# Patient Record
Sex: MALE | Race: WHITE | NOT HISPANIC OR LATINO | Employment: OTHER | URBAN - METROPOLITAN AREA
[De-identification: names, ages, dates, MRNs, and addresses within clinical notes are randomized per-mention and may not be internally consistent; named-entity substitution may affect disease eponyms.]

---

## 2017-08-08 ENCOUNTER — TELEPHONE (OUTPATIENT)
Dept: FAMILY MEDICINE | Facility: CLINIC | Age: 82
End: 2017-08-08

## 2017-08-08 DIAGNOSIS — M17.11 OSTEOARTHRITIS OF RIGHT KNEE, UNSPECIFIED OSTEOARTHRITIS TYPE: ICD-10-CM

## 2017-08-08 NOTE — TELEPHONE ENCOUNTER
Reason for Call:  Medication or medication refill:    Do you use a [facility]?  Name of the pharmacy and phone number for the current request:  [facility] [facility] [address]    Name of the medication requested: diclofenac (VOLTAREN) 1 % GEL           Other request: INCREASE to 3% is being requested   Pharmacist says this is available    Can we leave a detailed message on this number? YES    Phone number patient can be reached at: Home number on file [phone] (home)    Best Time: anytime    Call taken on 8/8/2017 at 10:18 AM by ADAM THOMAS
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
## 2017-08-10 ENCOUNTER — TRANSFERRED RECORDS (OUTPATIENT)
Dept: HEALTH INFORMATION MANAGEMENT | Facility: CLINIC | Age: 82
End: 2017-08-10

## 2017-08-10 ENCOUNTER — TELEPHONE (OUTPATIENT)
Dept: FAMILY MEDICINE | Facility: CLINIC | Age: 82
End: 2017-08-10

## 2017-08-10 DIAGNOSIS — M17.11 OSTEOARTHRITIS OF RIGHT KNEE, UNSPECIFIED OSTEOARTHRITIS TYPE: ICD-10-CM

## 2017-08-10 NOTE — TELEPHONE ENCOUNTER
Reason for Call:  Medication or medication refill:    Do you use a [facility]?  Name of the pharmacy and phone number for the current request:  [facility]    Name of the medication requested: Diclofenac gel.    Other request: Pt was using the 1% gel but this was recently changed to the 3% gel.  When he went to  rx  it was $365.  He can't afford that and would like a new rx sent to his pharmacy for the 1%.     Can we leave a detailed message on this number? YES    Phone number patient can be reached at: Cell number on file:    Telephone Information:   Mobile [phone]       Best Time: any    Call taken on 8/10/2017 at 11:59 AM by SARAH ROSSI

## 2017-08-18 ENCOUNTER — TELEPHONE (OUTPATIENT)
Dept: FAMILY MEDICINE | Facility: CLINIC | Age: 82
End: 2017-08-18
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
## 2017-08-22 ENCOUNTER — OFFICE VISIT (OUTPATIENT)
Dept: FAMILY MEDICINE | Facility: CLINIC | Age: 82
End: 2017-08-22
Payer: COMMERCIAL

## 2017-08-22 VITALS
SYSTOLIC BLOOD PRESSURE: 110 MMHG | HEART RATE: 68 BPM | BODY MASS INDEX: 32.35 KG/M2 | HEIGHT: 70 IN | DIASTOLIC BLOOD PRESSURE: 66 MMHG | WEIGHT: 226 LBS | RESPIRATION RATE: 16 BRPM

## 2017-08-22 DIAGNOSIS — B02.29 POST HERPETIC NEURALGIA: Primary | ICD-10-CM

## 2017-08-22 PROCEDURE — 99214 OFFICE O/P EST MOD 30 MIN: CPT | Performed by: FAMILY MEDICINE

## 2017-08-22 NOTE — MR AVS SNAPSHOT
After Visit Summary   8/22/2017    Canelo Cruz    MRN: [id number]           Patient Information     Date Of Birth          7/30/1931        Visit Information        Provider Department      8/22/2017 7:45 AM Mert English MD [facility]        Today's Diagnoses     Post herpetic neuralgia    -  1       Follow-ups after your visit        Additional Services     NEUROLOGY ADULT REFERRAL       Your provider has referred you for the following:   Consult at [facility]: [facility] [place] [phone]   [email or web address]    Please be aware that coverage of these services is subject to the terms and limitations of your health insurance plan.  Call member services at your health plan with any benefit or coverage questions.      Please bring the following with you to your appointment:    (1) Any X-Rays, CTs or MRIs which have been performed.  Contact the facility where they were done to arrange for  prior to your scheduled appointment.    (2) List of current medications  (3) This referral request   (4) Any documents/labs given to you for this referral                  Who to contact     If you have questions or need follow up information about today's clinic visit or your schedule please contact [facility] directly at [phone].  Normal or non-critical lab and imaging results will be communicated to you by MyChart, letter or phone within 4 business days after the clinic has received the results. If you do not hear from us within 7 days, please contact the clinic through MyChart or phone. If you have a critical or abnormal lab result, we will notify you by phone as soon as possible.  Submit refill requests through cottonTracks or call your pharmacy and they will forward the refill request to us. Please allow 3 business days for your refill to be completed.          Additional Information  "About Your Visit        The [employer] Information     [employer] lets you send messages to your doctor, view your test results, renew your prescriptions, schedule appointments and more. To sign up, go to [email or web address] . Click on \"Log in\" on the left side of the screen, which will take you to the Welcome page. Then click on \"Sign up Now\" on the right side of the page.     You will be asked to enter the access code listed below, as well as some personal information. Please follow the directions to create your username and password.     Your access code is: [email or web address]  Expires: 2017  8:21 AM     Your access code will  in 90 days. If you need help or a new code, please call your [place] clinic or [phone].        Care EveryWhere ID     This is your Care EveryWhere ID. This could be used by other organizations to access your [place] medical records  [id number]        Your Vitals Were     Pulse Respirations Height BMI (Body Mass Index)          68 16 5' 10\" (1.778 m) 32.43 kg/m2         Blood Pressure from Last 3 Encounters:   17 110/66   16 136/72   10/24/16 138/76    Weight from Last 3 Encounters:   17 226 lb (102.5 kg)   16 229 lb (103.9 kg)   10/24/16 229 lb (103.9 kg)              We Performed the Following     NEUROLOGY ADULT REFERRAL        Primary Care Provider Office Phone # Fax #    Yuki Jaime PA-C [phone]       [address]  [facility] [address]        Equal Access to Services     [facility] Hospital[facility]IS ANTONIO : Hadii renetta flannery Soharish, waaxda luqadaha, qaybta kaalmada calderon, ania arevalo . So [facility] [phone].    ATENCIÓN: Si habla español, tiene a bermudez disposición servicios gratuitos de asistencia lingüística. Llame al [phone].    We comply with applicable federal civil rights laws and [place] laws. We do not discriminate on the basis of race, color, national origin, age, disability sex, " sexual orientation or gender identity.            Thank you!     Thank you for choosing [facility]  for your care. Our goal is always to provide you with excellent care. Hearing back from our patients is one way we can continue to improve our services. Please take a few minutes to complete the written survey that you may receive in the mail after your visit with us. Thank you!             Your Updated Medication List - Protect others around you: Learn how to safely use, store and throw away your medicines at www.disposemymeds.org.          This list is accurate as of: 8/22/17  8:21 AM.  Always use your most recent med list.                   Brand Name Dispense Instructions for use Diagnosis    diclofenac 1 % Gel topical gel    VOLTAREN    100 g    Apply 4 grams to knees or 2 grams to hands four times daily using enclosed dosing card.    Osteoarthritis of right knee, unspecified osteoarthritis type       HYDROcodone-acetaminophen 5-325 MG per tablet    NORCO    60 tablet    Take 1 tablet by mouth every 8 hours as needed for moderate to severe pain    Post herpetic neuralgia       lidocaine 5 % Patch    LIDODERM    30 patch    Apply up to 3 patches to painful area at once for up to 12 h within a 24 h period.  Remove after 12 hours.    Post herpetic neuralgia       sennosides 8.6 MG tablet    SENOKOT    60 tablet    Take 1 tablet by mouth daily as needed for constipation    Constipation, unspecified constipation type       triamcinolone 0.1 % cream    KENALOG    80 g    Apply to affected area 1-3 times daily as needed    Dermatitis

## 2017-08-22 NOTE — NURSING NOTE
"Chief Complaint   Patient presents with     Referral     to a neurologist- pain from shingles       Initial /66  Pulse 68  Resp 16  Ht 5' 10\" (1.778 m)  Wt 226 lb (102.5 kg)  BMI 32.43 kg/m2 Estimated body mass index is 32.43 kg/(m^2) as calculated from the following:    Height as of this encounter: 5' 10\" (1.778 m).    Weight as of this encounter: 226 lb (102.5 kg).  Medication Reconciliation: complete     Renetta Toscano CMA      "

## 2017-08-22 NOTE — PROGRESS NOTES
"  SUBJECTIVE:   Canelo Cruz is a 86 year old male who presents to clinic today for the following health issues:      Nerve Pain      Duration: 2 years    Description (location/character/radiation): pain since having shingles    Intensity:  moderate    Accompanying signs and symptoms: would like a referral to neurology    History (similar episodes/previous evaluation): None    Precipitating or alleviating factors: None    Therapies tried and outcome: lidocaine patches, hydrocodone- effective but stopped taking             Problem list and histories reviewed & adjusted, as indicated.  Additional history: as documented    Patient Active Problem List   Diagnosis     History of colonic polyps     Constipation, unspecified constipation type     Post herpetic neuralgia     Decreased libido     ACP (advance care planning)     Erectile dysfunction, unspecified erectile dysfunction type     Osteoarthritis of right knee, unspecified osteoarthritis type     Primary insomnia     Other fatigue     Skin lesion     Past Surgical History:   Procedure Laterality Date     ARTHROSCOPY KNEE Right     ~0827-1767     ORTHOPEDIC SURGERY      right achilles rupture repair with 14 month MRSA infection       Social History   Substance Use Topics     Smoking status: Never Smoker     Smokeless tobacco: Never Used     Alcohol use Yes     No family history on file.          Reviewed and updated as needed this visit by clinical staffTobacco  Allergies  Meds       Reviewed and updated as needed this visit by Provider         ROS:  Constitutional, HEENT, cardiovascular, pulmonary, gi and gu systems are negative, except as otherwise noted.  NEURO: NEGATIVE for weakness, dizziness or paresthesias and POSITIVE for radicular pain due to previous shingles 2 years ago.      OBJECTIVE:                                                    /66  Pulse 68  Resp 16  Ht 5' 10\" (1.778 m)  Wt 226 lb (102.5 kg)  BMI 32.43 kg/m2  Body mass index is " 32.43 kg/(m^2).  GENERAL APPEARANCE: healthy, alert and no distress  SKIN: no suspicious lesions or rashes         ASSESSMENT/PLAN:                                                        ICD-10-CM    1. Post herpetic neuralgia B02.29 NEUROLOGY ADULT REFERRAL       Patient Instructions   I spent 30 minutes with the patient discussing treatment for postherpetic neuralgia.  He has already been seen by [facility] for a consult and they in essence told him there is nothing else they could do with his desires to stay off many of the oral medications.  His lidocaine patches are working well and he will continue with those.  I did refer him to [facility] of neurology for a second opinion on other treatments.  We basically had the discussion that this is possibly a permanent problem for him.  He was interested in going to [facility], however, we settled on [facility] neurology.      Mert English MD  [facility]

## 2017-08-22 NOTE — PATIENT INSTRUCTIONS
I spent 30 minutes with the patient discussing treatment for postherpetic neuralgia.  He has already been seen by [facility] for a consult and they in essence told him there is nothing else they could do with his desires to stay off many of the oral medications.  His lidocaine patches are working well and he will continue with those.  I did refer him to [facility] of neurology for a second opinion on other treatments.  We basically had the discussion that this is possibly a permanent problem for him.  He was interested in going to [facility], however, we settled on [facility] neurology.

## 2017-09-06 DIAGNOSIS — Z00.00 ROUTINE MEDICAL EXAM: ICD-10-CM

## 2017-09-06 DIAGNOSIS — Z12.5 SCREENING FOR PROSTATE CANCER: ICD-10-CM

## 2017-09-06 DIAGNOSIS — E78.1 HYPERTRIGLYCERIDEMIA: ICD-10-CM

## 2017-09-06 DIAGNOSIS — R73.9 ELEVATED BLOOD SUGAR: Primary | ICD-10-CM

## 2017-09-09 DIAGNOSIS — R73.9 ELEVATED BLOOD SUGAR: ICD-10-CM

## 2017-09-09 DIAGNOSIS — Z12.5 SCREENING FOR PROSTATE CANCER: ICD-10-CM

## 2017-09-09 DIAGNOSIS — E78.1 HYPERTRIGLYCERIDEMIA: ICD-10-CM

## 2017-09-09 DIAGNOSIS — Z00.00 ROUTINE MEDICAL EXAM: ICD-10-CM

## 2017-09-09 LAB
ALBUMIN UR-MCNC: NEGATIVE MG/DL
APPEARANCE UR: CLEAR
BASOPHILS # BLD AUTO: 0 10E9/L (ref 0–0.2)
BASOPHILS NFR BLD AUTO: 0.5 %
BILIRUB UR QL STRIP: NEGATIVE
COLOR UR AUTO: YELLOW
DIFFERENTIAL METHOD BLD: NORMAL
EOSINOPHIL # BLD AUTO: 0.2 10E9/L (ref 0–0.7)
EOSINOPHIL NFR BLD AUTO: 2.1 %
ERYTHROCYTE [DISTWIDTH] IN BLOOD BY AUTOMATED COUNT: 14 % (ref 10–15)
GLUCOSE SERPL-MCNC: 99 MG/DL (ref 70–99)
GLUCOSE UR STRIP-MCNC: NEGATIVE MG/DL
HCT VFR BLD AUTO: 47.8 % (ref 40–53)
HGB BLD-MCNC: 16.6 G/DL (ref 13.3–17.7)
HGB UR QL STRIP: NEGATIVE
KETONES UR STRIP-MCNC: NEGATIVE MG/DL
LEUKOCYTE ESTERASE UR QL STRIP: NEGATIVE
LYMPHOCYTES # BLD AUTO: 1.9 10E9/L (ref 0.8–5.3)
LYMPHOCYTES NFR BLD AUTO: 23 %
MCH RBC QN AUTO: 31.5 PG (ref 26.5–33)
MCHC RBC AUTO-ENTMCNC: 34.7 G/DL (ref 31.5–36.5)
MCV RBC AUTO: 91 FL (ref 78–100)
MONOCYTES # BLD AUTO: 0.8 10E9/L (ref 0–1.3)
MONOCYTES NFR BLD AUTO: 9.7 %
NEUTROPHILS # BLD AUTO: 5.2 10E9/L (ref 1.6–8.3)
NEUTROPHILS NFR BLD AUTO: 64.7 %
NITRATE UR QL: NEGATIVE
NON-SQ EPI CELLS #/AREA URNS LPF: NORMAL /LPF
PH UR STRIP: 6 PH (ref 5–7)
PLATELET # BLD AUTO: 198 10E9/L (ref 150–450)
PSA SERPL-ACNC: 3.94 UG/L (ref 0–4)
RBC # BLD AUTO: 5.27 10E12/L (ref 4.4–5.9)
RBC #/AREA URNS AUTO: NORMAL /HPF
SOURCE: NORMAL
SP GR UR STRIP: 1.01 (ref 1–1.03)
TRIGL SERPL-MCNC: 229 MG/DL
UROBILINOGEN UR STRIP-ACNC: 0.2 EU/DL (ref 0.2–1)
WBC # BLD AUTO: 8.1 10E9/L (ref 4–11)
WBC #/AREA URNS AUTO: NORMAL /HPF

## 2017-09-09 PROCEDURE — 82947 ASSAY GLUCOSE BLOOD QUANT: CPT | Performed by: FAMILY MEDICINE

## 2017-09-09 PROCEDURE — 85025 COMPLETE CBC W/AUTO DIFF WBC: CPT | Performed by: FAMILY MEDICINE

## 2017-09-09 PROCEDURE — G0103 PSA SCREENING: HCPCS | Performed by: FAMILY MEDICINE

## 2017-09-09 PROCEDURE — 36415 COLL VENOUS BLD VENIPUNCTURE: CPT | Performed by: FAMILY MEDICINE

## 2017-09-09 PROCEDURE — 81001 URINALYSIS AUTO W/SCOPE: CPT | Performed by: FAMILY MEDICINE

## 2017-09-09 PROCEDURE — 84478 ASSAY OF TRIGLYCERIDES: CPT | Performed by: FAMILY MEDICINE

## 2017-09-11 ENCOUNTER — ALLIED HEALTH/NURSE VISIT (OUTPATIENT)
Dept: NURSING | Facility: CLINIC | Age: 82
End: 2017-09-11
Payer: COMMERCIAL

## 2017-09-11 DIAGNOSIS — Z23 NEED FOR PROPHYLACTIC VACCINATION AND INOCULATION AGAINST INFLUENZA: Primary | ICD-10-CM

## 2017-09-11 PROCEDURE — 90662 IIV NO PRSV INCREASED AG IM: CPT

## 2017-09-11 PROCEDURE — G0008 ADMIN INFLUENZA VIRUS VAC: HCPCS

## 2017-09-11 PROCEDURE — 99207 ZZC NO CHARGE NURSE ONLY: CPT

## 2017-09-11 NOTE — MR AVS SNAPSHOT
"              After Visit Summary   2017    Canelo Cruz    MRN: [id number]           Patient Information     Date Of Birth          1931        Visit Information        Provider Department      2017 11:00 AM BX NURSE [facility]        Today's Diagnoses     Need for prophylactic vaccination and inoculation against influenza    -  1       Follow-ups after your visit        Who to contact     If you have questions or need follow up information about today's clinic visit or your schedule please contact [facility] directly at [phone].  Normal or non-critical lab and imaging results will be communicated to you by Hair Scyncehart, letter or phone within 4 business days after the clinic has received the results. If you do not hear from us within 7 days, please contact the clinic through [employer] or phone. If you have a critical or abnormal lab result, we will notify you by phone as soon as possible.  Submit refill requests through [employer] or call your pharmacy and they will forward the refill request to us. Please allow 3 business days for your refill to be completed.          Additional Information About Your Visit        MyChart Information     [employer] lets you send messages to your doctor, view your test results, renew your prescriptions, schedule appointments and more. To sign up, go to [email or web address] . Click on \"Log in\" on the left side of the screen, which will take you to the Welcome page. Then click on \"Sign up Now\" on the right side of the page.     You will be asked to enter the access code listed below, as well as some personal information. Please follow the directions to create your username and password.     Your access code is: [email or web address]  Expires: 2017  8:21 AM     Your access code will  in 90 days. If you need help or a new code, please call your [facility] or [phone].        Care EveryWhere ID     This " is your Care EveryWhere ID. This could be used by other organizations to access your [place] medical records  [id number]         Blood Pressure from Last 3 Encounters:   08/22/17 110/66   12/13/16 136/72   10/24/16 138/76    Weight from Last 3 Encounters:   08/22/17 226 lb (102.5 kg)   12/13/16 229 lb (103.9 kg)   10/24/16 229 lb (103.9 kg)              We Performed the Following     ADMIN INFLUENZA (For MEDICARE Patients ONLY) []     FLU VACCINE, INCREASED ANTIGEN, PRESV FREE, AGE 65+ [46851]        Primary Care Provider Office Phone # Fax #    Yuki Jaime PA-C [phone]       [address] [facility] [address] [address]        Equal Access to Services     HANH BEAN : Hadii aad ku hadasho Soomaali, waaxda luqadaha, qaybta kaalmada adeegyada, ania mckeonin hayamy arevalo . So [facility] [phone].    ATENCIÓN: Si habla español, tiene a bermudez disposición servicios gratuitos de asistencia lingüística. Dashawn al [phone].    We comply with applicable federal civil rights laws and [place] laws. We do not discriminate on the basis of race, color, national origin, age, disability sex, sexual orientation or gender identity.            Thank you!     Thank you for choosing [facility]  for your care. Our goal is always to provide you with excellent care. Hearing back from our patients is one way we can continue to improve our services. Please take a few minutes to complete the written survey that you may receive in the mail after your visit with us. Thank you!             Your Updated Medication List - Protect others around you: Learn how to safely use, store and throw away your medicines at www.disposemymeds.org.          This list is accurate as of: 9/11/17 11:11 AM.  Always use your most recent med list.                   Brand Name Dispense Instructions for use Diagnosis    diclofenac 1 % Gel topical gel    VOLTAREN    100 g    Apply  4 grams to knees or 2 grams to hands four times daily using enclosed dosing card.    Osteoarthritis of right knee, unspecified osteoarthritis type       HYDROcodone-acetaminophen 5-325 MG per tablet    NORCO    60 tablet    Take 1 tablet by mouth every 8 hours as needed for moderate to severe pain    Post herpetic neuralgia       lidocaine 5 % Patch    LIDODERM    30 patch    Apply up to 3 patches to painful area at once for up to 12 h within a 24 h period.  Remove after 12 hours.    Post herpetic neuralgia       sennosides 8.6 MG tablet    SENOKOT    60 tablet    Take 1 tablet by mouth daily as needed for constipation    Constipation, unspecified constipation type       triamcinolone 0.1 % cream    KENALOG    80 g    Apply to affected area 1-3 times daily as needed    Dermatitis

## 2017-09-11 NOTE — PROGRESS NOTES
Injectable Influenza Immunization Documentation    1.  Are you sick today? (Fever of 100.5 or higher on the day of the clinic)   No    2.  Have you ever had Guillain-Bend Syndrome within 6 weeks of an influenza vaccionation?  No    3. Do you have a life-threatening allergy to eggs?  No    4. Do you have a life-threatening allergy to a component of the vaccine? May include antibiotics, gelatin or latex.  No     5. Have you ever had a reaction to a dose of flu vaccine that needed immediate medical attention?  No     Form completed by Jose Luis PEREZ

## 2017-09-14 ENCOUNTER — TELEPHONE (OUTPATIENT)
Dept: FAMILY MEDICINE | Facility: CLINIC | Age: 82
End: 2017-09-14

## 2017-09-14 NOTE — TELEPHONE ENCOUNTER
Reason for Call:  Request for results:    Name of test or procedure: 9/9    Date of test of procedure: bx    Location of the test or procedure: wants copy of all lab tests done and results-mail to him please    OK to leave the result message on voice mail or with a family member? YES    Phone number Patient can be reached at:  Home number on file [phone] (home)    Additional comments: mail to pt    Call taken on 9/14/2017 at 10:49 AM by DOMITILA TOVAR

## 2017-09-14 NOTE — LETTER
September 14, 2017      Canelo Lopesrell  [address]  [place] [address]        Dear ,    We are writing to inform you of your test results.    Results for orders placed or performed in visit on 09/09/17   CBC with platelets differential   Result Value Ref Range    WBC 8.1 4.0 - 11.0 10e9/L    RBC Count 5.27 4.4 - 5.9 10e12/L    Hemoglobin 16.6 13.3 - 17.7 g/dL    Hematocrit 47.8 40.0 - 53.0 %    MCV 91 78 - 100 fl    MCH 31.5 26.5 - 33.0 pg    MCHC 34.7 31.5 - 36.5 g/dL    RDW 14.0 10.0 - 15.0 %    Platelet Count 198 150 - 450 10e9/L    Diff Method Automated Method     % Neutrophils 64.7 %    % Lymphocytes 23.0 %    % Monocytes 9.7 %    % Eosinophils 2.1 %    % Basophils 0.5 %    Absolute Neutrophil 5.2 1.6 - 8.3 10e9/L    Absolute Lymphocytes 1.9 0.8 - 5.3 10e9/L    Absolute Monocytes 0.8 0.0 - 1.3 10e9/L    Absolute Eosinophils 0.2 0.0 - 0.7 10e9/L    Absolute Basophils 0.0 0.0 - 0.2 10e9/L   UA with Microscopic   Result Value Ref Range    Color Urine Yellow     Appearance Urine Clear     Glucose Urine Negative NEG^Negative mg/dL    Bilirubin Urine Negative NEG^Negative    Ketones Urine Negative NEG^Negative mg/dL    Specific Gravity Urine 1.015 1.003 - 1.035    pH Urine 6.0 5.0 - 7.0 pH    Protein Albumin Urine Negative NEG^Negative mg/dL    Urobilinogen Urine 0.2 0.2 - 1.0 EU/dL    Nitrite Urine Negative NEG^Negative    Blood Urine Negative NEG^Negative    Leukocyte Esterase Urine Negative NEG^Negative    Source Midstream Urine     WBC Urine O - 2 OTO2^O - 2 /HPF    RBC Urine O - 2 OTO2^O - 2 /HPF    Squamous Epithelial /LPF Urine Few FEW^Few /LPF   Prostate spec antigen screen   Result Value Ref Range    PSA 3.94 0 - 4 ug/L   Triglycerides   Result Value Ref Range    Triglycerides 229 (H) <150 mg/dL   Glucose   Result Value Ref Range    Glucose 99 70 - 99 mg/dL             If you have any questions or concerns, please call the clinic at the number listed above.        Sincerely,        Mert English MD

## 2017-09-19 ENCOUNTER — OFFICE VISIT (OUTPATIENT)
Dept: FAMILY MEDICINE | Facility: CLINIC | Age: 82
End: 2017-09-19
Payer: COMMERCIAL

## 2017-09-19 VITALS
SYSTOLIC BLOOD PRESSURE: 128 MMHG | TEMPERATURE: 98 F | DIASTOLIC BLOOD PRESSURE: 78 MMHG | HEART RATE: 71 BPM | BODY MASS INDEX: 32.86 KG/M2 | RESPIRATION RATE: 16 BRPM | WEIGHT: 229 LBS

## 2017-09-19 DIAGNOSIS — E78.1 HYPERTRIGLYCERIDEMIA: Primary | ICD-10-CM

## 2017-09-19 DIAGNOSIS — B02.29 POST HERPETIC NEURALGIA: ICD-10-CM

## 2017-09-19 PROCEDURE — 99213 OFFICE O/P EST LOW 20 MIN: CPT | Performed by: FAMILY MEDICINE

## 2017-09-19 NOTE — PROGRESS NOTES
SUBJECTIVE:   Canelo Cruz is a 86 year old male who presents to clinic today for the following health issues:      Lab Results      Duration: from 9/9/17    Description (location/character/radiation): elevated triglycerides    Intensity:  moderate    Accompanying signs and symptoms: na    History (similar episodes/previous evaluation): None    Precipitating or alleviating factors: None    Therapies tried and outcome: None         Hyperlipidemia Follow-Up      Rate your low fat/cholesterol diet?: good    Taking statin?  No    Other lipid medications/supplements?:  none          Problem list and histories reviewed & adjusted, as indicated.  Additional history: as documented    Patient Active Problem List   Diagnosis     History of colonic polyps     Constipation, unspecified constipation type     Post herpetic neuralgia     Decreased libido     ACP (advance care planning)     Erectile dysfunction, unspecified erectile dysfunction type     Osteoarthritis of right knee, unspecified osteoarthritis type     Primary insomnia     Other fatigue     Skin lesion     Elevated blood sugar     Hypertriglyceridemia     Past Surgical History:   Procedure Laterality Date     ARTHROSCOPY KNEE Right     ~[id number]     ORTHOPEDIC SURGERY      right achilles rupture repair with 14 month MRSA infection       Social History   Substance Use Topics     Smoking status: Never Smoker     Smokeless tobacco: Never Used     Alcohol use Yes     History reviewed. No pertinent family history.          Reviewed and updated as needed this visit by clinical staffTobacco  Allergies  Meds  Med Hx  Surg Hx  Fam Hx  Soc Hx      Reviewed and updated as needed this visit by Provider         ROS:  CONSTITUTIONAL:NEGATIVE for fever, chills, change in weight  ENDOCRINE: NEGATIVE for temperature intolerance, skin/hair changes, polydipsia, polyphagia and polyuria  NEURO: post herpetic neuralgia  OBJECTIVE:                                                     /78  Pulse 71  Temp 98  F (36.7  C) (Tympanic)  Resp 16  Wt 229 lb (103.9 kg)  BMI 32.86 kg/m2  Body mass index is 32.86 kg/(m^2).  GENERAL APPEARANCE: healthy, alert and no distress    Diagnostic test results:  Results for orders placed or performed in visit on 09/09/17   CBC with platelets differential   Result Value Ref Range    WBC 8.1 4.0 - 11.0 10e9/L    RBC Count 5.27 4.4 - 5.9 10e12/L    Hemoglobin 16.6 13.3 - 17.7 g/dL    Hematocrit 47.8 40.0 - 53.0 %    MCV 91 78 - 100 fl    MCH 31.5 26.5 - 33.0 pg    MCHC 34.7 31.5 - 36.5 g/dL    RDW 14.0 10.0 - 15.0 %    Platelet Count 198 150 - 450 10e9/L    Diff Method Automated Method     % Neutrophils 64.7 %    % Lymphocytes 23.0 %    % Monocytes 9.7 %    % Eosinophils 2.1 %    % Basophils 0.5 %    Absolute Neutrophil 5.2 1.6 - 8.3 10e9/L    Absolute Lymphocytes 1.9 0.8 - 5.3 10e9/L    Absolute Monocytes 0.8 0.0 - 1.3 10e9/L    Absolute Eosinophils 0.2 0.0 - 0.7 10e9/L    Absolute Basophils 0.0 0.0 - 0.2 10e9/L   UA with Microscopic   Result Value Ref Range    Color Urine Yellow     Appearance Urine Clear     Glucose Urine Negative NEG^Negative mg/dL    Bilirubin Urine Negative NEG^Negative    Ketones Urine Negative NEG^Negative mg/dL    Specific Gravity Urine 1.015 1.003 - 1.035    pH Urine 6.0 5.0 - 7.0 pH    Protein Albumin Urine Negative NEG^Negative mg/dL    Urobilinogen Urine 0.2 0.2 - 1.0 EU/dL    Nitrite Urine Negative NEG^Negative    Blood Urine Negative NEG^Negative    Leukocyte Esterase Urine Negative NEG^Negative    Source Midstream Urine     WBC Urine O - 2 OTO2^O - 2 /HPF    RBC Urine O - 2 OTO2^O - 2 /HPF    Squamous Epithelial /LPF Urine Few FEW^Few /LPF   Prostate spec antigen screen   Result Value Ref Range    PSA 3.94 0 - 4 ug/L   Triglycerides   Result Value Ref Range    Triglycerides 229 (H) <150 mg/dL   Glucose   Result Value Ref Range    Glucose 99 70 - 99 mg/dL          ASSESSMENT/PLAN:                                                       ICD-10-CM    1. Hypertriglyceridemia E78.1 Lipid panel reflex to direct LDL   2. Post herpetic neuralgia B02.29        Patient Instructions   The patient will repeat his lipid panel in December.  We will check his PSA test annually.  He will continue with Lidoderm patches for his postherpetic neuralgia.  He may add 2 extra strength Tylenol up to 4 times daily also for his postherpetic neuralgia.      Mert English MD  [facility]

## 2017-09-19 NOTE — PATIENT INSTRUCTIONS
The patient will repeat his lipid panel in December.  We will check his PSA test annually.  He will continue with Lidoderm patches for his postherpetic neuralgia.  He may add 2 extra strength Tylenol up to 4 times daily also for his postherpetic neuralgia.

## 2017-09-19 NOTE — MR AVS SNAPSHOT
After Visit Summary   9/19/2017    Canelo Cruz    MRN: [id number]           Patient Information     Date Of Birth          7/30/1931        Visit Information        Provider Department      9/19/2017 11:45 AM Mert English MD [facility]        Today's Diagnoses     Hypertriglyceridemia    -  1    Post herpetic neuralgia          Care Instructions    The patient will repeat his lipid panel in December.  We will check his PSA test annually.  He will continue with Lidoderm patches for his postherpetic neuralgia.  He may add 2 extra strength Tylenol up to 4 times daily also for his postherpetic neuralgia.          Follow-ups after your visit        Follow-up notes from your care team     Return in about 3 months (around 12/19/2017) for Lab Work.      Future tests that were ordered for you today     Open Future Orders        Priority Expected Expires Ordered    Lipid panel reflex to direct LDL Routine  1/17/2018 9/19/2017            Who to contact     If you have questions or need follow up information about today's clinic visit or your schedule please contact [facility] directly at [phone].  Normal or non-critical lab and imaging results will be communicated to you by Auter[employer], letter or phone within 4 business days after the clinic has received the results. If you do not hear from us within 7 days, please contact the clinic through Auter[employer] or phone. If you have a critical or abnormal lab result, we will notify you by phone as soon as possible.  Submit refill requests through [employer] or call your pharmacy and they will forward the refill request to us. Please allow 3 business days for your refill to be completed.          Additional Information About Your Visit        Auterrahart Information     Park[employer]u lets you send messages to your doctor, view your test results, renew your prescriptions, schedule appointments and more. To sign up,  "go to [email or web address] . Click on \"Log in\" on the left side of the screen, which will take you to the Welcome page. Then click on \"Sign up Now\" on the right side of the page.     You will be asked to enter the access code listed below, as well as some personal information. Please follow the directions to create your username and password.     Your access code is: [email or web address]  Expires: 2017  8:21 AM     Your access code will  in 90 days. If you need help or a new code, please call your [facility] clinic or [phone].        Care EveryWhere ID     This is your Care EveryWhere ID. This could be used by other organizations to access your [facility] medical records  [id number]        Your Vitals Were     Pulse Temperature Respirations BMI (Body Mass Index)          71 98  F (36.7  C) (Tympanic) 16 32.86 kg/m2         Blood Pressure from Last 3 Encounters:   17 128/78   17 110/66   16 136/72    Weight from Last 3 Encounters:   17 229 lb (103.9 kg)   17 226 lb (102.5 kg)   16 229 lb (103.9 kg)               Primary Care Provider Office Phone # Fax #    Yuki Jaime PA-C [phone] [address]  [facility] [address]        Equal Access to Services     HANH BEAN AH: Hadii aad ku hadasho Soomaali, waaxda luqadaha, qaybta kaalmada adeegyada, ania rossi hayamy arevalo . So [facility] [phone].    ATENCIÓN: Si habla español, tiene a bermudez disposición servicios gratuitos de asistencia lingüística. Llame al [phone].    We comply with applicable federal civil rights laws and [place] laws. We do not discriminate on the basis of race, color, national origin, age, disability sex, sexual orientation or gender identity.            Thank you!     Thank you for choosing [facility]  for your care. Our goal is always to provide you with excellent care. Hearing back from our patients is " one way we can continue to improve our services. Please take a few minutes to complete the written survey that you may receive in the mail after your visit with us. Thank you!             Your Updated Medication List - Protect others around you: Learn how to safely use, store and throw away your medicines at www.disposemymeds.org.          This list is accurate as of: 9/19/17  2:05 PM.  Always use your most recent med list.                   Brand Name Dispense Instructions for use Diagnosis    diclofenac 1 % Gel topical gel    VOLTAREN    100 g    Apply 4 grams to knees or 2 grams to hands four times daily using enclosed dosing card.    Osteoarthritis of right knee, unspecified osteoarthritis type       HYDROcodone-acetaminophen 5-325 MG per tablet    NORCO    60 tablet    Take 1 tablet by mouth every 8 hours as needed for moderate to severe pain    Post herpetic neuralgia       lidocaine 5 % Patch    LIDODERM    30 patch    Apply up to 3 patches to painful area at once for up to 12 h within a 24 h period.  Remove after 12 hours.    Post herpetic neuralgia       sennosides 8.6 MG tablet    SENOKOT    60 tablet    Take 1 tablet by mouth daily as needed for constipation    Constipation, unspecified constipation type       triamcinolone 0.1 % cream    KENALOG    80 g    Apply to affected area 1-3 times daily as needed    Dermatitis

## 2017-09-19 NOTE — NURSING NOTE
"Chief Complaint   Patient presents with     Lipids     results       Initial /78  Pulse 71  Temp 98  F (36.7  C) (Tympanic)  Resp 16  Wt 229 lb (103.9 kg)  BMI 32.86 kg/m2 Estimated body mass index is 32.86 kg/(m^2) as calculated from the following:    Height as of 8/22/17: 5' 10\" (1.778 m).    Weight as of this encounter: 229 lb (103.9 kg).  Medication Reconciliation: complete     Renetta Toscano CMA      "

## 2017-09-21 DIAGNOSIS — B02.29 POST HERPETIC NEURALGIA: ICD-10-CM

## 2017-09-21 RX ORDER — LIDOCAINE 50 MG/G
PATCH TOPICAL
Qty: 30 PATCH | Refills: 4 | Status: SHIPPED | OUTPATIENT
Start: 2017-09-21 | End: 2018-05-31

## 2017-09-21 NOTE — TELEPHONE ENCOUNTER
Lidocaine (LIDODERM) 5 % PATCH     Last Written Prescription Date: 12/13/16  Last Fill Quantity: 30,  # refills: 5   Last Office Visit with [facility], [facility] or [facility] prescribing provider: 9/19/17    Prescription approved per [facility] Refill Protocol.

## 2017-10-11 ENCOUNTER — OFFICE VISIT (OUTPATIENT)
Dept: FAMILY MEDICINE | Facility: CLINIC | Age: 82
End: 2017-10-11
Payer: COMMERCIAL

## 2017-10-11 VITALS
SYSTOLIC BLOOD PRESSURE: 142 MMHG | HEIGHT: 70 IN | WEIGHT: 225 LBS | BODY MASS INDEX: 32.21 KG/M2 | OXYGEN SATURATION: 95 % | HEART RATE: 68 BPM | DIASTOLIC BLOOD PRESSURE: 84 MMHG | RESPIRATION RATE: 16 BRPM | TEMPERATURE: 98.8 F

## 2017-10-11 DIAGNOSIS — M25.562 PATELLOFEMORAL ARTHRALGIA OF LEFT KNEE: ICD-10-CM

## 2017-10-11 DIAGNOSIS — L98.9 SKIN LESION: Primary | ICD-10-CM

## 2017-10-11 PROCEDURE — 99214 OFFICE O/P EST MOD 30 MIN: CPT | Performed by: PHYSICIAN ASSISTANT

## 2017-10-11 NOTE — PATIENT INSTRUCTIONS
Patellofemoral Pain Syndrome (Runner's Knee)             What is patellofemoral pain syndrome?   Patellofemoral pain syndrome is pain behind the kneecap. It may also be called patellofemoral disorder, patellar malalignment, runner's knee, and chondromalacia.   How does it occur?   Patellofemoral pain syndrome can occur from overuse of the knee in sports and activities such as running, walking, jumping, or bicycling.   The kneecap (patella) is attached to the large group of muscles in the thigh called the quadriceps. It is also attached to the shin bone by the patellar tendon. The kneecap fits into grooves in the end of the thigh bone (femur) called the femoral condyle. With repeated bending and straightening of the knee, you can irritate the inside surface of the kneecap and cause pain.   Patellofemoral pain syndrome also may result from the way your hips, legs, knees, or feet are aligned. For example, if you have wide hips or underdeveloped thigh muscles, or if you are knock-kneed You may also have this problem if your foot flattens too much when you walk or run (a condition called over-pronation).   What are the symptoms?   The main symptom is pain behind the kneecap. You may have pain when you walk, run, or sit for a long time. The pain is usually worse when you walk downhill or down stairs. Your knee may swell at times. You may feel or hear snapping, popping, or grinding in the knee.   How is it diagnosed?   Your healthcare provider will review your symptoms and examine your knee. You will have knee X-rays. You may have an MRI to check for damage to the surface of the patella or femur or another injury.   How is it treated?   Treatment includes the following:   Put an ice pack, gel pack, or package of frozen vegetables, wrapped in a cloth on the area every 3 to 4 hours, for up to 20 minutes at a time.   Raise the knee on a pillow when you sit or lie down.   Take an anti-inflammatory medicine such  as ibuprofen, or other medicine as directed by your provider. Nonsteroidal anti-inflammatory medicines (NSAIDs) may cause stomach bleeding and other problems. These risks increase with age. Read the label and take as directed. Unless recommended by your healthcare provider, do not take for more than 10 days.   Follow your provider's instructions for doing exercises to help you recover. Your healthcare provider will show you exercises to help decrease the pain behind your kneecap.   If you over-pronate, your healthcare provider may recommend shoe inserts, called orthotics. You can buy orthotics at a pharmacy or athletic shoe store or they can be custom-made.   Use an infrapatellar strap, a strap placed below the kneecap over the patellar tendon.   Wear a neoprene knee sleeve, which will give support to your knee and patella.   While you recover from your injury, you will need to change your sport or activity to one that does not make your condition worse. For example, you may need to bicycle or swim instead of run.   In cases of severe patellofemoral pain syndrome, surgery may be recommended.   How long will the effects last?   Patellofemoral pain often lasts a long time and can come back after symptoms were better for a while. Treatment requires proper rehabilitation exercises that are done regularly.   When can I return to my normal activities?   Everyone recovers from an injury at a different rate. Return to your activities depends on how soon your knee recovers, not by how many days or weeks it has been since your injury has occurred. In general, the longer you have symptoms before you start treatment, the longer it will take to get better. The goal is to return you to your normal activities as soon as is safely possible. If you return too soon you may worsen your injury.   You may safely return to your normal activities when, starting from the top of the list and progressing to the end, each of the following is  true:   Your injured knee can be fully straightened and bent without pain.   Your knee and leg have regained normal strength compared to the uninjured knee and leg.   You are able to walk, bend, and squat without pain.   How can I prevent runner's knee?   Runner's knee can best be prevented by strengthening your thigh muscles, particularly the inside part of this muscle group. It is also important to wear shoes that fit well and that have good arch supports.     Published by [employer].  This content is reviewed periodically and is subject to change as new health information becomes available. The information is intended to inform and educate and is not a replacement for medical evaluation, advice, diagnosis or treatment by a healthcare professional.   Written by Dre Henderson MD, for [employer]   ? 2010 [employer] and/or its affiliates. All Rights Reserved.   Copyright   [employer] Reference Systems 2011  Adult Health Advisor    Patellofemoral Pain Syndrome (Runner's Knee) Rehabilitation Exercises              You can do the hamstring stretch right away. When the pain in your knee has decreased, you can do the quadriceps stretch and start strengthening the thigh muscles using the rest of the exercises.   Standing hamstring stretch: Put the heel of the leg on your injured side on a stool about 15 inches high. Keep your leg straight. Lean forward, bending at the hips, until you feel a mild stretch in the back of your thigh. Make sure you don't roll your shoulders or bend at the waist when doing this or you will stretch your lower back instead of your leg. Hold the stretch for 15 to 30 seconds. Repeat 3 times.   Quadriceps stretch: Stand an arm's length away from the wall with your injured leg farthest from the wall. Facing straight ahead, brace yourself by keeping one hand against the wall. With your other hand, grasp the ankle of your injured leg and pull your heel toward your buttocks. Don't arch or twist your  back. Keep your knees together. Hold this stretch for 15 to 30 seconds.   Side-lying leg lift: Lie on your uninjured side. Tighten the front thigh muscles on your injured leg and lift that leg 8 to 10 inches away from the other leg. Keep the leg straight and lower it slowly. Do 3 sets of 10.   Quad sets: Sit on the floor with your injured leg straight and your other leg bent. Press the back of the knee of your injured leg against the floor by tightening the muscles on the top of your thigh. Hold this position 10 seconds. Relax. Do 3 sets of 10.   Straight leg raise: Lie on your back with your legs straight out in front of you. Bend the knee on your uninjured side and place the foot flat on the floor. Tighten the thigh muscle on your injured side and lift your leg about 8 inches off the floor. Keep your leg straight and your thigh muscle tight. Slowly lower your leg back down to the floor. Do 3 sets of 10.   Step-up: Stand with the foot of your injured leg on a support 3 to 5 inches high (like a small step or block of wood). Keep your other foot flat on the floor. Shift your weight onto the injured leg on the support. Straighten your injured leg as the other leg comes off the floor. Return to the starting position by bending your injured leg and slowly lowering your uninjured leg back to the floor. Do 3 sets of 10.   Wall squat with a ball: Stand with your back, shoulders, and head against a wall. Look straight ahead. Keep your shoulders relaxed and your feet 3 feet from the wall and shoulder's width apart. Place a soccer or basketball-sized ball behind your back. Keeping your back against the wall, slowly squat down to a 45-degree angle. Your thighs will not yet be parallel to the floor. Hold this position for 10 seconds and then slowly slide back up the wall. Repeat 10 times. Build up to 3 sets of 10.   Knee stabilization: Wrap a piece of elastic tubing around the ankle of the uninjured leg. Tie a knot in the other  end of the tubing and close it in a door.   0. Stand facing the door on the leg without tubing and bend your knee slightly, keeping your thigh muscles tight. While maintaining this position, move the leg with the tubing straight back behind you. Do 3 sets of 10.   0. Turn 90 degrees so the leg without tubing is closest to the door. Move the leg with tubing away from your body. Do 3 sets of 10.   0. Turn 90 degrees again so your back is to the door. Move the leg with tubing straight out in front of you. Do 3 sets of 10.   0. Turn your body 90 degrees again so the leg with tubing is closest to the door. Move the leg with tubing across your body. Do 3 sets of 10.   Hold onto a chair if you need help balancing. This exercise can be made even more challenging by standing on a pillow while you move the leg with tubing.   Resisted terminal knee extension: Make a loop from a piece of elastic tubing by tying a knot in both ends. Close both knots in a door. Step into the loop so the tubing is around the back of your injured leg. Lift the other foot off the ground. Hold onto a chair for balance, if needed. Bend the knee on the leg with tubing about 45 degrees. Slowly straighten your leg, keeping your thigh muscle tight as you do this. Do this 10 times. Do 3 sets. An easier way to do this is to stand on both legs for better support while you do the exercise.   Standing calf stretch: Stand facing a wall with your hands on the wall at about eye level. Keep your injured leg back with your heel on the floor. Keep the other leg forward with the knee bent. Turn your back foot slightly inward (as if you were pigeon-toed). Slowly lean into the wall until you feel a stretch in the back of your calf. Hold the stretch for 15 to 30 seconds. Return to the starting position. Repeat 3 times. Do this exercise several times each day.   Clam exercise: Lie on your uninjured side with your hips and knees bent and feet together. Slowly raise your  top leg toward the ceiling while keeping your heels touching each other. Hold for 2 seconds and lower slowly. Do 3 sets of 10 repetitions.   Iliotibial band stretch: Side-bending: Cross one leg in front of the other leg and lean in the opposite direction from the front leg. Reach the arm on the side of the back leg over your head while you do this. Hold this position for 15 to 30 seconds. Return to the starting position. Repeat 3 times and then switch legs and repeat the exercise.   Published by [facility].  This content is reviewed periodically and is subject to change as new health information becomes available. The information is intended to inform and educate and is not a replacement for medical evaluation, advice, diagnosis or treatment by a healthcare professional.   Written by Alla Bowser MS, PT, and Dorene Rebolledo PT, [facility], [facility], for [facility].   ? 2010 [facility] and/or its affiliates. All Rights Reserved.   Copyright   Clinical Reference Systems 2011  Adult Health Advisor

## 2017-10-11 NOTE — MR AVS SNAPSHOT
After Visit Summary   10/11/2017    Canelo Cruz    MRN: [id number]           Patient Information     Date Of Birth          7/30/1931        Visit Information        Provider Department      10/11/2017 3:30 PM Yuki Jaime PA-C [facility]        Today's Diagnoses     Skin lesion    -  1      Care Instructions                    Patellofemoral Pain Syndrome (Runner's Knee)             What is patellofemoral pain syndrome?   Patellofemoral pain syndrome is pain behind the kneecap. It may also be called patellofemoral disorder, patellar malalignment, runner's knee, and chondromalacia.   How does it occur?   Patellofemoral pain syndrome can occur from overuse of the knee in sports and activities such as running, walking, jumping, or bicycling.   The kneecap (patella) is attached to the large group of muscles in the thigh called the quadriceps. It is also attached to the shin bone by the patellar tendon. The kneecap fits into grooves in the end of the thigh bone (femur) called the femoral condyle. With repeated bending and straightening of the knee, you can irritate the inside surface of the kneecap and cause pain.   Patellofemoral pain syndrome also may result from the way your hips, legs, knees, or feet are aligned. For example, if you have wide hips or underdeveloped thigh muscles, or if you are knock-kneed You may also have this problem if your foot flattens too much when you walk or run (a condition called over-pronation).   What are the symptoms?   The main symptom is pain behind the kneecap. You may have pain when you walk, run, or sit for a long time. The pain is usually worse when you walk downhill or down stairs. Your knee may swell at times. You may feel or hear snapping, popping, or grinding in the knee.   How is it diagnosed?   Your healthcare provider will review your symptoms and examine your knee. You will have knee X-rays. You may have an  MRI to check for damage to the surface of the patella or femur or another injury.   How is it treated?   Treatment includes the following:   Put an ice pack, gel pack, or package of frozen vegetables, wrapped in a cloth on the area every 3 to 4 hours, for up to 20 minutes at a time.   Raise the knee on a pillow when you sit or lie down.   Take an anti-inflammatory medicine such as ibuprofen, or other medicine as directed by your provider. Nonsteroidal anti-inflammatory medicines (NSAIDs) may cause stomach bleeding and other problems. These risks increase with age. Read the label and take as directed. Unless recommended by your healthcare provider, do not take for more than 10 days.   Follow your provider's instructions for doing exercises to help you recover. Your healthcare provider will show you exercises to help decrease the pain behind your kneecap.   If you over-pronate, your healthcare provider may recommend shoe inserts, called orthotics. You can buy orthotics at a pharmacy or athletic shoe store or they can be custom-made.   Use an infrapatellar strap, a strap placed below the kneecap over the patellar tendon.   Wear a neoprene knee sleeve, which will give support to your knee and patella.   While you recover from your injury, you will need to change your sport or activity to one that does not make your condition worse. For example, you may need to bicycle or swim instead of run.   In cases of severe patellofemoral pain syndrome, surgery may be recommended.   How long will the effects last?   Patellofemoral pain often lasts a long time and can come back after symptoms were better for a while. Treatment requires proper rehabilitation exercises that are done regularly.   When can I return to my normal activities?   Everyone recovers from an injury at a different rate. Return to your activities depends on how soon your knee recovers, not by how many days or weeks it has been since your injury has occurred. In  general, the longer you have symptoms before you start treatment, the longer it will take to get better. The goal is to return you to your normal activities as soon as is safely possible. If you return too soon you may worsen your injury.   You may safely return to your normal activities when, starting from the top of the list and progressing to the end, each of the following is true:   Your injured knee can be fully straightened and bent without pain.   Your knee and leg have regained normal strength compared to the uninjured knee and leg.   You are able to walk, bend, and squat without pain.   How can I prevent runner's knee?   Runner's knee can best be prevented by strengthening your thigh muscles, particularly the inside part of this muscle group. It is also important to wear shoes that fit well and that have good arch supports.     Published by [employer].  This content is reviewed periodically and is subject to change as new health information becomes available. The information is intended to inform and educate and is not a replacement for medical evaluation, advice, diagnosis or treatment by a healthcare professional.   Written by Dre Henderson MD, for [employer]   ? 2010 [employer] and/or its affiliates. All Rights Reserved.   Copyright   [employer] 2011  Adult Health Advisor    Patellofemoral Pain Syndrome (Runner's Knee) Rehabilitation Exercises              You can do the hamstring stretch right away. When the pain in your knee has decreased, you can do the quadriceps stretch and start strengthening the thigh muscles using the rest of the exercises.   Standing hamstring stretch: Put the heel of the leg on your injured side on a stool about 15 inches high. Keep your leg straight. Lean forward, bending at the hips, until you feel a mild stretch in the back of your thigh. Make sure you don't roll your shoulders or bend at the waist when doing this or you will stretch your lower back  instead of your leg. Hold the stretch for 15 to 30 seconds. Repeat 3 times.   Quadriceps stretch: Stand an arm's length away from the wall with your injured leg farthest from the wall. Facing straight ahead, brace yourself by keeping one hand against the wall. With your other hand, grasp the ankle of your injured leg and pull your heel toward your buttocks. Don't arch or twist your back. Keep your knees together. Hold this stretch for 15 to 30 seconds.   Side-lying leg lift: Lie on your uninjured side. Tighten the front thigh muscles on your injured leg and lift that leg 8 to 10 inches away from the other leg. Keep the leg straight and lower it slowly. Do 3 sets of 10.   Quad sets: Sit on the floor with your injured leg straight and your other leg bent. Press the back of the knee of your injured leg against the floor by tightening the muscles on the top of your thigh. Hold this position 10 seconds. Relax. Do 3 sets of 10.   Straight leg raise: Lie on your back with your legs straight out in front of you. Bend the knee on your uninjured side and place the foot flat on the floor. Tighten the thigh muscle on your injured side and lift your leg about 8 inches off the floor. Keep your leg straight and your thigh muscle tight. Slowly lower your leg back down to the floor. Do 3 sets of 10.   Step-up: Stand with the foot of your injured leg on a support 3 to 5 inches high (like a small step or block of wood). Keep your other foot flat on the floor. Shift your weight onto the injured leg on the support. Straighten your injured leg as the other leg comes off the floor. Return to the starting position by bending your injured leg and slowly lowering your uninjured leg back to the floor. Do 3 sets of 10.   Wall squat with a ball: Stand with your back, shoulders, and head against a wall. Look straight ahead. Keep your shoulders relaxed and your feet 3 feet from the wall and shoulder's width apart. Place a soccer or  basketball-sized ball behind your back. Keeping your back against the wall, slowly squat down to a 45-degree angle. Your thighs will not yet be parallel to the floor. Hold this position for 10 seconds and then slowly slide back up the wall. Repeat 10 times. Build up to 3 sets of 10.   Knee stabilization: Wrap a piece of elastic tubing around the ankle of the uninjured leg. Tie a knot in the other end of the tubing and close it in a door.   0. Stand facing the door on the leg without tubing and bend your knee slightly, keeping your thigh muscles tight. While maintaining this position, move the leg with the tubing straight back behind you. Do 3 sets of 10.   0. Turn 90 degrees so the leg without tubing is closest to the door. Move the leg with tubing away from your body. Do 3 sets of 10.   0. Turn 90 degrees again so your back is to the door. Move the leg with tubing straight out in front of you. Do 3 sets of 10.   0. Turn your body 90 degrees again so the leg with tubing is closest to the door. Move the leg with tubing across your body. Do 3 sets of 10.   Hold onto a chair if you need help balancing. This exercise can be made even more challenging by standing on a pillow while you move the leg with tubing.   Resisted terminal knee extension: Make a loop from a piece of elastic tubing by tying a knot in both ends. Close both knots in a door. Step into the loop so the tubing is around the back of your injured leg. Lift the other foot off the ground. Hold onto a chair for balance, if needed. Bend the knee on the leg with tubing about 45 degrees. Slowly straighten your leg, keeping your thigh muscle tight as you do this. Do this 10 times. Do 3 sets. An easier way to do this is to stand on both legs for better support while you do the exercise.   Standing calf stretch: Stand facing a wall with your hands on the wall at about eye level. Keep your injured leg back with your heel on the floor. Keep the other leg forward with  the knee bent. Turn your back foot slightly inward (as if you were pigeon-toed). Slowly lean into the wall until you feel a stretch in the back of your calf. Hold the stretch for 15 to 30 seconds. Return to the starting position. Repeat 3 times. Do this exercise several times each day.   Clam exercise: Lie on your uninjured side with your hips and knees bent and feet together. Slowly raise your top leg toward the ceiling while keeping your heels touching each other. Hold for 2 seconds and lower slowly. Do 3 sets of 10 repetitions.   Iliotibial band stretch: Side-bending: Cross one leg in front of the other leg and lean in the opposite direction from the front leg. Reach the arm on the side of the back leg over your head while you do this. Hold this position for 15 to 30 seconds. Return to the starting position. Repeat 3 times and then switch legs and repeat the exercise.   Published by [employer].  This content is reviewed periodically and is subject to change as new health information becomes available. The information is intended to inform and educate and is not a replacement for medical evaluation, advice, diagnosis or treatment by a healthcare professional.   Written by Alla Bowser, MS, PT, and Dorene Rebolledo, PT, [facility], [facility], for [employer].   ? 2010 [facility] and/or its affiliates. All Rights Reserved.   Copyright   Clinical Reference Systems 2011  Adult Health Advisor                                     Follow-ups after your visit        Additional Services     DERMATOLOGY REFERRAL       Your provider has referred you to: N[facility] [phone]   [email or web address]    Please be aware that coverage of these services is subject to the terms and limitations of your health insurance plan.  Call member services at your health plan with any benefit or coverage questions.      Please bring the following with you to your appointment:    (1) Any X-Rays, CTs or MRIs which  "have been performed.  Contact the facility where they were done to arrange for  prior to your scheduled appointment.    (2) List of current medications  (3) This referral request   (4) Any documents/labs given to you for this referral                  Who to contact     If you have questions or need follow up information about today's clinic visit or your schedule please contact [facility] directly at [phone].  Normal or non-critical lab and imaging results will be communicated to you by MyChart, letter or phone within 4 business days after the clinic has received the results. If you do not hear from us within 7 days, please contact the clinic through [employer] or phone. If you have a critical or abnormal lab result, we will notify you by phone as soon as possible.  Submit refill requests through [employer] or call your pharmacy and they will forward the refill request to us. Please allow 3 business days for your refill to be completed.          Additional Information About Your Visit        Care EveryWhere ID     This is your Care EveryWhere ID. This could be used by other organizations to access your [facility] medical records  [id number]        Your Vitals Were     Pulse Temperature Respirations Height Pulse Oximetry BMI (Body Mass Index)    68 98.8  F (37.1  C) (Tympanic) 16 5' 10\" (1.778 m) 95% 32.28 kg/m2       Blood Pressure from Last 3 Encounters:   10/11/17 142/84   09/19/17 128/78   08/22/17 110/66    Weight from Last 3 Encounters:   10/11/17 225 lb (102.1 kg)   09/19/17 229 lb (103.9 kg)   08/22/17 226 lb (102.5 kg)              We Performed the Following     DERMATOLOGY REFERRAL          Today's Medication Changes          These changes are accurate as of: 10/11/17  3:37 PM.  If you have any questions, ask your nurse or doctor.               Stop taking these medicines if you haven't already. Please contact your care team if you have questions.     " HYDROcodone-acetaminophen 5-325 MG per tablet   Commonly known as:  NORCO   Stopped by:  Yuki Jaime PA-C                    Primary Care Provider Office Phone # Fax #    Yuki Jaime PA-C [phone] [address]  [facility] [address]        Equal Access to Services     [facility] GENEVA : Hadii aad ku hadasho Soomaali, waaxda luqadaha, qaybta kaalmada adeegyada, waxay idiin hayaan adeeg kharash la'aan ah. So [facility] [phone].    ATENCIÓN: Si habla español, tiene a bermudez disposición servicios gratuitos de asistencia lingüística. Llame al [phone].    We comply with applicable federal civil rights laws and [place] laws. We do not discriminate on the basis of race, color, national origin, age, disability, sex, sexual orientation, or gender identity.            Thank you!     Thank you for choosing [facility]  for your care. Our goal is always to provide you with excellent care. Hearing back from our patients is one way we can continue to improve our services. Please take a few minutes to complete the written survey that you may receive in the mail after your visit with us. Thank you!             Your Updated Medication List - Protect others around you: Learn how to safely use, store and throw away your medicines at www.disposemymeds.org.          This list is accurate as of: 10/11/17  3:37 PM.  Always use your most recent med list.                   Brand Name Dispense Instructions for use Diagnosis    diclofenac 1 % Gel topical gel    VOLTAREN    100 g    Apply 4 grams to knees or 2 grams to hands four times daily using enclosed dosing card.    Osteoarthritis of right knee, unspecified osteoarthritis type       lidocaine 5 % Patch    LIDODERM    30 patch    APPLY UP TO THREE PATCHES TO PAINFUL AREA AT ONCE FOR UP TO 12 HOURS IN A 24 HOUR PERIOD. REMOVE AFTER 12 HOURS.    Post herpetic neuralgia       sennosides 8.6 MG  tablet    SENOKOT    60 tablet    Take 1 tablet by mouth daily as needed for constipation    Constipation, unspecified constipation type       triamcinolone 0.1 % cream    KENALOG    80 g    Apply to affected area 1-3 times daily as needed    Dermatitis

## 2017-10-11 NOTE — PROGRESS NOTES
SUBJECTIVE:   Canelo Cruz is a 86 year old male who presents to clinic today for the following health issues:    Eye(s) Problem---growth on lower eyelid      Duration: X2 to 3 months    Description:  Location: right  Pain: no   Redness: no   Discharge: no     Accompanying signs and symptoms: None    History (Trauma, foreign body exposure,): None    Precipitating or alleviating factors (contact use): None    Therapies tried and outcome: None  Reviewed and updated as needed this visit by clinical staff  Tobacco  Allergies  Meds  Problems  Med Hx  Surg Hx  Fam Hx  Soc Hx        Reviewed and updated as needed this visit by Provider  Tobacco  Allergies  Meds  Problems  Med Hx  Surg Hx  Fam Hx  Soc Hx        Additional complaints: None    HPI additional notes: Hang presents today with   Chief Complaint   Patient presents with     Eye Problem     Growth on the bottom on left eyelid X2 months'        ROS:  C: Negative for fever, chills, recent change in weight  Skin: Positive for skin lesion  Resp: Negative for significant cough or SOB  CV: Negative for chest pain or peripheral edema  GI: Negative for nausea, abdominal pain, heartburn, or change in bowel habits  MS: POSITIVE for arthralgias left knee.  P: Negative for changes in mood or affect  ROS otherwise negative.    Chart Review:  History   Smoking Status     Never Smoker   Smokeless Tobacco     Never Used     Patient Active Problem List   Diagnosis     History of colonic polyps     Constipation, unspecified constipation type     Post herpetic neuralgia     Decreased libido     ACP (advance care planning)     Erectile dysfunction, unspecified erectile dysfunction type     Osteoarthritis of right knee, unspecified osteoarthritis type     Primary insomnia     Other fatigue     Skin lesion     Elevated blood sugar     Hypertriglyceridemia     Past Surgical History:   Procedure Laterality Date     ARTHROSCOPY KNEE Right     ~[id number]     ORTHOPEDIC  "SURGERY      right achilles rupture repair with 14 month MRSA infection     Problem list, Medication list, Allergies, Medical/Social/Surg hx reviewed in [facility], updated as appropriate.   OBJECTIVE:                                                    /84  Pulse 68  Temp 98.8  F (37.1  C) (Tympanic)  Resp 16  Ht 5' 10\" (1.778 m)  Wt 225 lb (102.1 kg)  SpO2 95%  BMI 32.28 kg/m2  Body mass index is 32.28 kg/(m^2).  GENERAL: healthy, alert, in no acute distress  HENT: Mucous mebranes moist.  ORTHO: Knee exam: Inspection: AP/lateral alignment normal  Tender: patella tendon, medial joint line  Non-tender: distal IT band, prepatellar bursa, popliteal region  Active Range of Motion: pain with flexion, full extension  Strength: wnl  Special tests: Valgus stress test negative, varus stress test negative, posterior drawer negative, lachman's negative, Shonda Test negative.  SKIN: 4 mm actinic keratosis/skin tag vs cutaneous horn inferior to right eye, non-tender to palpation with mild scaling  PSYCH: Alert and oriented times 3;  Able to articulate logical thoughts. Affect is normal.    Diagnostic test results: none      ASSESSMENT/PLAN:                                                          ICD-10-CM    1. Skin lesion L98.9 DERMATOLOGY REFERRAL   2. Patellofemoral arthralgia of left knee M25.562        Discussed possible actinic keratosis/ cutaneous horn below the right eye.  Due to the thick base, prefer pt see dermatology for removal.  Referral provided.    Discussed patellofemoral syndrome, exercises provided and reviewed.  Pt to continue Voltaren patches.  Will let me know if not improving and will get in for PT.    Please see patient instructions for treatment details.    Follow up with specialist as referred.    Yuki Jaime PA-C  [facility]       "

## 2017-10-11 NOTE — NURSING NOTE
"Chief Complaint   Patient presents with     Eye Problem     Growth on the bottom on left eyelid X2 months'       Initial /90 (BP Location: Left arm, Patient Position: Sitting, Cuff Size: Adult Regular)  Pulse 68  Temp 98.8  F (37.1  C) (Tympanic)  Resp 16  Ht 5' 10\" (1.778 m)  Wt 225 lb (102.1 kg)  SpO2 95%  BMI 32.28 kg/m2 Estimated body mass index is 32.28 kg/(m^2) as calculated from the following:    Height as of this encounter: 5' 10\" (1.778 m).    Weight as of this encounter: 225 lb (102.1 kg).  Medication Reconciliation: complete     Shyanne Howard LPN  "

## 2017-11-14 ENCOUNTER — TELEPHONE (OUTPATIENT)
Dept: FAMILY MEDICINE | Facility: CLINIC | Age: 82
End: 2017-11-14

## 2017-11-14 NOTE — TELEPHONE ENCOUNTER
Prior Authorization Request    1. Prior Authorization for the medication lidocaine (LIDODERM) 5 % Patch        Requesting Provider: Yuki Jaime          Pt name: Canelo Cruz        Pt : 1931        Pt MRN: [id number]        Last Office Visit: 10/11/2017           Insurance: Payor: [employer] / Plan: [employer] PLATINUM BLUE / Product Type: PPO /         Insurance ID Number: [unfilled]         Prior Auth Contact Phone number:     BIN#:   PCN#:     CMM KEY: [id number]       To be completed by provider:     2.   Refuse or Start Prior Auth:  Please start Prior Auth.      If requesting prior auth initiation:     Diagnosis (with code):   Post herpetic neuralgia [B02.29]                     Patient has been on this medication since:

## 2017-11-28 ENCOUNTER — OFFICE VISIT (OUTPATIENT)
Dept: FAMILY MEDICINE | Facility: CLINIC | Age: 82
End: 2017-11-28
Payer: COMMERCIAL

## 2017-11-28 VITALS
TEMPERATURE: 97.2 F | OXYGEN SATURATION: 95 % | HEIGHT: 70 IN | BODY MASS INDEX: 32.5 KG/M2 | HEART RATE: 102 BPM | SYSTOLIC BLOOD PRESSURE: 116 MMHG | RESPIRATION RATE: 20 BRPM | DIASTOLIC BLOOD PRESSURE: 68 MMHG | WEIGHT: 227 LBS

## 2017-11-28 DIAGNOSIS — R82.90 NONSPECIFIC FINDING ON EXAMINATION OF URINE: ICD-10-CM

## 2017-11-28 DIAGNOSIS — B02.29 POST HERPETIC NEURALGIA: ICD-10-CM

## 2017-11-28 DIAGNOSIS — N30.00 ACUTE CYSTITIS WITHOUT HEMATURIA: Primary | ICD-10-CM

## 2017-11-28 LAB
ALBUMIN UR-MCNC: NEGATIVE MG/DL
APPEARANCE UR: ABNORMAL
BACTERIA #/AREA URNS HPF: ABNORMAL /HPF
BILIRUB UR QL STRIP: NEGATIVE
COLOR UR AUTO: YELLOW
GLUCOSE UR STRIP-MCNC: NEGATIVE MG/DL
HGB UR QL STRIP: NEGATIVE
KETONES UR STRIP-MCNC: NEGATIVE MG/DL
LEUKOCYTE ESTERASE UR QL STRIP: ABNORMAL
NITRATE UR QL: NEGATIVE
PH UR STRIP: 7.5 PH (ref 5–7)
RBC #/AREA URNS AUTO: ABNORMAL /HPF
SOURCE: ABNORMAL
SP GR UR STRIP: 1.01 (ref 1–1.03)
SPERM #/AREA URNS HPF: PRESENT /HPF
UROBILINOGEN UR STRIP-ACNC: 0.2 EU/DL (ref 0.2–1)
WBC #/AREA URNS AUTO: ABNORMAL /HPF

## 2017-11-28 PROCEDURE — 99214 OFFICE O/P EST MOD 30 MIN: CPT | Performed by: PHYSICIAN ASSISTANT

## 2017-11-28 PROCEDURE — 87086 URINE CULTURE/COLONY COUNT: CPT | Performed by: PHYSICIAN ASSISTANT

## 2017-11-28 PROCEDURE — 81001 URINALYSIS AUTO W/SCOPE: CPT | Performed by: PHYSICIAN ASSISTANT

## 2017-11-28 PROCEDURE — 87186 SC STD MICRODIL/AGAR DIL: CPT | Performed by: PHYSICIAN ASSISTANT

## 2017-11-28 RX ORDER — CIPROFLOXACIN 500 MG/1
500 TABLET, FILM COATED ORAL 2 TIMES DAILY
Qty: 20 TABLET | Refills: 0 | Status: SHIPPED | OUTPATIENT
Start: 2017-11-28 | End: 2017-12-08

## 2017-11-28 RX ORDER — LIDOCAINE 50 MG/G
OINTMENT TOPICAL 4 TIMES DAILY PRN
Qty: 200 G | Refills: 3 | Status: SHIPPED | OUTPATIENT
Start: 2017-11-28 | End: 2018-11-26

## 2017-11-28 NOTE — MR AVS SNAPSHOT
"              After Visit Summary   11/28/2017    Canelo Cruz    MRN: [id number]           Patient Information     Date Of Birth          7/30/1931        Visit Information        Provider Department      11/28/2017 7:50 AM Yuki Jaime PA-C [facility]        Today's Diagnoses     Acute cystitis without hematuria    -  1    Post herpetic neuralgia           Follow-ups after your visit        Who to contact     If you have questions or need follow up information about today's clinic visit or your schedule please contact [facility] directly at [phone].  Normal or non-critical lab and imaging results will be communicated to you by MyChart, letter or phone within 4 business days after the clinic has received the results. If you do not hear from us within 7 days, please contact the clinic through MyChart or phone. If you have a critical or abnormal lab result, we will notify you by phone as soon as possible.  Submit refill requests through AppT[employer] or call your pharmacy and they will forward the refill request to us. Please allow 3 business days for your refill to be completed.          Additional Information About Your Visit        Care EveryWhere ID     This is your Care EveryWhere ID. This could be used by other organizations to access your [facility] medical records  [id number]        Your Vitals Were     Pulse Temperature Respirations Height Pulse Oximetry BMI (Body Mass Index)    102 97.2  F (36.2  C) (Tympanic) 20 5' 10\" (1.778 m) 95% 32.57 kg/m2       Blood Pressure from Last 3 Encounters:   11/28/17 116/68   10/11/17 142/84   09/19/17 128/78    Weight from Last 3 Encounters:   11/28/17 227 lb (103 kg)   10/11/17 225 lb (102.1 kg)   09/19/17 229 lb (103.9 kg)              We Performed the Following     UA reflex to Microscopic and Culture     Urine Microscopic          Today's Medication Changes          These changes are accurate " as of: 11/28/17  8:38 AM.  If you have any questions, ask your nurse or doctor.               Start taking these medicines.        Dose/Directions    ciprofloxacin 500 MG tablet   Commonly known as:  CIPRO   Used for:  Acute cystitis without hematuria   Started by:  Yuki Jaime PA-C        Dose:  500 mg   Take 1 tablet (500 mg) by mouth 2 times daily for 10 days   Quantity:  20 tablet   Refills:  0         These medicines have changed or have updated prescriptions.        Dose/Directions    * lidocaine 5 % Patch   Commonly known as:  LIDODERM   This may have changed:  Another medication with the same name was added. Make sure you understand how and when to take each.   Used for:  Post herpetic neuralgia   Changed by:  Yuki Jaime PA-C        APPLY UP TO THREE PATCHES TO PAINFUL AREA AT ONCE FOR UP TO 12 HOURS IN A 24 HOUR PERIOD. REMOVE AFTER 12 HOURS.   Quantity:  30 patch   Refills:  4       * lidocaine 5 % ointment   Commonly known as:  XYLOCAINE   This may have changed:  You were already taking a medication with the same name, and this prescription was added. Make sure you understand how and when to take each.   Used for:  Post herpetic neuralgia   Changed by:  Ykui Jaime PA-C        Apply topically 4 times daily as needed for moderate pain   Quantity:  200 g   Refills:  3       * Notice:  This list has 2 medication(s) that are the same as other medications prescribed for you. Read the directions carefully, and ask your doctor or other care provider to review them with you.         Where to get your medicines      These medications were sent to [id number] - [place], [place] - [address]  [address] [place] [address]     Phone:  [phone]     ciprofloxacin 500 MG tablet    lidocaine 5 % ointment                Primary Care Provider Office Phone # Fax #    Yuki Jaime PA-C [phone] [address] [facility] [address]  [facility] [address]        Equal Access to Services     HANH BEAN : Hadii renetta ku hadlainanunu Soharish, waaxda luqadaha, qaybta kaevelioda dawoodjuanykarena, ania rizovannesasloan galvez. So [facility] [phone].    ATENCIÓN: Si habla español, tiene a bermudez disposición servicios gratuitos de asistencia lingüística. Llame al [phone].    We comply with applicable federal civil rights laws and [place] laws. We do not discriminate on the basis of race, color, national origin, age, disability, sex, sexual orientation, or gender identity.            Thank you!     Thank you for choosing [facility]  for your care. Our goal is always to provide you with excellent care. Hearing back from our patients is one way we can continue to improve our services. Please take a few minutes to complete the written survey that you may receive in the mail after your visit with us. Thank you!             Your Updated Medication List - Protect others around you: Learn how to safely use, store and throw away your medicines at www.disposemymeds.org.          This list is accurate as of: 11/28/17  8:38 AM.  Always use your most recent med list.                   Brand Name Dispense Instructions for use Diagnosis    ciprofloxacin 500 MG tablet    CIPRO    20 tablet    Take 1 tablet (500 mg) by mouth 2 times daily for 10 days    Acute cystitis without hematuria       diclofenac 1 % Gel topical gel    VOLTAREN    100 g    Apply 4 grams to knees or 2 grams to hands four times daily using enclosed dosing card.    Osteoarthritis of right knee, unspecified osteoarthritis type       * lidocaine 5 % Patch    LIDODERM    30 patch    APPLY UP TO THREE PATCHES TO PAINFUL AREA AT ONCE FOR UP TO 12 HOURS IN A 24 HOUR PERIOD. REMOVE AFTER 12 HOURS.    Post herpetic neuralgia       * lidocaine 5 % ointment    XYLOCAINE    200 g    Apply topically 4 times daily as needed for moderate pain    Post herpetic neuralgia        sennosides 8.6 MG tablet    SENOKOT    60 tablet    Take 1 tablet by mouth daily as needed for constipation    Constipation, unspecified constipation type       triamcinolone 0.1 % cream    KENALOG    80 g    Apply to affected area 1-3 times daily as needed    Dermatitis       * Notice:  This list has 2 medication(s) that are the same as other medications prescribed for you. Read the directions carefully, and ask your doctor or other care provider to review them with you.

## 2017-11-28 NOTE — NURSING NOTE
"Chief Complaint   Patient presents with     UTI     /68  Pulse 102  Temp 97.2  F (36.2  C) (Tympanic)  Resp 20  Ht 5' 10\" (1.778 m)  Wt 227 lb (103 kg)  SpO2 95%  BMI 32.57 kg/m2 Estimated body mass index is 32.57 kg/(m^2) as calculated from the following:    Height as of this encounter: 5' 10\" (1.778 m).    Weight as of this encounter: 227 lb (103 kg).  BP completed using cuff size: jose Dominguez CMA    There are no preventive care reminders to display for this patient.  Health Maintenance reviewed at today's visit patient asked to schedule/complete:   None, Health Maintenance up to date.    "

## 2017-11-28 NOTE — PROGRESS NOTES
SUBJECTIVE:   Canelo Cruz is a 86 year old male who presents to clinic today for the following health issues:    Genitourinary symptoms      Duration: x 1 day    Description:  frequency    Intensity:  moderate    Accompanying signs and symptoms (fever/discharge/nausea/vomiting/back or abdominal pain):  Strong Urine Odor, Insomnia and Fatigue    History (frequent UTI's/kidney stones/prostate problems): None  Sexually active: no     Precipitating or alleviating factors: None    Therapies tried and outcome: increase fluid intake   Outcome: Some Relief  Reviewed and updated as needed this visit by clinical staff  Tobacco  Allergies  Meds  Problems  Med Hx  Surg Hx  Fam Hx  Soc Hx        Reviewed and updated as needed this visit by Provider  Tobacco  Allergies  Meds  Problems  Med Hx  Surg Hx  Fam Hx  Soc Hx        Additional complaints: Continues to have pain for two years secondary to shingles.  Was constipated on gabapentin, price of lidocaine patches has increased from tier 2 to tier 3.    HPI additional notes: Hang presents today with   Chief Complaint   Patient presents with     UTI   Notes urgency, frequency and urine odor.       ROS:  C: Negative for fever, chills, recent change in weight  CV: Negative for chest pain or peripheral edema  : POSITIVE for frequency, urgency, and dysuria.  MS: Positive for post herpetic neuralgia  P: Negative for changes in mood or affect  ROS otherwise negative.    Chart Review:  History   Smoking Status     Never Smoker   Smokeless Tobacco     Never Used     Patient Active Problem List   Diagnosis     History of colonic polyps     Constipation, unspecified constipation type     Post herpetic neuralgia     Decreased libido     ACP (advance care planning)     Erectile dysfunction, unspecified erectile dysfunction type     Osteoarthritis of right knee, unspecified osteoarthritis type     Primary insomnia     Other fatigue     Skin lesion     Elevated blood sugar  "    Hypertriglyceridemia     Past Surgical History:   Procedure Laterality Date     ARTHROSCOPY KNEE Right     ~[id number]     ORTHOPEDIC SURGERY      right achilles rupture repair with 14 month MRSA infection     Problem list, Medication list, Allergies, Medical/Social/Surg hx reviewed in [facility], updated as appropriate.   OBJECTIVE:                                                    /68  Pulse 102  Temp 97.2  F (36.2  C) (Tympanic)  Resp 20  Ht 5' 10\" (1.778 m)  Wt 227 lb (103 kg)  SpO2 95%  BMI 32.57 kg/m2  Body mass index is 32.57 kg/(m^2).  GENERAL: healthy, alert, in no acute distress  HENT: Mucous mebranes moist.  MS: no gross deformities noted.  Left sided low back and abdominal tenderness secondary to postherpetic neuralgia.  SKIN: no suspicious lesions, no rashes  PSYCH: Alert and oriented times 3;  Able to articulate logical thoughts. Affect is normal.    Diagnostic test results:   Results for orders placed or performed in visit on 11/28/17 (from the past 24 hour(s))   UA reflex to Microscopic and Culture   Result Value Ref Range    Color Urine Yellow     Appearance Urine Slightly Cloudy     Glucose Urine Negative NEG^Negative mg/dL    Bilirubin Urine Negative NEG^Negative    Ketones Urine Negative NEG^Negative mg/dL    Specific Gravity Urine 1.015 1.003 - 1.035    Blood Urine Negative NEG^Negative    pH Urine 7.5 (H) 5.0 - 7.0 pH    Protein Albumin Urine Negative NEG^Negative mg/dL    Urobilinogen Urine 0.2 0.2 - 1.0 EU/dL    Nitrite Urine Negative NEG^Negative    Leukocyte Esterase Urine Small (A) NEG^Negative    Source Midstream Urine    Urine Microscopic   Result Value Ref Range    WBC Urine O - 2 OTO2^O - 2 /HPF    RBC Urine O - 2 OTO2^O - 2 /HPF    Bacteria Urine Moderate (A) NEG^Negative /HPF    sperm Present (A) NEG^Negative /HPF        ASSESSMENT/PLAN:                                                          ICD-10-CM    1. Acute cystitis without hematuria N30.00 UA reflex to Microscopic " and Culture     ciprofloxacin (CIPRO) 500 MG tablet     Urine Microscopic   2. Post herpetic neuralgia B02.29 lidocaine (XYLOCAINE) 5 % ointment     Will start antibiotics and push fluids for UTI.    Discussed treatment options for phn.  Will try switching from patches to gel to see if that works better and longer for him.  Can also try capcasin again in a small area to see if he can tolerate the pain to see if that eventually provides pain relief.    Please see patient instructions for treatment details.    Follow up in 7-10 days if not improving as anticipated.    Yuki Jaime PA-C  [facility]

## 2017-12-02 LAB
BACTERIA SPEC CULT: ABNORMAL
SPECIMEN SOURCE: ABNORMAL

## 2017-12-04 ENCOUNTER — TELEPHONE (OUTPATIENT)
Dept: FAMILY MEDICINE | Facility: CLINIC | Age: 82
End: 2017-12-04

## 2017-12-04 NOTE — TELEPHONE ENCOUNTER
If he has completed 7 days of the antibiotic, his infection should be cleared and he can stop the antibiotic.  His urine culture came back showing cipro was the appropriate treatment.

## 2017-12-04 NOTE — TELEPHONE ENCOUNTER
Patient called reporting possible side effect of Cipro. States he is having right ankle pain and is concerned of tendonitis. He is concerned b/c he had tendon problems in the past when he had tendon severed. He took a cipro dose this morning. He is on his eight day. Urine is clear not musty color. Denies rash or other symptoms with medication. Advised hold Cipro until further directed. Please advice.

## 2017-12-07 NOTE — PROGRESS NOTES
SUBJECTIVE:   Canelo Cruz is a 86 year old male who presents to clinic today for the following health issues:    Genitourinary symptoms    Duration: 2 weeks ago     Description:  Musty smelling urine    Intensity:  mild    Accompanying signs and symptoms (fever/discharge/nausea/vomiting/back or abdominal pain):  Nausea, fatigue since     History (frequent UTI's/kidney stones/prostate problems): None  Sexually active: no     Precipitating or alleviating factors: Antibiotics helped within 24 hours and then slow improvement. Does think that the abx caused tendonitis.    Therapies tried and outcome: course of antibiotics - cipro   Outcome: feels like it has been improved but concerned about tendonitis from the cipro    Reviewed and updated as needed this visit by clinical staff  Tobacco  Allergies  Meds  Med Hx  Surg Hx  Fam Hx  Soc Hx      Reviewed and updated as needed this visit by Provider  Tobacco  Allergies  Meds  Med Hx  Surg Hx  Fam Hx  Soc Hx      Additional complaints: None    HPI additional notes: Hang presents today with   Chief Complaint   Patient presents with     Urinary Problem        ROS:  C: Negative for fever, chills, recent change in weight  Skin: Negative for worrisome rashes or lesions  ENT: Negative for ear, mouth and throat problems  Resp: Negative for significant cough or SOB  CV: Negative for chest pain or peripheral edema  GI: Negative for nausea, abdominal pain, heartburn, or change in bowel habits  MS: Positive for right ankle pain  P: Negative for changes in mood or affect  ROS all other systems negative.    Chart Review:  History   Smoking Status     Never Smoker   Smokeless Tobacco     Never Used     Patient Active Problem List   Diagnosis     History of colonic polyps     Constipation, unspecified constipation type     Post herpetic neuralgia     Decreased libido     ACP (advance care planning)     Erectile dysfunction, unspecified erectile dysfunction type      "Osteoarthritis of right knee, unspecified osteoarthritis type     Primary insomnia     Other fatigue     Skin lesion     Elevated blood sugar     Hypertriglyceridemia     Past Surgical History:   Procedure Laterality Date     ARTHROSCOPY KNEE Right     ~[id number]     ORTHOPEDIC SURGERY      right achilles rupture repair with 14 month MRSA infection     Problem list, Medication list, Allergies, Medical/Social/Surg hx reviewed in [facility], updated as appropriate.   OBJECTIVE:                                                    /68 (BP Location: Left arm, Patient Position: Chair, Cuff Size: Adult Regular)  Pulse 87  Temp 98.5  F (36.9  C) (Tympanic)  Resp 14  Ht 5' 10\" (1.778 m)  Wt 224 lb 8 oz (101.8 kg)  SpO2 96%  BMI 32.21 kg/m2  Body mass index is 32.21 kg/(m^2).  GENERAL: healthy, alert, in no acute distress  HENT: Mucous mebranes moist.  RESP: lungs clear to auscultation - no rales, no rhonchi, no wheezes  CV: regular rate and rhythm, normal S1 S2.  No peripheral edema.  MS: no gross deformities noted.  No CVA tenderness.  SKIN: no suspicious lesions, no rashes  PSYCH: Alert and oriented times 3;  Able to articulate logical thoughts. Affect is normal.    Wt Readings from Last 5 Encounters:   12/08/17 224 lb 8 oz (101.8 kg)   11/28/17 227 lb (103 kg)   10/11/17 225 lb (102.1 kg)   09/19/17 229 lb (103.9 kg)   08/22/17 226 lb (102.5 kg)       Diagnostic test results:   Results for orders placed or performed in visit on 12/08/17 (from the past 24 hour(s))   UA reflex to Microscopic and Culture   Result Value Ref Range    Color Urine Yellow     Appearance Urine Clear     Glucose Urine Negative NEG^Negative mg/dL    Bilirubin Urine Negative NEG^Negative    Ketones Urine Negative NEG^Negative mg/dL    Specific Gravity Urine 1.015 1.003 - 1.035    Blood Urine Negative NEG^Negative    pH Urine 6.5 5.0 - 7.0 pH    Protein Albumin Urine Negative NEG^Negative mg/dL    Urobilinogen Urine 0.2 0.2 - 1.0 EU/dL    " Nitrite Urine Negative NEG^Negative    Leukocyte Esterase Urine Trace (A) NEG^Negative    Source Midstream Urine    Urine Microscopic   Result Value Ref Range    WBC Urine 2-5 (A) OTO2^O - 2 /HPF    RBC Urine O - 2 OTO2^O - 2 /HPF        ASSESSMENT/PLAN:                                                          ICD-10-CM    1. Personal history of urinary tract infection Z87.440 UA reflex to Microscopic and Culture     Urine Culture Aerobic Bacterial     Urine Microscopic   2. Dysuria R30.0 Urine Culture Aerobic Bacterial     Pt has improved but still present urinary symptoms.  UA was close to negative but will get a culture to verify infection has cleared.  Pt should avoid flouroquinolones due to history of achilles tendon tear.  He was just on cipro and had some ankle pain.    Will call Monday with culture results.    Please see patient instructions for treatment details.    Follow up in 7-10 days if not improving as anticipated, sooner PRN.    Yuki Jaime PA-C  [facility]

## 2017-12-08 ENCOUNTER — OFFICE VISIT (OUTPATIENT)
Dept: FAMILY MEDICINE | Facility: CLINIC | Age: 82
End: 2017-12-08
Payer: COMMERCIAL

## 2017-12-08 VITALS
TEMPERATURE: 98.5 F | OXYGEN SATURATION: 96 % | RESPIRATION RATE: 14 BRPM | HEART RATE: 87 BPM | WEIGHT: 224.5 LBS | BODY MASS INDEX: 32.14 KG/M2 | SYSTOLIC BLOOD PRESSURE: 118 MMHG | DIASTOLIC BLOOD PRESSURE: 68 MMHG | HEIGHT: 70 IN

## 2017-12-08 DIAGNOSIS — R30.0 DYSURIA: ICD-10-CM

## 2017-12-08 DIAGNOSIS — Z87.440 PERSONAL HISTORY OF URINARY TRACT INFECTION: Primary | ICD-10-CM

## 2017-12-08 LAB
ALBUMIN UR-MCNC: NEGATIVE MG/DL
APPEARANCE UR: CLEAR
BILIRUB UR QL STRIP: NEGATIVE
COLOR UR AUTO: YELLOW
GLUCOSE UR STRIP-MCNC: NEGATIVE MG/DL
HGB UR QL STRIP: NEGATIVE
KETONES UR STRIP-MCNC: NEGATIVE MG/DL
LEUKOCYTE ESTERASE UR QL STRIP: ABNORMAL
NITRATE UR QL: NEGATIVE
PH UR STRIP: 6.5 PH (ref 5–7)
RBC #/AREA URNS AUTO: ABNORMAL /HPF
SOURCE: ABNORMAL
SP GR UR STRIP: 1.01 (ref 1–1.03)
UROBILINOGEN UR STRIP-ACNC: 0.2 EU/DL (ref 0.2–1)
WBC #/AREA URNS AUTO: ABNORMAL /HPF

## 2017-12-08 PROCEDURE — 99213 OFFICE O/P EST LOW 20 MIN: CPT | Performed by: PHYSICIAN ASSISTANT

## 2017-12-08 PROCEDURE — 87086 URINE CULTURE/COLONY COUNT: CPT | Performed by: PHYSICIAN ASSISTANT

## 2017-12-08 PROCEDURE — 81001 URINALYSIS AUTO W/SCOPE: CPT | Performed by: PHYSICIAN ASSISTANT

## 2017-12-08 NOTE — NURSING NOTE
"Chief Complaint   Patient presents with     Urinary Problem       Initial /68 (BP Location: Left arm, Patient Position: Chair, Cuff Size: Adult Regular)  Pulse 87  Temp 98.5  F (36.9  C) (Tympanic)  Resp 14  Ht 5' 10\" (1.778 m)  Wt 224 lb 8 oz (101.8 kg)  SpO2 96%  BMI 32.21 kg/m2 Estimated body mass index is 32.21 kg/(m^2) as calculated from the following:    Height as of this encounter: 5' 10\" (1.778 m).    Weight as of this encounter: 224 lb 8 oz (101.8 kg).  Medication Reconciliation: complete  Priti Peter CMA  "

## 2017-12-08 NOTE — MR AVS SNAPSHOT
"              After Visit Summary   12/8/2017    Canelo Cruz    MRN: [id number]           Patient Information     Date Of Birth          7/30/1931        Visit Information        Provider Department      12/8/2017 8:10 AM Yuki Jaime PA-C [facility]        Today's Diagnoses     Personal history of urinary tract infection    -  1    Dysuria           Follow-ups after your visit        Who to contact     If you have questions or need follow up information about today's clinic visit or your schedule please contact [facility] directly at [phone].  Normal or non-critical lab and imaging results will be communicated to you by MyChart, letter or phone within 4 business days after the clinic has received the results. If you do not hear from us within 7 days, please contact the clinic through MyChart or phone. If you have a critical or abnormal lab result, we will notify you by phone as soon as possible.  Submit refill requests through [email or web address] or call your pharmacy and they will forward the refill request to us. Please allow 3 business days for your refill to be completed.          Additional Information About Your Visit        Care EveryWhere ID     This is your Care EveryWhere ID. This could be used by other organizations to access your [facility] medical records  [id number]        Your Vitals Were     Pulse Temperature Respirations Height Pulse Oximetry BMI (Body Mass Index)    87 98.5  F (36.9  C) (Tympanic) 14 5' 10\" (1.778 m) 96% 32.21 kg/m2       Blood Pressure from Last 3 Encounters:   12/08/17 118/68   11/28/17 116/68   10/11/17 142/84    Weight from Last 3 Encounters:   12/08/17 224 lb 8 oz (101.8 kg)   11/28/17 227 lb (103 kg)   10/11/17 225 lb (102.1 kg)              We Performed the Following     UA reflex to Microscopic and Culture     Urine Culture Aerobic Bacterial     Urine Microscopic          Today's Medication Changes    "       These changes are accurate as of: 12/8/17  8:49 AM.  If you have any questions, ask your nurse or doctor.               Stop taking these medicines if you haven't already. Please contact your care team if you have questions.     ciprofloxacin 500 MG tablet   Commonly known as:  CIPRO   Stopped by:  Yuki Jaime PA-C                    Primary Care Provider Office Phone # Fax #    Yuki Jaime PA-C [phone] [address] [facility] [address]  [facility] [address]        Equal Access to Services     [facility]: Hadii aad ku hadasho Soomaali, waaxda luqadaha, qaybta kaalmada adeegyada, waxay idiin hayaan adeeg khrandall arevalo . So [facility] [phone].    ATENCIÓN: Si habla español, tiene a bermudez disposición servicios gratuitos de asistencia lingüística. [facility] [phone].    We comply with applicable federal civil rights laws and [place] laws. We do not discriminate on the basis of race, color, national origin, age, disability, sex, sexual orientation, or gender identity.            Thank you!     Thank you for choosing [facility]  for your care. Our goal is always to provide you with excellent care. Hearing back from our patients is one way we can continue to improve our services. Please take a few minutes to complete the written survey that you may receive in the mail after your visit with us. Thank you!             Your Updated Medication List - Protect others around you: Learn how to safely use, store and throw away your medicines at www.disposemymeds.org.          This list is accurate as of: 12/8/17  8:49 AM.  Always use your most recent med list.                   Brand Name Dispense Instructions for use Diagnosis    diclofenac 1 % Gel topical gel    VOLTAREN    100 g    Apply 4 grams to knees or 2 grams to hands four times daily using enclosed dosing card.    Osteoarthritis of right knee, unspecified osteoarthritis type        * lidocaine 5 % Patch    LIDODERM    30 patch    APPLY UP TO THREE PATCHES TO PAINFUL AREA AT ONCE FOR UP TO 12 HOURS IN A 24 HOUR PERIOD. REMOVE AFTER 12 HOURS.    Post herpetic neuralgia       * lidocaine 5 % ointment    XYLOCAINE    200 g    Apply topically 4 times daily as needed for moderate pain    Post herpetic neuralgia       sennosides 8.6 MG tablet    SENOKOT    60 tablet    Take 1 tablet by mouth daily as needed for constipation    Constipation, unspecified constipation type       triamcinolone 0.1 % cream    KENALOG    80 g    Apply to affected area 1-3 times daily as needed    Dermatitis       * Notice:  This list has 2 medication(s) that are the same as other medications prescribed for you. Read the directions carefully, and ask your doctor or other care provider to review them with you.

## 2017-12-09 LAB
BACTERIA SPEC CULT: NO GROWTH
Lab: NORMAL
SPECIMEN SOURCE: NORMAL

## 2017-12-12 ENCOUNTER — TELEPHONE (OUTPATIENT)
Dept: FAMILY MEDICINE | Facility: CLINIC | Age: 82
End: 2017-12-12

## 2017-12-12 DIAGNOSIS — R35.0 URINARY FREQUENCY: Primary | ICD-10-CM

## 2017-12-12 RX ORDER — TAMSULOSIN HYDROCHLORIDE 0.4 MG/1
0.4 CAPSULE ORAL DAILY
Qty: 90 CAPSULE | Refills: 1 | Status: SHIPPED | OUTPATIENT
Start: 2017-12-12 | End: 2018-05-31

## 2017-12-12 NOTE — TELEPHONE ENCOUNTER
`Discussed with Canelo.   He would like to try the prescription medication. Please send it to [facility] on [address].

## 2017-12-12 NOTE — TELEPHONE ENCOUNTER
Note from reception: Patient is having a lot of urine frequency and feels it is related to his prostate.    Message left on VM to call triage back.

## 2017-12-12 NOTE — TELEPHONE ENCOUNTER
PSA   Date Value Ref Range Status   09/09/2017 3.94 0 - 4 ug/L Final     Comment:     Assay Method:  Chemiluminescence using Siemens Vista analyzer     We can try him on a prescription medication for hypertrophy of the prostate, there is no OTC medication or I can put a referral to urology if he would rather do that.  If he had an infection in his prostate that would have showed up in his urine.

## 2017-12-12 NOTE — TELEPHONE ENCOUNTER
Patient called back, since his urine culture showed no growth, he is suspecting a prostate problem. For the last 2 weeks he is getting up during the night every 2 hours. This is too frequent and he is not sleeping well. His night urine stream has reduced during the night - slow and somewhat intermittent. The stream seems ok during the day - but not as fast as it used to be. Urine is pale yellow and no abdominal pain. He is wondering if there is an OTC medication he can take such as Proaxil? He did not have any prostate problems before and he has never seen a urologist. He is willing to try a medication that will help him. Provider to recommend a plan.

## 2017-12-19 ENCOUNTER — OFFICE VISIT (OUTPATIENT)
Dept: FAMILY MEDICINE | Facility: CLINIC | Age: 82
End: 2017-12-19
Payer: COMMERCIAL

## 2017-12-19 VITALS
HEART RATE: 85 BPM | TEMPERATURE: 98.4 F | BODY MASS INDEX: 32.43 KG/M2 | WEIGHT: 226 LBS | OXYGEN SATURATION: 95 % | RESPIRATION RATE: 18 BRPM | DIASTOLIC BLOOD PRESSURE: 72 MMHG | SYSTOLIC BLOOD PRESSURE: 126 MMHG

## 2017-12-19 DIAGNOSIS — R30.0 DYSURIA: ICD-10-CM

## 2017-12-19 DIAGNOSIS — N30.01 ACUTE CYSTITIS WITH HEMATURIA: Primary | ICD-10-CM

## 2017-12-19 DIAGNOSIS — R82.90 NONSPECIFIC FINDING ON EXAMINATION OF URINE: ICD-10-CM

## 2017-12-19 DIAGNOSIS — B02.29 POST HERPETIC NEURALGIA: ICD-10-CM

## 2017-12-19 LAB
ALBUMIN UR-MCNC: 100 MG/DL
APPEARANCE UR: ABNORMAL
BACTERIA #/AREA URNS HPF: ABNORMAL /HPF
BILIRUB UR QL STRIP: NEGATIVE
COLOR UR AUTO: YELLOW
GLUCOSE UR STRIP-MCNC: NEGATIVE MG/DL
HGB UR QL STRIP: ABNORMAL
KETONES UR STRIP-MCNC: NEGATIVE MG/DL
LEUKOCYTE ESTERASE UR QL STRIP: ABNORMAL
NITRATE UR QL: NEGATIVE
PH UR STRIP: 7 PH (ref 5–7)
RBC #/AREA URNS AUTO: ABNORMAL /HPF
SOURCE: ABNORMAL
SP GR UR STRIP: 1.02 (ref 1–1.03)
UROBILINOGEN UR STRIP-ACNC: 0.2 EU/DL (ref 0.2–1)
WBC #/AREA URNS AUTO: ABNORMAL /HPF

## 2017-12-19 PROCEDURE — 81001 URINALYSIS AUTO W/SCOPE: CPT | Performed by: PHYSICIAN ASSISTANT

## 2017-12-19 PROCEDURE — 87088 URINE BACTERIA CULTURE: CPT | Performed by: PHYSICIAN ASSISTANT

## 2017-12-19 PROCEDURE — 87186 SC STD MICRODIL/AGAR DIL: CPT | Performed by: PHYSICIAN ASSISTANT

## 2017-12-19 PROCEDURE — 99214 OFFICE O/P EST MOD 30 MIN: CPT | Performed by: PHYSICIAN ASSISTANT

## 2017-12-19 PROCEDURE — 87086 URINE CULTURE/COLONY COUNT: CPT | Performed by: PHYSICIAN ASSISTANT

## 2017-12-19 RX ORDER — SULFAMETHOXAZOLE/TRIMETHOPRIM 800-160 MG
1 TABLET ORAL 2 TIMES DAILY
Qty: 28 TABLET | Refills: 0 | Status: SHIPPED | OUTPATIENT
Start: 2017-12-19 | End: 2018-01-02

## 2017-12-19 NOTE — MR AVS SNAPSHOT
"              After Visit Summary   2017    Canelo Cruz    MRN: [id number]           Patient Information     Date Of Birth          1931        Visit Information        Provider Department      2017 9:10 AM Yuki Jaime PA-C [facility]        Today's Diagnoses     Acute cystitis with hematuria    -  1    Dysuria        Post herpetic neuralgia        Nonspecific finding on examination of urine           Follow-ups after your visit        Who to contact     If you have questions or need follow up information about today's clinic visit or your schedule please contact [facility] directly at [phone].  Normal or non-critical lab and imaging results will be communicated to you by MyChart, letter or phone within 4 business days after the clinic has received the results. If you do not hear from us within 7 days, please contact the clinic through MyChart or phone. If you have a critical or abnormal lab result, we will notify you by phone as soon as possible.  Submit refill requests through [employer] or call your pharmacy and they will forward the refill request to us. Please allow 3 business days for your refill to be completed.          Additional Information About Your Visit        MyChart Information     [employer] lets you send messages to your doctor, view your test results, renew your prescriptions, schedule appointments and more. To sign up, go to [email or web address] . Click on \"Log in\" on the left side of the screen, which will take you to the Welcome page. Then click on \"Sign up Now\" on the right side of the page.     You will be asked to enter the access code listed below, as well as some personal information. Please follow the directions to create your username and password.     Your access code is: [email or web address]  Expires: 3/19/2018 10:34 AM     Your access code will  in 90 days. If you need help or a new code, " please call your [place] clinic or [phone].        Care EveryWhere ID     This is your Care EveryWhere ID. This could be used by other organizations to access your [place] medical records  [id number]        Your Vitals Were     Pulse Temperature Respirations Pulse Oximetry BMI (Body Mass Index)       85 98.4  F (36.9  C) (Tympanic) 18 95% 32.43 kg/m2        Blood Pressure from Last 3 Encounters:   12/19/17 126/72   12/08/17 118/68   11/28/17 116/68    Weight from Last 3 Encounters:   12/19/17 226 lb (102.5 kg)   12/08/17 224 lb 8 oz (101.8 kg)   11/28/17 227 lb (103 kg)              We Performed the Following     UA reflex to Microscopic and Culture     Urine Culture Aerobic Bacterial     Urine Microscopic          Today's Medication Changes          These changes are accurate as of: 12/19/17 10:34 AM.  If you have any questions, ask your nurse or doctor.               Start taking these medicines.        Dose/Directions    sulfamethoxazole-trimethoprim 800-160 MG per tablet   Commonly known as:  BACTRIM DS   Used for:  Acute cystitis with hematuria   Started by:  Yuki Jaime PA-C        Dose:  1 tablet   Take 1 tablet by mouth 2 times daily for 14 days   Quantity:  28 tablet   Refills:  0            Where to get your medicines      These medications were sent to [id number] - [place], [place] - [address]  [address] [facility] [address]     Phone:  [phone]     sulfamethoxazole-trimethoprim 800-160 MG per tablet                Primary Care Provider Office Phone # Fax #    Yuki Jaime PA-C [phone] [address]  [facility] [address]        Equal Access to Services     HANH BEAN AH: Hadii renetta masto Sobhartiali, waaxda luqadaha, qaybta kaalmada adeegyada, ania galvez. So [facility] [phone].    ATENCIÓN: Si habla español, tiene a bermudez disposición servicios gratuitos de asistencia  lingüística. Dashawn al [phone].    We comply with applicable federal civil rights laws and [place] laws. We do not discriminate on the basis of race, color, national origin, age, disability, sex, sexual orientation, or gender identity.            Thank you!     Thank you for choosing [facility]  for your care. Our goal is always to provide you with excellent care. Hearing back from our patients is one way we can continue to improve our services. Please take a few minutes to complete the written survey that you may receive in the mail after your visit with us. Thank you!             Your Updated Medication List - Protect others around you: Learn how to safely use, store and throw away your medicines at www.disposemymeds.org.          This list is accurate as of: 12/19/17 10:34 AM.  Always use your most recent med list.                   Brand Name Dispense Instructions for use Diagnosis    diclofenac 1 % Gel topical gel    VOLTAREN    100 g    Apply 4 grams to knees or 2 grams to hands four times daily using enclosed dosing card.    Osteoarthritis of right knee, unspecified osteoarthritis type       * lidocaine 5 % Patch    LIDODERM    30 patch    APPLY UP TO THREE PATCHES TO PAINFUL AREA AT ONCE FOR UP TO 12 HOURS IN A 24 HOUR PERIOD. REMOVE AFTER 12 HOURS.    Post herpetic neuralgia       * lidocaine 5 % ointment    XYLOCAINE    200 g    Apply topically 4 times daily as needed for moderate pain    Post herpetic neuralgia       sennosides 8.6 MG tablet    SENOKOT    60 tablet    Take 1 tablet by mouth daily as needed for constipation    Constipation, unspecified constipation type       sulfamethoxazole-trimethoprim 800-160 MG per tablet    BACTRIM DS    28 tablet    Take 1 tablet by mouth 2 times daily for 14 days    Acute cystitis with hematuria       tamsulosin 0.4 MG capsule    FLOMAX    90 capsule    Take 1 capsule (0.4 mg) by mouth daily    Urinary frequency        triamcinolone 0.1 % cream    KENALOG    80 g    Apply to affected area 1-3 times daily as needed    Dermatitis       * Notice:  This list has 2 medication(s) that are the same as other medications prescribed for you. Read the directions carefully, and ask your doctor or other care provider to review them with you.

## 2017-12-19 NOTE — PROGRESS NOTES
SUBJECTIVE:   Canelo Cruz is a 86 year old male who presents to clinic today for the following health issues:    Genitourinary symptoms      Duration: around 11/24/17    Description:  Urine smells, frequently-every 2 hours, swelling near groin area-used volteran gel    Intensity:  mild, moderate    Accompanying signs and symptoms (fever/discharge/nausea/vomiting/back or abdominal pain):  None    History (frequent UTI's/kidney stones/prostate problems): None  Sexually active: no     Precipitating or alleviating factors: None    Therapies tried and outcome: course of antibiotics - cipro and flomax  Outcome: helpful    -patient is still having the side pain on the left from shingles 2 years ago, saw neurology and he states that they cannot help him, lidocaine patches do not help much at all    Reviewed and updated as needed this visit by clinical staff  Tobacco  Allergies  Meds  Problems  Med Hx  Surg Hx  Fam Hx  Soc Hx        Reviewed and updated as needed this visit by Provider  Tobacco  Allergies  Meds  Problems  Med Hx  Surg Hx  Fam Hx  Soc Hx        Additional complaints: None    HPI additional notes: Hang presents today with   Chief Complaint   Patient presents with     UTI        ROS:  C: Negative for fever, chills, recent change in weight  CV: Negative for chest pain or peripheral edema  : POSITIVE for frequency, urgency, and dysuria.  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    Chart Review:  History   Smoking Status     Never Smoker   Smokeless Tobacco     Never Used     Patient Active Problem List   Diagnosis     History of colonic polyps     Constipation, unspecified constipation type     Post herpetic neuralgia     Decreased libido     ACP (advance care planning)     Erectile dysfunction, unspecified erectile dysfunction type     Osteoarthritis of right knee, unspecified  osteoarthritis type     Primary insomnia     Other fatigue     Skin lesion     Elevated blood sugar     Hypertriglyceridemia     Past Surgical History:   Procedure Laterality Date     ARTHROSCOPY KNEE Right     ~[id number]     ORTHOPEDIC SURGERY      right achilles rupture repair with 14 month MRSA infection     Problem list, Medication list, Allergies, Medical/Social/Surg hx reviewed in [facility], updated as appropriate.   OBJECTIVE:                                                    /72  Pulse 85  Temp 98.4  F (36.9  C) (Tympanic)  Resp 18  Wt 226 lb (102.5 kg)  SpO2 95%  BMI 32.43 kg/m2  Body mass index is 32.43 kg/(m^2).  GENERAL: healthy, alert, in no acute distress  HENT: Mucous mebranes moist.  MS: no gross deformities noted.  No CVA tenderness.  SKIN: no suspicious lesions, no rashes  PSYCH: Alert and oriented times 3;  Able to articulate logical thoughts. Affect is normal.    Diagnostic test results:   Results for orders placed or performed in visit on 12/19/17 (from the past 24 hour(s))   UA reflex to Microscopic and Culture   Result Value Ref Range    Color Urine Yellow     Appearance Urine Slightly Cloudy     Glucose Urine Negative NEG^Negative mg/dL    Bilirubin Urine Negative NEG^Negative    Ketones Urine Negative NEG^Negative mg/dL    Specific Gravity Urine 1.020 1.003 - 1.035    Blood Urine Small (A) NEG^Negative    pH Urine 7.0 5.0 - 7.0 pH    Protein Albumin Urine 100 (A) NEG^Negative mg/dL    Urobilinogen Urine 0.2 0.2 - 1.0 EU/dL    Nitrite Urine Negative NEG^Negative    Leukocyte Esterase Urine Large (A) NEG^Negative    Source Midstream Urine    Urine Microscopic   Result Value Ref Range    WBC Urine 10-25 (A) OTO2^O - 2 /HPF    RBC Urine O - 2 OTO2^O - 2 /HPF    Bacteria Urine Moderate (A) NEG^Negative /HPF        ASSESSMENT/PLAN:                                                          ICD-10-CM    1. Acute cystitis with hematuria N30.01 sulfamethoxazole-trimethoprim (BACTRIM DS) 800-160  MG per tablet     Urine Microscopic     Urine Culture Aerobic Bacterial   2. Dysuria R30.0 UA reflex to Microscopic and Culture   3. Post herpetic neuralgia B02.29    4. Nonspecific finding on examination of urine R82.90      Discussed again trying capsaicin for neuropathic pain, starting at a small area so it does not cause too much pain and then increasing use as tolerated.  Pt can check with neurology to see if they think anything else could be causing his pain but I think it is likely still due to shingles which he had two years ago.    Discussed UTI treatment, will switch antibiotic and take for 2 weeks as had some tendon pain with cipro and was unable to finish the course.    Please see patient instructions for treatment details.    Follow up in 7-10 days if not improving as anticipated, sooner PRN.    Yuki Jaime PA-C  [facility]

## 2017-12-19 NOTE — NURSING NOTE
"Chief Complaint   Patient presents with     UTI       Initial /72  Pulse 85  Temp 98.4  F (36.9  C) (Tympanic)  Resp 18  Wt 226 lb (102.5 kg)  SpO2 95%  BMI 32.43 kg/m2 Estimated body mass index is 32.43 kg/(m^2) as calculated from the following:    Height as of 12/8/17: 5' 10\" (1.778 m).    Weight as of this encounter: 226 lb (102.5 kg).  Medication Reconciliation: complete    "

## 2017-12-23 LAB
BACTERIA SPEC CULT: ABNORMAL
SPECIMEN SOURCE: ABNORMAL

## 2018-01-17 ENCOUNTER — OFFICE VISIT (OUTPATIENT)
Dept: FAMILY MEDICINE | Facility: CLINIC | Age: 83
End: 2018-01-17
Payer: COMMERCIAL

## 2018-01-17 VITALS
RESPIRATION RATE: 16 BRPM | DIASTOLIC BLOOD PRESSURE: 80 MMHG | SYSTOLIC BLOOD PRESSURE: 134 MMHG | HEART RATE: 76 BPM | BODY MASS INDEX: 32.14 KG/M2 | OXYGEN SATURATION: 95 % | WEIGHT: 224 LBS | TEMPERATURE: 97.7 F

## 2018-01-17 DIAGNOSIS — N39.0 RECURRENT UTI: ICD-10-CM

## 2018-01-17 DIAGNOSIS — B02.29 POST HERPETIC NEURALGIA: ICD-10-CM

## 2018-01-17 DIAGNOSIS — Z87.440 PERSONAL HISTORY OF URINARY TRACT INFECTION: Primary | ICD-10-CM

## 2018-01-17 DIAGNOSIS — Z53.9 NO SHOW: Primary | ICD-10-CM

## 2018-01-17 PROBLEM — E66.09 OBESITY DUE TO EXCESS CALORIES: Status: ACTIVE | Noted: 2018-01-17

## 2018-01-17 LAB
ALBUMIN UR-MCNC: 100 MG/DL
APPEARANCE UR: ABNORMAL
BACTERIA #/AREA URNS HPF: ABNORMAL /HPF
BILIRUB UR QL STRIP: NEGATIVE
COLOR UR AUTO: YELLOW
GLUCOSE UR STRIP-MCNC: NEGATIVE MG/DL
HGB UR QL STRIP: ABNORMAL
KETONES UR STRIP-MCNC: NEGATIVE MG/DL
LEUKOCYTE ESTERASE UR QL STRIP: ABNORMAL
NITRATE UR QL: NEGATIVE
PH UR STRIP: 6 PH (ref 5–7)
RBC #/AREA URNS AUTO: ABNORMAL /HPF
SOURCE: ABNORMAL
SP GR UR STRIP: 1.02 (ref 1–1.03)
UROBILINOGEN UR STRIP-ACNC: 0.2 EU/DL (ref 0.2–1)
WBC #/AREA URNS AUTO: >100 /HPF

## 2018-01-17 PROCEDURE — 99214 OFFICE O/P EST MOD 30 MIN: CPT | Performed by: PHYSICIAN ASSISTANT

## 2018-01-17 PROCEDURE — 87086 URINE CULTURE/COLONY COUNT: CPT | Performed by: PHYSICIAN ASSISTANT

## 2018-01-17 PROCEDURE — 81001 URINALYSIS AUTO W/SCOPE: CPT | Performed by: PHYSICIAN ASSISTANT

## 2018-01-17 RX ORDER — SULFAMETHOXAZOLE/TRIMETHOPRIM 800-160 MG
1 TABLET ORAL 2 TIMES DAILY
Qty: 28 TABLET | Refills: 0 | Status: SHIPPED | OUTPATIENT
Start: 2018-01-17 | End: 2018-01-31

## 2018-01-17 NOTE — MR AVS SNAPSHOT
After Visit Summary   1/17/2018    Cnaelo Cruz    MRN: [id number]           Patient Information     Date Of Birth          7/30/1931        Visit Information        Provider Department      1/17/2018 3:50 PM Yuki Jaime PA-C [facility]        Today's Diagnoses     Personal history of urinary tract infection    -  1    Recurrent UTI        Post herpetic neuralgia           Follow-ups after your visit        Additional Services     PAIN MANAGEMENT REFERRAL       Your provider has referred you to: [facility]: [facility] -    Reason for Referral: Comprehensive Evaluation and Management    Please complete the following questions:    What is your diagnosis for the patient's pain? Post herpetic neuralgia, not improving after several years.    Do you have any specific questions for the pain specialist? No    Are there any red flags that may impact the assessment or management of the patient? None, would prefer not to take medications if he doesn't have to.  Concerned about their side effects.  May benefit from other PT, acupuncture etc.  Has already seen neurology, their note is under media.    For any questions, contact the [facility] at [phone].     **ANY DIAGNOSTIC TESTS THAT ARE NOT IN EPIC SHOULD BE SENT TO THE PAIN CENTER**    REGARDING OPIOID MEDICATIONS:  We will always address appropriateness of opioid pain medications, but we generally will not automatically take on a prescribing role. When we do take on prescribing of opioids for chronic pain, it is in collaboration with the referring physician for an intermediate period of time (months), with an expectation that the primary physician or provider will assume the prescribing role if medications are effective at stable doses with demonstrated compliance.  Therefore, please do not assume that your prescribing responsibilities end on the day of pain clinic  consultation.  Is this agreeable to you? YES    Please be aware that coverage of these services is subject to the terms and limitations of your health insurance plan.  Call member services at your health plan with any benefit or coverage questions.      Please bring the following with you to your appointment:    (1) Any X-Rays, CTs or MRIs which have been performed.  Contact the facility where they were done to arrange for  prior to your scheduled appointment.    (2) List of current medications   (3) This referral request   (4) Any documents/labs given to you for this referral            UROLOGY ADULT REFERRAL       Your provider has referred you to: [facility]: [facility] [place] [phone]   [email or web address]  N: [facility] [place] [phone]   [email or web address]    Please be aware that coverage of these services is subject to the terms and limitations of your health insurance plan.  Call member services at your health plan with any benefit or coverage questions.      Please bring the following with you to your appointment:    (1) Any X-Rays, CTs or MRIs which have been performed.  Contact the facility where they were done to arrange for  prior to your scheduled appointment.    (2) List of current medications  (3) This referral request   (4) Any documents/labs given to you for this referral                  Who to contact     If you have questions or need follow up information about today's clinic visit or your schedule please contact [facility] directly at [phone].  Normal or non-critical lab and imaging results will be communicated to you by MyChart, letter or phone within 4 business days after the clinic has received the results. If you do not hear from us within 7 days, please contact the clinic through MyChart or phone. If you have a critical or abnormal lab result, we will notify you by phone as soon  "as possible.  Submit refill requests through [employer] or call your pharmacy and they will forward the refill request to us. Please allow 3 business days for your refill to be completed.          Additional Information About Your Visit        [employer] Information     [employer] lets you send messages to your doctor, view your test results, renew your prescriptions, schedule appointments and more. To sign up, go to [email or web address] . Click on \"Log in\" on the left side of the screen, which will take you to the Welcome page. Then click on \"Sign up Now\" on the right side of the page.     You will be asked to enter the access code listed below, as well as some personal information. Please follow the directions to create your username and password.     Your access code is: [email or web address]  Expires: 3/19/2018 10:34 AM     Your access code will  in 90 days. If you need help or a new code, please call your [place] clinic or [phone].        Care EveryWhere ID     This is your Care EveryWhere ID. This could be used by other organizations to access your [place] medical records  [id number]        Your Vitals Were     Pulse Temperature Respirations Pulse Oximetry BMI (Body Mass Index)       76 97.7  F (36.5  C) 16 95% 32.14 kg/m2        Blood Pressure from Last 3 Encounters:   18 134/80   17 126/72   17 118/68    Weight from Last 3 Encounters:   18 224 lb (101.6 kg)   17 226 lb (102.5 kg)   17 224 lb 8 oz (101.8 kg)              We Performed the Following     PAIN MANAGEMENT REFERRAL     UA reflex to Microscopic and Culture     Urine Culture Aerobic Bacterial     Urine Microscopic     UROLOGY ADULT REFERRAL          Today's Medication Changes          These changes are accurate as of: 18  4:20 PM.  If you have any questions, ask your nurse or doctor.               Start taking these medicines.        Dose/Directions    sulfamethoxazole-trimethoprim 800-160 MG per tablet "   Commonly known as:  BACTRIM DS   Used for:  Recurrent UTI   Started by:  Yuki Jaime PA-C        Dose:  1 tablet   Take 1 tablet by mouth 2 times daily for 14 days   Quantity:  28 tablet   Refills:  0            Where to get your medicines      [id number] [place], [place] - [address]  [address] [place] [address]     Phone:  [phone]     sulfamethoxazole-trimethoprim 800-160 MG per tablet                Primary Care Provider Office Phone # Fax #    Yuki Jaime PA-C [phone] [address]  [facility] [address]        Equal Access to Services     HANH BEAN : Hadii renetta lemons hadasho Soomaali, waaxda luqadaha, qaybta kaalmada adeegyada, waxchandler mckeonin tere galvez. So [facility] [phone].    ATENCIÓN: Si habla español, tiene a bermudez disposición servicios gratuitos de asistencia lingüística. [facility] [phone].    We comply with applicable federal civil rights laws and [place] laws. We do not discriminate on the basis of race, color, national origin, age, disability, sex, sexual orientation, or gender identity.            Thank you!     [facility]  [facility] your [facility]. Our goal is always to provide you with excellent care. Hearing back from our patients is one way we can continue to improve our services. Please take a few minutes to complete the written survey that you may receive in the mail after your visit with us. Thank you!             Your Updated Medication List - Protect others around you: Learn how to safely use, store and throw away your medicines at www.disposemymeds.org.          This list is accurate as of: 1/17/18  4:20 PM.  Always use your most recent med list.                   Brand Name Dispense Instructions for use Diagnosis    diclofenac 1 % Gel topical gel    VOLTAREN    100 g    Apply 4 grams to knees or 2  grams to hands four times daily using enclosed dosing card.    Osteoarthritis of right knee, unspecified osteoarthritis type       * lidocaine 5 % Patch    LIDODERM    30 patch    APPLY UP TO THREE PATCHES TO PAINFUL AREA AT ONCE FOR UP TO 12 HOURS IN A 24 HOUR PERIOD. REMOVE AFTER 12 HOURS.    Post herpetic neuralgia       * lidocaine 5 % ointment    XYLOCAINE    200 g    Apply topically 4 times daily as needed for moderate pain    Post herpetic neuralgia       sennosides 8.6 MG tablet    SENOKOT    60 tablet    Take 1 tablet by mouth daily as needed for constipation    Constipation, unspecified constipation type       sulfamethoxazole-trimethoprim 800-160 MG per tablet    BACTRIM DS    28 tablet    Take 1 tablet by mouth 2 times daily for 14 days    Recurrent UTI       tamsulosin 0.4 MG capsule    FLOMAX    90 capsule    Take 1 capsule (0.4 mg) by mouth daily    Urinary frequency       triamcinolone 0.1 % cream    KENALOG    80 g    Apply to affected area 1-3 times daily as needed    Dermatitis       * Notice:  This list has 2 medication(s) that are the same as other medications prescribed for you. Read the directions carefully, and ask your doctor or other care provider to review them with you.

## 2018-01-17 NOTE — MR AVS SNAPSHOT
"              After Visit Summary   2018    Canelo Cruz    MRN: [id number]           Patient Information     Date Of Birth          1931        Visit Information        Provider Department      2018 8:10 AM Yuki Jaime PA-C [facility]        Today's Diagnoses     NO SHOW    -  1       Follow-ups after your visit        Who to contact     If you have questions or need follow up information about today's clinic visit or your schedule please contact [facility] directly at [phone].  Normal or non-critical lab and imaging results will be communicated to you by Mbaobaohart, letter or phone within 4 business days after the clinic has received the results. If you do not hear from us within 7 days, please contact the clinic through Mbaobaohart or phone. If you have a critical or abnormal lab result, we will notify you by phone as soon as possible.  Submit refill requests through [email or web address] or call your pharmacy and they will forward the refill request to us. Please allow 3 business days for your refill to be completed.          Additional Information About Your Visit        MyChart Information     [email or web address] lets you send messages to your doctor, view your test results, renew your prescriptions, schedule appointments and more. To sign up, go to [email or web address] . Click on \"Log in\" on the left side of the screen, which will take you to the Welcome page. Then click on \"Sign up Now\" on the right side of the page.     You will be asked to enter the access code listed below, as well as some personal information. Please follow the directions to create your username and password.     Your access code is: [email or web address]  Expires: 3/19/2018 10:34 AM     Your access code will  in 90 days. If you need help or a new code, please call your [facility]3] or [phone].        Care EveryWhere ID     This is your Care EveryWhere ID. This " could be used by other organizations to access your [place] medical records  [id number]         Blood Pressure from Last 3 Encounters:   12/19/17 126/72   12/08/17 118/68   11/28/17 116/68    Weight from Last 3 Encounters:   12/19/17 226 lb (102.5 kg)   12/08/17 224 lb 8 oz (101.8 kg)   11/28/17 227 lb (103 kg)              Today, you had the following     No orders found for display       Primary Care Provider Office Phone # Fax #    Yuki Jaime PA-C [phone]       [address] [facility] [address]  [facility] [address]        Equal Access to Services     HANH BEAN : Hadii aad ku hadasho Soomaali, waaxda luqadaha, qaybta kaalmada adeegyada, waxay idiin hayjuanan dawood arevalo . So [facility] [phone].    ATENCIÓN: Si habla español, tiene a bermudez disposición servicios gratuitos de asistencia lingüística. Llame al [phone].    We comply with applicable federal civil rights laws and [place] laws. We do not discriminate on the basis of race, color, national origin, age, disability, sex, sexual orientation, or gender identity.            Thank you!     Thank you for choosing [facility]  for your care. Our goal is always to provide you with excellent care. Hearing back from our patients is one way we can continue to improve our services. Please take a few minutes to complete the written survey that you may receive in the mail after your visit with us. Thank you!             Your Updated Medication List - Protect others around you: Learn how to safely use, store and throw away your medicines at www.disposemymeds.org.          This list is accurate as of: 1/17/18  8:38 AM.  Always use your most recent med list.                   Brand Name Dispense Instructions for use Diagnosis    diclofenac 1 % Gel topical gel    VOLTAREN    100 g    Apply 4 grams to knees or 2 grams to hands four times daily using enclosed dosing card.    Osteoarthritis of right knee,  unspecified osteoarthritis type       * lidocaine 5 % Patch    LIDODERM    30 patch    APPLY UP TO THREE PATCHES TO PAINFUL AREA AT ONCE FOR UP TO 12 HOURS IN A 24 HOUR PERIOD. REMOVE AFTER 12 HOURS.    Post herpetic neuralgia       * lidocaine 5 % ointment    XYLOCAINE    200 g    Apply topically 4 times daily as needed for moderate pain    Post herpetic neuralgia       sennosides 8.6 MG tablet    SENOKOT    60 tablet    Take 1 tablet by mouth daily as needed for constipation    Constipation, unspecified constipation type       tamsulosin 0.4 MG capsule    FLOMAX    90 capsule    Take 1 capsule (0.4 mg) by mouth daily    Urinary frequency       triamcinolone 0.1 % cream    KENALOG    80 g    Apply to affected area 1-3 times daily as needed    Dermatitis       * Notice:  This list has 2 medication(s) that are the same as other medications prescribed for you. Read the directions carefully, and ask your doctor or other care provider to review them with you.

## 2018-01-17 NOTE — NURSING NOTE
"Chief Complaint   Patient presents with     UTI       Initial /80  Pulse 76  Temp 97.7  F (36.5  C)  Resp 16  Wt 224 lb (101.6 kg)  SpO2 95%  BMI 32.14 kg/m2 Estimated body mass index is 32.14 kg/(m^2) as calculated from the following:    Height as of 12/8/17: 5' 10\" (1.778 m).    Weight as of this encounter: 224 lb (101.6 kg).  Medication Reconciliation: jeb Serra CMA      "

## 2018-01-17 NOTE — ASSESSMENT & PLAN NOTE
- Continues to have problems with PHN pain after having shingles two years ago.  We have trialed multiple medications including gabapentin, lyrica and TCDs which he stopped either due to side effects or concern of side effects.  I have recommended several times that he try OTC capsicin cream in small locations to see if it provides some relief but he is afraid to try it again after having significant pain with the first application.  Neurology said they do not have any further recommendations unless he is willing to try medications.  Will have him follow up with an assessment by the pain clinic to see if they can help him or offer other ways in which he can manage his pain.

## 2018-01-17 NOTE — ASSESSMENT & PLAN NOTE
After first UTI in Nov, pt was unable to complete his course of cipro due to side effects.  He got another infection in December and completed his two week course of bactrim on Jan 2nd.  He noted full resolution of his symptoms.  4 days later he began to again have progressively worsening dysuria, urine odor and frequency.  He has had no history of prior infections before the last few months.  Due to reoccurrence of infection today, will have pt follow up with urology for assessment to determine if there is a correctable cause of his infections.  Referral provided.  Will start again on 2 week course of bactrim as he did not have any side effects with the last course.

## 2018-01-17 NOTE — PROGRESS NOTES
SUBJECTIVE:   Canelo Cruz is a 86 year old male who presents to clinic today for the following health issues:      Genitourinary symptoms      Duration: ongoing    Description:  dysuria and odor    Intensity:  moderate    Accompanying signs and symptoms (fever/discharge/nausea/vomiting/back or abdominal pain):  None    History (frequent UTI's/kidney stones/prostate problems): yes  Sexually active: no     Precipitating or alleviating factors: None    Therapies tried and outcome: course of antibiotics - 2x   Outcome: pt felt better but a week later sx return  Reviewed and updated as needed this visit by clinical staff  Tobacco  Allergies  Meds  Problems  Med Hx  Surg Hx  Fam Hx  Soc Hx        Reviewed and updated as needed this visit by Provider  Tobacco  Allergies  Meds  Problems  Med Hx  Surg Hx  Fam Hx  Soc Hx        Additional complaints: Pain due to PHN    HPI additional notes: Hang presents today with   Chief Complaint   Patient presents with     UTI   Finished 2 week course of bactrim on Jan 2nd and felt much better but then symptoms returned 4 days after and have been getting progressively worse.       ROS:  C: Negative for fever, chills, recent change in weight  CV: Negative for chest pain or peripheral edema  : POSITIVE for frequency, urgency, and dysuria.  MS: Negative for flank pain, Positive for left sided pain due to PHN  P: Negative for changes in mood or affect  ROS otherwise negative.    Chart Review:  History   Smoking Status     Never Smoker   Smokeless Tobacco     Never Used     No flowsheet data found.  No flowsheet data found.  Recent Labs   Lab Test  09/09/17   1048  12/29/16   0808  12/13/16   0808  11/09/15   1455  10/13/15   0840   LDL   --    --   110*   --   108   HDL   --    --   52   --   44   TRIG  229*   --   110   --   239*   ALT   --   25   --    --    --    CR   --   1.06   --   1.10  1.30*   GFRESTIMATED   --   66   --   64  53*   GFRESTBLACK   --   80   --    77  64   POTASSIUM   --   3.8   --    --   4.0      BP Readings from Last 3 Encounters:   01/17/18 134/80   12/19/17 126/72   12/08/17 118/68    Wt Readings from Last 3 Encounters:   01/17/18 224 lb (101.6 kg)   12/19/17 226 lb (102.5 kg)   12/08/17 224 lb 8 oz (101.8 kg)                  Patient Active Problem List   Diagnosis     History of colonic polyps     Constipation, unspecified constipation type     Post herpetic neuralgia     Decreased libido     ACP (advance care planning)     Erectile dysfunction, unspecified erectile dysfunction type     Osteoarthritis of right knee, unspecified osteoarthritis type     Primary insomnia     Other fatigue     Skin lesion     Elevated blood sugar     Hypertriglyceridemia     BMI 30-35     Recurrent UTI     Past Surgical History:   Procedure Laterality Date     ARTHROSCOPY KNEE Right     ~4604-7822     ORTHOPEDIC SURGERY      right achilles rupture repair with 14 month MRSA infection     Problem list, Medication list, Allergies, Medical/Social/Surg hx reviewed in [facility], updated as appropriate.   OBJECTIVE:                                                    /80  Pulse 76  Temp 97.7  F (36.5  C)  Resp 16  Wt 224 lb (101.6 kg)  SpO2 95%  BMI 32.14 kg/m2  Body mass index is 32.14 kg/(m^2).  GENERAL: healthy, alert, in no acute distress  HENT: Mucous mebranes moist.  MS: no gross deformities noted.  No CVA tenderness.  SKIN: no suspicious lesions, no rashes  PSYCH: Alert and oriented times 3;  Able to articulate logical thoughts. Affect is normal.    Diagnostic test results:   Results for orders placed or performed in visit on 01/17/18 (from the past 24 hour(s))   UA reflex to Microscopic and Culture   Result Value Ref Range    Color Urine Yellow     Appearance Urine Cloudy     Glucose Urine Negative NEG^Negative mg/dL    Bilirubin Urine Negative NEG^Negative    Ketones Urine Negative NEG^Negative mg/dL    Specific Gravity Urine 1.025 1.003 - 1.035    Blood Urine  Moderate (A) NEG^Negative    pH Urine 6.0 5.0 - 7.0 pH    Protein Albumin Urine 100 (A) NEG^Negative mg/dL    Urobilinogen Urine 0.2 0.2 - 1.0 EU/dL    Nitrite Urine Negative NEG^Negative    Leukocyte Esterase Urine Large (A) NEG^Negative    Source Midstream Urine    Urine Microscopic   Result Value Ref Range    WBC Urine >100 (A) OTO2^O - 2 /HPF    RBC Urine O - 2 OTO2^O - 2 /HPF    Bacteria Urine Few (A) NEG^Negative /HPF        ASSESSMENT/PLAN:                                                          ICD-10-CM    1. Personal history of urinary tract infection Z87.440 UA reflex to Microscopic and Culture     Urine Microscopic     Urine Culture Aerobic Bacterial   2. Recurrent UTI N39.0 sulfamethoxazole-trimethoprim (BACTRIM DS) 800-160 MG per tablet     UROLOGY ADULT REFERRAL     Urine Culture Aerobic Bacterial   3. Post herpetic neuralgia B02.29 PAIN MANAGEMENT REFERRAL     Urine Culture Aerobic Bacterial     Recurrent UTI  After first UTI in Nov, pt was unable to complete his course of cipro due to side effects.  He got another infection in December and completed his two week course of bactrim on Jan 2nd.  He noted full resolution of his symptoms.  4 days later he began to again have progressively worsening dysuria, urine odor and frequency.  He has had no history of prior infections before the last few months.  Due to reoccurrence of infection today, will have pt follow up with urology for assessment to determine if there is a correctable cause of his infections.  Referral provided.  Will start again on 2 week course of bactrim as he did not have any side effects with the last course.    Post herpetic neuralgia  - Continues to have problems with PHN pain after having shingles two years ago.  We have trialed multiple medications including gabapentin, lyrica and TCDs which he stopped either due to side effects or concern of side effects.  I have recommended several times that he try OTC capsicin cream in small  locations to see if it provides some relief but he is afraid to try it again after having significant pain with the first application.  Neurology said they do not have any further recommendations unless he is willing to try medications.  Will have him follow up with an assessment by the pain clinic to see if they can help him or offer other ways in which he can manage his pain.    Please see patient instructions for treatment details.    Follow up in 7-10 days if not improving as anticipated.    Yuki Jaime PA-C  [facility]

## 2018-01-18 LAB
BACTERIA SPEC CULT: NORMAL
BACTERIA SPEC CULT: NORMAL
SPECIMEN SOURCE: NORMAL
SPECIMEN SOURCE: NORMAL

## 2018-02-09 NOTE — TELEPHONE ENCOUNTER
Patient Contact    Attempt # 1    Was call answered?  No.  Left message on voicemail with information to call back and schedule an appointment.      Left arm;

## 2018-05-31 ENCOUNTER — OFFICE VISIT (OUTPATIENT)
Dept: FAMILY MEDICINE | Facility: CLINIC | Age: 83
End: 2018-05-31
Payer: COMMERCIAL

## 2018-05-31 VITALS
SYSTOLIC BLOOD PRESSURE: 128 MMHG | TEMPERATURE: 97.5 F | HEIGHT: 70 IN | RESPIRATION RATE: 14 BRPM | WEIGHT: 231 LBS | DIASTOLIC BLOOD PRESSURE: 82 MMHG | HEART RATE: 68 BPM | OXYGEN SATURATION: 95 % | BODY MASS INDEX: 33.07 KG/M2

## 2018-05-31 DIAGNOSIS — L98.9 SKIN LESION: Primary | ICD-10-CM

## 2018-05-31 DIAGNOSIS — B02.29 POST HERPETIC NEURALGIA: ICD-10-CM

## 2018-05-31 PROCEDURE — 99214 OFFICE O/P EST MOD 30 MIN: CPT | Performed by: PHYSICIAN ASSISTANT

## 2018-05-31 NOTE — PROGRESS NOTES
SUBJECTIVE:   Canelo Cruz is a 86 year old male who presents to clinic today for the following health issues:    Ear Problem      Duration: Ongoing but changing in size    Description (location/character/radiation): Left ear lobe    Intensity:  moderate    Accompanying signs and symptoms: Itching    History (similar episodes/previous evaluation): Yes    Precipitating or alleviating factors: None    Therapies tried and outcome: Used ointment at one time     Reviewed and updated as needed this visit by clinical staff  Tobacco  Allergies  Meds  Problems  Med Hx  Surg Hx  Fam Hx  Soc Hx        Reviewed and updated as needed this visit by Provider  Tobacco  Allergies  Meds  Problems  Med Hx  Surg Hx  Fam Hx  Soc Hx        Additional complaints: PHN, chronic pain    HPI additional notes: Hang presents today with   Chief Complaint   Patient presents with     Ear Problem     Recheck right ear lobe, change in size.   In Dec 2016 was 1 cm in size, no erythema or warmth.  Had been present for 1-2 years at that time.  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    Continues to have shingles pain on left side, declined going to the [facility] pain clinic because they were rude to him and would not put him on the wait list.       ROS:  C: Negative for fever, chills, recent change in weight  Skin: POSITIVE for rash and pruritis of the left ear. Negative for discharge  ENT: Negative for ear, mouth and throat problems  Resp: Negative for significant cough or SOB  CV: Negative for chest pain or peripheral edema  GI: Negative for nausea, abdominal pain, heartburn, or change in bowel habits  MS: Positive for mid back left  pain secondary to shingles  P: Negative for changes in mood or affect  ROS all other systems negative.    Chart Review:  History   Smoking Status     Never Smoker   Smokeless Tobacco     Never Used     Patient Active Problem List   Diagnosis     History of  "colonic polyps     Constipation, unspecified constipation type     Post herpetic neuralgia     Decreased libido     ACP (advance care planning)     Erectile dysfunction, unspecified erectile dysfunction type     Osteoarthritis of right knee, unspecified osteoarthritis type     Primary insomnia     Other fatigue     Skin lesion     Elevated blood sugar     Hypertriglyceridemia     BMI 30-35     Recurrent UTI     Past Surgical History:   Procedure Laterality Date     ARTHROSCOPY KNEE Right     ~5508-3111     ORTHOPEDIC SURGERY      right achilles rupture repair with 14 month MRSA infection     Problem list, Medication list, Allergies, Medical/Social/Surg hx reviewed in [facility], updated as appropriate.   OBJECTIVE:                                                    /82 (BP Location: Left arm, Patient Position: Sitting, Cuff Size: Adult Regular)  Pulse 68  Temp 97.5  F (36.4  C) (Tympanic)  Resp 14  Ht 5' 10\" (1.778 m)  Wt 231 lb (104.8 kg)  SpO2 95%  BMI 33.15 kg/m2  Body mass index is 33.15 kg/(m^2).  GENERAL: healthy, alert, in no acute distress  SKIN: 1.5 cm lesion of hard cartilage on left auricle, multiple actinic keratoses on left external ear with erythema and flaking dermis.  PSYCH: Alert and oriented times 3;  Able to articulate logical thoughts. Affect is normal.    Diagnostic test results: none      ASSESSMENT/PLAN:                                                          ICD-10-CM    1. Skin lesion L98.9 SKIN CARE REFERRAL   2. Post herpetic neuralgia B02.29 PAIN MANAGEMENT REFERRAL         Still having problems with PHN.  Tried to go to  Pain clinic but they wouldn't see him for 5 weeks and would not put him on a wait list so he declined.  Will try [facility] instead.  Discussed he should get the new shingles shot.    Discussed itching on ear looks like actinic keratoses which he should see dermatology for due to the number as they should be treated.  He also has a hardening of the cartilage of the " left ear which he thinks has increased in size over the last year.  Will have them take a look at that as well.  He may need to see ENT if it is not a dermatology problem.        Please see patient instructions for treatment details.    Follow up with specialist as referred.    Yuki Jaime PA-C  [facility]

## 2018-05-31 NOTE — MR AVS SNAPSHOT
After Visit Summary   5/31/2018    Canelo Cruz    MRN: [id number]           Patient Information     Date Of Birth          7/30/1931        Visit Information        Provider Department      5/31/2018 1:10 PM Yuki Jaime PA-C [facility]        Today's Diagnoses     Skin lesion    -  1    Post herpetic neuralgia           Follow-ups after your visit        Additional Services     PAIN MANAGEMENT REFERRAL       Your provider has referred you to: [facility]:   [facility]  [address]  ZACKARY Conner  [phone] (fax)   [phone] (phone)    Please be aware that coverage of these services is subject to the terms and limitations of your health insurance plan.  Call member services at your health plan with any benefit or coverage questions.      Please bring the following to your appointment:  >>   Any x-rays, CTs or MRIs which have been performed.  Contact the facility where they were done to arrange for  prior to your scheduled appointment.  Any new CT, MRI or other procedures ordered by your specialist must be performed at a [facility] facility or coordinated by your clinic's referral office.    >>   List of current medications   >>   This referral request   >>   Any documents/labs given to you for this referral  Reason for Consult:  What is your diagnosis for this patient's pain?    Do you have any specific questions for the pain specialist? No  Are there any red flags that may impact the assessment or management of this patient?            SKIN CARE REFERRAL       Your provider has referred you to: [facility]: [facility] [place] [phone]  [email or web address]     Please be aware that coverage of these services is subject to the terms and limitations of your health insurance plan.  Please check with your insurance prior to the appointment to ensure  "appropriate coverage for any services considered cosmetic in nature or not medically necessary.    Please bring the following with you to your appointment:    (1) Any X-Rays, CTs or MRIs which have been performed.  Contact the facility where they were done to arrange for  prior to your scheduled appointment.  Any new CT, MRI or other procedures ordered by your specialist must be performed at a [facility] facility or coordinated by your clinic's referral office.  (2) List of current medications  (3) This referral request   (4) Any documents/labs given to you for this referral                  Who to contact     If you have questions or need follow up information about today's clinic visit or your schedule please contact [facility] directly at [phone].  Normal or non-critical lab and imaging results will be communicated to you by MyChart, letter or phone within 4 business days after the clinic has received the results. If you do not hear from us within 7 days, please contact the clinic through MyChart or phone. If you have a critical or abnormal lab result, we will notify you by phone as soon as possible.  Submit refill requests through [employer] or call your pharmacy and they will forward the refill request to us. Please allow 3 business days for your refill to be completed.          Additional Information About Your Visit        [employer] Information     [employer] lets you send messages to your doctor, view your test results, renew your prescriptions, schedule appointments and more. To sign up, go to [email or web address] . Click on \"Log in\" on the left side of the screen, which will take you to the Welcome page. Then click on \"Sign up Now\" on the right side of the page.     You will be asked to enter the access code listed below, as well as some personal information. Please follow the directions to create your username and password.     Your access code is: [email or web address]  Expires: " "2018 12:59 PM     Your access code will  in 90 days. If you need help or a new code, please call your [facility] clinic or [phone].        Care EveryWhere ID     This is your Care EveryWhere ID. This could be used by other organizations to access your [facility] medical records  [id number]        Your Vitals Were     Pulse Temperature Respirations Height Pulse Oximetry BMI (Body Mass Index)    68 97.5  F (36.4  C) (Tympanic) 14 5' 10\" (1.778 m) 95% 33.15 kg/m2       Blood Pressure from Last 3 Encounters:   18 128/82   18 134/80   17 126/72    Weight from Last 3 Encounters:   18 231 lb (104.8 kg)   18 224 lb (101.6 kg)   17 226 lb (102.5 kg)              We Performed the Following     PAIN MANAGEMENT REFERRAL     SKIN CARE REFERRAL          Today's Medication Changes          These changes are accurate as of 18  1:57 PM.  If you have any questions, ask your nurse or doctor.               These medicines have changed or have updated prescriptions.        Dose/Directions    lidocaine 5 % ointment   Commonly known as:  XYLOCAINE   This may have changed:  Another medication with the same name was removed. Continue taking this medication, and follow the directions you see here.   Used for:  Post herpetic neuralgia   Changed by:  Yuki Jaime PA-C        Apply topically 4 times daily as needed for moderate pain   Quantity:  200 g   Refills:  3         Stop taking these medicines if you haven't already. Please contact your care team if you have questions.     sennosides 8.6 MG tablet   Commonly known as:  SENOKOT   Stopped by:  Yuki Jaime PA-C           tamsulosin 0.4 MG capsule   Commonly known as:  FLOMAX   Stopped by:  Yuki Jaime PA-C                    Primary Care Provider Office Phone # Fax #    Yuki Jaime PA-C [phone] [address] [address]   "      Equal Access to Services     [facility] ANTONIO : Hadii aad ku hadlainanunu Patriciaharish, wacasada luqtongha, qacherylta valerieevelioania pfeiffer. So [facility] [phone].    ATENCIÓN: Si habla español, tiene a bermudez disposición servicios gratuitos de asistencia lingüística. [place] al [phone].    We comply with applicable federal civil rights laws and [place] laws. We do not discriminate on the basis of race, color, national origin, age, disability, sex, sexual orientation, or gender identity.            Thank you!     Thank you for choosing [facility]  for your care. Our goal is always to provide you with excellent care. Hearing back from our patients is one way we can continue to improve our services. Please take a few minutes to complete the written survey that you may receive in the mail after your visit with us. Thank you!             Your Updated Medication List - Protect others around you: Learn how to safely use, store and throw away your medicines at www.disposemymeds.org.          This list is accurate as of 5/31/18  1:57 PM.  Always use your most recent med list.                   Brand Name Dispense Instructions for use Diagnosis    diclofenac 1 % Gel topical gel    VOLTAREN    100 g    Apply 4 grams to knees or 2 grams to hands four times daily using enclosed dosing card.    Osteoarthritis of right knee, unspecified osteoarthritis type       lidocaine 5 % ointment    XYLOCAINE    200 g    Apply topically 4 times daily as needed for moderate pain    Post herpetic neuralgia       triamcinolone 0.1 % cream    KENALOG    80 g    Apply to affected area 1-3 times daily as needed    Dermatitis

## 2018-06-01 ENCOUNTER — OFFICE VISIT (OUTPATIENT)
Dept: FAMILY MEDICINE | Facility: CLINIC | Age: 83
End: 2018-06-01
Payer: COMMERCIAL

## 2018-06-01 ENCOUNTER — TELEPHONE (OUTPATIENT)
Dept: FAMILY MEDICINE | Facility: CLINIC | Age: 83
End: 2018-06-01

## 2018-06-01 VITALS — HEART RATE: 80 BPM | OXYGEN SATURATION: 96 % | SYSTOLIC BLOOD PRESSURE: 139 MMHG | DIASTOLIC BLOOD PRESSURE: 78 MMHG

## 2018-06-01 DIAGNOSIS — L57.8 SOLAR ELASTOSIS: ICD-10-CM

## 2018-06-01 DIAGNOSIS — M94.9 CARTILAGE DISORDER: ICD-10-CM

## 2018-06-01 DIAGNOSIS — L57.0 AK (ACTINIC KERATOSIS): Primary | ICD-10-CM

## 2018-06-01 PROCEDURE — 17000 DESTRUCT PREMALG LESION: CPT | Performed by: PHYSICIAN ASSISTANT

## 2018-06-01 PROCEDURE — 99213 OFFICE O/P EST LOW 20 MIN: CPT | Mod: 25 | Performed by: PHYSICIAN ASSISTANT

## 2018-06-01 PROCEDURE — 17003 DESTRUCT PREMALG LES 2-14: CPT | Performed by: PHYSICIAN ASSISTANT

## 2018-06-01 NOTE — MR AVS SNAPSHOT
After Visit Summary   6/1/2018    Canelo Cruz    MRN: [id number]           Patient Information     Date Of Birth          7/30/1931        Visit Information        Provider Department      6/1/2018 9:20 AM Jahaira Morrison PA-C [facility] [place] [facility] Instructions    WOUND CARE INSTRUCTIONS  FOR CRYOSURGERY        This area treated with liquid nitrogen will form a blister. You do not need to bandage the area until after the blister forms and breaks (which may be a few days).  When the blister breaks, begin daily dressing changes as follows:    1) Clean and dry the area with tap water using clean Q-tip or sterile gauze pad.    2) Apply Polysporin ointment or Bacitracin ointment over entire wound.  Do NOT use Neosporin ointment.    3) Cover the wound with a band-aid or sterile non-stick gauze pad and micropore paper tape.      REPEAT THESE INSTRUCTIONS AT LEAST ONCE A DAY UNTIL THE WOUND HAS COMPLETELY HEALED.        It is an old wives tale that a wound heals better when it is exposed to air and allowed to dry out. The wound will heal faster with a better cosmetic result if it is kept moist with ointment and covered with a bandage.  Do not let the wound dry out.      IMPORTANT INFORMATION ON REVERSE SIDE    Supplies Needed:     *Cotton tipped applicators (Q-tips)   *Polysporin ointment or Bacitracin ointment (NOT NEOSPORIN)   *Band-aids, or non stick gauze pads and micropore paper tape                PATIENT INFORMATION    During the healing process you will notice a number of changes. All wounds develop a small halo of redness surrounding the wound.  This means healing is occurring. Severe itching with extensive redness usually indicates sensitivity to the ointment or bandage tape used to dress the wound.  You should call our office if this develops.      Swelling and/or discoloration around your surgical site is common, particularly when performed around the eye.    All  wounds normally drain.  The larger the wound the more drainage there will be.  After 7-10 days, you will notice the wound beginning to shrink and new skin will begin to grow.  The wound is healed when you can see skin has formed over the entire area.  A healed wound has a healthy, shiny look to the surface and is red to dark pink in color to normalize.  Wounds may take approximately 4-6 weeks to heal.  Larger wounds may take 6-8 weeks.  After the wound is healed you may discontinue dressing changes.    You may experience a sensation of tightness as your wound heals. This is normal and will gradually subside.    Your healed wound may be sensitive to temperature changes. This sensitivity improves with time, but if you re having a lot of discomfort, try to avoid temperature extremes.    Patients frequently experience itching after their wound appears to have healed because of the continue healing under the skin.  Plain Vaseline will help relieve the itching.                   Follow-ups after your visit        Your next 10 appointments already scheduled     Jul 12, 2018  9:20 AM CDT   Office Visit with Jahaira Morrison PA-C   [facility] ([facility] [place] [facility])    [address] [place] [id number] [phone]           Bring a current list of meds and any records pertaining to this visit. For Physicals, please bring immunization records and any forms needing to be filled out. Please arrive 10 minutes early to complete paperwork.              Who to contact     If you have questions or need follow up information about today's clinic visit or your schedule please contact [facility] directly at [phone].  Normal or non-critical lab and imaging results will be communicated to you by MyChart, letter or phone within 4 business days after the clinic has received the results. If you do not hear from us within 7 days, please contact the clinic through  [employer] or phone. If you have a critical or abnormal lab result, we will notify you by phone as soon as possible.  Submit refill requests through [employer] or call your pharmacy and they will forward the refill request to us. Please allow 3 business days for your refill to be completed.          Additional Information About Your Visit        [employer] Information     [employer] gives you secure access to your electronic health record. If you see a primary care provider, you can also send messages to your care team and make appointments. If you have questions, please call your primary care clinic.  If you do not have a primary care provider, please call [phone] and they will assist you.        Care EveryWhere ID     This is your Care EveryWhere ID. This could be used by other organizations to access your [facility] medical records  [id number]        Your Vitals Were     Pulse Pulse Oximetry                80 96%           Blood Pressure from Last 3 Encounters:   06/01/18 139/78   05/31/18 128/82   01/17/18 134/80    Weight from Last 3 Encounters:   05/31/18 231 lb (104.8 kg)   01/17/18 224 lb (101.6 kg)   12/19/17 226 lb (102.5 kg)              Today, you had the following     No orders found for display       Primary Care Provider Office Phone # Fax #    Yuki Jaime PA-C [phone] [address]  [facility] [address]        Equal Access to Services     [facility] ANTONIO : Hadii aad ku hadasho Soomaali, waaxda luqadaha, qaybta kaalmada adeegyada, ania rossi hayaan dawood arevalo . So [facility] [phone].    ATENCIÓN: Si habla español, tiene a bermudez disposición servicios gratuitos de asistencia lingüística. [place] al [phone].    We comply with applicable federal civil rights laws and [place] laws. We do not discriminate on the basis of race, color, national origin, age, disability, sex, sexual orientation, or gender identity.            Thank you!     Thank you for  choosing [facility]  for your care. Our goal is always to provide you with excellent care. Hearing back from our patients is one way we can continue to improve our services. Please take a few minutes to complete the written survey that you may receive in the mail after your visit with us. Thank you!             Your Updated Medication List - Protect others around you: Learn how to safely use, store and throw away your medicines at www.disposemymeds.org.          This list is accurate as of 6/1/18  9:59 AM.  Always use your most recent med list.                   Brand Name Dispense Instructions for use Diagnosis    diclofenac 1 % Gel topical gel    VOLTAREN    100 g    Apply 4 grams to knees or 2 grams to hands four times daily using enclosed dosing card.    Osteoarthritis of right knee, unspecified osteoarthritis type       lidocaine 5 % ointment    XYLOCAINE    200 g    Apply topically 4 times daily as needed for moderate pain    Post herpetic neuralgia       triamcinolone 0.1 % cream    KENALOG    80 g    Apply to affected area 1-3 times daily as needed    Dermatitis

## 2018-06-01 NOTE — PROGRESS NOTES
HPI:  I was asked to see pt by Yuki Jaime PA-C. Canelo Cruz is a 86 year old year old male patient here today for hardened growth on left ear.   Duration: 3 years  Symptoms:  Denies symptoms just feels hard     Previous treatments: none     Alleviating/aggravating factors: nothing bothers it. Does not hurt to lay on. Has improved some but seems to be growing a bit as well.     Associated symptoms: none  Additional findings:none  Patient has no other skin complaints today.  Remainder of the HPI, Meds, PMH, Allergies, FH, and SH was reviewed in chart.      Past Medical History:   Diagnosis Date     Postherpetic neuralgia        Past Surgical History:   Procedure Laterality Date     ARTHROSCOPY KNEE Right     ~2058-6563     ORTHOPEDIC SURGERY      right achilles rupture repair with 14 month MRSA infection        Family History   Problem Relation Age of Onset     Unknown/Adopted Mother      Unknown/Adopted Father      DIABETES No family hx of      Coronary Artery Disease No family hx of      Hypertension No family hx of      Hyperlipidemia No family hx of      CEREBROVASCULAR DISEASE No family hx of      Breast Cancer No family hx of      Colon Cancer No family hx of      Prostate Cancer No family hx of      Other Cancer No family hx of      Depression No family hx of      Anxiety Disorder No family hx of      MENTAL ILLNESS No family hx of      Substance Abuse No family hx of      Anesthesia Reaction No family hx of      Asthma No family hx of      OSTEOPOROSIS No family hx of      Genetic Disorder No family hx of      Thyroid Disease No family hx of      Obesity No family hx of        Social History     Social History     Marital status:      Spouse name: N/A     Number of children: N/A     Years of education: N/A     Occupational History     Not on file.     Social History Main Topics     Smoking status: Never Smoker     Smokeless tobacco: Never Used     Alcohol use Yes     Drug use: No     Sexual  activity: No     Other Topics Concern     Parent/Sibling W/ Cabg, Mi Or Angioplasty Before 65f 55m? No     Social History Narrative       Outpatient Encounter Prescriptions as of 6/1/2018   Medication Sig Dispense Refill     diclofenac (VOLTAREN) 1 % GEL topical gel Apply 4 grams to knees or 2 grams to hands four times daily using enclosed dosing card. 100 g 3     lidocaine (XYLOCAINE) 5 % ointment Apply topically 4 times daily as needed for moderate pain 200 g 3     triamcinolone (KENALOG) 0.1 % cream Apply to affected area 1-3 times daily as needed 80 g 5     No facility-administered encounter medications on file as of 6/1/2018.        Review Of Systems:  Skin: As above  Eyes: negative  Ears/Nose/Throat: negative  Respiratory: No shortness of breath, dyspnea on exertion, cough, or hemoptysis  Cardiovascular: negative  Gastrointestinal: negative  Genitourinary: negative  Musculoskeletal: negative  Neurologic: negative  Psychiatric: negative  Hematologic/Lymphatic/Immunologic: negative  Endocrine: negative      Objective:     /78  Pulse 80  SpO2 96%  Eyes: Conjunctivae/lids: Normal   ENT: Lips:  Normal  MSK: Normal  Cardiovascular: Peripheral edema none  Pulm: Breathing Normal  Neuro/Psych: Orientation: Normal; Mood/Affect: Normal, NAD, WDWN  Following areas examined: face, neck, ears, forearms  Findings:    1) Pink scaly macule/s x14 on face, ears, forearms, and dorsal hands  2) normal appearing skin on carlos of helix and anterior superior helix. On palpation cartilage feels smooth but calcified.   3) Rhytides, hypo/hyperpigmentation, and atrophy of face, neck, and v of chest      Assessment and Plan:  1) AKs x 14    LN2 for 5 seconds x 2. Discussed AE include hypopigmentation (white spot) and recurrence. Follow up in 2-3 months to recheck lesions. There is a 0.025%-20% chance that AKs can develop into a SCC.   Discussed treating with LN2 vs PDT vs Efudex. 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 Since area does not bother pt recommend just leaving area.  3) Solar elastosis  Signs and Symptoms of non-melanoma skin cancer and ABCDEs of melanoma reviewed with patient. Patient encouraged to perform monthly self skin exams. UV precautions reviewed with patient.     Follow up in 1-2 months to check AKs.

## 2018-06-01 NOTE — LETTER
6/1/2018         RE: Canelo Cruz  [address]  [address]  [place] [address]        Dear Colleague,    Thank you for referring your patient, Canelo Cruz, to the [facility]. Please see a copy of my visit note below.    HPI:  I was asked to see pt by Yuki Jaime PA-C. Canelo Cruz is a 86 year old year old male patient here today for hardened growth on left ear.   Duration: 3 years  Symptoms:  Denies symptoms just feels hard     Previous treatments: none     Alleviating/aggravating factors: nothing bothers it. Does not hurt to lay on. Has improved some but seems to be growing a bit as well.     Associated symptoms: none  Additional findings:none  Patient has no other skin complaints today.  Remainder of the HPI, Meds, PMH, Allergies, FH, and SH was reviewed in chart.      Past Medical History:   Diagnosis Date     Postherpetic neuralgia        Past Surgical History:   Procedure Laterality Date     ARTHROSCOPY KNEE Right     ~6491-3041     ORTHOPEDIC SURGERY      right achilles rupture repair with 14 month MRSA infection        Family History   Problem Relation Age of Onset     Unknown/Adopted Mother      Unknown/Adopted Father      DIABETES No family hx of      Coronary Artery Disease No family hx of      Hypertension No family hx of      Hyperlipidemia No family hx of      CEREBROVASCULAR DISEASE No family hx of      Breast Cancer No family hx of      Colon Cancer No family hx of      Prostate Cancer No family hx of      Other Cancer No family hx of      Depression No family hx of      Anxiety Disorder No family hx of      MENTAL ILLNESS No family hx of      Substance Abuse No family hx of      Anesthesia Reaction No family hx of      Asthma No family hx of      OSTEOPOROSIS No family hx of      Genetic Disorder No family hx of      Thyroid Disease No family hx of      Obesity No family hx of        Social History     Social History     Marital status:      Spouse  name: N/A     Number of children: N/A     Years of education: N/A     Occupational History     Not on file.     Social History Main Topics     Smoking status: Never Smoker     Smokeless tobacco: Never Used     Alcohol use Yes     Drug use: No     Sexual activity: No     Other Topics Concern     Parent/Sibling W/ Cabg, Mi Or Angioplasty Before 65f 55m? No     Social History Narrative       Outpatient Encounter Prescriptions as of 6/1/2018   Medication Sig Dispense Refill     diclofenac (VOLTAREN) 1 % GEL topical gel Apply 4 grams to knees or 2 grams to hands four times daily using enclosed dosing card. 100 g 3     lidocaine (XYLOCAINE) 5 % ointment Apply topically 4 times daily as needed for moderate pain 200 g 3     triamcinolone (KENALOG) 0.1 % cream Apply to affected area 1-3 times daily as needed 80 g 5     No facility-administered encounter medications on file as of 6/1/2018.        Review Of Systems:  Skin: As above  Eyes: negative  Ears/Nose/Throat: negative  Respiratory: No shortness of breath, dyspnea on exertion, cough, or hemoptysis  Cardiovascular: negative  Gastrointestinal: negative  Genitourinary: negative  Musculoskeletal: negative  Neurologic: negative  Psychiatric: negative  Hematologic/Lymphatic/Immunologic: negative  Endocrine: negative      Objective:     /78  Pulse 80  SpO2 96%  Eyes: Conjunctivae/lids: Normal   ENT: Lips:  Normal  MSK: Normal  Cardiovascular: Peripheral edema none  Pulm: Breathing Normal  Neuro/Psych: Orientation: Normal; Mood/Affect: Normal, NAD, WDWN  Following areas examined: face, neck, ears, forearms  Findings:    1) Pink scaly macule/s x14 on face, ears, forearms, and dorsal hands  2) normal appearing skin on carlos of helix and anterior superior helix. On palpation cartilage feels smooth but calcified.   3) Rhytides, hypo/hyperpigmentation, and atrophy of face, neck, and v of chest      Assessment and Plan:  1) AKs x 14    LN2 for 5 seconds x 2. Discussed AE  include hypopigmentation (white spot) and recurrence. Follow up in 2-3 months to recheck lesions. There is a 0.025%-20% chance that AKs can develop into a SCC.   Discussed treating with LN2 vs PDT vs Efudex. 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 Since area does not bother pt recommend just leaving area.  3) Solar elastosis  Signs and Symptoms of non-melanoma skin cancer and ABCDEs of melanoma reviewed with patient. Patient encouraged to perform monthly self skin exams. UV precautions reviewed with patient.     Follow up in 1-2 months to check AKs.      Again, thank you for allowing me to participate in the care of your patient.        Sincerely,        Jahaira Morrison PA-C

## 2018-06-06 ENCOUNTER — TRANSFERRED RECORDS (OUTPATIENT)
Dept: HEALTH INFORMATION MANAGEMENT | Facility: CLINIC | Age: 83
End: 2018-06-06

## 2018-06-06 ENCOUNTER — TELEPHONE (OUTPATIENT)
Dept: FAMILY MEDICINE | Facility: CLINIC | Age: 83
End: 2018-06-06

## 2018-06-06 NOTE — TELEPHONE ENCOUNTER
Reason for Call:  Other   Letting you know epidural is scheduled    Detailed comments:   The patient called to let you an epidural is scheduled this Friday 06/8/18 with Dr [facility]    He will let you know how it goes    Phone Number Patient can be reached at: Home number on file [phone] (home)    Best Time:   anytime    Can we leave a detailed message on this number? YES    Call taken on 6/6/2018 at 11:06 AM by ADAM THOMAS

## 2018-06-17 ENCOUNTER — NURSE TRIAGE (OUTPATIENT)
Dept: NURSING | Facility: CLINIC | Age: 83
End: 2018-06-17

## 2018-06-17 NOTE — TELEPHONE ENCOUNTER
Reason for Disposition    [1] Continuous (nonstop) coughing interferes with work or school AND [2] no improvement using cough treatment per Care Advice    Additional Information    Negative: Severe difficulty breathing (e.g., struggling for each breath, speaks in single words)    Negative: Bluish lips, tongue, or face now    Negative: [1] Difficulty breathing AND [2] exposure to flames, smoke, or fumes    Negative: [1] Stridor AND [2] difficulty breathing    Negative: Sounds like a life-threatening emergency to the triager    Negative: Chest pain  (Exception: MILD central chest pain, present only when coughing)    Negative: Difficulty breathing    Negative: Patient sounds very sick or weak to the triager    Negative: [1] Coughed up blood AND [2] > 1 tablespoon (15 ml) (Exception: blood-tinged sputum)    Negative: Fever > 103 F (39.4 C)    Negative: [1] Fever > 101 F (38.3 C) AND [2] age > 60    Negative: [1] Fever > 101 F (38.3 C) AND [2] bedridden (e.g., nursing home patient, CVA, chronic illness, recovering from surgery)    Negative: [1] Fever > 100.5 F (38.1 C) AND [2] diabetes mellitus or weak immune system (e.g., HIV positive, cancer chemo, splenectomy, organ transplant, chronic steroids)    Negative: Wheezing is present    Negative: SEVERE coughing spells (e.g., whooping sound after coughing, vomiting after coughing)    Protocols used: COUGH - ACUTE PRODUCTIVE-ADULT-

## 2018-06-17 NOTE — TELEPHONE ENCOUNTER
Canelo has a cough that started two days ago.  Denies fever.  Denies coughing up blood.  Productive cough that is clear.  Canelo is requesting an appointment at clinic for tomorrow.

## 2018-06-18 ENCOUNTER — OFFICE VISIT (OUTPATIENT)
Dept: FAMILY MEDICINE | Facility: CLINIC | Age: 83
End: 2018-06-18
Payer: COMMERCIAL

## 2018-06-18 VITALS
TEMPERATURE: 99 F | HEART RATE: 85 BPM | BODY MASS INDEX: 32.64 KG/M2 | HEIGHT: 70 IN | RESPIRATION RATE: 18 BRPM | WEIGHT: 228 LBS | SYSTOLIC BLOOD PRESSURE: 118 MMHG | DIASTOLIC BLOOD PRESSURE: 66 MMHG | OXYGEN SATURATION: 95 %

## 2018-06-18 DIAGNOSIS — B02.29 POST HERPETIC NEURALGIA: ICD-10-CM

## 2018-06-18 DIAGNOSIS — J98.01 ACUTE BRONCHOSPASM: ICD-10-CM

## 2018-06-18 DIAGNOSIS — J20.9 ACUTE BRONCHITIS, UNSPECIFIED ORGANISM: Primary | ICD-10-CM

## 2018-06-18 PROCEDURE — 99214 OFFICE O/P EST MOD 30 MIN: CPT | Performed by: PHYSICIAN ASSISTANT

## 2018-06-18 RX ORDER — AZITHROMYCIN 250 MG/1
TABLET, FILM COATED ORAL
Qty: 6 TABLET | Refills: 0 | Status: SHIPPED | OUTPATIENT
Start: 2018-06-18 | End: 2018-07-09

## 2018-06-18 RX ORDER — CODEINE PHOSPHATE AND GUAIFENESIN 10; 100 MG/5ML; MG/5ML
2 SOLUTION ORAL EVERY 4 HOURS PRN
Qty: 120 ML | Refills: 0 | Status: SHIPPED | OUTPATIENT
Start: 2018-06-18 | End: 2018-06-25

## 2018-06-18 RX ORDER — PREDNISONE 20 MG/1
20 TABLET ORAL 2 TIMES DAILY
Qty: 10 TABLET | Refills: 0 | Status: SHIPPED | OUTPATIENT
Start: 2018-06-18 | End: 2018-06-25

## 2018-06-18 NOTE — PROGRESS NOTES
SUBJECTIVE:   Canelo Cruz is a 86 year old male who presents to clinic today for the following health issues:      RESPIRATORY SYMPTOMS      Duration: X4 days    Description  nasal congestion, cough, wheezing and fatigue/malaise    Severity: moderate    Accompanying signs and symptoms: See above    History (predisposing factors):  none    Precipitating or alleviating factors: None    Therapies tried and outcome:  guaifenesin    Reviewed and updated as needed this visit by clinical staff  Tobacco  Allergies  Meds  Problems  Med Hx  Surg Hx  Fam Hx  Soc Hx        Reviewed and updated as needed this visit by Provider  Tobacco  Allergies  Meds  Problems  Med Hx  Surg Hx  Fam Hx  Soc Hx        Additional complaints: Shingles follow up     HPI additional notes: Hang presents today with   Chief Complaint   Patient presents with     URI     x4 days, SOB, post nasal drip, wheezing   Strong cough with severe chest wall pain bilaterally.  Notes fever of 99.5 at home yesterday. Symptoms have been getting progressively worse.  Cough has been more dry.  Has been having wheezing.  Has to cough hard in order for anything to come up.         ROS:  C: POSITIVE for fever and chills.  Skin: Negative for worrisome rashes or lesions  ENT/MOUTH:POSITIVE for congestion, runny nose, ear pain and post-nasal drainage.  Negative for sinus pressure.  Resp: POSITIVE for cough non-productive with SOB and wheezing  MS: Negative for significant arthralgias or myalgias  NEURO: Negative  for headaches or dizziness.  P: Negative for changes in mood or affect  ROS otherwise negative.    Chart Review:  History   Smoking Status     Never Smoker   Smokeless Tobacco     Never Used       PFSH: Personal history of atopy.       Patient Active Problem List   Diagnosis     History of colonic polyps     Constipation, unspecified constipation type     Post herpetic neuralgia     Decreased libido     ACP (advance care planning)     Erectile  "dysfunction, unspecified erectile dysfunction type     Osteoarthritis of right knee, unspecified osteoarthritis type     Primary insomnia     Other fatigue     Skin lesion     Elevated blood sugar     Hypertriglyceridemia     BMI 30-35     Recurrent UTI     Past Surgical History:   Procedure Laterality Date     ARTHROSCOPY KNEE Right     ~7096-4742     ORTHOPEDIC SURGERY      right achilles rupture repair with 14 month MRSA infection     Problem list, Medication list, Allergies, Medical/Social/Surg hx reviewed in [facility], updated as appropriate.   OBJECTIVE:                                                    /66 (BP Location: Right arm, Patient Position: Sitting, Cuff Size: Adult Regular)  Pulse 85  Temp 99  F (37.2  C)  Resp 18  Ht 5' 10\" (1.778 m)  Wt 228 lb (103.4 kg)  SpO2 95%  BMI 32.71 kg/m2  Body mass index is 32.71 kg/(m^2).  GENERAL: healthy, alert, in no acute distress  EYES: Grossly normal to inspection, EOMI, PERRL  HENT: Ear canals normal; TMs pearly gray without effusion. Nasal mucosa moist without edema or discharge. Oral mucous membranes moist, no lesions or ulcerations. Pharynx pink.  Uvula midline.  No postnasal drainage. Sinuses non-tender to palpation.  NECK: Non-tender, no adenopathy.  RESP: expiratory wheezes bilateral, prolonged expiratory phase, decreased respiratory effort, cough with deep inspiration  and inspiratory wheezes bilateral  CV: regular rate and rhythm, normal S1 S2.  No peripheral edema.  SKIN: no suspicious lesions, no rashes  PSYCH: Alert and oriented times 3;  Able to articulate logical thoughts. Affect is normal.    Diagnostic test results: None     ASSESSMENT/PLAN:                                                          ICD-10-CM    1. Acute bronchitis, unspecified organism J20.9 predniSONE (DELTASONE) 20 MG tablet     azithromycin (ZITHROMAX) 250 MG tablet     guaiFENesin-codeine (ROBITUSSIN AC) 100-10 MG/5ML SOLN solution   2. Acute bronchospasm J98.01    3. " Post herpetic neuralgia B02.29      Discussed significant wheezing on exam but pt has good O2 sats.  Will start on azithromycin and prednisone and cough syrup to help with coughing spells at night.  Will call if worsening cough, SOB or fever.      Has been seeing pain clinic for shingles pain.  Thinks the epidural decreased the size of the area that is painful but has not decreased the skin sensitivity.      Please see patient instructions for treatment details.    Follow up in 7-10 days if not improving as anticipated.    Yuki Jaime PA-C  [facility]

## 2018-06-18 NOTE — PATIENT INSTRUCTIONS
What Is Acute Bronchitis?    Acute or short-term bronchitis last for days or weeks. It occurs when the bronchial tubes (airways in the lungs) are irritated by a virus, bacteria, or allergen. This causes a cough that produces yellow or greenish mucus.  Inside Healthy Lungs  Air travels in and out of the lungs through the airways. The linings of these airways produce sticky mucus. This mucus traps particles that enter the lungs. Tiny structures called cilia then sweep the particles out of the airways.    Healthy Airway: Airways are normally open. Air moves in and out easily.   Healthy Cilia: Tiny, hairlike cilia sweep mucus and particles up and out of the airways.   Lings with Bronchitis  Bronchitis often occurs when a cold or the flu virus. The airways become inflamed (red and swollen). There is a deep  hacking  cough from the extra mucus. Other symptoms may include:    Wheezing    Chest discomfort    Shortness of breath    Mild fever  A second infection, this time due to bacteria, may then occur. And, airways irritated by allergens or smoke are more likely to get infected.    Inflamed Airway: Inflammation and excess mucus narrow the airway, causing shortness of breath.   Impaired Cilia: Excess mucus impairs cilia, causing congestion and wheezing. Smoking worsens the problem.   Making a Diagnosis  A physical exam, medical history, and certain tests help your health care provider make the diagnosis.  Medical History  Your health care provider will ask you about your symptoms.  The Exam  Your provider listens to your chest for signs of congestion. He or she may also check your ears, nose, and throat.  Possible Tests    A sputum test for bacteria. This requires a sample of mucus from the lungs.    A nasal or throat swab for bacterial infection.    A chest X-ray if your health care provider suspects pneumonia.    Tests to check for an underlying condition, such as allergies, asthma, or COPD. You may be referred to a  specialist for further lung function testing.  Treating a Cough  The main treatment for bronchitis is easing symptoms. Avoiding smoke, allergens, and other things that trigger coughing can often help. If the infection is bacterial, antibiotics may be used. During the illness, it's important to get plenty of sleep. To ease symptoms:    Don t smoke, and avoid secondhand smoke.    Use a humidifier, or breathe in steam from a hot shower. This may help loosen mucus.    Drink a lot of water and juice. They can soothe the throat and may help thin mucus.    Sit up or use extra pillows when in bed to help lessen coughing and congestion.    Ask your provider about using cough medicine, pain and fever medication, or a decongestant.  Antibiotics  Most cases of bronchitis are caused by cold or flu viruses. Antibiotics don t treat viral illness. Taking antibiotics when they are not needed increases your risk of getting an infection later that is antibiotic-resistant. Your provider will prescribe antibiotics if the infection is caused by bacteria. If they are prescribed:    Take the medication until it is used up, even if symptoms have improved. If you don t, the bronchitis may come back.    Take them as directed. For instance, some medications should be taken with food.    Ask your provider or pharmacist what side effects are common, and what to do about them.  Follow-Up  You should go see your provider again in 2-3 weeks. By this time, symptoms should have improved. An infection that lasts longer may signal a more serious problem.  Prevention    Avoid tobacco smoke. If you smoke, quit. Stay away from smoky places. Ask friends and family not to smoke around you, or in your home or car.    Get checked for allergies.    Ask your provider about getting a yearly flu shot, and possibly a pneumoccocal or pneumonia shot.    Wash your hands often. This helps reduce the chance of picking up viruses that cause colds and flu.  Call Your  Health Care Provider If:    Symptoms worsen, or new symptoms develop.    Breathing problems worsen or  become severe.    Symptoms don t improve within a week, or within 3 days of taking antibiotics.        2806-4874 [employer]. [address] [place] [address], [place] [address]. All rights reserved. This information is not intended as a substitute for professional medical care. Always follow your healthcare professional's instructions.

## 2018-06-18 NOTE — MR AVS SNAPSHOT
After Visit Summary   6/18/2018    Canelo Cruz    MRN: [id number]           Patient Information     Date Of Birth          7/30/1931        Visit Information        Provider Department      6/18/2018 7:50 AM Yuki Jaime PA-C [facility]        Today's Diagnoses     Acute bronchitis, unspecified organism    -  1    Acute bronchospasm        Post herpetic neuralgia          Care Instructions      What Is Acute Bronchitis?    Acute or short-term bronchitis last for days or weeks. It occurs when the bronchial tubes (airways in the lungs) are irritated by a virus, bacteria, or allergen. This causes a cough that produces yellow or greenish mucus.  Inside Healthy Lungs  Air travels in and out of the lungs through the airways. The linings of these airways produce sticky mucus. This mucus traps particles that enter the lungs. Tiny structures called cilia then sweep the particles out of the airways.    Healthy Airway: Airways are normally open. Air moves in and out easily.   Healthy Cilia: Tiny, hairlike cilia sweep mucus and particles up and out of the airways.   Lings with Bronchitis  Bronchitis often occurs when a cold or the flu virus. The airways become inflamed (red and swollen). There is a deep  hacking  cough from the extra mucus. Other symptoms may include:    Wheezing    Chest discomfort    Shortness of breath    Mild fever  A second infection, this time due to bacteria, may then occur. And, airways irritated by allergens or smoke are more likely to get infected.    Inflamed Airway: Inflammation and excess mucus narrow the airway, causing shortness of breath.   Impaired Cilia: Excess mucus impairs cilia, causing congestion and wheezing. Smoking worsens the problem.   Making a Diagnosis  A physical exam, medical history, and certain tests help your health care provider make the diagnosis.  Medical History  Your health care provider will ask you about  your symptoms.  The Exam  Your provider listens to your chest for signs of congestion. He or she may also check your ears, nose, and throat.  Possible Tests    A sputum test for bacteria. This requires a sample of mucus from the lungs.    A nasal or throat swab for bacterial infection.    A chest X-ray if your health care provider suspects pneumonia.    Tests to check for an underlying condition, such as allergies, asthma, or COPD. You may be referred to a specialist for further lung function testing.  Treating a Cough  The main treatment for bronchitis is easing symptoms. Avoiding smoke, allergens, and other things that trigger coughing can often help. If the infection is bacterial, antibiotics may be used. During the illness, it's important to get plenty of sleep. To ease symptoms:    Don t smoke, and avoid secondhand smoke.    Use a humidifier, or breathe in steam from a hot shower. This may help loosen mucus.    Drink a lot of water and juice. They can soothe the throat and may help thin mucus.    Sit up or use extra pillows when in bed to help lessen coughing and congestion.    Ask your provider about using cough medicine, pain and fever medication, or a decongestant.  Antibiotics  Most cases of bronchitis are caused by cold or flu viruses. Antibiotics don t treat viral illness. Taking antibiotics when they are not needed increases your risk of getting an infection later that is antibiotic-resistant. Your provider will prescribe antibiotics if the infection is caused by bacteria. If they are prescribed:    Take the medication until it is used up, even if symptoms have improved. If you don t, the bronchitis may come back.    Take them as directed. For instance, some medications should be taken with food.    Ask your provider or pharmacist what side effects are common, and what to do about them.  Follow-Up  You should go see your provider again in 2-3 weeks. By this time, symptoms should have improved. An  infection that lasts longer may signal a more serious problem.  Prevention    Avoid tobacco smoke. If you smoke, quit. Stay away from smoky places. Ask friends and family not to smoke around you, or in your home or car.    Get checked for allergies.    Ask your provider about getting a yearly flu shot, and possibly a pneumoccocal or pneumonia shot.    Wash your hands often. This helps reduce the chance of picking up viruses that cause colds and flu.  Call Your Health Care Provider If:    Symptoms worsen, or new symptoms develop.    Breathing problems worsen or  become severe.    Symptoms don t improve within a week, or within 3 days of taking antibiotics.        6953-7921 [employer]. [address] [place], [place] [address]. All rights reserved. This information is not intended as a substitute for professional medical care. Always follow your healthcare professional's instructions.              Follow-ups after your visit        Your next 10 appointments already scheduled     Jul 12, 2018  9:20 AM CDT   Office Visit with Jahaira Morrison PA-C   [facility] ([facility])    [address] [place] [id number] [phone]           Bring a current list of meds and any records pertaining to this visit. For Physicals, please bring immunization records and any forms needing to be filled out. Please arrive 10 minutes early to complete paperwork.              Who to contact     If you have questions or need follow up information about today's clinic visit or your schedule please contact [facility] directly at [phone].  Normal or non-critical lab and imaging results will be communicated to you by MyChart, letter or phone within 4 business days after the clinic has received the results. If you do not hear from us within 7 days, please contact the clinic through MyChart or phone. If you have a critical or abnormal  "lab result, we will notify you by phone as soon as possible.  Submit refill requests through [employer] or call your pharmacy and they will forward the refill request to us. Please allow 3 business days for your refill to be completed.          Additional Information About Your Visit        [employer] Information     [employer] gives you secure access to your electronic health record. If you see a primary care provider, you can also send messages to your care team and make appointments. If you have questions, please call your primary care clinic.  If you do not have a primary care provider, please call [phone] and they will assist you.        Care EveryWhere ID     This is your Care EveryWhere ID. This could be used by other organizations to access your [facility] medical records  [id number]        Your Vitals Were     Pulse Temperature Respirations Height Pulse Oximetry BMI (Body Mass Index)    85 99  F (37.2  C) 18 5' 10\" (1.778 m) 95% 32.71 kg/m2       Blood Pressure from Last 3 Encounters:   06/18/18 118/66   06/01/18 139/78   05/31/18 128/82    Weight from Last 3 Encounters:   06/18/18 228 lb (103.4 kg)   05/31/18 231 lb (104.8 kg)   01/17/18 224 lb (101.6 kg)              Today, you had the following     No orders found for display         Today's Medication Changes          These changes are accurate as of 6/18/18  8:07 AM.  If you have any questions, ask your nurse or doctor.               Start taking these medicines.        Dose/Directions    azithromycin 250 MG tablet   Commonly known as:  ZITHROMAX   Used for:  Acute bronchitis, unspecified organism   Started by:  Yuki Jaime PA-C        Two tablets first day, then one tablet daily for four days.   Quantity:  6 tablet   Refills:  0       guaiFENesin-codeine 100-10 MG/5ML Soln solution   Commonly known as:  ROBITUSSIN AC   Used for:  Acute bronchitis, unspecified organism   Started by:  Yuki Jaime PA-C        Dose:  2 tsp. "   Take 10 mLs by mouth every 4 hours as needed for cough   Quantity:  120 mL   Refills:  0       predniSONE 20 MG tablet   Commonly known as:  DELTASONE   Used for:  Acute bronchitis, unspecified organism   Started by:  Yuki Jaime PA-C        Dose:  20 mg   Take 1 tablet (20 mg) by mouth 2 times daily for 5 days   Quantity:  10 tablet   Refills:  0            Where to get your medicines      These medications were sent to [id number] - [place], [place] - [address]  [address] [place] [address]     Phone:  [phone]     azithromycin 250 MG tablet    predniSONE 20 MG tablet         Some of these will need a paper prescription and others can be bought over the counter.  Ask your nurse if you have questions.     Bring a paper prescription for each of these medications     guaiFENesin-codeine 100-10 MG/5ML Soln solution                Primary Care Provider Office Phone # Fax #    Yuki Jaime PA-C [phone]       [address]  [facility] [address]        Equal Access to Services     [facility] Hospital[facility]IS ANTONIO : Hadii aad ku hadasho Soomaali, waaxda luqadaha, qaybta kaalmada adeclaudioyada, ania arevalo . So [facility] [phone].    ATENCIÓN: Si habla español, tiene a bermudez disposición servicios gratZia Health Clinicos de asistencia lingüística. [facility] [phone].    We comply with applicable federal civil rights laws and [place] laws. We do not discriminate on the basis of race, color, national origin, age, disability, sex, sexual orientation, or gender identity.            Thank you!     [facility]. Our goal is always to provide you with excellent care. Hearing back from our patients is one way we can continue to improve our services. Please take a few minutes to complete the written survey that you may receive in the mail after your visit with us. Thank you!              Your Updated Medication List - Protect others around you: Learn how to safely use, store and throw away your medicines at www.disposemymeds.org.          This list is accurate as of 6/18/18  8:07 AM.  Always use your most recent med list.                   Brand Name Dispense Instructions for use Diagnosis    azithromycin 250 MG tablet    ZITHROMAX    6 tablet    Two tablets first day, then one tablet daily for four days.    Acute bronchitis, unspecified organism       diclofenac 1 % Gel topical gel    VOLTAREN    100 g    Apply 4 grams to knees or 2 grams to hands four times daily using enclosed dosing card.    Osteoarthritis of right knee, unspecified osteoarthritis type       guaiFENesin-codeine 100-10 MG/5ML Soln solution    ROBITUSSIN AC    120 mL    Take 10 mLs by mouth every 4 hours as needed for cough    Acute bronchitis, unspecified organism       lidocaine 5 % ointment    XYLOCAINE    200 g    Apply topically 4 times daily as needed for moderate pain    Post herpetic neuralgia       predniSONE 20 MG tablet    DELTASONE    10 tablet    Take 1 tablet (20 mg) by mouth 2 times daily for 5 days    Acute bronchitis, unspecified organism       triamcinolone 0.1 % cream    KENALOG    80 g    Apply to affected area 1-3 times daily as needed    Dermatitis

## 2018-06-21 DIAGNOSIS — J20.9 ACUTE BRONCHITIS, UNSPECIFIED ORGANISM: ICD-10-CM

## 2018-06-22 ENCOUNTER — TELEPHONE (OUTPATIENT)
Dept: FAMILY MEDICINE | Facility: CLINIC | Age: 83
End: 2018-06-22

## 2018-06-22 DIAGNOSIS — J20.9 ACUTE BRONCHITIS, UNSPECIFIED ORGANISM: ICD-10-CM

## 2018-06-22 NOTE — TELEPHONE ENCOUNTER
Reason for Call:  Other pt update    Detailed comments: Pt came in to see AYANA Jaime and was dx with bronchitis.  He has been on the internet and thinks it is asthma and he wants Sivan to know this.     Phone Number Patient can be reached at: Home number on file [phone] (home)    Best Time: any    Can we leave a detailed message on this number? YES    Call taken on 6/22/2018 at 9:35 AM by SARAH ROSSI

## 2018-06-22 NOTE — TELEPHONE ENCOUNTER
Routing refill request to provider for review/approval because:  Drug not on the [facility], [facility] or [facility] refill protocol or controlled substance

## 2018-06-22 NOTE — TELEPHONE ENCOUNTER
Reason for call:  Other   Patient called regarding (reason for call): call back  Additional comments: Patient called to with HERIBERTO Ramirez he wanted to give an update on his service/prescription for his medication. He is dissatisfied with his service and wants a call from her by 9am tomorrow.    Phone number to reach patient:  Cell number on file:    Telephone Information:   Mobile [phone]       Best Time:  asap    Can we leave a detailed message on this number?  YES

## 2018-06-25 RX ORDER — CODEINE PHOSPHATE AND GUAIFENESIN 10; 100 MG/5ML; MG/5ML
2 SOLUTION ORAL EVERY 4 HOURS PRN
Qty: 120 ML | Refills: 0 | Status: SHIPPED | OUTPATIENT
Start: 2018-06-25 | End: 2018-07-09

## 2018-06-25 NOTE — TELEPHONE ENCOUNTER
The patient was called back and told the prescription was sent over.    Promised to come in on Wednesday if not better by then.

## 2018-06-25 NOTE — TELEPHONE ENCOUNTER
I will give him one refill but it's not something that I want him taking for a prolonged period of time.  If he is still coughing, we need to start him on an inhaler.

## 2018-06-25 NOTE — TELEPHONE ENCOUNTER
Pt states he's been coughing all weekend. He is very upset that the refill for the cough medicine was denied. He is insisting on a refill. Please advise.

## 2018-06-27 ENCOUNTER — TELEPHONE (OUTPATIENT)
Dept: FAMILY MEDICINE | Facility: CLINIC | Age: 83
End: 2018-06-27

## 2018-06-27 NOTE — TELEPHONE ENCOUNTER
PLEASE AMEND MY CHART TO INCLUDE EPIDURAL INJECTION ON FRIDAY, JOSEE 15, 2018 AND STERIOD INJECTIONS ON FRIDAY, JUNE 22, 2018 FOR NERVE DAMAGE LOWER LEFT SIDE BACK STEMMING FROM SHINGLES. TREATMENT BY DR. PALENCIA AT [facility], PHONE# [phone]   YUMIKO FERNANDEZ   [phone]     This message was sent as a my chart customer service message.

## 2018-07-05 ENCOUNTER — TELEPHONE (OUTPATIENT)
Dept: FAMILY MEDICINE | Facility: CLINIC | Age: 83
End: 2018-07-05

## 2018-07-05 NOTE — TELEPHONE ENCOUNTER
Pt called reporting productive cough with phlegm and SOB. Denies chest pain or fever. Symptom ongoing for 3 weeks. Asked for OV with PCP only. Future appt was scheduled. Advised pt be seen at ED if worsening breathing problems or chest pain. He declined to see UC today.

## 2018-07-05 NOTE — TELEPHONE ENCOUNTER
Reason for Call:  Other call back    Detailed comments: Patient saw Sivan about 3 weeks ago and was given antibiotics for bronchitis. Patient states he thinks it is walking pneumonia. Please call to advise.     Phone Number Patient can be reached at: Home number on file [phone] (home)    Best Time: any    Can we leave a detailed message on this number? YES    Call taken on 7/5/2018 at 3:33 PM by EMERY NICK

## 2018-07-09 ENCOUNTER — OFFICE VISIT (OUTPATIENT)
Dept: FAMILY MEDICINE | Facility: CLINIC | Age: 83
End: 2018-07-09
Payer: COMMERCIAL

## 2018-07-09 ENCOUNTER — TELEPHONE (OUTPATIENT)
Dept: FAMILY MEDICINE | Facility: CLINIC | Age: 83
End: 2018-07-09

## 2018-07-09 VITALS
TEMPERATURE: 99 F | SYSTOLIC BLOOD PRESSURE: 132 MMHG | OXYGEN SATURATION: 94 % | WEIGHT: 229 LBS | RESPIRATION RATE: 16 BRPM | BODY MASS INDEX: 32.86 KG/M2 | DIASTOLIC BLOOD PRESSURE: 90 MMHG | HEART RATE: 89 BPM

## 2018-07-09 DIAGNOSIS — R25.2 CRAMP OF LIMB: ICD-10-CM

## 2018-07-09 DIAGNOSIS — M79.641 PAIN IN BOTH HANDS: ICD-10-CM

## 2018-07-09 DIAGNOSIS — J98.01 ACUTE BRONCHOSPASM: ICD-10-CM

## 2018-07-09 DIAGNOSIS — M79.642 PAIN IN BOTH HANDS: ICD-10-CM

## 2018-07-09 DIAGNOSIS — J20.9 ACUTE BRONCHITIS, UNSPECIFIED ORGANISM: Primary | ICD-10-CM

## 2018-07-09 PROCEDURE — 99214 OFFICE O/P EST MOD 30 MIN: CPT | Performed by: PHYSICIAN ASSISTANT

## 2018-07-09 RX ORDER — CODEINE PHOSPHATE AND GUAIFENESIN 10; 100 MG/5ML; MG/5ML
2 SOLUTION ORAL EVERY 4 HOURS PRN
Qty: 120 ML | Refills: 0 | Status: SHIPPED | OUTPATIENT
Start: 2018-07-09 | End: 2018-11-26

## 2018-07-09 RX ORDER — LEVOFLOXACIN 500 MG/1
500 TABLET, FILM COATED ORAL DAILY
Qty: 7 TABLET | Refills: 0 | Status: SHIPPED | OUTPATIENT
Start: 2018-07-09 | End: 2018-11-26

## 2018-07-09 RX ORDER — AZITHROMYCIN 250 MG/1
TABLET, FILM COATED ORAL
Qty: 6 TABLET | Refills: 0 | Status: SHIPPED | OUTPATIENT
Start: 2018-07-09 | End: 2018-07-09

## 2018-07-09 RX ORDER — FLUTICASONE PROPIONATE 110 UG/1
2 AEROSOL, METERED RESPIRATORY (INHALATION) 2 TIMES DAILY
Qty: 1 INHALER | Refills: 1 | Status: CANCELLED | OUTPATIENT
Start: 2018-07-09

## 2018-07-09 RX ORDER — ALBUTEROL SULFATE 90 UG/1
1-2 AEROSOL, METERED RESPIRATORY (INHALATION) EVERY 4 HOURS PRN
Qty: 1 INHALER | Refills: 3 | Status: SHIPPED | OUTPATIENT
Start: 2018-07-09 | End: 2019-05-30

## 2018-07-09 RX ORDER — PREDNISONE 20 MG/1
20 TABLET ORAL 2 TIMES DAILY
Qty: 10 TABLET | Refills: 0 | Status: SHIPPED | OUTPATIENT
Start: 2018-07-09 | End: 2018-07-14

## 2018-07-09 NOTE — PROGRESS NOTES
SUBJECTIVE:   Canelo Cruz is a 86 year old male who presents to clinic today for the following health issues:    ENT Symptoms           Symptoms: cc Present Absent Comment   Fever/Chills  x  Low grade 100   Fatigue  x     Muscle Aches  x     Eye Irritation   x    Sneezing   x    Nasal Otilio/Drg  x     Sinus Pressure/Pain   x    Loss of smell   x    Dental pain   x    Sore Throat   x    Swollen Glands   x    Ear Pain/Fullness   x    Cough  x     Wheeze  x     Chest Pain  x     Shortness of breath  x     Rash   x    Other   x      Symptom duration:  3 week   Symptom severity:  moderate   Treatments tried:  zithromax, robitussin AC and prednisone   Contacts:  none     Reviewed and updated as needed this visit by clinical staff  Tobacco  Allergies  Meds  Problems  Med Hx  Surg Hx  Fam Hx  Soc Hx        Reviewed and updated as needed this visit by Provider  Tobacco  Allergies  Meds  Problems  Med Hx  Surg Hx  Fam Hx  Soc Hx        Additional complaints: None    HPI additional notes: Hang presents today with   Chief Complaint   Patient presents with     URI     wife has pneumonia            ROS:  C: POSITIVE for fever and chills.  Skin: Negative for worrisome rashes or lesions  ENT: Negative for ear, mouth and throat problems  Resp: POSITIVE for cough occasionally productive with  SOB and wheezing  MS: POSITIVE for generalized fatigue and malaise.  NEURO: Negative  for headaches or dizziness.  P: Negative for changes in mood or affect  ROS otherwise negative.    Chart Review:  History   Smoking Status     Never Smoker   Smokeless Tobacco     Never Used     Patient Active Problem List   Diagnosis     History of colonic polyps     Constipation, unspecified constipation type     Post herpetic neuralgia     Decreased libido     ACP (advance care planning)     Erectile dysfunction, unspecified erectile dysfunction type     Osteoarthritis of right knee, unspecified osteoarthritis type     Primary insomnia      Other fatigue     Skin lesion     Elevated blood sugar     Hypertriglyceridemia     BMI 30-35     Recurrent UTI     Past Surgical History:   Procedure Laterality Date     ARTHROSCOPY KNEE Right     ~5340-4466     ORTHOPEDIC SURGERY      right achilles rupture repair with 14 month MRSA infection     Problem list, Medication list, Allergies, Medical/Social/Surg hx reviewed in [facility], updated as appropriate.   OBJECTIVE:                                                    /90  Pulse 89  Temp 99  F (37.2  C) (Tympanic)  Resp 16  Wt 229 lb (103.9 kg)  SpO2 94%  BMI 32.86 kg/m2  Body mass index is 32.86 kg/(m^2).  GENERAL: healthy, alert, in no acute distress  EYES: Grossly normal to inspection, EOMI, PERRL  HENT: Ear canals normal; TMs pearly gray without effusion. Nasal mucosa moist without edema or discharge. Oral mucous membranes moist, no lesions or ulcerations. Pharynx pink.  Uvula midline.  No postnasal drainage. Sinuses non-tender to palpation.  NECK: Non-tender, no adenopathy.  RESP: no rales or rhonchi, expiratory wheezes throughout, prolonged expiratory phase and cough with deep inspiration   CV: regular rate and rhythm, normal S1 S2.  No peripheral edema.  MS: FROM of bilateral hands without erythema, edema or warmth.  SKIN: no suspicious lesions, no rashes  PSYCH: Alert and oriented times 3;  Able to articulate logical thoughts. Affect is normal.    Diagnostic test results: None     ASSESSMENT/PLAN:                                                          ICD-10-CM    1. Acute bronchitis, unspecified organism J20.9 albuterol (PROAIR HFA/PROVENTIL HFA/VENTOLIN HFA) 108 (90 Base) MCG/ACT Inhaler     predniSONE (DELTASONE) 20 MG tablet     guaiFENesin-codeine (ROBITUSSIN AC) 100-10 MG/5ML SOLN solution     levofloxacin (LEVAQUIN) 500 MG tablet     DISCONTINUED: azithromycin (ZITHROMAX) 250 MG tablet   2. Acute bronchospasm J98.01    3. Cramp of limb R25.2    4. Pain in both hands M79.641     M79.642         Discussed he may also have walking pneumonia for which the antibiotic would also cover.  His wife is currently in the hospital with pneumonia.  Pt noted he was feeling better the first couple of days but then started feeling worse.  He continues to have a deep productive cough with wheezing.  Will put on new antibiotic, prednisone and albuterol. Will also refill cough syrup for pt.    Pt also complained of left groin pain and locking of this first two digits on bilateral hands.  I think this is likely due to OA and not related to his current illness.  Discussed drinking lots of fluids to help with muscle cramps.      Please see patient instructions for treatment details.    Follow up in 7-10 days if not improving as anticipated.    Yuki Jaime PA-C  [facility]

## 2018-07-09 NOTE — TELEPHONE ENCOUNTER
Pharmacy called, spoke with patient, going ahead with the prescription as the patient said he had discussed with PCP already.

## 2018-07-09 NOTE — MR AVS SNAPSHOT
After Visit Summary   7/9/2018    Canelo Cruz    MRN: [id number]           Patient Information     Date Of Birth          7/30/1931        Visit Information        Provider Department      7/9/2018 8:10 AM Yuki Jaime PA-C [facility]        Today's Diagnoses     Acute bronchitis, unspecified organism    -  1    Acute bronchospasm        Cramp of limb        Pain in both hands           Follow-ups after your visit        Your next 10 appointments already scheduled     Jul 12, 2018  9:20 AM CDT   Office Visit with Jahaira Morrison PA-C   [facility] [place]    [address] [place] [id number] [phone]           Bring a current list of meds and any records pertaining to this visit. For Physicals, please bring immunization records and any forms needing to be filled out. Please arrive 10 minutes early to complete paperwork.              Who to contact     If you have questions or need follow up information about today's clinic visit or your schedule please contact [facility] directly at [phone].  Normal or non-critical lab and imaging results will be communicated to you by MyChart, letter or phone within 4 business days after the clinic has received the results. If you do not hear from us within 7 days, please contact the clinic through [employer] or phone. If you have a critical or abnormal lab result, we will notify you by phone as soon as possible.  Submit refill requests through [employer] or call your pharmacy and they will forward the refill request to us. Please allow 3 business days for your refill to be completed.          Additional Information About Your Visit        MyChart Information     [employer] gives you secure access to your electronic health record. If you see a primary care provider, you can also send messages to your care team and make  appointments. If you have questions, please call your primary care clinic.  If you do not have a primary care provider, please call [phone] and they will assist you.        Care EveryWhere ID     This is your Care EveryWhere ID. This could be used by other organizations to access your [facility] medical records  [id number]        Your Vitals Were     Pulse Temperature Respirations Pulse Oximetry BMI (Body Mass Index)       89 99  F (37.2  C) (Tympanic) 16 94% 32.86 kg/m2        Blood Pressure from Last 3 Encounters:   07/09/18 132/90   06/18/18 118/66   06/01/18 139/78    Weight from Last 3 Encounters:   07/09/18 229 lb (103.9 kg)   06/18/18 228 lb (103.4 kg)   05/31/18 231 lb (104.8 kg)              Today, you had the following     No orders found for display         Today's Medication Changes          These changes are accurate as of 7/9/18  8:54 AM.  If you have any questions, ask your nurse or doctor.               Start taking these medicines.        Dose/Directions    albuterol 108 (90 Base) MCG/ACT Inhaler   Commonly known as:  PROAIR HFA/PROVENTIL HFA/VENTOLIN HFA   Used for:  Acute bronchitis, unspecified organism   Started by:  Yuki Jaime PA-C        Dose:  1-2 puff   Inhale 1-2 puffs into the lungs every 4 hours as needed for shortness of breath / dyspnea   Quantity:  1 Inhaler   Refills:  3       guaiFENesin-codeine 100-10 MG/5ML Soln solution   Commonly known as:  ROBITUSSIN AC   Used for:  Acute bronchitis, unspecified organism   Started by:  Yuki Jaime PA-C        Dose:  2 tsp.   Take 10 mLs by mouth every 4 hours as needed for cough   Quantity:  120 mL   Refills:  0       levofloxacin 500 MG tablet   Commonly known as:  LEVAQUIN   Used for:  Acute bronchitis, unspecified organism   Started by:  Yuki Jaime PA-C        Dose:  500 mg   Take 1 tablet (500 mg) by mouth daily   Quantity:  7 tablet   Refills:  0       predniSONE 20 MG tablet    Commonly known as:  DELTASONE   Used for:  Acute bronchitis, unspecified organism   Started by:  Yuki Jaime PA-C        Dose:  20 mg   Take 1 tablet (20 mg) by mouth 2 times daily for 5 days   Quantity:  10 tablet   Refills:  0            Where to get your medicines      These medications were sent to [id number] - [place], [place] - [address]  [address] [place] [address]     Phone:  [phone]     albuterol 108 (90 Base) MCG/ACT Inhaler    levofloxacin 500 MG tablet    predniSONE 20 MG tablet         Some of these will need a paper prescription and others can be bought over the counter.  Ask your nurse if you have questions.     Bring a paper prescription for each of these medications     guaiFENesin-codeine 100-10 MG/5ML Soln solution                Primary Care Provider Office Phone # Fax #    Yuki Jaime PA-C [phone] [address]  [facility] [address]        Equal Access to Services     [facility]: Hadii aad ku hadasho Soomaali, waaxda luqadaha, qaybta kaalmada adeegyada, waxay idiin hayjuanan dawood arevalo . So [facility] [phone].    ATENCIÓN: Si habla español, tiene a bermudez disposición servicios gratuitos de asistencia lingüística. Llame al [phone].    We comply with applicable federal civil rights laws and [place] laws. We do not discriminate on the basis of race, color, national origin, age, disability, sex, sexual orientation, or gender identity.            Thank you!     Thank you for choosing [facility]  for your care. Our goal is always to provide you with excellent care. Hearing back from our patients is one way we can continue to improve our services. Please take a few minutes to complete the written survey that you may receive in the mail after your visit with us. Thank you!             Your Updated Medication List - Protect others around you: Learn how to  safely use, store and throw away your medicines at www.disposemymeds.org.          This list is accurate as of 7/9/18  8:54 AM.  Always use your most recent med list.                   Brand Name Dispense Instructions for use Diagnosis    albuterol 108 (90 Base) MCG/ACT Inhaler    PROAIR HFA/PROVENTIL HFA/VENTOLIN HFA    1 Inhaler    Inhale 1-2 puffs into the lungs every 4 hours as needed for shortness of breath / dyspnea    Acute bronchitis, unspecified organism       diclofenac 1 % Gel topical gel    VOLTAREN    100 g    Apply 4 grams to knees or 2 grams to hands four times daily using enclosed dosing card.    Osteoarthritis of right knee, unspecified osteoarthritis type       guaiFENesin-codeine 100-10 MG/5ML Soln solution    ROBITUSSIN AC    120 mL    Take 10 mLs by mouth every 4 hours as needed for cough    Acute bronchitis, unspecified organism       levofloxacin 500 MG tablet    LEVAQUIN    7 tablet    Take 1 tablet (500 mg) by mouth daily    Acute bronchitis, unspecified organism       lidocaine 5 % ointment    XYLOCAINE    200 g    Apply topically 4 times daily as needed for moderate pain    Post herpetic neuralgia       predniSONE 20 MG tablet    DELTASONE    10 tablet    Take 1 tablet (20 mg) by mouth 2 times daily for 5 days    Acute bronchitis, unspecified organism       triamcinolone 0.1 % cream    KENALOG    80 g    Apply to affected area 1-3 times daily as needed    Dermatitis

## 2018-07-09 NOTE — TELEPHONE ENCOUNTER
levofloxacin (LEVAQUIN) 500 MG tablet 7 tablet 0 7/9/2018  --      Sig - Route: Take 1 tablet (500 mg) by mouth daily - Oral     Class: E-Prescribe     Notes to Pharmacy: Replaces azithromycin.     Order: [id number]     E-Prescribing Status: Receipt confirmed by pharmacy (7/9/2018  8:19 AM CDT)     There is a ciprofloxacin allergy listed. Pharmacy wants to know if it is still okay to prescribe the above medication, since the above medication is still in the same family as Cipro.    Routing to provider for okay. Patient is at the pharmacy.

## 2018-07-10 ENCOUNTER — TRANSFERRED RECORDS (OUTPATIENT)
Dept: HEALTH INFORMATION MANAGEMENT | Facility: CLINIC | Age: 83
End: 2018-07-10

## 2018-07-13 ENCOUNTER — OFFICE VISIT (OUTPATIENT)
Dept: FAMILY MEDICINE | Facility: CLINIC | Age: 83
End: 2018-07-13
Payer: COMMERCIAL

## 2018-07-13 VITALS — DIASTOLIC BLOOD PRESSURE: 70 MMHG | SYSTOLIC BLOOD PRESSURE: 132 MMHG | HEART RATE: 73 BPM | OXYGEN SATURATION: 94 %

## 2018-07-13 DIAGNOSIS — M94.9 CARTILAGE DISORDER: ICD-10-CM

## 2018-07-13 DIAGNOSIS — L57.8 SOLAR ELASTOSIS: ICD-10-CM

## 2018-07-13 DIAGNOSIS — L57.0 ACTINIC KERATOSES: Primary | ICD-10-CM

## 2018-07-13 PROCEDURE — 99213 OFFICE O/P EST LOW 20 MIN: CPT | Mod: 25 | Performed by: PHYSICIAN ASSISTANT

## 2018-07-13 PROCEDURE — 17003 DESTRUCT PREMALG LES 2-14: CPT | Performed by: PHYSICIAN ASSISTANT

## 2018-07-13 PROCEDURE — 17000 DESTRUCT PREMALG LESION: CPT | Performed by: PHYSICIAN ASSISTANT

## 2018-07-13 NOTE — MR AVS SNAPSHOT
After Visit Summary   7/13/2018    Canelo Cruz    MRN: [id number]           Patient Information     Date Of Birth          7/30/1931        Visit Information        Provider Department      7/13/2018 9:20 AM Jahaira Morrison PA-C [facility]    WOUND CARE INSTRUCTIONS  FOR CRYOSURGERY        This area treated with liquid nitrogen will form a blister. You do not need to bandage the area until after the blister forms and breaks (which may be a few days).  When the blister breaks, begin daily dressing changes as follows:    1) Clean and dry the area with tap water using clean Q-tip or sterile gauze pad.    2) Apply Polysporin ointment or Bacitracin ointment over entire wound.  Do NOT use Neosporin ointment.    3) Cover the wound with a band-aid or sterile non-stick gauze pad and micropore paper tape.      REPEAT THESE INSTRUCTIONS AT LEAST ONCE A DAY UNTIL THE WOUND HAS COMPLETELY HEALED.        It is an old wives tale that a wound heals better when it is exposed to air and allowed to dry out. The wound will heal faster with a better cosmetic result if it is kept moist with ointment and covered with a bandage.  Do not let the wound dry out.      IMPORTANT INFORMATION ON REVERSE SIDE    Supplies Needed:     *Cotton tipped applicators (Q-tips)   *Polysporin ointment or Bacitracin ointment (NOT NEOSPORIN)   *Band-aids, or non stick gauze pads and micropore paper tape                PATIENT INFORMATION    During the healing process you will notice a number of changes. All wounds develop a small halo of redness surrounding the wound.  This means healing is occurring. Severe itching with extensive redness usually indicates sensitivity to the ointment or bandage tape used to dress the wound.  You should call our office if this develops.      Swelling and/or discoloration around your surgical site is common, particularly when performed around the eye.    All  wounds normally drain.  The larger the wound the more drainage there will be.  After 7-10 days, you will notice the wound beginning to shrink and new skin will begin to grow.  The wound is healed when you can see skin has formed over the entire area.  A healed wound has a healthy, shiny look to the surface and is red to dark pink in color to normalize.  Wounds may take approximately 4-6 weeks to heal.  Larger wounds may take 6-8 weeks.  After the wound is healed you may discontinue dressing changes.    You may experience a sensation of tightness as your wound heals. This is normal and will gradually subside.    Your healed wound may be sensitive to temperature changes. This sensitivity improves with time, but if you re having a lot of discomfort, try to avoid temperature extremes.    Patients frequently experience itching after their wound appears to have healed because of the continue healing under the skin.  Plain Vaseline will help relieve the itching.                   Follow-ups after your visit        Who to contact     If you have questions or need follow up information about today's clinic visit or your schedule please contact [facility] directly at [phone].  Normal or non-critical lab and imaging results will be communicated to you by [employer], letter or phone within 4 business days after the clinic has received the results. If you do not hear from us within 7 days, please contact the clinic through [employer] or phone. If you have a critical or abnormal lab result, we will notify you by phone as soon as possible.  Submit refill requests through [employer] or call your pharmacy and they will forward the refill request to us. Please allow 3 business days for your refill to be completed.          Additional Information About Your Visit        [employer] Information     [employer] gives you secure access to your electronic health record. If you see a primary care provider, you can also send messages  to your care team and make appointments. If you have questions, please call your primary care clinic.  If you do not have a primary care provider, please call [phone] and they will assist you.        Care EveryWhere ID     This is your Care EveryWhere ID. This could be used by other organizations to access your [place] medical records  [id number]        Your Vitals Were     Pulse Pulse Oximetry                73 94%           Blood Pressure from Last 3 Encounters:   07/13/18 163/86   07/09/18 132/90   06/18/18 118/66    Weight from Last 3 Encounters:   07/09/18 229 lb (103.9 kg)   06/18/18 228 lb (103.4 kg)   05/31/18 231 lb (104.8 kg)              Today, you had the following     No orders found for display       Primary Care Provider Office Phone # Fax #    Yuki Jaime PA-C [phone]       [address]  [facility] [address]        Equal Access to Services     AAYUSH BEAN : Hadii aad ku hadasho Soomaali, waaxda luqadaha, qaybta kaalmada adeegyada, waxay idiin hayaan adeeg kharash latom . So [facility] [phone].    ATENCIÓN: Si habla español, tiene a bermudez disposición servicios gratuitos de asistencia lingüística. [facility] [phone].    We comply with applicable federal civil rights laws and [place] laws. We do not discriminate on the basis of race, color, national origin, age, disability, sex, sexual orientation, or gender identity.            Thank you!     Thank you for choosing [facility]  for your care. Our goal is always to provide you with excellent care. Hearing back from our patients is one way we can continue to improve our services. Please take a few minutes to complete the written survey that you may receive in the mail after your visit with us. Thank you!             Your Updated Medication List - Protect others around you: Learn how to safely use, store and throw away your medicines at www.disposemymeds.org.          This list  is accurate as of 7/13/18  9:41 AM.  Always use your most recent med list.                   Brand Name Dispense Instructions for use Diagnosis    albuterol 108 (90 Base) MCG/ACT Inhaler    PROAIR HFA/PROVENTIL HFA/VENTOLIN HFA    1 Inhaler    Inhale 1-2 puffs into the lungs every 4 hours as needed for shortness of breath / dyspnea    Acute bronchitis, unspecified organism       diclofenac 1 % Gel topical gel    VOLTAREN    100 g    Apply 4 grams to knees or 2 grams to hands four times daily using enclosed dosing card.    Osteoarthritis of right knee, unspecified osteoarthritis type       guaiFENesin-codeine 100-10 MG/5ML Soln solution    ROBITUSSIN AC    120 mL    Take 10 mLs by mouth every 4 hours as needed for cough    Acute bronchitis, unspecified organism       levofloxacin 500 MG tablet    LEVAQUIN    7 tablet    Take 1 tablet (500 mg) by mouth daily    Acute bronchitis, unspecified organism       lidocaine 5 % ointment    XYLOCAINE    200 g    Apply topically 4 times daily as needed for moderate pain    Post herpetic neuralgia       predniSONE 20 MG tablet    DELTASONE    10 tablet    Take 1 tablet (20 mg) by mouth 2 times daily for 5 days    Acute bronchitis, unspecified organism       triamcinolone 0.1 % cream    KENALOG    80 g    Apply to affected area 1-3 times daily as needed    Dermatitis

## 2018-07-13 NOTE — PROGRESS NOTES
HPI:  Canelo Cruz is a 86 year old year old male patient here today for follow up AKs.   LOV treated 14 lesions on face, ears, forearms, and dorsal hands with cryo.   Alleviating/aggravating factors: none    Associated symptoms: none  Additional findings:none  Patient has no other skin complaints today.  Remainder of the HPI, Meds, PMH, Allergies, FH, and SH was reviewed in chart.    Pertinent Hx:   AKs  Past Medical History:   Diagnosis Date     Postherpetic neuralgia        Past Surgical History:   Procedure Laterality Date     ARTHROSCOPY KNEE Right     ~[id number]     ORTHOPEDIC SURGERY      right achilles rupture repair with 14 month MRSA infection        Family History   Problem Relation Age of Onset     Unknown/Adopted Mother      Unknown/Adopted Father      Diabetes No family hx of      Coronary Artery Disease No family hx of      Hypertension No family hx of      Hyperlipidemia No family hx of      Cerebrovascular Disease No family hx of      Breast Cancer No family hx of      Colon Cancer No family hx of      Prostate Cancer No family hx of      Other Cancer No family hx of      Depression No family hx of      Anxiety Disorder No family hx of      Mental Illness No family hx of      Substance Abuse No family hx of      Anesthesia Reaction No family hx of      Asthma No family hx of      Osteoperosis No family hx of      Genetic Disorder No family hx of      Thyroid Disease No family hx of      Obesity No family hx of        Social History     Social History     Marital status:      Spouse name: N/A     Number of children: N/A     Years of education: N/A     Occupational History     Not on file.     Social History Main Topics     Smoking status: Never Smoker     Smokeless tobacco: Never Used     Alcohol use Yes     Drug use: No     Sexual activity: No     Other Topics Concern     Parent/Sibling W/ Cabg, Mi Or Angioplasty Before 65f 55m? No     Social History Narrative       Outpatient Encounter  Prescriptions as of 7/13/2018   Medication Sig Dispense Refill     albuterol (PROAIR HFA/PROVENTIL HFA/VENTOLIN HFA) 108 (90 Base) MCG/ACT Inhaler Inhale 1-2 puffs into the lungs every 4 hours as needed for shortness of breath / dyspnea 1 Inhaler 3     diclofenac (VOLTAREN) 1 % GEL topical gel Apply 4 grams to knees or 2 grams to hands four times daily using enclosed dosing card. 100 g 3     guaiFENesin-codeine (ROBITUSSIN AC) 100-10 MG/5ML SOLN solution Take 10 mLs by mouth every 4 hours as needed for cough 120 mL 0     levofloxacin (LEVAQUIN) 500 MG tablet Take 1 tablet (500 mg) by mouth daily 7 tablet 0     lidocaine (XYLOCAINE) 5 % ointment Apply topically 4 times daily as needed for moderate pain 200 g 3     predniSONE (DELTASONE) 20 MG tablet Take 1 tablet (20 mg) by mouth 2 times daily for 5 days 10 tablet 0     triamcinolone (KENALOG) 0.1 % cream Apply to affected area 1-3 times daily as needed 80 g 5     No facility-administered encounter medications on file as of 7/13/2018.        Review Of Systems:  Skin: As above  Eyes: negative  Ears/Nose/Throat: negative  Respiratory: No shortness of breath, dyspnea on exertion, cough, or hemoptysis  Cardiovascular: negative  Gastrointestinal: negative  Genitourinary: negative  Musculoskeletal: negative  Neurologic: negative  Psychiatric: negative  Hematologic/Lymphatic/Immunologic: negative  Endocrine: negative      Objective:     /86  Pulse 73  SpO2 94%  Eyes: Conjunctivae/lids: Normal   ENT: Lips:  Normal  MSK: Normal  Cardiovascular: Peripheral edema none  Pulm: Breathing Normal  Neuro/Psych: Orientation: Normal; Mood/Affect: Normal, NAD, WDWN  Following areas examined: face, neck, ears, forearms, hands  Strong skin type:l   Findings:    1)Pink scaly macules x 8 on face and dorsal forearms and hands.   2) Rhytides, hypo/hyperpigmentation, and atrophy of face and neck  3) firm cartilage of carlos of helix and anterior superior helix    Assessment and  Plan:  1) AKs x 8  LN2 for 5 seconds x 2. Discussed AE include hypopigmentation (white spot) and recurrence. Follow up in 2-3 months to recheck lesions. There is a 0.025%-20% chance that AKs can develop into a SCC.   Pt elected LN2  2) solar elastosis  Wear a sunscreen with at least SPF 30 on your face, ears, neck and V of the chest daily. Wear sunscreen on other areas of the body if those areas are exposed to the sun throughout the day. Sunscreens can contain physical and/or chemical blockers. Physical blockers are less likely to clog pores, these include zinc oxide and titanium dioxide. Reapply every two hour and after swimming. Sunscreen examples include Neutrogena, CeraVe, Blue Lizard, Elta MD and many others.    3) calcified cartilage of left ear  Discussed can be due to genetics and/or normal part of aging. Since area does not bother pt recommend just leaving area.    Follow up in 2-1 months of aks not resolved. Yearly for FBE

## 2018-07-13 NOTE — LETTER
7/13/2018         RE: Canelo Cruz  [address]  [address]  [place] [address]        Dear Colleague,    Thank you for referring your patient, Canelo Cruz, to the [facility]. Please see a copy of my visit note below.    HPI:  Canelo Cruz is a 86 year old year old male patient here today for follow up AKs.   LOV treated 14 lesions on face, ears, forearms, and dorsal hands with cryo.   Alleviating/aggravating factors: none    Associated symptoms: none  Additional findings:none  Patient has no other skin complaints today.  Remainder of the HPI, Meds, PMH, Allergies, FH, and SH was reviewed in chart.    Pertinent Hx:   AKs  Past Medical History:   Diagnosis Date     Postherpetic neuralgia        Past Surgical History:   Procedure Laterality Date     ARTHROSCOPY KNEE Right     ~0623-7934     ORTHOPEDIC SURGERY      right achilles rupture repair with 14 month MRSA infection        Family History   Problem Relation Age of Onset     Unknown/Adopted Mother      Unknown/Adopted Father      Diabetes No family hx of      Coronary Artery Disease No family hx of      Hypertension No family hx of      Hyperlipidemia No family hx of      Cerebrovascular Disease No family hx of      Breast Cancer No family hx of      Colon Cancer No family hx of      Prostate Cancer No family hx of      Other Cancer No family hx of      Depression No family hx of      Anxiety Disorder No family hx of      Mental Illness No family hx of      Substance Abuse No family hx of      Anesthesia Reaction No family hx of      Asthma No family hx of      Osteoperosis No family hx of      Genetic Disorder No family hx of      Thyroid Disease No family hx of      Obesity No family hx of        Social History     Social History     Marital status:      Spouse name: N/A     Number of children: N/A     Years of education: N/A     Occupational History     Not on file.     Social History Main Topics     Smoking status:  Never Smoker     Smokeless tobacco: Never Used     Alcohol use Yes     Drug use: No     Sexual activity: No     Other Topics Concern     Parent/Sibling W/ Cabg, Mi Or Angioplasty Before 65f 55m? No     Social History Narrative       Outpatient Encounter Prescriptions as of 7/13/2018   Medication Sig Dispense Refill     albuterol (PROAIR HFA/PROVENTIL HFA/VENTOLIN HFA) 108 (90 Base) MCG/ACT Inhaler Inhale 1-2 puffs into the lungs every 4 hours as needed for shortness of breath / dyspnea 1 Inhaler 3     diclofenac (VOLTAREN) 1 % GEL topical gel Apply 4 grams to knees or 2 grams to hands four times daily using enclosed dosing card. 100 g 3     guaiFENesin-codeine (ROBITUSSIN AC) 100-10 MG/5ML SOLN solution Take 10 mLs by mouth every 4 hours as needed for cough 120 mL 0     levofloxacin (LEVAQUIN) 500 MG tablet Take 1 tablet (500 mg) by mouth daily 7 tablet 0     lidocaine (XYLOCAINE) 5 % ointment Apply topically 4 times daily as needed for moderate pain 200 g 3     predniSONE (DELTASONE) 20 MG tablet Take 1 tablet (20 mg) by mouth 2 times daily for 5 days 10 tablet 0     triamcinolone (KENALOG) 0.1 % cream Apply to affected area 1-3 times daily as needed 80 g 5     No facility-administered encounter medications on file as of 7/13/2018.        Review Of Systems:  Skin: As above  Eyes: negative  Ears/Nose/Throat: negative  Respiratory: No shortness of breath, dyspnea on exertion, cough, or hemoptysis  Cardiovascular: negative  Gastrointestinal: negative  Genitourinary: negative  Musculoskeletal: negative  Neurologic: negative  Psychiatric: negative  Hematologic/Lymphatic/Immunologic: negative  Endocrine: negative      Objective:     /86  Pulse 73  SpO2 94%  Eyes: Conjunctivae/lids: Normal   ENT: Lips:  Normal  MSK: Normal  Cardiovascular: Peripheral edema none  Pulm: Breathing Normal  Neuro/Psych: Orientation: Normal; Mood/Affect: Normal, NAD, WDWN  Following areas examined: face, neck, ears, forearms,  hands  Strong skin type:l   Findings:    1)Pink scaly macules x 8 on face and dorsal forearms and hands.   2) Rhytides, hypo/hyperpigmentation, and atrophy of face and neck  3) firm cartilage of carlos of helix and anterior superior helix    Assessment and Plan:  1) AKs x 8  LN2 for 5 seconds x 2. Discussed AE include hypopigmentation (white spot) and recurrence. Follow up in 2-3 months to recheck lesions. There is a 0.025%-20% chance that AKs can develop into a SCC.   Pt elected LN2  2) solar elastosis  Wear a sunscreen with at least SPF 30 on your face, ears, neck and V of the chest daily. Wear sunscreen on other areas of the body if those areas are exposed to the sun throughout the day. Sunscreens can contain physical and/or chemical blockers. Physical blockers are less likely to clog pores, these include zinc oxide and titanium dioxide. Reapply every two hour and after swimming. Sunscreen examples include Neutrogena, CeraVe, Blue Lizard, Elta MD and many others.    3) calcified cartilage of left ear  Discussed can be due to genetics and/or normal part of aging. Since area does not bother pt recommend just leaving area.    Follow up in 2-1 months of aks not resolved. Yearly for FBE      Again, thank you for allowing me to participate in the care of your patient.        Sincerely,        Jahaira Morrison PA-C

## 2018-08-07 ENCOUNTER — TRANSFERRED RECORDS (OUTPATIENT)
Dept: HEALTH INFORMATION MANAGEMENT | Facility: CLINIC | Age: 83
End: 2018-08-07

## 2018-09-05 ENCOUNTER — ALLIED HEALTH/NURSE VISIT (OUTPATIENT)
Dept: NURSING | Facility: CLINIC | Age: 83
End: 2018-09-05
Payer: COMMERCIAL

## 2018-09-05 DIAGNOSIS — Z23 NEED FOR PROPHYLACTIC VACCINATION AND INOCULATION AGAINST INFLUENZA: Primary | ICD-10-CM

## 2018-09-05 PROCEDURE — G0008 ADMIN INFLUENZA VIRUS VAC: HCPCS

## 2018-09-05 PROCEDURE — 99207 ZZC NO CHARGE NURSE ONLY: CPT

## 2018-09-05 PROCEDURE — 90662 IIV NO PRSV INCREASED AG IM: CPT

## 2018-09-05 NOTE — MR AVS SNAPSHOT
After Visit Summary   9/5/2018    Canelo Cruz    MRN: [id number]           Patient Information     Date Of Birth          7/30/1931        Visit Information        Provider Department      9/5/2018 9:00 AM BX NURSE [facility]        Today's Diagnoses     Need for prophylactic vaccination and inoculation against influenza    -  1       Follow-ups after your visit        Who to contact     If you have questions or need follow up information about today's clinic visit or your schedule please contact [facility] directly at [phone].  Normal or non-critical lab and imaging results will be communicated to you by Clarihart, letter or phone within 4 business days after the clinic has received the results. If you do not hear from us within 7 days, please contact the clinic through Saavn or phone. If you have a critical or abnormal lab result, we will notify you by phone as soon as possible.  Submit refill requests through Saavn or call your pharmacy and they will forward the refill request to us. Please allow 3 business days for your refill to be completed.          Additional Information About Your Visit        MyChart Information     Saavn gives you secure access to your electronic health record. If you see a primary care provider, you can also send messages to your care team and make appointments. If you have questions, please call your primary care clinic.  If you do not have a primary care provider, please call [phone] and they will assist you.        Care EveryWhere ID     This is your Care EveryWhere ID. This could be used by other organizations to access your [facility] medical records  [id number]         Blood Pressure from Last 3 Encounters:   07/13/18 132/70   07/09/18 132/90   06/18/18 118/66    Weight from Last 3 Encounters:   07/09/18 229 lb (103.9 kg)   06/18/18 228 lb (103.4 kg)   05/31/18 231 lb (104.8 kg)               We Performed the Following     FLU VACCINE, INCREASED ANTIGEN, PRESV FREE, AGE 65+ [67625]     Vaccine Administration, Initial [[id number]]        Primary Care Provider Office Phone # Fax #    Yuki Jaime PA-C [phone] [address]  [facility] [address]        Equal Access to Services     HANH BEAN : Hadii aad ku hadasho Soomaali, waaxda luqadaha, qaybta kaalmada adeegyada, waxay idiin hayaan adeeg kharash la'aan ah. So [facility] [phone].    ATENCIÓN: Si habla español, tiene a bermudez disposición servicios gratuitos de asistencia lingüística. Dashawn al [phone].    We comply with applicable federal civil rights laws and [place] laws. We do not discriminate on the basis of race, color, national origin, age, disability, sex, sexual orientation, or gender identity.            Thank you!     Thank you for choosing [facility]  for your care. Our goal is always to provide you with excellent care. Hearing back from our patients is one way we can continue to improve our services. Please take a few minutes to complete the written survey that you may receive in the mail after your visit with us. Thank you!             Your Updated Medication List - Protect others around you: Learn how to safely use, store and throw away your medicines at www.disposemymeds.org.          This list is accurate as of 9/5/18  9:02 AM.  Always use your most recent med list.                   Brand Name Dispense Instructions for use Diagnosis    albuterol 108 (90 Base) MCG/ACT inhaler    PROAIR HFA/PROVENTIL HFA/VENTOLIN HFA    1 Inhaler    Inhale 1-2 puffs into the lungs every 4 hours as needed for shortness of breath / dyspnea    Acute bronchitis, unspecified organism       diclofenac 1 % Gel topical gel    VOLTAREN    100 g    Apply 4 grams to knees or 2 grams to hands four times daily using enclosed dosing card.    Osteoarthritis of right knee, unspecified  osteoarthritis type       guaiFENesin-codeine 100-10 MG/5ML Soln solution    ROBITUSSIN AC    120 mL    Take 10 mLs by mouth every 4 hours as needed for cough    Acute bronchitis, unspecified organism       levofloxacin 500 MG tablet    LEVAQUIN    7 tablet    Take 1 tablet (500 mg) by mouth daily    Acute bronchitis, unspecified organism       lidocaine 5 % ointment    XYLOCAINE    200 g    Apply topically 4 times daily as needed for moderate pain    Post herpetic neuralgia       triamcinolone 0.1 % cream    KENALOG    80 g    Apply to affected area 1-3 times daily as needed    Dermatitis

## 2018-09-05 NOTE — PROGRESS NOTES

## 2018-10-02 ENCOUNTER — TRANSFERRED RECORDS (OUTPATIENT)
Dept: HEALTH INFORMATION MANAGEMENT | Facility: CLINIC | Age: 83
End: 2018-10-02

## 2018-11-13 ENCOUNTER — TRANSFERRED RECORDS (OUTPATIENT)
Dept: HEALTH INFORMATION MANAGEMENT | Facility: CLINIC | Age: 83
End: 2018-11-13

## 2018-11-26 ENCOUNTER — OFFICE VISIT (OUTPATIENT)
Dept: FAMILY MEDICINE | Facility: CLINIC | Age: 83
End: 2018-11-26
Payer: COMMERCIAL

## 2018-11-26 VITALS
BODY MASS INDEX: 31.78 KG/M2 | HEART RATE: 80 BPM | WEIGHT: 222 LBS | SYSTOLIC BLOOD PRESSURE: 120 MMHG | OXYGEN SATURATION: 93 % | TEMPERATURE: 97.6 F | DIASTOLIC BLOOD PRESSURE: 70 MMHG | HEIGHT: 70 IN | RESPIRATION RATE: 16 BRPM

## 2018-11-26 DIAGNOSIS — Z00.00 ROUTINE GENERAL MEDICAL EXAMINATION AT A HEALTH CARE FACILITY: Primary | ICD-10-CM

## 2018-11-26 DIAGNOSIS — M17.11 OSTEOARTHRITIS OF RIGHT KNEE, UNSPECIFIED OSTEOARTHRITIS TYPE: ICD-10-CM

## 2018-11-26 DIAGNOSIS — R73.9 ELEVATED BLOOD SUGAR: ICD-10-CM

## 2018-11-26 DIAGNOSIS — E66.09 CLASS 1 OBESITY DUE TO EXCESS CALORIES WITH SERIOUS COMORBIDITY IN ADULT, UNSPECIFIED BMI: ICD-10-CM

## 2018-11-26 DIAGNOSIS — G47.00 INSOMNIA, UNSPECIFIED TYPE: ICD-10-CM

## 2018-11-26 DIAGNOSIS — B02.29 POST HERPETIC NEURALGIA: ICD-10-CM

## 2018-11-26 DIAGNOSIS — E78.1 HYPERTRIGLYCERIDEMIA: ICD-10-CM

## 2018-11-26 DIAGNOSIS — R53.83 FATIGUE, UNSPECIFIED TYPE: ICD-10-CM

## 2018-11-26 DIAGNOSIS — E66.811 CLASS 1 OBESITY DUE TO EXCESS CALORIES WITH SERIOUS COMORBIDITY IN ADULT, UNSPECIFIED BMI: ICD-10-CM

## 2018-11-26 DIAGNOSIS — Z12.5 SCREENING FOR PROSTATE CANCER: ICD-10-CM

## 2018-11-26 DIAGNOSIS — R53.83 OTHER FATIGUE: ICD-10-CM

## 2018-11-26 LAB
ALBUMIN UR-MCNC: NEGATIVE MG/DL
APPEARANCE UR: CLEAR
BILIRUB UR QL STRIP: NEGATIVE
COLOR UR AUTO: YELLOW
ERYTHROCYTE [DISTWIDTH] IN BLOOD BY AUTOMATED COUNT: 13 % (ref 10–15)
GLUCOSE UR STRIP-MCNC: NEGATIVE MG/DL
HBA1C MFR BLD: 5.7 % (ref 0–5.6)
HCT VFR BLD AUTO: 47 % (ref 40–53)
HGB BLD-MCNC: 16.2 G/DL (ref 13.3–17.7)
HGB UR QL STRIP: NEGATIVE
KETONES UR STRIP-MCNC: NEGATIVE MG/DL
LEUKOCYTE ESTERASE UR QL STRIP: ABNORMAL
MCH RBC QN AUTO: 31.8 PG (ref 26.5–33)
MCHC RBC AUTO-ENTMCNC: 34.5 G/DL (ref 31.5–36.5)
MCV RBC AUTO: 92 FL (ref 78–100)
NITRATE UR QL: NEGATIVE
PH UR STRIP: 7 PH (ref 5–7)
PLATELET # BLD AUTO: 208 10E9/L (ref 150–450)
RBC # BLD AUTO: 5.09 10E12/L (ref 4.4–5.9)
RBC #/AREA URNS AUTO: NORMAL /HPF
SOURCE: ABNORMAL
SP GR UR STRIP: 1.01 (ref 1–1.03)
UROBILINOGEN UR STRIP-ACNC: 0.2 EU/DL (ref 0.2–1)
WBC # BLD AUTO: 6 10E9/L (ref 4–11)
WBC #/AREA URNS AUTO: NORMAL /HPF

## 2018-11-26 PROCEDURE — 36415 COLL VENOUS BLD VENIPUNCTURE: CPT | Performed by: PHYSICIAN ASSISTANT

## 2018-11-26 PROCEDURE — 84443 ASSAY THYROID STIM HORMONE: CPT | Performed by: PHYSICIAN ASSISTANT

## 2018-11-26 PROCEDURE — 81001 URINALYSIS AUTO W/SCOPE: CPT | Performed by: PHYSICIAN ASSISTANT

## 2018-11-26 PROCEDURE — 80061 LIPID PANEL: CPT | Performed by: PHYSICIAN ASSISTANT

## 2018-11-26 PROCEDURE — 85027 COMPLETE CBC AUTOMATED: CPT | Performed by: PHYSICIAN ASSISTANT

## 2018-11-26 PROCEDURE — 99213 OFFICE O/P EST LOW 20 MIN: CPT | Performed by: PHYSICIAN ASSISTANT

## 2018-11-26 PROCEDURE — 83036 HEMOGLOBIN GLYCOSYLATED A1C: CPT | Performed by: PHYSICIAN ASSISTANT

## 2018-11-26 PROCEDURE — 80053 COMPREHEN METABOLIC PANEL: CPT | Performed by: PHYSICIAN ASSISTANT

## 2018-11-26 PROCEDURE — G0103 PSA SCREENING: HCPCS | Performed by: PHYSICIAN ASSISTANT

## 2018-11-26 PROCEDURE — G0439 PPPS, SUBSEQ VISIT: HCPCS | Mod: 25 | Performed by: PHYSICIAN ASSISTANT

## 2018-11-26 RX ORDER — TAMSULOSIN HYDROCHLORIDE 0.4 MG/1
CAPSULE ORAL
Refills: 1 | COMMUNITY
Start: 2018-09-06 | End: 2019-03-21

## 2018-11-26 NOTE — PROGRESS NOTES
"  SUBJECTIVE:   Canelo Cruz is a 87 year old male who presents for Preventive Visit.    Are you in the first 12 months of your Medicare Part B coverage?  No    Physical Health:    In general, how would you rate your overall physical health? good    Outside of work, how many days during the week do you exercise? 2-3 days/week    Outside of work, approximately how many minutes a day do you exercise?15-30 minutes    If you drink alcohol do you typically have >3 drinks per day or >7 drinks per week? No    Do you usually eat at least 4 servings of fruit and vegetables a day, include whole grains & fiber and avoid regularly eating high fat or \"junk\" foods? yes    Do you have any problems taking medications regularly?  No    Do you have any side effects from medications? None    Needs assistance for the following daily activities: no assistance needed    Which of the following safety concerns are present in your home?  none identified     Hearing impairment: No    In the past 6 months, have you been bothered by leaking of urine? no    Mental Health:    In general, how would you rate your overall mental or emotional health? good  PHQ-2 Score:      Do you feel safe in your environment? Yes    Do you have a Health Care Directive? No: Advance care planning was reviewed with patient; patient declined at this time.    Additional concerns to address?  YES    Fall risk:  Fallen 2 or more times in the past year?: No  Any fall with injury in the past year?: No    Cognitive Screenin) Repeat 3 items (Leader, Season, Table)    2) Clock draw: NORMAL  3) 3 item recall: Recalls 2 objects   Results: NORMAL clock, 1-2 items recalled: COGNITIVE IMPAIRMENT LESS LIKELY    Mini-CogTM Copyright ELLIOTT Villarreal. Licensed by the author for use in [facility]; reprinted with permission (ana@.[facility]). All rights reserved.      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, has had several steroid injections at [facility] and tried electrotherapy-made pain worse, used ice and now heat     Reviewed and updated as needed this visit by clinical staff  Tobacco  Allergies  Meds  Problems  Med Hx  Surg Hx  Fam Hx  Soc Hx          Reviewed and updated as needed this visit by Provider  Tobacco  Allergies  Meds  Problems  Med Hx  Surg Hx  Fam Hx  Soc Hx         Social History   Substance Use Topics     Smoking status: Never Smoker     Smokeless tobacco: Never Used     Alcohol use Yes                           Current providers sharing in care for this patient include:   Patient Care Team:  Yuki Jaime PA-C as PCP - General (Physician Assistant)    The following health maintenance items are reviewed in Epic and correct as of today:  Health Maintenance   Topic Date Due     LIPID MONITORING Q1 YEAR  12/13/2017     FALL RISK ASSESSMENT  11/28/2018     PHQ-2 Q1 YR  07/09/2019     COLONOSCOPY Q5 YR  10/21/2020     ADVANCE DIRECTIVE PLANNING Q5 YRS  11/10/2020     TETANUS IMMUNIZATION (SYSTEM ASSIGNED)  12/13/2026     PNEUMOCOCCAL  Addressed     INFLUENZA VACCINE  Completed     BP Readings from Last 3 Encounters:   11/26/18 120/70   07/13/18 132/70   07/09/18 132/90    Wt Readings from Last 3 Encounters:   11/26/18 222 lb (100.7 kg)   07/09/18 229 lb (103.9 kg)   06/18/18 228 lb (103.4 kg)                  Recent Labs   Lab Test  09/09/17   1048  12/29/16   0808  12/13/16   0808  11/09/15   1455  10/13/15   0840   LDL   --    --   110*   --   108   HDL   --    --   52   --   44   TRIG  229*   --   110   --   239*   ALT   --   25   --    --    --    CR   --   1.06   --   1.10  1.30*   GFRESTIMATED   --   66   --   64  53*   GFRESTBLACK   --   80   --   77  64   POTASSIUM   --   3.8   --    --   4.0        ROS:  CONSTITUTIONAL: NEGATIVE for fever, chills, change in weight  INTEGUMENTARY/SKIN: NEGATIVE for worrisome rashes, moles  "or lesions  EYES: NEGATIVE for vision changes or irritation  ENT/MOUTH: NEGATIVE for ear, mouth and throat problems  RESP: NEGATIVE for significant cough or SOB  BREAST: NEGATIVE for masses, tenderness or discharge  CV: NEGATIVE for chest pain, palpitations or peripheral edema  GI: NEGATIVE for nausea, abdominal pain, heartburn, or change in bowel habits  : NEGATIVE for frequency, dysuria, or hematuria  MUSCULOSKELETAL: NEGATIVE for significant arthralgias or myalgia  NEURO: POSITIVE for PHN  ENDOCRINE: NEGATIVE for temperature intolerance, skin/hair changes  HEME: NEGATIVE for bleeding problems  PSYCHIATRIC: POSITIVE for fatigue, insomnia, napping, irritability, depressed mood, weight gain    OBJECTIVE:   /70  Pulse 80  Temp 97.6  F (36.4  C) (Tympanic)  Resp 16  Ht 5' 10\" (1.778 m)  Wt 222 lb (100.7 kg)  SpO2 93%  BMI 31.85 kg/m2 Estimated body mass index is 31.85 kg/(m^2) as calculated from the following:    Height as of this encounter: 5' 10\" (1.778 m).    Weight as of this encounter: 222 lb (100.7 kg).  EXAM:   GENERAL: healthy, alert and no distress  EYES: Eyes grossly normal to inspection, PERRL and conjunctivae and sclerae normal  HENT: ear canals and TM's normal, nose and mouth without ulcers or lesions  NECK: no adenopathy, no asymmetry, masses, or scars and thyroid normal to palpation  RESP: lungs clear to auscultation - no rales, rhonchi or wheezes  CV: regular rate and rhythm, normal S1 S2, no S3 or S4, no murmur, click or rub, no peripheral edema and peripheral pulses strong  ABDOMEN: soft, nontender, no hepatosplenomegaly, no masses and bowel sounds normal  MS: no gross musculoskeletal defects noted, no edema  SKIN: no suspicious lesions or rashes  NEURO: Normal strength and tone, mentation intact and speech normal  PSYCH: mentation appears normal and irritable, angry.      ASSESSMENT / PLAN:       ICD-10-CM    1. Routine general medical examination at a health care facility Z00.00 " Comprehensive metabolic panel     CBC with platelets     UA reflex to Microscopic and Culture   2. Elevated blood sugar R73.9 Hemoglobin A1c   3. Hypertriglyceridemia E78.1 Lipid panel reflex to direct LDL Non-fasting   4. Screening for prostate cancer Z12.5 Prostate spec antigen screen   5. Osteoarthritis of right knee, unspecified osteoarthritis type M17.11 diclofenac (VOLTAREN) 1 % topical gel   2:23 start, 2:38 end.    Started electrotherapy on shingles pain.  Was supposed to have appointment Wed this week but it made it worse so he canceled his appointment.  Suggestions from [facility]?  PT has no other medications.  Has been using ice and heat but everything seems to be making worse.      Voltaren helps with arms and legs.      Tired all the time, has no energy.      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  will recheck in 6 months, seeing [facility] on [place].    Discussed at length that I have no further recommendations on what to do with his shingles pain.  He is not having improvement with his pain clinic and is upset that I did not fill chronic pain medications for him two weeks ago but I explained to him that I could not do so when [place] was [place]  of him having been prescribed those medications by the pain clinic.  Recommend he follow up with [facility] to see if he has any further recommendations for referrals or treatments as pt has already failed every treatment option I can come up with.    Post herpetic neuralgia  - Discussed treatment options and those that have been tried at length with patient.  He has stopped many medications without giving them a sufficient time to work and has also refused to see some specialists because they required too much paperwork to be filled out prior to his visit.  He is currently seeing the pain clinic who have given him several epidural injections which are becoming less effective.  He stopped by our clinic when the [facility] in  [place] was closed several weeks ago to get a pain medication refill on tramadol but per [place]  he had never been prescribed this medication.  My refusal to fill it made him very upset.  He reports now that his pain is worse since starting electrical stimulation of the nerve endings.  The only things that are somewhat helpful are lidocaine and triamcinolone.  Recommend he follow up with Dr. Singleton at our clinic who may have other options to consider due to his longer years in practice especially with the geriatric population.  Pt has previously seen [facility] and had a bad experience.  Refilled voltaren today to help with his OA.        End of Life Planning:  Patient currently has an advanced directive: No.  I have verified the patient's ablity to prepare an advanced directive/make health care decisions.  Literature was provided to assist patient in preparing an advanced directive.    COUNSELING:  Reviewed preventive health counseling, as reflected in patient instructions    BP Readings from Last 1 Encounters:   11/26/18 120/70     Estimated body mass index is 31.85 kg/(m^2) as calculated from the following:    Height as of this encounter: 5' 10\" (1.778 m).    Weight as of this encounter: 222 lb (100.7 kg).      Weight management plan: : -30 minutes of exercise 5 days a week (walking, jogging, housework, biking).  - Limit portion sizes and avoid eating out.  -Eat at least 5 servings of fruits and vegetables daily.   -Eat whole-grain bread, whole-wheat pasta and brown rice instead of white grains and rice.       reports that he has never smoked. He has never used smokeless tobacco.      Appropriate preventive services were discussed with this patient, including applicable screening as appropriate for cardiovascular disease, diabetes, osteopenia/osteoporosis, and glaucoma.  As appropriate for age/gender, discussed screening for colorectal cancer, prostate cancer, breast cancer, and cervical cancer. Checklist reviewing " preventive services available has been given to the patient.    Reviewed patients plan of care and provided an AVS. The Basic Care Plan (routine screening as documented in Health Maintenance) for Canelo meets the Care Plan requirement. This Care Plan has been established and reviewed with the Patient.    Counseling Resources:  ATP IV Guidelines  Pooled Cohorts Equation Calculator  Breast Cancer Risk Calculator  FRAX Risk Assessment  ICSI Preventive Guidelines  Dietary Guidelines for Americans, 2010  USDA's MyPlate  ASA Prophylaxis  Lung CA Screening    Yuki Jaime PA-C  [facility]

## 2018-11-26 NOTE — PATIENT INSTRUCTIONS
Preventive Health Recommendations:     See your health care provider every year to    Review health changes.     Discuss preventive care.      Review your medicines if your doctor has prescribed any.    Talk with your health care provider about whether you should have a test to screen for prostate cancer (PSA).    Every 3 years, have a diabetes test (fasting glucose). If you are at risk for diabetes, you should have this test more often.    Every 5 years, have a cholesterol test. Have this test more often if you are at risk for high cholesterol or heart disease.     Every 10 years, have a colonoscopy. Or, have a yearly FIT test (stool test). These exams will check for colon cancer.    Talk to with your health care provider about screening for Abdominal Aortic Aneurysm if you have a family history of AAA or have a history of smoking.  Shots:     Get a flu shot each year.     Get a tetanus shot every 10 years.     Talk to your doctor about your pneumonia vaccines. There are now two you should receive - Pneumovax (PPSV 23) and Prevnar (PCV 13).    Talk to your pharmacist about a shingles vaccine.     Talk to your doctor about the hepatitis B vaccine.  Nutrition:     Eat at least 5 servings of fruits and vegetables each day.     Eat whole-grain bread, whole-wheat pasta and brown rice instead of white grains and rice.     Get adequate Calcium and Vitamin D.   Lifestyle    Exercise for at least 150 minutes a week (30 minutes a day, 5 days a week). This will help you control your weight and prevent disease.     Limit alcohol to one drink per day.     No smoking.     Wear sunscreen to prevent skin cancer.     See your dentist every six months for an exam and cleaning.     See your eye doctor every 1 to 2 years to screen for conditions such as glaucoma, macular degeneration and cataracts.    Personalized Prevention Plan  You are due for the preventive services outlined below.  Your care team is available to assist you in  scheduling these services.  If you have already completed any of these items, please share that information with your care team to update in your medical record.    Health Maintenance Due   Topic Date Due     Cholesterol Lab - yearly  12/13/2017     FALL RISK ASSESSMENT  11/28/2018

## 2018-11-26 NOTE — MR AVS SNAPSHOT
After Visit Summary   11/26/2018    Canelo Cruz    MRN: [id number]           Patient Information     Date Of Birth          7/30/1931        Visit Information        Provider Department      11/26/2018 2:10 PM Yuki Jaime PA-C [facility]        Today's Diagnoses     Routine general medical examination at a health care facility    -  1    Elevated blood sugar        Hypertriglyceridemia        Screening for prostate cancer        Osteoarthritis of right knee, unspecified osteoarthritis type        Fatigue, unspecified type        Insomnia, unspecified type        Post herpetic neuralgia        Other fatigue        Class 1 obesity due to excess calories with serious comorbidity in adult, unspecified BMI          Care Instructions      Preventive Health Recommendations:     See your health care provider every year to    Review health changes.     Discuss preventive care.      Review your medicines if your doctor has prescribed any.    Talk with your health care provider about whether you should have a test to screen for prostate cancer (PSA).    Every 3 years, have a diabetes test (fasting glucose). If you are at risk for diabetes, you should have this test more often.    Every 5 years, have a cholesterol test. Have this test more often if you are at risk for high cholesterol or heart disease.     Every 10 years, have a colonoscopy. Or, have a yearly FIT test (stool test). These exams will check for colon cancer.    Talk to with your health care provider about screening for Abdominal Aortic Aneurysm if you have a family history of AAA or have a history of smoking.  Shots:     Get a flu shot each year.     Get a tetanus shot every 10 years.     Talk to your doctor about your pneumonia vaccines. There are now two you should receive - Pneumovax (PPSV 23) and Prevnar (PCV 13).    Talk to your pharmacist about a shingles vaccine.     Talk to your doctor about  the hepatitis B vaccine.  Nutrition:     Eat at least 5 servings of fruits and vegetables each day.     Eat whole-grain bread, whole-wheat pasta and brown rice instead of white grains and rice.     Get adequate Calcium and Vitamin D.   Lifestyle    Exercise for at least 150 minutes a week (30 minutes a day, 5 days a week). This will help you control your weight and prevent disease.     Limit alcohol to one drink per day.     No smoking.     Wear sunscreen to prevent skin cancer.     See your dentist every six months for an exam and cleaning.     See your eye doctor every 1 to 2 years to screen for conditions such as glaucoma, macular degeneration and cataracts.    Personalized Prevention Plan  You are due for the preventive services outlined below.  Your care team is available to assist you in scheduling these services.  If you have already completed any of these items, please share that information with your care team to update in your medical record.    Health Maintenance Due   Topic Date Due     Cholesterol Lab - yearly  12/13/2017     FALL RISK ASSESSMENT  11/28/2018             Follow-ups after your visit        Follow-up notes from your care team     Return in about 1 year (around 11/26/2019).      Who to contact     If you have questions or need follow up information about today's clinic visit or your schedule please contact [facility] directly at [phone].  Normal or non-critical lab and imaging results will be communicated to you by MyChart, letter or phone within 4 business days after the clinic has received the results. If you do not hear from us within 7 days, please contact the clinic through MyChart or phone. If you have a critical or abnormal lab result, we will notify you by phone as soon as possible.  Submit refill requests through [employer] or call your pharmacy and they will forward the refill request to us. Please allow 3 business days for your refill to be  "completed.          Additional Information About Your Visit        [facility] [employer] Information     [employer] gives you secure access to your electronic health record. If you see a primary care provider, you can also send messages to your care team and make appointments. If you have questions, please call your primary care clinic.  If you do not have a primary care provider, please call [phone] and they will assist you.        Care EveryWhere ID     This is your Care EveryWhere ID. This could be used by other organizations to access your [place] medical records  [id number]        Your Vitals Were     Pulse Temperature Respirations Height Pulse Oximetry BMI (Body Mass Index)    80 97.6  F (36.4  C) (Tympanic) 16 5' 10\" (1.778 m) 93% 31.85 kg/m2       Blood Pressure from Last 3 Encounters:   11/26/18 120/70   07/13/18 132/70   07/09/18 132/90    Weight from Last 3 Encounters:   11/26/18 222 lb (100.7 kg)   07/09/18 229 lb (103.9 kg)   06/18/18 228 lb (103.4 kg)              We Performed the Following     CBC with platelets     Comprehensive metabolic panel     Hemoglobin A1c     Lipid panel reflex to direct LDL Non-fasting     Prostate spec antigen screen     TSH with free T4 reflex     UA reflex to Microscopic and Culture          Today's Medication Changes          These changes are accurate as of 11/26/18  3:10 PM.  If you have any questions, ask your nurse or doctor.               Stop taking these medicines if you haven't already. Please contact your care team if you have questions.     guaiFENesin-codeine 100-10 MG/5ML Soln solution   Commonly known as:  ROBITUSSIN AC   Stopped by:  Yuki Jaime PA-C           levofloxacin 500 MG tablet   Commonly known as:  LEVAQUIN   Stopped by:  Yuki Jaime PA-C           lidocaine 5 % ointment   Commonly known as:  XYLOCAINE   Stopped by:  Yuki Jaime PA-C                Where to get your medicines      These medications were " sent to [id number] - [place], [place] - [address]  [address] [place] [address]     Phone:  [phone]     diclofenac 1 % topical gel                Primary Care Provider Office Phone # Fax #    Yuki Jaime PA-C [phone] [address]  [facility] [address]        Equal Access to Services     HANH BEAN : Hadii aad ku hadasho Soomaali, waaxda luqadaha, qaybta kaalmada adeegyada, waxay idiin hayaan adeeg kharash la'aan . So [facility] [phone].    ATENCIÓN: Si habla español, tiene a bermudez disposición servicios gratuitos de asistencia lingüística. [facility] [phone].    We comply with applicable federal civil rights laws and [place] laws. We do not discriminate on the basis of race, color, national origin, age, disability, sex, sexual orientation, or gender identity.            Thank you!     Thank you for choosing [facility]  for your care. Our goal is always to provide you with excellent care. Hearing back from our patients is one way we can continue to improve our services. Please take a few minutes to complete the written survey that you may receive in the mail after your visit with us. Thank you!             Your Updated Medication List - Protect others around you: Learn how to safely use, store and throw away your medicines at www.disposemymeds.org.          This list is accurate as of 11/26/18  3:10 PM.  Always use your most recent med list.                   Brand Name Dispense Instructions for use Diagnosis    albuterol 108 (90 Base) MCG/ACT inhaler    PROAIR HFA/PROVENTIL HFA/VENTOLIN HFA    1 Inhaler    Inhale 1-2 puffs into the lungs every 4 hours as needed for shortness of breath / dyspnea    Acute bronchitis, unspecified organism       diclofenac 1 % topical gel    VOLTAREN    100 g    Apply 4 grams to knees or 2 grams to hands four times daily using enclosed dosing card.    Osteoarthritis of  right knee, unspecified osteoarthritis type       tamsulosin 0.4 MG capsule    FLOMAX          triamcinolone 0.1 % cream    KENALOG    80 g    Apply to affected area 1-3 times daily as needed    Dermatitis

## 2018-11-26 NOTE — ASSESSMENT & PLAN NOTE
- Discussed treatment options and those that have been tried at length with patient.  He has stopped many medications without giving them a sufficient time to work and has also refused to see some specialists because they required too much paperwork to be filled out prior to his visit.  He is currently seeing the pain clinic who have given him several epidural injections which are becoming less effective.  He stopped by our clinic when the [facility] in [place] was closed several weeks ago to get a pain medication refill on tramadol but per [place]  he had never been prescribed this medication.  My refusal to fill it made him very upset.  He reports now that his pain is worse since starting electrical stimulation of the nerve endings.  The only things that are somewhat helpful are lidocaine and triamcinolone.  Recommend he follow up with Dr. Singleton at our clinic who may have other options to consider due to his longer years in practice especially with the geriatric population.  Pt has previously seen [facility] and had a bad experience.  Refilled voltaren today to help with his OA.

## 2018-11-27 LAB
ALBUMIN SERPL-MCNC: 3.7 G/DL (ref 3.4–5)
ALP SERPL-CCNC: 70 U/L (ref 40–150)
ALT SERPL W P-5'-P-CCNC: 25 U/L (ref 0–70)
ANION GAP SERPL CALCULATED.3IONS-SCNC: 8 MMOL/L (ref 3–14)
AST SERPL W P-5'-P-CCNC: 27 U/L (ref 0–45)
BILIRUB SERPL-MCNC: 0.4 MG/DL (ref 0.2–1.3)
BUN SERPL-MCNC: 10 MG/DL (ref 7–30)
CALCIUM SERPL-MCNC: 9 MG/DL (ref 8.5–10.1)
CHLORIDE SERPL-SCNC: 107 MMOL/L (ref 94–109)
CHOLEST SERPL-MCNC: 226 MG/DL
CO2 SERPL-SCNC: 25 MMOL/L (ref 20–32)
CREAT SERPL-MCNC: 1.07 MG/DL (ref 0.66–1.25)
GFR SERPL CREATININE-BSD FRML MDRD: 65 ML/MIN/1.7M2
GLUCOSE SERPL-MCNC: 95 MG/DL (ref 70–99)
HDLC SERPL-MCNC: 39 MG/DL
LDLC SERPL CALC-MCNC: 137 MG/DL
NONHDLC SERPL-MCNC: 187 MG/DL
POTASSIUM SERPL-SCNC: 3.9 MMOL/L (ref 3.4–5.3)
PROT SERPL-MCNC: 7.1 G/DL (ref 6.8–8.8)
PSA SERPL-ACNC: 4.32 UG/L (ref 0–4)
SODIUM SERPL-SCNC: 140 MMOL/L (ref 133–144)
TRIGL SERPL-MCNC: 248 MG/DL
TSH SERPL DL<=0.005 MIU/L-ACNC: 2.74 MU/L (ref 0.4–4)

## 2018-11-28 NOTE — PROGRESS NOTES
Bruno Mauricio,    I just wanted to let you know that your lab results have been reviewed and are attached.    - Hemoglobin A1c is a test shows your blood sugar level over the last 2-3 months. A normal result for someone who does not have diabetes is 4-5.7% (fasting blood sugar <100). - Your A1c is in the pre-diabetic range and we want to make sure it doesn't reach 6.5%.  Try to decrease sugars and carbohydrates from your diet and increase exercise to keep those numbers down.  We'll recheck next year.   - Your other labs are stable.    Please let me know if you have any questions and have a great week!    Sincerely,    Sivan Jaime PA-C    [facility]  [address]  [place] [address]  [phone] (p)

## 2018-11-30 ENCOUNTER — TELEPHONE (OUTPATIENT)
Dept: FAMILY MEDICINE | Facility: CLINIC | Age: 83
End: 2018-11-30

## 2018-11-30 NOTE — TELEPHONE ENCOUNTER
Reason for Call:  Other pt update    Detailed comments: Pt wants AYANA Jaime to know that he has reviewed his test results and   Thank you. Also he has an appt with Dr Singleton on Dec 5.     Phone Number Patient can be reached at: Home number on file [phone] (home)    Best Time: any    Can we leave a detailed message on this number? YES    Call taken on 11/30/2018 at 1:24 PM by SARAH ROSSI

## 2018-12-03 NOTE — TELEPHONE ENCOUNTER
This is a difficult pt who has had shingles pain for several years.  I have him seeing the pain clinic now with minimal help.  We have tried pretty much everything I can think of to manage his symptoms.  He is at a loss of what to do and wanted to seek a second opinion.  I recommended that you may have additional ideas and he has an appointment with you on Wed.    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 TREATMENT BY DR. PALENCIA AT [facility], [place], PHONE# [phone]      - Gabapentin 300 mg caused constipation  Tried lyrica, unsure why was stopped  Lidocaine patches provide temporary relief.  Some improvement with triamcinolone and possibly topical lidocaine.  - Pain clinic electrotherapy treatments made pain worse.    Requested oral pain medication refill when pain clinic was closed but could not find record of a previous prescription on [facility] so I denied the refill and pt was very upset. MEM Nov 2018

## 2018-12-05 ENCOUNTER — OFFICE VISIT (OUTPATIENT)
Dept: FAMILY MEDICINE | Facility: CLINIC | Age: 83
End: 2018-12-05
Payer: COMMERCIAL

## 2018-12-05 VITALS
BODY MASS INDEX: 31.78 KG/M2 | OXYGEN SATURATION: 94 % | WEIGHT: 222 LBS | SYSTOLIC BLOOD PRESSURE: 122 MMHG | HEART RATE: 85 BPM | DIASTOLIC BLOOD PRESSURE: 72 MMHG | TEMPERATURE: 97.8 F | HEIGHT: 70 IN | RESPIRATION RATE: 20 BRPM

## 2018-12-05 DIAGNOSIS — R53.82 CHRONIC FATIGUE: ICD-10-CM

## 2018-12-05 DIAGNOSIS — B02.29 POST HERPETIC NEURALGIA: Primary | ICD-10-CM

## 2018-12-05 PROCEDURE — 99213 OFFICE O/P EST LOW 20 MIN: CPT | Performed by: INTERNAL MEDICINE

## 2018-12-05 RX ORDER — PREGABALIN 75 MG/1
75 CAPSULE ORAL AT BEDTIME
Qty: 30 CAPSULE | Refills: 5 | Status: SHIPPED | OUTPATIENT
Start: 2018-12-05 | End: 2019-03-21

## 2018-12-05 RX ORDER — TRAMADOL HYDROCHLORIDE 50 MG/1
50 TABLET ORAL 3 TIMES DAILY PRN
Qty: 30 TABLET | Refills: 3 | Status: SHIPPED | OUTPATIENT
Start: 2018-12-05 | End: 2019-03-21

## 2018-12-05 NOTE — PROGRESS NOTES
"  SUBJECTIVE:   Canelo Cruz is a 87 year old male who presents to clinic today for the following health issues:      Consult re: see MEM's notes                                                       S/p shingles 3.5 years ago.               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              Sx are worse at night.                           Lidocaine helps a bit, for a short while.           Using OTC 4%.            Did not tolerate capsaicin.              Reports diarrhea and hair loss with gabapentin.               Tramadol helps a little.                    However, he does not like to take oral medications.               \"I am not a pill taker\".                                                                            2.5 wks ago, he had 5 hrs of severe pain in the area of his PHN, plus into the R abd and R leg.             Had another SEEMA; did not help.           Had one tramadol remaining; this helped.                                                                                          He has had several types of injections with MAPS, over the past several months and even years; none of them helped.              He also worked with physical therapy, using using a TENS unit, and another type of electrical stimulator, and none of this helped.                                                                                                    for the past several months, he reports that he has decreased his alcohol intake considerably, and now just has an occasional glass of wine.      Current Outpatient Prescriptions   Medication Sig Dispense Refill     diclofenac (VOLTAREN) 1 % topical gel Apply 4 grams to knees or 2 grams to hands four times daily using enclosed dosing card. 100 g 3     tamsulosin (FLOMAX) 0.4 MG capsule   1     triamcinolone (KENALOG) 0.1 % cream Apply to affected area 1-3 times daily as needed 80 g 5     albuterol (PROAIR HFA/PROVENTIL HFA/VENTOLIN HFA) 108 (90 Base) MCG/ACT Inhaler Inhale 1-2 puffs into the " "lungs every 4 hours as needed for shortness of breath / dyspnea 1 Inhaler 3     Allergies   Allergen Reactions     Ciprofloxacin      history of achilles tendon tear     BP Readings from Last 3 Encounters:   12/05/18 122/72   11/26/18 120/70   07/13/18 132/70    Wt Readings from Last 3 Encounters:   12/05/18 222 lb (100.7 kg)   11/26/18 222 lb (100.7 kg)   07/09/18 229 lb (103.9 kg)                    Reviewed and updated as needed this visit by clinical staff       Reviewed and updated as needed this visit by Provider         ROS:  CONSTITUTIONAL:NEGATIVE for fever, chills, change in weight and POSITIVE  for fatigue  PSYCHIATRIC: POSITIVE forfatigue and NEGATIVE foralcohol abuse and depressed mood    OBJECTIVE:                                                    /72 (BP Location: Left arm, Patient Position: Chair, Cuff Size: Adult Large)  Pulse 85  Temp 97.8  F (36.6  C)  Resp 20  Ht 5' 10\" (1.778 m)  Wt 222 lb (100.7 kg)  SpO2 94%  BMI 31.85 kg/m2  Body mass index is 31.85 kg/(m^2).  GENERAL APPEARANCE: alert and no distress  CV: regular rates and rhythm  PSYCH: mentation appears normal    Diagnostic test results:  Results for orders placed or performed in visit on 11/26/18   Hemoglobin A1c   Result Value Ref Range    Hemoglobin A1C 5.7 (H) 0 - 5.6 %   Lipid panel reflex to direct LDL Non-fasting   Result Value Ref Range    Cholesterol 226 (H) <200 mg/dL    Triglycerides 248 (H) <150 mg/dL    HDL Cholesterol 39 (L) >39 mg/dL    LDL Cholesterol Calculated 137 (H) <100 mg/dL    Non HDL Cholesterol 187 (H) <130 mg/dL   Comprehensive metabolic panel   Result Value Ref Range    Sodium 140 133 - 144 mmol/L    Potassium 3.9 3.4 - 5.3 mmol/L    Chloride 107 94 - 109 mmol/L    Carbon Dioxide 25 20 - 32 mmol/L    Anion Gap 8 3 - 14 mmol/L    Glucose 95 70 - 99 mg/dL    Urea Nitrogen 10 7 - 30 mg/dL    Creatinine 1.07 0.66 - 1.25 mg/dL    GFR Estimate 65 >60 mL/min/1.7m2    GFR Estimate If Black 79 >60 " mL/min/1.7m2    Calcium 9.0 8.5 - 10.1 mg/dL    Bilirubin Total 0.4 0.2 - 1.3 mg/dL    Albumin 3.7 3.4 - 5.0 g/dL    Protein Total 7.1 6.8 - 8.8 g/dL    Alkaline Phosphatase 70 40 - 150 U/L    ALT 25 0 - 70 U/L    AST 27 0 - 45 U/L   Prostate spec antigen screen   Result Value Ref Range    PSA 4.32 (H) 0 - 4 ug/L   CBC with platelets   Result Value Ref Range    WBC 6.0 4.0 - 11.0 10e9/L    RBC Count 5.09 4.4 - 5.9 10e12/L    Hemoglobin 16.2 13.3 - 17.7 g/dL    Hematocrit 47.0 40.0 - 53.0 %    MCV 92 78 - 100 fl    MCH 31.8 26.5 - 33.0 pg    MCHC 34.5 31.5 - 36.5 g/dL    RDW 13.0 10.0 - 15.0 %    Platelet Count 208 150 - 450 10e9/L   UA reflex to Microscopic and Culture   Result Value Ref Range    Color Urine Yellow     Appearance Urine Clear     Glucose Urine Negative NEG^Negative mg/dL    Bilirubin Urine Negative NEG^Negative    Ketones Urine Negative NEG^Negative mg/dL    Specific Gravity Urine 1.015 1.003 - 1.035    Blood Urine Negative NEG^Negative    pH Urine 7.0 5.0 - 7.0 pH    Protein Albumin Urine Negative NEG^Negative mg/dL    Urobilinogen Urine 0.2 0.2 - 1.0 EU/dL    Nitrite Urine Negative NEG^Negative    Leukocyte Esterase Urine Trace (A) NEG^Negative    Source Midstream Urine    TSH with free T4 reflex   Result Value Ref Range    TSH 2.74 0.40 - 4.00 mU/L   Urine Microscopic   Result Value Ref Range    WBC Urine 0 - 5 OTO5^0 - 5 /HPF    RBC Urine O - 2 OTO2^O - 2 /HPF        ASSESSMENT/PLAN:                                                        ICD-10-CM    1. Post herpetic neuralgia B02.29 traMADol (ULTRAM) 50 MG tablet     pregabalin (LYRICA) 75 MG capsule   2. Chronic fatigue R53.82           We discussed treatment options, and he is agreeable to trying medications for nighttime use, since that is his most uncomfortable time.  He believes he can cope fairly well during the daytime with most of his symptoms.  Follow up with Provider -he will call with an update in 2-3 weeks.               I suggest  that he could call either Ms Jaime, or me, whichever he prefers.  Patient Instructions   For night time use, try the Lyrica, 75 mg ,one hour before bedtime.                  We can increase the dose ; we are starting at a low dose.                        You can also use the tramadol,50 mg, three times per day as needed.                             Keep using the lidocaine ointment.                    Otto Singleton MD  [facility]

## 2018-12-05 NOTE — MR AVS SNAPSHOT
After Visit Summary   12/5/2018    Canelo Cruz    MRN: [id number]           Patient Information     Date Of Birth          7/30/1931        Visit Information        Provider Department      12/5/2018 4:30 PM Otto Singleton MD [facility]        Today's Diagnoses     Post herpetic neuralgia    -  1    Chronic fatigue          Care Instructions    For night time use, try the Lyrica, 75 mg ,one hour before bedtime.                  We can increase the dose ; we are starting at a low dose.                        You can also use the tramadol,50 mg, three times per day as needed.                             Keep using the lidocaine ointment.                        Follow-ups after your visit        Who to contact     If you have questions or need follow up information about today's clinic visit or your schedule please contact [facility] directly at [phone].  Normal or non-critical lab and imaging results will be communicated to you by Meiaojuhart, letter or phone within 4 business days after the clinic has received the results. If you do not hear from us within 7 days, please contact the clinic through Meiaojuhart or phone. If you have a critical or abnormal lab result, we will notify you by phone as soon as possible.  Submit refill requests through Charity Engine or call your pharmacy and they will forward the refill request to us. Please allow 3 business days for your refill to be completed.          Additional Information About Your Visit        MyChart Information     [employer] gives you secure access to your electronic health record. If you see a primary care provider, you can also send messages to your care team and make appointments. If you have questions, please call your primary care clinic.  If you do not have a primary care provider, please call [phone] and they will assist you.        Care EveryWhere ID     This is your Care EveryWhere ID.  "This could be used by other organizations to access your [facility] medical records  [id number]        Your Vitals Were     Pulse Temperature Respirations Height Pulse Oximetry BMI (Body Mass Index)    85 97.8  F (36.6  C) 20 5' 10\" (1.778 m) 94% 31.85 kg/m2       Blood Pressure from Last 3 Encounters:   12/05/18 122/72   11/26/18 120/70   07/13/18 132/70    Weight from Last 3 Encounters:   12/05/18 222 lb (100.7 kg)   11/26/18 222 lb (100.7 kg)   07/09/18 229 lb (103.9 kg)              Today, you had the following     No orders found for display         Today's Medication Changes          These changes are accurate as of 12/5/18  5:00 PM.  If you have any questions, ask your nurse or doctor.               Start taking these medicines.        Dose/Directions    pregabalin 75 MG capsule   Commonly known as:  LYRICA   Used for:  Post herpetic neuralgia   Started by:  Otto Singleton MD        Dose:  75 mg   Take 1 capsule (75 mg) by mouth At Bedtime   Quantity:  30 capsule   Refills:  5       traMADol 50 MG tablet   Commonly known as:  ULTRAM   Used for:  Post herpetic neuralgia   Started by:  Otto Singleton MD        Dose:  50 mg   Take 1 tablet (50 mg) by mouth 3 times daily as needed for severe pain   Quantity:  30 tablet   Refills:  3            Where to get your medicines      Some of these will need a paper prescription and others can be bought over the counter.  Ask your nurse if you have questions.     Bring a paper prescription for each of these medications     pregabalin 75 MG capsule    traMADol 50 MG tablet               Information about OPIOIDS     PRESCRIPTION OPIOIDS: WHAT YOU NEED TO KNOW   We gave you an opioid (narcotic) pain medicine. It is important to manage your pain, but opioids are not always the best choice. You should first try all the other options your care team gave you. Take this medicine for as short a time (and as few doses) as possible.    Some activities can increase your pain, " such as bandage changes or therapy sessions. It may help to take your pain medicine 30 to 60 minutes before these activities. Reduce your stress by getting enough sleep, working on hobbies you enjoy and practicing relaxation or meditation. Talk to your care team about ways to manage your pain beyond prescription opioids.    These medicines have risks:    DO NOT drive when on new or higher doses of pain medicine. These medicines can affect your alertness and reaction times, and you could be arrested for driving under the influence (DUI). If you need to use opioids long-term, talk to your care team about driving.    DO NOT operate heavy machinery    DO NOT do any other dangerous activities while taking these medicines.    DO NOT drink any alcohol while taking these medicines.     If the opioid prescribed includes acetaminophen, DO NOT take with any other medicines that contain acetaminophen. Read all labels carefully. Look for the word  acetaminophen  or  Tylenol.  Ask your pharmacist if you have questions or are unsure.    You can get addicted to pain medicines, especially if you have a history of addiction (chemical, alcohol or substance dependence). Talk to your care team about ways to reduce this risk.    All opioids tend to cause constipation. Drink plenty of water and eat foods that have a lot of fiber, such as fruits, vegetables, prune juice, apple juice and high-fiber cereal. Take a laxative (Miralax, milk of magnesia, Colace, Senna) if you don t move your bowels at least every other day. Other side effects include upset stomach, sleepiness, dizziness, throwing up, tolerance (needing more of the medicine to have the same effect), physical dependence and slowed breathing.    Store your pills in a secure place, locked if possible. We will not replace any lost or stolen medicine. If you don t finish your medicine, please throw away (dispose) as directed by your pharmacist. The [place] Pollution Control Agency  has more information about safe disposal: [email or web address]         Primary Care Provider Office Phone # Fax #    Yuki Jaime PA-C [phone]       [address]  [facility] [address]        Equal Access to Services     HANH BEAN : Hadii aad ku hadasho Soomaali, waaxda luqadaha, qaybta kaalmada adeegyada, waxay idiin hayaan adeeg kharash la'aan ah. So [facility] [phone].    ATENCIÓN: Si habla español, tiene a bermudez disposición servicios gratuitos de asistencia lingüística. Dashawn al [phone].    We comply with applicable federal civil rights laws and [place] laws. We do not discriminate on the basis of race, color, national origin, age, disability, sex, sexual orientation, or gender identity.            Thank you!     Thank you for choosing [facility]  for your care. Our goal is always to provide you with excellent care. Hearing back from our patients is one way we can continue to improve our services. Please take a few minutes to complete the written survey that you may receive in the mail after your visit with us. Thank you!             Your Updated Medication List - Protect others around you: Learn how to safely use, store and throw away your medicines at www.disposemymeds.org.          This list is accurate as of 12/5/18  5:00 PM.  Always use your most recent med list.                   Brand Name Dispense Instructions for use Diagnosis    albuterol 108 (90 Base) MCG/ACT inhaler    PROAIR HFA/PROVENTIL HFA/VENTOLIN HFA    1 Inhaler    Inhale 1-2 puffs into the lungs every 4 hours as needed for shortness of breath / dyspnea    Acute bronchitis, unspecified organism       diclofenac 1 % topical gel    VOLTAREN    100 g    Apply 4 grams to knees or 2 grams to hands four times daily using enclosed dosing card.    Osteoarthritis of right knee, unspecified osteoarthritis type        pregabalin 75 MG capsule    LYRICA    30 capsule    Take 1 capsule (75 mg) by mouth At Bedtime    Post herpetic neuralgia       tamsulosin 0.4 MG capsule    FLOMAX          traMADol 50 MG tablet    ULTRAM    30 tablet    Take 1 tablet (50 mg) by mouth 3 times daily as needed for severe pain    Post herpetic neuralgia       triamcinolone 0.1 % external cream    KENALOG    80 g    Apply to affected area 1-3 times daily as needed    Dermatitis

## 2018-12-05 NOTE — PATIENT INSTRUCTIONS
For night time use, try the Lyrica, 75 mg ,one hour before bedtime.                  We can increase the dose ; we are starting at a low dose.                        You can also use the tramadol,50 mg, three times per day as needed.                             Keep using the lidocaine ointment.

## 2018-12-13 ENCOUNTER — TELEPHONE (OUTPATIENT)
Dept: FAMILY MEDICINE | Facility: CLINIC | Age: 83
End: 2018-12-13

## 2018-12-13 NOTE — TELEPHONE ENCOUNTER
PT called again @ 1:19 PM , would like to see if one of the DrZulema Could fit him is to look at him ear. Very concerned. Please call him when you can.

## 2018-12-13 NOTE — TELEPHONE ENCOUNTER
Reason for Call:Requested Provider:  [facility]:    PCP: Jahaira Morrison or Dr. Newby    Reason for visit: Look at spots on ear agiain.  Getting bigger and more are showing up, some going inside the ear. PT is very concerned and would like to get in A.S.A.P.    Duration of symptoms: Last appt. June 1st,2018    Have you been treated for this in the past? Yes        Can we leave a detailed message on this number? YES    Phone number patient can be reached at: Cell number on file:    Telephone Information:   Mobile [phone]       Best Time: any    Call taken on 12/13/2018 at 11:07 AM by Dian Rucker

## 2018-12-13 NOTE — TELEPHONE ENCOUNTER
patient is scheduled for 12/14/18 at 11:00 am.    Myriam FlynnRN BSN  [facility]  [phone]

## 2018-12-14 ENCOUNTER — TELEPHONE (OUTPATIENT)
Dept: FAMILY MEDICINE | Facility: CLINIC | Age: 83
End: 2018-12-14

## 2018-12-14 ENCOUNTER — OFFICE VISIT (OUTPATIENT)
Dept: FAMILY MEDICINE | Facility: CLINIC | Age: 83
End: 2018-12-14
Payer: COMMERCIAL

## 2018-12-14 VITALS — OXYGEN SATURATION: 93 % | HEART RATE: 80 BPM | SYSTOLIC BLOOD PRESSURE: 146 MMHG | DIASTOLIC BLOOD PRESSURE: 85 MMHG

## 2018-12-14 DIAGNOSIS — B35.8 TINEA FACIALE: ICD-10-CM

## 2018-12-14 DIAGNOSIS — L57.0 ACTINIC KERATOSIS: ICD-10-CM

## 2018-12-14 DIAGNOSIS — M94.8X9: ICD-10-CM

## 2018-12-14 DIAGNOSIS — L82.0 INFLAMED SEBORRHEIC KERATOSIS: ICD-10-CM

## 2018-12-14 DIAGNOSIS — R21 RASH AND OTHER NONSPECIFIC SKIN ERUPTION: Primary | ICD-10-CM

## 2018-12-14 DIAGNOSIS — L57.8 SOLAR ELASTOSIS: ICD-10-CM

## 2018-12-14 LAB
KOH PREP SPEC: ABNORMAL
SPECIMEN SOURCE: ABNORMAL

## 2018-12-14 PROCEDURE — 87220 TISSUE EXAM FOR FUNGI: CPT | Performed by: PHYSICIAN ASSISTANT

## 2018-12-14 PROCEDURE — 99213 OFFICE O/P EST LOW 20 MIN: CPT | Mod: 25 | Performed by: PHYSICIAN ASSISTANT

## 2018-12-14 PROCEDURE — 17000 DESTRUCT PREMALG LESION: CPT | Mod: 59 | Performed by: PHYSICIAN ASSISTANT

## 2018-12-14 PROCEDURE — 17110 DESTRUCTION B9 LES UP TO 14: CPT | Performed by: PHYSICIAN ASSISTANT

## 2018-12-14 PROCEDURE — 17003 DESTRUCT PREMALG LES 2-14: CPT | Performed by: PHYSICIAN ASSISTANT

## 2018-12-14 RX ORDER — TRIAMCINOLONE ACETONIDE 1 MG/G
CREAM TOPICAL
Qty: 45 G | Refills: 1 | Status: SHIPPED | OUTPATIENT
Start: 2018-12-14 | End: 2019-03-21

## 2018-12-14 RX ORDER — KETOCONAZOLE 20 MG/G
CREAM TOPICAL 2 TIMES DAILY
Qty: 60 G | Refills: 1 | Status: SHIPPED | OUTPATIENT
Start: 2018-12-14 | End: 2019-03-21

## 2018-12-14 NOTE — PATIENT INSTRUCTIONS
Triamcinolone 2x a day for 2 weeks. Tapering with improvement.   Side effects of topical steroids including but not limited to atrophy (skin thinning), striae (stretch marks) telangiectasias, steroid acne, and others. Do not apply to normal skin. Do not apply to discolored skin that does not have rash present.       WOUND CARE INSTRUCTIONS  FOR CRYOSURGERY        This area treated with liquid nitrogen will form a blister. You do not need to bandage the area until after the blister forms and breaks (which may be a few days).  When the blister breaks, begin daily dressing changes as follows:    1) Clean and dry the area with tap water using clean Q-tip or sterile gauze pad.    2) Apply Polysporin ointment or Bacitracin ointment over entire wound.  Do NOT use Neosporin ointment.    3) Cover the wound with a band-aid or sterile non-stick gauze pad and micropore paper tape.      REPEAT THESE INSTRUCTIONS AT LEAST ONCE A DAY UNTIL THE WOUND HAS COMPLETELY HEALED.        It is an old wives tale that a wound heals better when it is exposed to air and allowed to dry out. The wound will heal faster with a better cosmetic result if it is kept moist with ointment and covered with a bandage.  Do not let the wound dry out.      IMPORTANT INFORMATION ON REVERSE SIDE    Supplies Needed:     *Cotton tipped applicators (Q-tips)   *Polysporin ointment or Bacitracin ointment (NOT NEOSPORIN)   *Band-aids, or non stick gauze pads and micropore paper tape                PATIENT INFORMATION    During the healing process you will notice a number of changes. All wounds develop a small halo of redness surrounding the wound.  This means healing is occurring. Severe itching with extensive redness usually indicates sensitivity to the ointment or bandage tape used to dress the wound.  You should call our office if this develops.      Swelling and/or discoloration around your surgical site is common, particularly when performed around the  eye.    All wounds normally drain.  The larger the wound the more drainage there will be.  After 7-10 days, you will notice the wound beginning to shrink and new skin will begin to grow.  The wound is healed when you can see skin has formed over the entire area.  A healed wound has a healthy, shiny look to the surface and is red to dark pink in color to normalize.  Wounds may take approximately 4-6 weeks to heal.  Larger wounds may take 6-8 weeks.  After the wound is healed you may discontinue dressing changes.    You may experience a sensation of tightness as your wound heals. This is normal and will gradually subside.    Your healed wound may be sensitive to temperature changes. This sensitivity improves with time, but if you re having a lot of discomfort, try to avoid temperature extremes.    Patients frequently experience itching after their wound appears to have healed because of the continue healing under the skin.  Plain Vaseline will help relieve the itching.             Proper skin care from [facility]:    -Eliminate harsh soaps as they strip the natural oils from the skin, often resulting in dry itchy skin ( i.e. Dial, Zest, [place])  -Use mild soaps such as Cetaphil or Dove Sensitive Skin in the shower. You do not need to use soap on arms, legs, and trunk every time you shower unless visibly soiled.   -Avoid hot or cold showers.  -After showering, lightly dry off and apply moisturizing within 2-3 minutes. This will help trap moisture in the skin.   -Aggressive use of a moisturizer at least 1-2 times a day to the entire body (including -Vanicream, Cetaphil, Aquaphor or Cerave) and moisturize hands after every washing.  -We recommend using moisturizers that come in a tub that needs to be scooped out, not a pump. This has more of an oil base. It will hold moisture in your skin much better than a water base moisturizer. The above recommended are non-pore clogging.    Wear a sunscreen with at  least SPF 30 on your face, ears, neck and V of the chest daily. Wear sunscreen on other areas of the body if those areas are exposed to the sun throughout the day. Sunscreens can contain physical and/or chemical blockers. Physical blockers are less likely to clog pores, these include zinc oxide and titanium dioxide. Reapply every two hour and after swimming. Sunscreen examples include Neutrogena, CeraVe, Blue Lizard, Elta MD and many others.    UV radiation  UVA radiation remains constant throughout the day and throughout the year. It is a longer wavelength than UVB and therefore penetrates deeper into the skin leading to immediate and delayed tanning, photoaging, and skin cancer. 70-80% of UVA and UVB radiation occurs between the hours of 10am-2pm.  UVB radiation  UVB radiation causes the most harmful effects and is more significant during the summer months. However, snow and ice can reflect UVB radiation leading to skin damage during the winter months as well. UVB radiation is responsible for tanning, burning, inflammation, delayed erythema (pinkness), pigmentation (brown spots), and skin cancer.   Just because you do not burn or are not developing a tan does not mean that you are not damaging your skin. A 15 minute drive to and from work for 30 years an lead to chronic sun damage of the skin. It is important to wear a broad spectrum (both UVA and UVB) sunscreen EVERY day with at least 30 SPF. Apply to face, ears, neck and v of the chest as this is where most of our sun exposure is. Reapply sunscreen every two hours if you plan on being outside.   Randolph Baxter. Clinical Dermatology: A Color Guide to Diagnosis and Therapy. Elsevier, 2016.

## 2018-12-14 NOTE — PROGRESS NOTES
HPI:  Canelo Cruz is a 87 year old male patient here today for actinic keratoses follow up of face, hands, forearms. LOV ln2 with great improvement. Some still present.  Pt has a history of calcified cartilage of the left ear that he feels is spreading. No tx tried. Pt also has a new rash on left cheek x a few weeks.  Patient reports the following symptoms: itchy .  Patient reports the following previous treatments: tmc 1% with great improvement of itch but rash is still present.  Patient reports the following modifying factors: none.  Associated symptoms: none.  Patient has no other skin complaints today.  Remainder of the HPI, Meds, PMH, Allergies, FH, and SH was reviewed in chart.    Pertinent Hx:   aks  Past Medical History:   Diagnosis Date     Postherpetic neuralgia        Past Surgical History:   Procedure Laterality Date     ARTHROSCOPY KNEE Right     ~3363-2017     ORTHOPEDIC SURGERY      right achilles rupture repair with 14 month MRSA infection        Family History   Problem Relation Age of Onset     Unknown/Adopted Mother      Unknown/Adopted Father      Diabetes No family hx of      Coronary Artery Disease No family hx of      Hypertension No family hx of      Hyperlipidemia No family hx of      Cerebrovascular Disease No family hx of      Breast Cancer No family hx of      Colon Cancer No family hx of      Prostate Cancer No family hx of      Other Cancer No family hx of      Depression No family hx of      Anxiety Disorder No family hx of      Mental Illness No family hx of      Substance Abuse No family hx of      Anesthesia Reaction No family hx of      Asthma No family hx of      Osteoporosis No family hx of      Genetic Disorder No family hx of      Thyroid Disease No family hx of      Obesity No family hx of        Social History     Socioeconomic History     Marital status:      Spouse name: Not on file     Number of children: Not on file     Years of education: Not on file      Highest education level: Not on file   Social Needs     Financial resource strain: Not on file     Food insecurity - worry: Not on file     Food insecurity - inability: Not on file     Transportation needs - medical: Not on file     Transportation needs - non-medical: Not on file   Occupational History     Not on file   Tobacco Use     Smoking status: Never Smoker     Smokeless tobacco: Never Used   Substance and Sexual Activity     Alcohol use: Yes     Comment: occ.     Drug use: No     Sexual activity: No   Other Topics Concern     Parent/sibling w/ CABG, MI or angioplasty before 65F 55M? No   Social History Narrative     Not on file       Outpatient Encounter Medications as of 12/14/2018   Medication Sig Dispense Refill     diclofenac (VOLTAREN) 1 % topical gel Apply 4 grams to knees or 2 grams to hands four times daily using enclosed dosing card. 100 g 3     pregabalin (LYRICA) 75 MG capsule Take 1 capsule (75 mg) by mouth At Bedtime 30 capsule 5     tamsulosin (FLOMAX) 0.4 MG capsule   1     traMADol (ULTRAM) 50 MG tablet Take 1 tablet (50 mg) by mouth 3 times daily as needed for severe pain 30 tablet 3     triamcinolone (KENALOG) 0.1 % cream Apply to affected area 1-3 times daily as needed 80 g 5     albuterol (PROAIR HFA/PROVENTIL HFA/VENTOLIN HFA) 108 (90 Base) MCG/ACT Inhaler Inhale 1-2 puffs into the lungs every 4 hours as needed for shortness of breath / dyspnea 1 Inhaler 3     No facility-administered encounter medications on file as of 12/14/2018.        Review Of Systems:  Skin: As above  Eyes: negative  Ears/Nose/Throat: negative  Respiratory: No shortness of breath, dyspnea on exertion, cough, or hemoptysis  Cardiovascular: negative  Gastrointestinal: negative  Genitourinary: negative  Musculoskeletal: negative  Neurologic: negative  Psychiatric: negative  Hematologic/Lymphatic/Immunologic: negative  Endocrine: negative      Objective:     /85   Pulse 80   SpO2 93%   Eyes: Conjunctivae/lids:  Normal   ENT: Lips:  Normal  MSK: Normal  Cardiovascular: Peripheral edema none  Pulm: Breathing Normal  Neuro/Psych: Orientation: Normal; Mood/Affect: Normal, NAD, WDWN  Pt accompanied by: self  Following areas examined: face, ears, forearms, chest, and hands  Strong skin type:i   Findings:  Pink scaly macules x 8 on face, forearms, and dorsal hands.  Inflamed brown, stuck-on scaly appearing papules on chest x 3  Rhytides, hypo/hyperpigmentation, and atrophy  normal appearing skin on carlos of helix, antitragus,  anterior superior helix. On palpation cartilage feels smooth but firm.  Pink scaly patch on left cheek and left ear  Assessment and Plan:  1) aks x 8 and solar elastosis  LN2 for 5 seconds x 2. Discussed AE include hypopigmentation (white spot) and recurrence. Follow up in 2-3 months to recheck lesions. There is a 0.025%-20% chance that AKs can develop into a SCC.   Discussed treating with LN2 vs PDT vs Efudex. Pt elected LN2    2) isk x 3  LN2: Treated with LN2 for 5s for 1-2 cycles. Warned risks of blistering, pain, pigment change, scarring, and incomplete resolution.  Advised patient to return if lesions do not completely resolve within 2-3 months.  Wound care sheet given.    3) tinea faciale with localized pruritis  koh pos  Continue tmc BID x 1-2 weeks for itch  Start ketoconazole BID x 3-4 weeks. Continue a week past visible resolution.   Disc etiology and course of rash.   Side effects of topical steroids including but not limited to atrophy (skin thinning), striae (stretch marks) telangiectasias, steroid acne, and others. Do not apply to normal skin. Do not apply to discolored skin that does not have rash present.     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   Can follow up with Dr. Benavides if you would like to have another provider look at area.     Follow up in 4 weeks

## 2018-12-14 NOTE — LETTER
12/14/2018         RE: Canelo Cruz  [address]  [address]  [place] [address]        Dear Colleague,    Thank you for referring your patient, Canelo Cruz, to the [facility]. Please see a copy of my visit note below.    HPI:  Canelo Cruz is a 87 year old male patient here today for actinic keratoses follow up of face, hands, forearms. LOV ln2 with great improvement. Some still present.  Pt has a history of calcified cartilage of the left ear that he feels is spreading. No tx tried. Pt also has a new rash on left cheek x a few weeks.  Patient reports the following symptoms: itchy .  Patient reports the following previous treatments: tmc 1% with great improvement of itch but rash is still present.  Patient reports the following modifying factors: none.  Associated symptoms: none.  Patient has no other skin complaints today.  Remainder of the HPI, Meds, PMH, Allergies, FH, and SH was reviewed in chart.    Pertinent Hx:   aks  Past Medical History:   Diagnosis Date     Postherpetic neuralgia        Past Surgical History:   Procedure Laterality Date     ARTHROSCOPY KNEE Right     ~[id number]     ORTHOPEDIC SURGERY      right achilles rupture repair with 14 month MRSA infection        Family History   Problem Relation Age of Onset     Unknown/Adopted Mother      Unknown/Adopted Father      Diabetes No family hx of      Coronary Artery Disease No family hx of      Hypertension No family hx of      Hyperlipidemia No family hx of      Cerebrovascular Disease No family hx of      Breast Cancer No family hx of      Colon Cancer No family hx of      Prostate Cancer No family hx of      Other Cancer No family hx of      Depression No family hx of      Anxiety Disorder No family hx of      Mental Illness No family hx of      Substance Abuse No family hx of      Anesthesia Reaction No family hx of      Asthma No family hx of      Osteoporosis No family hx of      Genetic Disorder No family hx of       Thyroid Disease No family hx of      Obesity No family hx of        Social History     Socioeconomic History     Marital status:      Spouse name: Not on file     Number of children: Not on file     Years of education: Not on file     Highest education level: Not on file   Social Needs     Financial resource strain: Not on file     Food insecurity - worry: Not on file     Food insecurity - inability: Not on file     Transportation needs - medical: Not on file     Transportation needs - non-medical: Not on file   Occupational History     Not on file   Tobacco Use     Smoking status: Never Smoker     Smokeless tobacco: Never Used   Substance and Sexual Activity     Alcohol use: Yes     Comment: occ.     Drug use: No     Sexual activity: No   Other Topics Concern     Parent/sibling w/ CABG, MI or angioplasty before 65F 55M? No   Social History Narrative     Not on file       Outpatient Encounter Medications as of 12/14/2018   Medication Sig Dispense Refill     diclofenac (VOLTAREN) 1 % topical gel Apply 4 grams to knees or 2 grams to hands four times daily using enclosed dosing card. 100 g 3     pregabalin (LYRICA) 75 MG capsule Take 1 capsule (75 mg) by mouth At Bedtime 30 capsule 5     tamsulosin (FLOMAX) 0.4 MG capsule   1     traMADol (ULTRAM) 50 MG tablet Take 1 tablet (50 mg) by mouth 3 times daily as needed for severe pain 30 tablet 3     triamcinolone (KENALOG) 0.1 % cream Apply to affected area 1-3 times daily as needed 80 g 5     albuterol (PROAIR HFA/PROVENTIL HFA/VENTOLIN HFA) 108 (90 Base) MCG/ACT Inhaler Inhale 1-2 puffs into the lungs every 4 hours as needed for shortness of breath / dyspnea 1 Inhaler 3     No facility-administered encounter medications on file as of 12/14/2018.        Review Of Systems:  Skin: As above  Eyes: negative  Ears/Nose/Throat: negative  Respiratory: No shortness of breath, dyspnea on exertion, cough, or hemoptysis  Cardiovascular: negative  Gastrointestinal:  negative  Genitourinary: negative  Musculoskeletal: negative  Neurologic: negative  Psychiatric: negative  Hematologic/Lymphatic/Immunologic: negative  Endocrine: negative      Objective:     /85   Pulse 80   SpO2 93%   Eyes: Conjunctivae/lids: Normal   ENT: Lips:  Normal  MSK: Normal  Cardiovascular: Peripheral edema none  Pulm: Breathing Normal  Neuro/Psych: Orientation: Normal; Mood/Affect: Normal, NAD, WDWN  Pt accompanied by: self  Following areas examined: face, ears, forearms, chest, and hands  Strong skin type:i   Findings:  Pink scaly macules x 8 on face, forearms, and dorsal hands.  Inflamed brown, stuck-on scaly appearing papules on chest x 3  Rhytides, hypo/hyperpigmentation, and atrophy  normal appearing skin on carlos of helix, antitragus,  anterior superior helix. On palpation cartilage feels smooth but firm.  Pink scaly patch on left cheek and left ear  Assessment and Plan:  1) aks and solar elastosis  LN2 for 5 seconds x 2. Discussed AE include hypopigmentation (white spot) and recurrence. Follow up in 2-3 months to recheck lesions. There is a 0.025%-20% chance that AKs can develop into a SCC.   Discussed treating with LN2 vs PDT vs Efudex. Pt elected LN2    2) isk x 3  LN2: Treated with LN2 for 5s for 1-2 cycles. Warned risks of blistering, pain, pigment change, scarring, and incomplete resolution.  Advised patient to return if lesions do not completely resolve within 2-3 months.  Wound care sheet given.    3) tinea faciale with localized pruritis  koh pos  Continue tmc BID x 1-2 weeks for itch  Start ketoconazole BID x 3-4 weeks. Continue a week past visible resolution.   Disc etiology and course of rash.   Side effects of topical steroids including but not limited to atrophy (skin thinning), striae (stretch marks) telangiectasias, steroid acne, and others. Do not apply to normal skin. Do not apply to discolored skin that does not have rash present.     4) Calcified cartilage on left  ear, dbt CDNH  Can be due to genetics and/or normal part of aging.   Can follow up with Dr. Benavides if you would like to have another provider look at area.     Follow up in 4 weeks      Again, thank you for allowing me to participate in the care of your patient.        Sincerely,        Jahaira Morrison PA-C

## 2018-12-14 NOTE — TELEPHONE ENCOUNTER
Notes recorded by Mary Anne Flynn RN on 12/14/2018 at 1:38 PM CST  Left message on answering machine for patient to call back.    Myriam FlynnRN BSN  [facility]  [phone]    ------    Notes recorded by Jahaira Morrison PA-C on 12/14/2018 at 12:37 PM CST  Fungus seen on scraping  Begin ketoconazole (sent to pharm) bid for at least 3-4 weeks. Then one week after visible clearance of rash.  Okay to continue tmc 1-2x a day for 1 week or so.  No need to moisturize the area.     Follow up 4 weeks

## 2018-12-17 NOTE — TELEPHONE ENCOUNTER
Patient notified of test results and providers message, patient has no further questions.    Myriam KANGRN BSN  [facility]  [phone]

## 2019-03-21 ENCOUNTER — OFFICE VISIT (OUTPATIENT)
Dept: FAMILY MEDICINE | Facility: CLINIC | Age: 84
End: 2019-03-21
Payer: COMMERCIAL

## 2019-03-21 VITALS
TEMPERATURE: 98.6 F | HEIGHT: 70 IN | DIASTOLIC BLOOD PRESSURE: 82 MMHG | RESPIRATION RATE: 16 BRPM | BODY MASS INDEX: 32.21 KG/M2 | SYSTOLIC BLOOD PRESSURE: 152 MMHG | HEART RATE: 80 BPM | WEIGHT: 225 LBS | OXYGEN SATURATION: 96 %

## 2019-03-21 DIAGNOSIS — Z12.5 SCREENING FOR PROSTATE CANCER: ICD-10-CM

## 2019-03-21 DIAGNOSIS — B02.29 POST HERPETIC NEURALGIA: ICD-10-CM

## 2019-03-21 DIAGNOSIS — N39.0 RECURRENT UTI: ICD-10-CM

## 2019-03-21 DIAGNOSIS — M10.071 ACUTE IDIOPATHIC GOUT OF RIGHT FOOT: Primary | ICD-10-CM

## 2019-03-21 DIAGNOSIS — M17.11 OSTEOARTHRITIS OF RIGHT KNEE, UNSPECIFIED OSTEOARTHRITIS TYPE: ICD-10-CM

## 2019-03-21 DIAGNOSIS — Z00.00 ROUTINE GENERAL MEDICAL EXAMINATION AT A HEALTH CARE FACILITY: ICD-10-CM

## 2019-03-21 LAB
ALBUMIN SERPL-MCNC: 3.8 G/DL (ref 3.4–5)
ALBUMIN UR-MCNC: NEGATIVE MG/DL
ALP SERPL-CCNC: 73 U/L (ref 40–150)
ALT SERPL W P-5'-P-CCNC: 22 U/L (ref 0–70)
ANION GAP SERPL CALCULATED.3IONS-SCNC: 9 MMOL/L (ref 3–14)
APPEARANCE UR: CLEAR
AST SERPL W P-5'-P-CCNC: 23 U/L (ref 0–45)
BILIRUB SERPL-MCNC: 0.5 MG/DL (ref 0.2–1.3)
BILIRUB UR QL STRIP: NEGATIVE
BUN SERPL-MCNC: 13 MG/DL (ref 7–30)
CALCIUM SERPL-MCNC: 8.9 MG/DL (ref 8.5–10.1)
CHLORIDE SERPL-SCNC: 107 MMOL/L (ref 94–109)
CHOLEST SERPL-MCNC: 224 MG/DL
CO2 SERPL-SCNC: 27 MMOL/L (ref 20–32)
COLOR UR AUTO: YELLOW
CREAT SERPL-MCNC: 1.11 MG/DL (ref 0.66–1.25)
ERYTHROCYTE [DISTWIDTH] IN BLOOD BY AUTOMATED COUNT: 14.2 % (ref 10–15)
GFR SERPL CREATININE-BSD FRML MDRD: 59 ML/MIN/{1.73_M2}
GLUCOSE SERPL-MCNC: 104 MG/DL (ref 70–99)
GLUCOSE UR STRIP-MCNC: NEGATIVE MG/DL
HCT VFR BLD AUTO: 46 % (ref 40–53)
HDLC SERPL-MCNC: 40 MG/DL
HGB BLD-MCNC: 16 G/DL (ref 13.3–17.7)
HGB UR QL STRIP: NEGATIVE
KETONES UR STRIP-MCNC: NEGATIVE MG/DL
LDLC SERPL CALC-MCNC: 139 MG/DL
LEUKOCYTE ESTERASE UR QL STRIP: NEGATIVE
MCH RBC QN AUTO: 30.9 PG (ref 26.5–33)
MCHC RBC AUTO-ENTMCNC: 34.8 G/DL (ref 31.5–36.5)
MCV RBC AUTO: 89 FL (ref 78–100)
NITRATE UR QL: NEGATIVE
NONHDLC SERPL-MCNC: 184 MG/DL
PH UR STRIP: 6 PH (ref 5–7)
PLATELET # BLD AUTO: 206 10E9/L (ref 150–450)
POTASSIUM SERPL-SCNC: 3.7 MMOL/L (ref 3.4–5.3)
PROT SERPL-MCNC: 7.2 G/DL (ref 6.8–8.8)
PSA SERPL-ACNC: 3.59 UG/L (ref 0–4)
RBC # BLD AUTO: 5.17 10E12/L (ref 4.4–5.9)
SODIUM SERPL-SCNC: 143 MMOL/L (ref 133–144)
SOURCE: NORMAL
SP GR UR STRIP: 1.02 (ref 1–1.03)
TRIGL SERPL-MCNC: 225 MG/DL
UROBILINOGEN UR STRIP-ACNC: 0.2 EU/DL (ref 0.2–1)
WBC # BLD AUTO: 5.9 10E9/L (ref 4–11)

## 2019-03-21 PROCEDURE — 85027 COMPLETE CBC AUTOMATED: CPT | Performed by: PHYSICIAN ASSISTANT

## 2019-03-21 PROCEDURE — 99214 OFFICE O/P EST MOD 30 MIN: CPT | Performed by: PHYSICIAN ASSISTANT

## 2019-03-21 PROCEDURE — 36415 COLL VENOUS BLD VENIPUNCTURE: CPT | Performed by: PHYSICIAN ASSISTANT

## 2019-03-21 PROCEDURE — 81003 URINALYSIS AUTO W/O SCOPE: CPT | Performed by: PHYSICIAN ASSISTANT

## 2019-03-21 PROCEDURE — G0103 PSA SCREENING: HCPCS | Performed by: PHYSICIAN ASSISTANT

## 2019-03-21 PROCEDURE — 80053 COMPREHEN METABOLIC PANEL: CPT | Performed by: PHYSICIAN ASSISTANT

## 2019-03-21 PROCEDURE — 80061 LIPID PANEL: CPT | Performed by: PHYSICIAN ASSISTANT

## 2019-03-21 RX ORDER — INDOMETHACIN 50 MG/1
50 CAPSULE ORAL
Qty: 24 CAPSULE | Refills: 1 | Status: SHIPPED | OUTPATIENT
Start: 2019-03-21 | End: 2019-05-30

## 2019-03-21 RX ORDER — TAMSULOSIN HYDROCHLORIDE 0.4 MG/1
0.4 CAPSULE ORAL DAILY
Qty: 90 CAPSULE | Refills: 1 | Status: SHIPPED | OUTPATIENT
Start: 2019-03-21 | End: 2019-08-14

## 2019-03-21 RX ORDER — TRAMADOL HYDROCHLORIDE 50 MG/1
50 TABLET ORAL 3 TIMES DAILY PRN
Qty: 30 TABLET | Refills: 3 | Status: SHIPPED | OUTPATIENT
Start: 2019-03-21 | End: 2020-02-17

## 2019-03-21 ASSESSMENT — MIFFLIN-ST. JEOR: SCORE: 1701.84

## 2019-03-21 NOTE — PROGRESS NOTES
SUBJECTIVE:   Canelo Cruz is a 87 year old male who presents to clinic today for the following health issues:        Musculoskeletal problem/pain      Duration: X3 weeks    Description  Location: Right outer aspect of the foot    Intensity:  7/10    Accompanying signs and symptoms: warmth, swelling and redness    History  Previous similar problem: no   Previous evaluation:  none    Precipitating or alleviating factors:  Trauma or overuse: no   Aggravating factors include: walking    Therapies tried and outcome: ice, elevating which used to get swelling down but it did not work this time.  Had shrimp about three weeks ago which is when the pain started.    Reviewed and updated as needed this visit by clinical staff  Tobacco  Allergies  Meds  Problems  Med Hx  Surg Hx  Fam Hx  Soc Hx        Reviewed and updated as needed this visit by Provider  Tobacco  Allergies  Meds  Problems  Med Hx  Surg Hx  Fam Hx  Soc Hx        Additional complaints: Review all medications and labs    HPI additional notes: Hang presents today with   Chief Complaint   Patient presents with     Musculoskeletal Problem     Right foot swelling and pain X3 weeks for the last 3 weeks     Creatinine   Date Value Ref Range Status   11/26/2018 1.07 0.66 - 1.25 mg/dL Final     Has had gout a few times before in the past.         ROS:  C: Negative for fever, chills, recent change in weight  Skin: Negative for worrisome rashes or lesions  Resp: Negative for significant cough or SOB  CV: Negative for chest pain or peripheral edema  GI: Negative for nausea, abdominal pain, heartburn, or change in bowel habits  MS: Positive for right foot pain  NEURO: NEGATIVE for numbness, tingling, or radicular pain.  P: Negative for changes in mood or affect  ROS otherwise negative.    Chart Review:  History   Smoking Status     Never Smoker   Smokeless Tobacco     Never Used       Recent Labs   Lab Test 11/26/18  1441 09/09/17  1048 12/29/16  0808  "12/13/16  0808  10/13/15  0840   A1C 5.7*  --   --   --   --   --    *  --   --  110*  --  108   HDL 39*  --   --  52  --  44   TRIG 248* 229*  --  110  --  239*   ALT 25  --  25  --   --   --    CR 1.07  --  1.06  --    < > 1.30*   GFRESTIMATED 65  --  66  --    < > 53*   GFRESTBLACK 79  --  80  --    < > 64   POTASSIUM 3.9  --  3.8  --   --  4.0   TSH 2.74  --   --   --   --   --     < > = values in this interval not displayed.      BP Readings from Last 3 Encounters:   03/21/19 152/82   12/14/18 146/85   12/05/18 122/72    Wt Readings from Last 3 Encounters:   03/21/19 102.1 kg (225 lb)   12/05/18 100.7 kg (222 lb)   11/26/18 100.7 kg (222 lb)                    Patient Active Problem List   Diagnosis     History of colonic polyps     Constipation, unspecified constipation type     Post herpetic neuralgia     Decreased libido     ACP (advance care planning)     Erectile dysfunction, unspecified erectile dysfunction type     Osteoarthritis of right knee, unspecified osteoarthritis type     Primary insomnia     Other fatigue     Skin lesion     Elevated blood sugar     Hypertriglyceridemia     BMI 30-35     Recurrent UTI     Chronic fatigue     Past Surgical History:   Procedure Laterality Date     ARTHROSCOPY KNEE Right     ~4390-1697     ORTHOPEDIC SURGERY      right achilles rupture repair with 14 month MRSA infection     Problem list, Medication list, Allergies, Medical/Social/Surg hx reviewed in [facility], updated as appropriate.   OBJECTIVE:                                                    /82 (Patient Position: Sitting, Cuff Size: Adult Regular)   Pulse 80   Temp 98.6  F (37  C) (Tympanic)   Resp 16   Ht 1.778 m (5' 10\")   Wt 102.1 kg (225 lb)   SpO2 96%   BMI 32.28 kg/m    Body mass index is 32.28 kg/m .  GENERAL: healthy, alert, in no acute distress  HENT: Mucous mebranes moist.  MS: warmth tenderness and edema to the lateral side of the right foot.  SKIN: no suspicious lesions, no " rashes  PSYCH: Alert and oriented times 3;  Able to articulate logical thoughts. Affect is normal.    Diagnostic test results:   Results for orders placed or performed in visit on 03/21/19 (from the past 24 hour(s))   UA reflex to Microscopic and Culture   Result Value Ref Range    Color Urine Yellow     Appearance Urine Clear     Glucose Urine Negative NEG^Negative mg/dL    Bilirubin Urine Negative NEG^Negative    Ketones Urine Negative NEG^Negative mg/dL    Specific Gravity Urine 1.020 1.003 - 1.035    Blood Urine Negative NEG^Negative    pH Urine 6.0 5.0 - 7.0 pH    Protein Albumin Urine Negative NEG^Negative mg/dL    Urobilinogen Urine 0.2 0.2 - 1.0 EU/dL    Nitrite Urine Negative NEG^Negative    Leukocyte Esterase Urine Negative NEG^Negative    Source Midstream Urine         ASSESSMENT/PLAN:                                                          ICD-10-CM    1. Acute idiopathic gout of right foot M10.071 indomethacin (INDOCIN) 50 MG capsule   2. Routine general medical examination at a health care facility Z00.00 Lipid panel reflex to direct LDL Non-fasting     CBC with platelets     Comprehensive metabolic panel     UA reflex to Microscopic and Culture   3. Osteoarthritis of right knee, unspecified osteoarthritis type M17.11 diclofenac (VOLTAREN) 1 % topical gel   4. Recurrent UTI N39.0 tamsulosin (FLOMAX) 0.4 MG capsule   5. Post herpetic neuralgia B02.29 traMADol (ULTRAM) 50 MG tablet   6. Screening for prostate cancer Z12.5 Prostate spec antigen screen       Hang wanted all his medications renewed today and to get his complete physical labs performed as well.    He does not have significant concerns other than recurrent trouble with urination and persistent PHN.    Will start on indocin for gout.  I wanted to check a uric acid today to confirm diagnosis but pt declined.  Discussed that this could also be cellulitis but he does not think that is the case and if it worry, it would likely have spread  further.    Please see patient instructions for treatment details.    Return in about 3 months (around 6/21/2019) for Med Check.      Yuki Jaime PA-C  [facility]

## 2019-03-21 NOTE — LETTER
March 25, 2019      Canelo Lopesrell  [address]  [address]  [place] [address]        Dear ,    We are writing to inform you of your test results.    Your test results fall within the expected range(s) or remain unchanged from previous results.  Please continue with current treatment plan.    Resulted Orders   Prostate spec antigen screen   Result Value Ref Range    PSA 3.59 0 - 4 ug/L      Comment:      Assay Method:  Chemiluminescence using Siemens Vista analyzer   Lipid panel reflex to direct LDL Non-fasting   Result Value Ref Range    Cholesterol 224 (H) <200 mg/dL    Triglycerides 225 (H) <150 mg/dL      Comment:      Borderline high:  150-199 mg/dl  High:             200-499 mg/dl    HDL Cholesterol 40 >39 mg/dL    LDL Cholesterol Calculated 139 (H) <100 mg/dL      Comment:      Above desirable:  100-129 mg/dl  Borderline High:  130-159 mg/dL    Non HDL Cholesterol 184 (H) <130 mg/dL      Comment:      Above Desirable:  130-159 mg/dl  Borderline high:  160-189 mg/dl  High:             190-219 mg/dl   CBC with platelets   Result Value Ref Range    WBC 5.9 4.0 - 11.0 10e9/L    RBC Count 5.17 4.4 - 5.9 10e12/L    Hemoglobin 16.0 13.3 - 17.7 g/dL    Hematocrit 46.0 40.0 - 53.0 %    MCV 89 78 - 100 fl    MCH 30.9 26.5 - 33.0 pg    MCHC 34.8 31.5 - 36.5 g/dL    RDW 14.2 10.0 - 15.0 %    Platelet Count 206 150 - 450 10e9/L   Comprehensive metabolic panel   Result Value Ref Range    Sodium 143 133 - 144 mmol/L    Potassium 3.7 3.4 - 5.3 mmol/L    Chloride 107 94 - 109 mmol/L    Carbon Dioxide 27 20 - 32 mmol/L    Anion Gap 9 3 - 14 mmol/L    Glucose 104 (H) 70 - 99 mg/dL      Non Fasting    Urea Nitrogen 13 7 - 30 mg/dL    Creatinine 1.11 0.66 - 1.25 mg/dL    GFR Estimate 59 (L) >60 mL/min/[1.73_m2]    Calcium 8.9 8.5 - 10.1 mg/dL    Bilirubin Total 0.5 0.2 - 1.3 mg/dL    Albumin 3.8 3.4 - 5.0 g/dL    Protein Total 7.2 6.8 - 8.8 g/dL    Alkaline Phosphatase 73 40 - 150 U/L    ALT 22 0 - 70 U/L    AST 23  0 - 45 U/L   UA reflex to Microscopic and Culture   Result Value Ref Range    Color Urine Yellow     Appearance Urine Clear     Glucose Urine Negative NEG^Negative mg/dL    Bilirubin Urine Negative NEG^Negative    Ketones Urine Negative NEG^Negative mg/dL    Specific Gravity Urine 1.020 1.003 - 1.035    Blood Urine Negative NEG^Negative    pH Urine 6.0 5.0 - 7.0 pH    Protein Albumin Urine Negative NEG^Negative mg/dL    Urobilinogen Urine 0.2 0.2 - 1.0 EU/dL    Nitrite Urine Negative NEG^Negative    Leukocyte Esterase Urine Negative NEG^Negative    Source Midstream Urine        If you have any questions or concerns, please call the clinic at the number listed above.       Sincerely,        Yuki Jaime PA-C

## 2019-03-25 NOTE — RESULT ENCOUNTER NOTE
Bruno Mauricio,    I just wanted to let you know that your lab results have been reviewed and are attached.    - Your lab results look great; everything is normal.    Please let me know if you have any questions and have a great week!    Sincerely,    Sivan Jaime PA-C    [facility]  [address]  [place] [address]  [phone] (p)

## 2019-05-30 ENCOUNTER — OFFICE VISIT (OUTPATIENT)
Dept: FAMILY MEDICINE | Facility: CLINIC | Age: 84
End: 2019-05-30
Payer: COMMERCIAL

## 2019-05-30 VITALS
BODY MASS INDEX: 31.57 KG/M2 | WEIGHT: 220 LBS | DIASTOLIC BLOOD PRESSURE: 70 MMHG | RESPIRATION RATE: 16 BRPM | TEMPERATURE: 98 F | SYSTOLIC BLOOD PRESSURE: 134 MMHG | HEART RATE: 70 BPM

## 2019-05-30 DIAGNOSIS — M54.50 ACUTE RIGHT-SIDED LOW BACK PAIN WITHOUT SCIATICA: Primary | ICD-10-CM

## 2019-05-30 PROCEDURE — 99213 OFFICE O/P EST LOW 20 MIN: CPT | Performed by: PHYSICIAN ASSISTANT

## 2019-05-30 RX ORDER — TIZANIDINE 2 MG/1
2-4 TABLET ORAL 3 TIMES DAILY PRN
Qty: 45 TABLET | Refills: 1 | Status: SHIPPED | OUTPATIENT
Start: 2019-05-30 | End: 2019-08-09

## 2019-05-30 NOTE — PROGRESS NOTES
Subjective     Canelo Cruz is a 87 year old male who presents to clinic today for the following health issues:    HPI   Flank pain      Duration: since 2015. Pain just started 1 week ago in a spot near the shingles site    Description (location/character/radiation): patient is here today to follow up on chronic flank pain from having shingles in 2015. Not taking tramadol very often only when needed. Pain is the same as always. Has a new spot of pain though on his right side.    Intensity:  mild, moderate    Accompanying signs and symptoms: has been seen by neurology, pain clinic and Dr. Singleton for f/u for second opinion.     History (similar episodes/previous evaluation): None    Precipitating or alleviating factors: None    Therapies tried and outcome: tramadol, voltaren gel, gabapentin, relaxing in warm water, aleve     Reviewed and updated as needed this visit by Provider  Tobacco  Allergies  Meds  Problems  Med Hx  Surg Hx  Fam Hx  Soc Hx          Additional complaints: None    HPI additional notes: Hang presents today with   Chief Complaint   Patient presents with     Flank Pain   Having right lower back pain.  No improvement with heat or ice.  Aleve and lidocaine with some improvement after about an hour.  No known trigger to pain.  Has been getting worse since onset but has been better since interventions last night.  Has been awake since 9 last night due to pain and sleep schedule being off.           Review of Systems   C: Negative for fever, chills, recent change in weight  Skin: Negative for worrisome rashes or lesions  Resp: Negative for significant cough or SOB  CV: Negative for chest pain or peripheral edema  GI: Negative for nausea, abdominal pain, heartburn, or change in bowel habits  MS: Positive for right lower back pain  NEURO: NEGATIVE for numbness, tingling, or radicular pain.  P: Negative for changes in mood or affect  ROS otherwise negative.        Objective    /70   Pulse  70   Temp 98  F (36.7  C) (Tympanic)   Resp 16   Wt 99.8 kg (220 lb)   BMI 31.57 kg/m    Body mass index is 31.57 kg/m .  Physical Exam   GENERAL: healthy, alert, in no acute distress  HENT: Mucous mebranes moist.  MS: extremities normal- no gross deformities noted, tenderness to palpation of right paralumbar muscles with pain with right and left lumbar rotation and bending.  SKIN: no suspicious lesions, no rashes  PSYCH: Alert and oriented times 3;  Able to articulate logical thoughts. Affect is normal.    Diagnostic test results: none         Assessment & Plan       ICD-10-CM    1. Acute right-sided low back pain without sciatica M54.5 tiZANidine (ZANAFLEX) 2 MG tablet     - Take ibuprofen (Advil, Motrin) 600 mg. three times a day or Aleve (naproxen sodium) 220 to 440 mg. two times a day as needed for pain.  Do not take with medrol dose pack if that was started today.  - Use hot or cold packs (which ever gives more relief) for 15 minutes at a time at least three times daily.  - Stretch and massage the muscles affected.  - Muscle relaxants at night.  Can take every 8 hours if you are not too drowsy when taking or take a 1/2 of a tab during the day and a whole tab at night.  - If no improvement in 1 week, call and I will put in an order for PT.    Please see patient instructions for treatment details.    Return in about 1 week (around 6/6/2019) for Recheck if not improving, phone call to clinic.    Yuki Jaime PA-C  [facility]

## 2019-08-08 NOTE — PROGRESS NOTES
Subjective     Canelo Cruz is a 88 year old male who presents to clinic today for the following health issues:    HPI   Joint Pain    Onset: 2 months ago    Description:   Location: left hip  Character: Dull ache, patient states that his hip gives in on him, goes out    Intensity: moderate, severe    Progression of Symptoms: worse, constant    Accompanying Signs & Symptoms:  Other symptoms: some pain going down the leg, hard to lift leg    History:   Previous similar pain: no       Precipitating factors:   Trauma or overuse: YES- back in 2003/2004 patient had achilles tendon surgery on the right foot, and had fallen about 3 times and each time landed on this hip    Alleviating factors:  Improved by: n/a    Therapies Tried and outcome: tramadol    Recent Labs   Lab Test 03/21/19  0833 11/26/18  1441 09/09/17  1048 12/29/16  0808 12/13/16  0808   A1C  --  5.7*  --   --   --    * 137*  --   --  110*   HDL 40 39*  --   --  52   TRIG 225* 248* 229*  --  110   ALT 22 25  --  25  --    CR 1.11 1.07  --  1.06  --    GFRESTIMATED 59* 65  --  66  --    GFRESTBLACK 69 79  --  80  --    POTASSIUM 3.7 3.9  --  3.8  --    TSH  --  2.74  --   --   --       BP Readings from Last 3 Encounters:   08/09/19 114/64   05/30/19 134/70   03/21/19 152/82    Wt Readings from Last 3 Encounters:   08/09/19 101.2 kg (223 lb)   05/30/19 99.8 kg (220 lb)   03/21/19 102.1 kg (225 lb)                    Reviewed and updated as needed this visit by Provider  Tobacco  Allergies  Meds  Problems  Med Hx  Surg Hx  Fam Hx  Soc Hx          Additional complaints: None    HPI additional notes: Hang presents today with   Chief Complaint   Patient presents with     Musculoskeletal Problem            Review of Systems   C: Negative for fever, chills, recent change in weight  Skin: Negative for worrisome rashes or lesions  Resp: Negative for significant cough or SOB  CV: Negative for chest pain or peripheral edema  GI: Negative for nausea,  abdominal pain, heartburn, or change in bowel habits  MS: Positive for left hip pain  NEURO: NEGATIVE for numbness, tingling, or radicular pain.  P: Negative for changes in mood or affect  ROS otherwise negative.        Objective    /64   Pulse 79   Temp 97.4  F (36.3  C) (Tympanic)   Resp 16   Wt 101.2 kg (223 lb)   SpO2 96%   BMI 32.00 kg/m    Body mass index is 32 kg/m .  Physical Exam   GENERAL: healthy, alert, in no acute distress  HENT: Mucous mebranes moist.  ORTHO: Hip Exam: Palpation: Tender:   left greater trochanter  Non-tender:  left gluteus medius, left ASIS, left iliac crest, left proximal hamstring attachment  Range of Motion:  Full ROM, both hips  Strength:  flexion: 5/5, extension 5/5, abduction: 4/5, adduction: 4/5, internal rotation: 4/5, external roatation: 4/5  Special tests:  no IT band tightness    SKIN: no suspicious lesions, no rashes  PSYCH: Alert and oriented times 3;  Able to articulate logical thoughts. Affect is normal.    Diagnostic test results: Xray - Degenerative changes in left hip        Assessment & Plan       ICD-10-CM    1. Hip pain, left M25.552 XR Hip Left 2-3 Views     methylPREDNISolone (MEDROL DOSEPAK) 4 MG tablet therapy pack     DISCONTINUED: methylPREDNISolone (MEDROL DOSEPAK) 4 MG tablet therapy pack     CANCELED: XR Pelvis and Hip Left 1 View     Suspect trochanteric bursitis.  Will start on medrol dosepak.      Medications Prescribed Today:  Prednisone: May cause emotional changes, bloating, water retention, insomnia, increased blood sugar, increased blood pressure, rare aseptic necrosis of bones  Xrays show some degenerative changes, will wait for official radiology review.    Will discontinue and call clinic if any side effects are noted.    Please see patient instructions for treatment details.    Return in about 2 weeks (around 8/23/2019) for Recheck if not improving.    Yuki Jaime PA-C  [facility]

## 2019-08-09 ENCOUNTER — ANCILLARY PROCEDURE (OUTPATIENT)
Dept: GENERAL RADIOLOGY | Facility: CLINIC | Age: 84
End: 2019-08-09
Attending: PHYSICIAN ASSISTANT
Payer: COMMERCIAL

## 2019-08-09 ENCOUNTER — MYC MEDICAL ADVICE (OUTPATIENT)
Dept: FAMILY MEDICINE | Facility: CLINIC | Age: 84
End: 2019-08-09

## 2019-08-09 ENCOUNTER — OFFICE VISIT (OUTPATIENT)
Dept: FAMILY MEDICINE | Facility: CLINIC | Age: 84
End: 2019-08-09
Payer: COMMERCIAL

## 2019-08-09 VITALS
HEART RATE: 79 BPM | TEMPERATURE: 97.4 F | WEIGHT: 223 LBS | OXYGEN SATURATION: 96 % | BODY MASS INDEX: 32 KG/M2 | DIASTOLIC BLOOD PRESSURE: 64 MMHG | RESPIRATION RATE: 16 BRPM | SYSTOLIC BLOOD PRESSURE: 114 MMHG

## 2019-08-09 DIAGNOSIS — M25.552 HIP PAIN, LEFT: Primary | ICD-10-CM

## 2019-08-09 PROCEDURE — 99214 OFFICE O/P EST MOD 30 MIN: CPT | Performed by: PHYSICIAN ASSISTANT

## 2019-08-09 PROCEDURE — 73502 X-RAY EXAM HIP UNI 2-3 VIEWS: CPT | Mod: FY

## 2019-08-09 RX ORDER — METHYLPREDNISOLONE 4 MG
TABLET, DOSE PACK ORAL
Qty: 21 TABLET | Refills: 0 | Status: SHIPPED | OUTPATIENT
Start: 2019-08-09 | End: 2019-08-14

## 2019-08-09 RX ORDER — METHYLPREDNISOLONE 4 MG
TABLET, DOSE PACK ORAL
Qty: 21 TABLET | Refills: 0 | Status: SHIPPED | OUTPATIENT
Start: 2019-08-09 | End: 2019-08-09

## 2019-08-12 NOTE — RESULT ENCOUNTER NOTE
Bruno Mauricio,    I just wanted to let you know that your lab results have been reviewed and are attached.    The radiology report showed mild arthritis. Let me know if you're having worsening pain and we'll have you see orthopedics.      Please let me know if you have any questions and have a great week!    Sincerely,    Sivan Jaime PA-C    [facility]  [address] [address]  [place] [address]  [phone] (p)

## 2019-08-14 ENCOUNTER — OFFICE VISIT (OUTPATIENT)
Dept: FAMILY MEDICINE | Facility: CLINIC | Age: 84
End: 2019-08-14
Payer: COMMERCIAL

## 2019-08-14 VITALS
RESPIRATION RATE: 16 BRPM | DIASTOLIC BLOOD PRESSURE: 64 MMHG | SYSTOLIC BLOOD PRESSURE: 122 MMHG | WEIGHT: 226 LBS | TEMPERATURE: 97.4 F | BODY MASS INDEX: 32.43 KG/M2 | HEART RATE: 79 BPM | OXYGEN SATURATION: 95 %

## 2019-08-14 DIAGNOSIS — M25.552 HIP PAIN, LEFT: ICD-10-CM

## 2019-08-14 DIAGNOSIS — R10.32 LEFT INGUINAL PAIN: Primary | ICD-10-CM

## 2019-08-14 DIAGNOSIS — N39.0 RECURRENT UTI: ICD-10-CM

## 2019-08-14 DIAGNOSIS — M17.11 OSTEOARTHRITIS OF RIGHT KNEE, UNSPECIFIED OSTEOARTHRITIS TYPE: ICD-10-CM

## 2019-08-14 PROCEDURE — 99214 OFFICE O/P EST MOD 30 MIN: CPT | Performed by: PHYSICIAN ASSISTANT

## 2019-08-14 RX ORDER — METHYLPREDNISOLONE 4 MG
TABLET, DOSE PACK ORAL
Qty: 21 TABLET | Refills: 0 | Status: SHIPPED | OUTPATIENT
Start: 2019-08-14 | End: 2019-10-24

## 2019-08-14 RX ORDER — TAMSULOSIN HYDROCHLORIDE 0.4 MG/1
0.4 CAPSULE ORAL DAILY PRN
Qty: 90 CAPSULE | Refills: 3 | Status: SHIPPED | OUTPATIENT
Start: 2019-08-14 | End: 2020-03-05

## 2019-08-14 NOTE — TELEPHONE ENCOUNTER
"FYI-  Patient was advised to see [facility] as recommended by provider. Pt plans to keep appointment with provider to discuss left groin pain. Denies fever, vomiting and unclear of radiating pain. States my leg goes out occasionally\".       "

## 2019-08-14 NOTE — PATIENT INSTRUCTIONS
[facility]    [facility]  Access Walk-In Clinic Hours  Start with the specialist to diagnose and treat your acute injury. [facility] offers orthopedic and sports medicine acute injury walk-in clinic hours from 8 am - 8 pm, 7 days/week. No appointment or referral necessary. For chronic conditions, please call the clinic to make an appointment.    Address [address] [address]     Phone [phone]    Fax  [phone]       [facility]    [facility]  Access Walk-In Clinic Hours  Start with the specialist to diagnose and treat your acute injury. [facility] offers orthopedic and sports medicine acute injury walk-in clinic hours from 8 am - 8 pm, 7 days/week. No appointment or referral necessary. For chronic conditions, please call the clinic to make an appointment.    Address [address]  [place] [address]     Phone [phone]    Fax  [phone]

## 2019-08-14 NOTE — TELEPHONE ENCOUNTER
May want to call pt with my last message.  He has an appointment this am and I don't think he needs it.

## 2019-08-15 ENCOUNTER — TRANSFERRED RECORDS (OUTPATIENT)
Dept: HEALTH INFORMATION MANAGEMENT | Facility: CLINIC | Age: 84
End: 2019-08-15

## 2019-08-19 ENCOUNTER — TELEPHONE (OUTPATIENT)
Dept: FAMILY MEDICINE | Facility: CLINIC | Age: 84
End: 2019-08-19

## 2019-08-19 NOTE — TELEPHONE ENCOUNTER
Called patient again to relay provider message that ortho referral was for muscle pain. He verbalized understanding and states he will call [facility] today. Informed this is a walk-in clinic.     Also notified patient that [facility] was alerted of long wait time. He was thankful. No further action needed at this time.

## 2019-08-19 NOTE — TELEPHONE ENCOUNTER
"Called patient to relay provider message. He is unsure why he would go to orthopedics when he doesn't feel like it is bone related. Huddled with provider - ortho referral is due to muscle. Willing to put in order for inguinal US without being seen in clinic today. Recommended he be seen at [facility] walk-in clinic.    Patient to call [facility] and will contact clinic again if able to be seen.    Patient upset stating \"I started calling today at 6:47, finally got to talk to someone at 7:25. That's pretty slow\". Routing to [facility].   "

## 2019-08-19 NOTE — TELEPHONE ENCOUNTER
Pt has appointment today at 9:50.  I put him on  medication for his pain on Thursday, if that didn't help, I recommend he follow up with [facility] as we discussed at his last visit.  I don't think there is anything more I can do.  Please let him know.

## 2019-09-13 ENCOUNTER — ALLIED HEALTH/NURSE VISIT (OUTPATIENT)
Dept: NURSING | Facility: CLINIC | Age: 84
End: 2019-09-13
Payer: COMMERCIAL

## 2019-09-13 DIAGNOSIS — Z23 NEED FOR PROPHYLACTIC VACCINATION AND INOCULATION AGAINST INFLUENZA: Primary | ICD-10-CM

## 2019-09-13 PROCEDURE — G0008 ADMIN INFLUENZA VIRUS VAC: HCPCS

## 2019-09-13 PROCEDURE — 90662 IIV NO PRSV INCREASED AG IM: CPT

## 2019-09-13 PROCEDURE — 99207 ZZC NO CHARGE NURSE ONLY: CPT

## 2019-09-13 NOTE — NURSING NOTE
Injectable Influenza Immunization Documentation    1.  Are you sick today? (Fever of 100.5 or higher on the day of the clinic)   No    2.  Have you ever had Guillain-[place] Syndrome within 6 weeks of an influenza vaccionation?  No    3. Do you have a life-threatening allergy to eggs?  No    4. Do you have a life-threatening allergy to a component of the vaccine? May include antibiotics, gelatin or latex.  No     5. Have you ever had a reaction to a dose of flu vaccine that needed immediate medical attention?  No     Form completed by Princess KATELYN Pedro CMA

## 2019-10-07 ENCOUNTER — TRANSFERRED RECORDS (OUTPATIENT)
Dept: HEALTH INFORMATION MANAGEMENT | Facility: CLINIC | Age: 84
End: 2019-10-07

## 2019-10-24 ENCOUNTER — OFFICE VISIT (OUTPATIENT)
Dept: FAMILY MEDICINE | Facility: CLINIC | Age: 84
End: 2019-10-24
Payer: COMMERCIAL

## 2019-10-24 VITALS
OXYGEN SATURATION: 94 % | TEMPERATURE: 98.4 F | HEART RATE: 80 BPM | DIASTOLIC BLOOD PRESSURE: 68 MMHG | WEIGHT: 224.5 LBS | RESPIRATION RATE: 20 BRPM | SYSTOLIC BLOOD PRESSURE: 120 MMHG | BODY MASS INDEX: 32.21 KG/M2

## 2019-10-24 DIAGNOSIS — J06.9 UPPER RESPIRATORY TRACT INFECTION, UNSPECIFIED TYPE: Primary | ICD-10-CM

## 2019-10-24 PROCEDURE — 99213 OFFICE O/P EST LOW 20 MIN: CPT | Performed by: FAMILY MEDICINE

## 2019-10-24 NOTE — PROGRESS NOTES
Subjective     Canelo Cruz is a 88 year old male who presents to clinic today for the following health issues:    HPI     RESPIRATORY SYMPTOMS      Duration: 2 days    Description  nasal congestion, productive cough, wheezing, watery and itchy eyes    Severity: severe    Accompanying signs and symptoms: balance-fell 3 times in the last 2 days, trouble sleeping     History (predisposing factors):  none    Precipitating or alleviating factors: None    Therapies tried and outcome:  Peppermint essential oil helped clear congestion, Melatonin and allergy pill     Fell twice the day before and once yesterday. Was unable to straighten out leg when he fell yesterday        BP Readings from Last 3 Encounters:   10/24/19 120/68   08/14/19 122/64   08/09/19 114/64    Wt Readings from Last 3 Encounters:   10/24/19 101.8 kg (224 lb 8 oz)   08/14/19 102.5 kg (226 lb)   08/09/19 101.2 kg (223 lb)                      Reviewed and updated as needed this visit by Provider         Review of Systems   ROS COMP: CONSTITUTIONAL: NEGATIVE for fever, chills, change in weight  ENT/MOUTH: NEGATIVE for ear, mouth and throat problems  RESP: NEGATIVE for significant cough or SOB  CV: NEGATIVE for chest pain, palpitations or peripheral edema  NEURO: POSITIVE for dizziness/lightheadedness and weakness overall, mild      Objective    /68 (BP Location: Left arm, Patient Position: Sitting, Cuff Size: Adult Large)   Pulse 80   Temp 98.4  F (36.9  C) (Tympanic)   Resp 20   Wt 101.8 kg (224 lb 8 oz)   SpO2 94%   BMI 32.21 kg/m    Body mass index is 32.21 kg/m .  Physical Exam   GENERAL: healthy, alert and no distress  HENT: normal cephalic/atraumatic, ear canals and TM's normal, nose and mouth without ulcers or lesions, oropharynx clear and oral mucous membranes moist  NECK: no adenopathy, no asymmetry, masses, or scars and thyroid normal to palpation  RESP: lungs clear to auscultation - no rales, rhonchi or wheezes  CV: regular rate  and rhythm, normal S1 S2, no S3 or S4, no murmur, click or rub, no peripheral edema and peripheral pulses strong  NEURO: Normal strength and tone, sensory exam grossly normal, mentation intact and Romberg normal    Diagnostic Test Results:  Labs reviewed in Epic  none         Assessment & Plan     Canelo was seen today for uri.    Diagnoses and all orders for this visit:    Upper respiratory tract infection, unspecified type           FUTURE APPOINTMENTS:       - Follow-up visit in 1 week if not improving  Patient Instructions   Symptomatic treatment.  Will use saline gargles, tylenol and/or advil. Suck on  lozenges as needed. Push fluids. Salt water nasal spray as needed.  Use expectorant such as Mucinex or Robitussin for cough  Change positions carefully, avoid sudden position changes      Return in about 1 week (around 10/31/2019), or if symptoms worsen or fail to improve, for upper respiratory infection, balance problems.    Jack Alegre MD  [facility]

## 2019-10-24 NOTE — PATIENT INSTRUCTIONS
Symptomatic treatment.  Will use saline gargles, tylenol and/or advil. Suck on  lozenges as needed. Push fluids. Salt water nasal spray as needed.  Use expectorant such as Mucinex or Robitussin for cough  Change positions carefully, avoid sudden position changes

## 2019-12-17 ENCOUNTER — NURSE TRIAGE (OUTPATIENT)
Dept: FAMILY MEDICINE | Facility: CLINIC | Age: 84
End: 2019-12-17

## 2019-12-17 ENCOUNTER — OFFICE VISIT (OUTPATIENT)
Dept: FAMILY MEDICINE | Facility: CLINIC | Age: 84
End: 2019-12-17
Payer: COMMERCIAL

## 2019-12-17 VITALS
WEIGHT: 223 LBS | BODY MASS INDEX: 32 KG/M2 | RESPIRATION RATE: 16 BRPM | HEART RATE: 71 BPM | OXYGEN SATURATION: 95 % | SYSTOLIC BLOOD PRESSURE: 130 MMHG | TEMPERATURE: 98 F | DIASTOLIC BLOOD PRESSURE: 80 MMHG

## 2019-12-17 DIAGNOSIS — H61.23 BILATERAL IMPACTED CERUMEN: Primary | ICD-10-CM

## 2019-12-17 PROCEDURE — 69210 REMOVE IMPACTED EAR WAX UNI: CPT | Performed by: PHYSICIAN ASSISTANT

## 2019-12-17 PROCEDURE — 99213 OFFICE O/P EST LOW 20 MIN: CPT | Mod: 25 | Performed by: PHYSICIAN ASSISTANT

## 2019-12-17 NOTE — PROGRESS NOTES
Subjective     Canelo Cruz is a 88 year old male who presents to clinic today for the following health issues:    HPI   Hearing Loss      Duration: 2 days    Description (location/character/radiation): went swimming Sunday and yesterday and got water in his ears    Intensity:  moderate    Accompanying signs and symptoms: sudden decrease in hearing this morning    History (similar episodes/previous evaluation): None    Precipitating or alleviating factors: loosing balance more often    Therapies tried and outcome: None         Reviewed and updated as needed this visit by Provider  Tobacco  Allergies  Meds  Problems  Med Hx  Surg Hx  Fam Hx  Soc Hx          Additional complaints: None    HPI additional notes: Hang presents today with   Chief Complaint   Patient presents with     Hearing Problem            Review of Systems   C: Negative for fever, chills, recent change in weight  Skin: Negative for worrisome rashes or lesions  ENT/MOUTH:POSITIVE for hearing loss.  Negative for ear pain.  Resp: Negative for significant cough or SOB  NEURO: Negative  for headaches or dizziness.  P: Negative for changes in mood or affect  ROS otherwise negative.      Objective    /80 (Cuff Size: Adult Large)   Pulse 71   Temp 98  F (36.7  C) (Tympanic)   Resp 16   Wt 101.2 kg (223 lb)   SpO2 95%   BMI 32.00 kg/m    Body mass index is 32 kg/m .  Physical Exam   GENERAL: healthy, alert, in no acute distress  EYES: Grossly normal to inspection, EOMI, PERRL  HENT: Ear canals severe obstruction with cerumen bilaterally. TM pearly gray without effusion bilaterally visible after irrigation.  NECK: Non-tender, no adenopathy.  SKIN: no suspicious lesions, no rashes  PSYCH: Alert and oriented times 3;  Able to articulate logical thoughts. Affect is normal.    Diagnostic Test Results: none         Assessment & Plan       ICD-10-CM    1. Bilateral impacted cerumen H61.23 REMOVE IMPACTED CERUMEN           Cerumen is noted via  otoscopic examination in the bilateral external ear causing severe obstruction with impaction.  Removal deemed medically necessary due loss of hearing secondary to obstruction.  Cerumen was removed by provider with syringing and manual debridement with wax curette. Instructions for home care to prevent wax buildup are given. Pt tolerated procedure well without complication.     See pt instructions.    Return in about 3 months (around 3/17/2020) for Annual Exam.    Yuki Jaime PA-C  [facility]

## 2019-12-17 NOTE — TELEPHONE ENCOUNTER
Patient called. He was swimming yesterday and water got in ears. This morning he could somewhat hear and lost hearing 30 min ago, could barely hear triage nurse. No dizziness and no pain. Huddled with PCP, double booked for today.     Reason for Disposition    [1] Hearing loss in one or both ears AND [2] sudden onset AND [3] present now    Protocols used: HEARING LOSS OR CHANGE-A-AH

## 2019-12-18 NOTE — TELEPHONE ENCOUNTER
----- Message from Jahaira Morrison PA-C sent at 6/1/2018 12:57 PM CDT -----  Please let pt know that the hardening of the spot on his ear is a normal genetic and/or age related thing. The cartilage is calcified and that is why it feels hard. No treatment is needed. Please let me know if you have any questions.     Shalini  
patient advised and had no further questions.  GERMAIN GARZA MA  
none

## 2019-12-20 ENCOUNTER — TELEPHONE (OUTPATIENT)
Dept: FAMILY MEDICINE | Facility: CLINIC | Age: 84
End: 2019-12-20

## 2019-12-20 DIAGNOSIS — R10.32 LEFT INGUINAL PAIN: Primary | ICD-10-CM

## 2019-12-20 DIAGNOSIS — M25.552 HIP PAIN, LEFT: ICD-10-CM

## 2019-12-20 NOTE — TELEPHONE ENCOUNTER
Reason for Call: Request for an order or referral:    Order or referral being requested: needs order/referral for hip injection at [facility] [place]    Date needed: as soon as possible    Has the patient been seen by the PCP for this problem? YES    Additional comments: call [facility] at  Dr Gaytan    Phone number Patient can be reached at:  Home number on file [phone] (home)    Best Time:      Can we leave a detailed message on this number?  YES    Call taken on 12/20/2019 at 10:25 AM by DOMITILA TOVAR

## 2019-12-20 NOTE — TELEPHONE ENCOUNTER
"Pt called reporting left hip pain. He needs Cortizone injection \"diagnostic and therapeutic left hip injection\". Pt had last injection 10/11/2019 with doctor Gaytan at [facility]. Please advice on request. Order can be faxed to [facility] at Fax to:[phone].  "

## 2019-12-20 NOTE — TELEPHONE ENCOUNTER
Please see note below:    Referral:  Pt would like to see Dr. Gaytan with [facility]    Fax to:[phone]    Will need to clarify what this injection is for.

## 2019-12-20 NOTE — TELEPHONE ENCOUNTER
Patient Contact    Attempt # 1    Was call answered?  No.  Left message on voicemail with information to call me back.    Will need to clarify what this injection is for.

## 2019-12-24 ENCOUNTER — TRANSFERRED RECORDS (OUTPATIENT)
Dept: HEALTH INFORMATION MANAGEMENT | Facility: CLINIC | Age: 84
End: 2019-12-24

## 2020-02-13 ENCOUNTER — OFFICE VISIT (OUTPATIENT)
Dept: FAMILY MEDICINE | Facility: CLINIC | Age: 85
End: 2020-02-13
Payer: COMMERCIAL

## 2020-02-13 VITALS
DIASTOLIC BLOOD PRESSURE: 70 MMHG | SYSTOLIC BLOOD PRESSURE: 130 MMHG | HEIGHT: 70 IN | RESPIRATION RATE: 18 BRPM | TEMPERATURE: 97.9 F | OXYGEN SATURATION: 92 % | HEART RATE: 88 BPM | WEIGHT: 223 LBS | BODY MASS INDEX: 31.92 KG/M2

## 2020-02-13 DIAGNOSIS — E78.1 HYPERTRIGLYCERIDEMIA: ICD-10-CM

## 2020-02-13 DIAGNOSIS — R26.89 BALANCE PROBLEMS: ICD-10-CM

## 2020-02-13 DIAGNOSIS — R73.01 IMPAIRED FASTING GLUCOSE: ICD-10-CM

## 2020-02-13 DIAGNOSIS — H91.91 DECREASED HEARING OF RIGHT EAR: ICD-10-CM

## 2020-02-13 DIAGNOSIS — E66.811 CLASS 1 OBESITY DUE TO EXCESS CALORIES WITH SERIOUS COMORBIDITY AND BODY MASS INDEX (BMI) OF 32.0 TO 32.9 IN ADULT: ICD-10-CM

## 2020-02-13 DIAGNOSIS — H61.21 IMPACTED CERUMEN OF RIGHT EAR: Primary | ICD-10-CM

## 2020-02-13 DIAGNOSIS — E66.09 CLASS 1 OBESITY DUE TO EXCESS CALORIES WITH SERIOUS COMORBIDITY AND BODY MASS INDEX (BMI) OF 32.0 TO 32.9 IN ADULT: ICD-10-CM

## 2020-02-13 PROCEDURE — 99214 OFFICE O/P EST MOD 30 MIN: CPT | Performed by: FAMILY MEDICINE

## 2020-02-13 ASSESSMENT — MIFFLIN-ST. JEOR: SCORE: 1687.77

## 2020-02-13 NOTE — PATIENT INSTRUCTIONS
1. Use Debrox or cerumenex Put one to 2  Drops of this in  Each ear one at a time and hold with that ear up  For 15-30min until you get relief  NO Q tips in the ear !! Nothing smaller than your elbow in your ear You may use the shower or a sink faucet to run warm water in the ear to clean it out    2.  Weight Loss Tips  1. Do not eat after 6 hrs before your expected bedtime  2. Have your heaviest meal for breakfast, a slightly lighter meal at lunch and a snack 6 hrs before bed  3. No sugar/calorie drinks except milk ie no fruit juice, pop, alcohol.  4. Drink milk 30min before meals to decrease your hunger. Also it is excellent as part of your last meal of the day snack  5. Drink lots of water  6. Increase fiber in diet: all bran cereal, salads, popcorn etc  7. Have only one small serving of fruit a day about 1/2 cup (as this is high in sugar)  8. EXERCISE is the bottom line. Without it, you will gain weight even on a low calorie diet. Best if done 2-3X a day as can    Being overweight contributes to high blood pressure and high cholesterol, both of which cause heart attacks, strokes and kidney failure, prediabetes and diabetes, arthritis, and liver disease     You must also decrease your caloric intake and especially decrease the carbs or carbohydrates as these are the most harmful regarding the above health risks      Patient Education     Earache, No Infection (Adult)  Earaches can happen without an infection. This occurs when air and fluid build up behind the eardrum causing a feeling of fullness and discomfort and reduced hearing. This is called otitis media with effusion (OME) or serous otitis media. It means there is fluid in the middle ear. It is not the same as acute otitis media, which is typically from infection.  OME can happen when you have a cold if congestion blocks the passage that drains the middle ear. This passage is called the eustachian tube. OME may also occur with nasal allergies or after a  bacterial middle ear infection.    The pain or discomfort may come and go. You may hear clicking or popping sounds when you chew or swallow. You may feel that your balance is off. Or you may hear ringing in the ear.  It often takes from several weeks up to 3 months for the fluid to clear on its own. Oral pain relievers and ear drops help if there is pain. Decongestants and antihistamines sometimes help. Antibiotics don't help since there is no infection. Your doctor may prescribe a nasal spray to help reduce swelling in the nose and eustachian tube. This can allow the ear to drain.  If your OME doesn't improve after 3 months, surgery may be used to drain the fluid and insert a small tube in the eardrum to allow continued drainage.  Because the middle ear fluid can become infected, it is important to watch for signs of an ear infection which may develop later. These signs include increased ear pain, fever, or drainage from the ear.  Home care  The following guidelines will help you care for yourself at home:    You may use over-the-counter medicine as directed to control pain, unless another medicine was prescribed. If you have chronic liver or kidney disease or ever had a stomach ulcer or GI bleeding, talk with your doctor before using these medicines. Aspirin should never be used in anyone under 18 years of age who is ill with a fever. It may cause severe liver damage.    You may use over-the-counter decongestants such as phenylephrine or pseudoephedrine. But they are not always helpful. Don't use nasal spray decongestants more than 3 days. Longer use can make congestion worse. Prescription nasal sprays from your doctor don't typically have those restrictions.    Antihistamines may help if you are also having allergy symptoms.    You may use medicines such as guaifenesin to thin mucus and promote drainage.  Follow-up care  Follow up with your healthcare provider or as advised if you are not feeling better after 3  days.  When to seek medical advice  Call your healthcare provider right away if any of the following occur:    Your ear pain gets worse or does not start to improve     Fever of 100.4 F (38 C) or higher, or as directed by your healthcare provider    Fluid or blood draining from the ear    Headache or sinus pain    Stiff neck    Unusual drowsiness or confusion  Date Last Reviewed: 10/1/2016    4913-0506 [email or web address]. [address] [place] [address], [place] [address]. All rights reserved. This information is not intended as a substitute for professional medical care. Always follow your healthcare professional's instructions.         2. Call and see shot record   Ask them if you had the pneumonia shot  And was it 23 or 13 valent   If the 1st pneumonia shot was > a yr ago , then you need the 2nd one     3. Shingrex is a 2 shot series that prevents shingles 97% of the time, as opposed to the old shingles shot that only prevented it at 40-50%  It costs less for medicare at a pharmacy  You should get it starting at 50 yrs old get the 2nd shot 5-6 mo after the first one    4 consider getting a cold air vaporizer     5.  Run a cold air vaporizer as much as possible. If you cannot,  boil water and breath the warm vapors 2-3 times a day to try to open up the sinuses take 2400mgm of guaifenesin per 24 hours   You can do this by taking  Mucinex plain blue  1200 mg  One tablet twice a day (This may come as 600mg/tablet and you need to take 2 tabs twice a day) or you could buy the cheaper  generic 400mgm / tab and take 2 tablets 3 x a day or 1 and 1/2 tablets 4 x a day . .Guaifenesin is  the major component of most cough syrups, because it makes the mucus less thick, and therefore it drains out better and you are less likely to cough from it dripping on the back of your throat.  Irrigate the  nose with plain water under the kitchen sink faucet or the shower.  Keesha pots, spray bottles, etc accumulate bacteria and are not  recommended.   The tickle in the throat is also helped by gargling with vinegar and honey mixture, or pop or mouth wash as these coat the throat.  Please try to rinse teeth with water after using these .   Do not use sudafed or pseudephedrine as it dries the mucus up so it is harder to get it out, and it can raise your BP       ENT  Per insurance

## 2020-02-14 ENCOUNTER — OFFICE VISIT (OUTPATIENT)
Dept: FAMILY MEDICINE | Facility: CLINIC | Age: 85
End: 2020-02-14
Payer: COMMERCIAL

## 2020-02-14 ENCOUNTER — NURSE TRIAGE (OUTPATIENT)
Dept: FAMILY MEDICINE | Facility: CLINIC | Age: 85
End: 2020-02-14

## 2020-02-14 VITALS
OXYGEN SATURATION: 95 % | HEART RATE: 76 BPM | TEMPERATURE: 96.5 F | WEIGHT: 224 LBS | SYSTOLIC BLOOD PRESSURE: 116 MMHG | RESPIRATION RATE: 16 BRPM | BODY MASS INDEX: 32.14 KG/M2 | DIASTOLIC BLOOD PRESSURE: 70 MMHG

## 2020-02-14 DIAGNOSIS — Z00.00 ROUTINE HISTORY AND PHYSICAL EXAMINATION OF ADULT: ICD-10-CM

## 2020-02-14 DIAGNOSIS — R53.83 FATIGUE, UNSPECIFIED TYPE: ICD-10-CM

## 2020-02-14 DIAGNOSIS — M54.2 NECK PAIN: Primary | ICD-10-CM

## 2020-02-14 LAB
ALBUMIN SERPL-MCNC: 3.6 G/DL (ref 3.4–5)
ALP SERPL-CCNC: 80 U/L (ref 40–150)
ALT SERPL W P-5'-P-CCNC: 20 U/L (ref 0–70)
ANION GAP SERPL CALCULATED.3IONS-SCNC: 5 MMOL/L (ref 3–14)
AST SERPL W P-5'-P-CCNC: 18 U/L (ref 0–45)
BILIRUB SERPL-MCNC: 0.4 MG/DL (ref 0.2–1.3)
BUN SERPL-MCNC: 15 MG/DL (ref 7–30)
CALCIUM SERPL-MCNC: 9 MG/DL (ref 8.5–10.1)
CHLORIDE SERPL-SCNC: 107 MMOL/L (ref 94–109)
CHOLEST SERPL-MCNC: 211 MG/DL
CO2 SERPL-SCNC: 25 MMOL/L (ref 20–32)
CREAT SERPL-MCNC: 0.99 MG/DL (ref 0.66–1.25)
ERYTHROCYTE [DISTWIDTH] IN BLOOD BY AUTOMATED COUNT: 13.5 % (ref 10–15)
FERRITIN SERPL-MCNC: 268 NG/ML (ref 26–388)
GFR SERPL CREATININE-BSD FRML MDRD: 67 ML/MIN/{1.73_M2}
GLUCOSE SERPL-MCNC: 110 MG/DL (ref 70–99)
HCT VFR BLD AUTO: 46.2 % (ref 40–53)
HDLC SERPL-MCNC: 32 MG/DL
HGB BLD-MCNC: 15.3 G/DL (ref 13.3–17.7)
LDLC SERPL CALC-MCNC: 121 MG/DL
MCH RBC QN AUTO: 30.1 PG (ref 26.5–33)
MCHC RBC AUTO-ENTMCNC: 33.1 G/DL (ref 31.5–36.5)
MCV RBC AUTO: 91 FL (ref 78–100)
NONHDLC SERPL-MCNC: 179 MG/DL
PLATELET # BLD AUTO: 214 10E9/L (ref 150–450)
POTASSIUM SERPL-SCNC: 3.8 MMOL/L (ref 3.4–5.3)
PROT SERPL-MCNC: 6.9 G/DL (ref 6.8–8.8)
RBC # BLD AUTO: 5.09 10E12/L (ref 4.4–5.9)
SODIUM SERPL-SCNC: 139 MMOL/L (ref 133–144)
TRIGL SERPL-MCNC: 289 MG/DL
WBC # BLD AUTO: 6.4 10E9/L (ref 4–11)

## 2020-02-14 PROCEDURE — 99214 OFFICE O/P EST MOD 30 MIN: CPT | Performed by: PHYSICIAN ASSISTANT

## 2020-02-14 PROCEDURE — 85027 COMPLETE CBC AUTOMATED: CPT | Performed by: PHYSICIAN ASSISTANT

## 2020-02-14 PROCEDURE — 82728 ASSAY OF FERRITIN: CPT | Performed by: PHYSICIAN ASSISTANT

## 2020-02-14 PROCEDURE — 36415 COLL VENOUS BLD VENIPUNCTURE: CPT | Performed by: PHYSICIAN ASSISTANT

## 2020-02-14 PROCEDURE — 80061 LIPID PANEL: CPT | Performed by: PHYSICIAN ASSISTANT

## 2020-02-14 PROCEDURE — 80053 COMPREHEN METABOLIC PANEL: CPT | Performed by: PHYSICIAN ASSISTANT

## 2020-02-14 RX ORDER — TIZANIDINE 2 MG/1
2 TABLET ORAL 3 TIMES DAILY PRN
Qty: 30 TABLET | Refills: 1 | Status: SHIPPED | OUTPATIENT
Start: 2020-02-14 | End: 2020-03-05

## 2020-02-14 RX ORDER — TRAMADOL HYDROCHLORIDE 50 MG/1
50 TABLET ORAL
Qty: 7 TABLET | Refills: 0 | Status: SHIPPED | OUTPATIENT
Start: 2020-02-14 | End: 2020-02-21

## 2020-02-14 NOTE — PROGRESS NOTES
Subjective     Canelo Cruz is a 88 year old male who presents to clinic today for the following health issues:    HPI   Head injury      Duration: last  night    Description (location/character/radiation): fell out of chair and hit his head on the night stand, no headache, small lump on the back of his head, hard to move head side to side-sharp pain when turning but constantly has a pain in his neck, no dizziness      Intensity:  moderate    Accompanying signs and symptoms: see above    History (similar episodes/previous evaluation): None    Precipitating or alleviating factors: None    Therapies tried and outcome: motrin and 2 melatonin for sleep, heating pad     Recent Labs   Lab Test 03/21/19  0833 11/26/18  1441 09/09/17  1048 12/29/16  0808 12/13/16  0808   A1C  --  5.7*  --   --   --    * 137*  --   --  110*   HDL 40 39*  --   --  52   TRIG 225* 248* 229*  --  110   ALT 22 25  --  25  --    CR 1.11 1.07  --  1.06  --    GFRESTIMATED 59* 65  --  66  --    GFRESTBLACK 69 79  --  80  --    POTASSIUM 3.7 3.9  --  3.8  --    TSH  --  2.74  --   --   --       BP Readings from Last 3 Encounters:   02/14/20 116/70   02/13/20 130/70   12/17/19 130/80    Wt Readings from Last 3 Encounters:   02/14/20 101.6 kg (224 lb)   02/13/20 101.2 kg (223 lb)   12/17/19 101.2 kg (223 lb)                      Reviewed and updated as needed this visit by Provider  Tobacco  Allergies  Meds  Problems  Med Hx  Surg Hx  Fam Hx  Soc Hx          Additional complaints: None    HPI additional notes: Hang presents today with   Chief Complaint   Patient presents with     Head Injury     Unsure if blacked out or not.  Head hit table.  No nausea, vomiting, bleeding, memory loss, headache or dizziness.  Could not sleep because of pain.  Has had two motrin this morning.       Review of Systems   C: Negative for fever, chills, recent change in weight  Skin: Negative for worrisome rashes or lesions  Resp: Negative for significant  cough or SOB  CV: Negative for chest pain or peripheral edema  GI: Negative for nausea, abdominal pain, heartburn, or change in bowel habits  MS: Positive for bilateral neck pain  NEURO: NEGATIVE for numbness, tingling, or radicular pain.  P: Negative for changes in mood or affect  ROS otherwise negative.        Objective    /70   Pulse 76   Temp 96.5  F (35.8  C) (Tympanic)   Resp 16   Wt 101.6 kg (224 lb)   SpO2 95%   BMI 32.14 kg/m    Body mass index is 32.14 kg/m .  Physical Exam   GENERAL: healthy, alert, in no acute distress  HENT: Mucous mebranes moist.  MS: tenderness to palpation of bilateral paracervical muscles, decreased ROM of cervical spine with flexion, lateral bending bilaterally and lateral rotation bilaterally.  Neuro: CN II-XII intact normal finger nose.  SKIN: no suspicious lesions, no rashes, no ecchymoses  PSYCH: Alert and oriented times 3;  Able to articulate logical thoughts. Affect is normal.    Diagnostic test results:  Pending        Assessment & Plan       ICD-10-CM    1. Neck pain M54.2 tiZANidine (ZANAFLEX) 2 MG tablet     traMADol (ULTRAM) 50 MG tablet   2. Routine history and physical examination of adult Z00.00 Lipid panel reflex to direct LDL Non-fasting     Comprehensive metabolic panel     CBC with platelets     Ferritin   3. Fatigue, unspecified type R53.83        Pt complains of significant fatigue over the last few months.  Will get labs today to check for anemia.  Will also get labs for his physical because he has that scheduled in 3 weeks.    No sign of concussion but does have muscle strain in his neck.    - Take ibuprofen (Advil, Motrin) 600 mg. three times a day or Aleve (naproxen sodium) 220 to 440 mg. two times a day as needed for pain.  Do not take with medrol dose pack if that was started today.  - Use hot or cold packs (which ever gives more relief) for 15 minutes at a time at least three times daily.  - Stretch and massage the muscles affected.  - Muscle  relaxants at night.  Can take every 8 hours if you are not too drowsy when taking or take a 1/2 of a tab during the day and a whole tab at night.  - If no improvement in 1 week, call and I will put in an order for PT.     Please see patient instructions for treatment details.    Return in about 3 weeks (around 3/6/2020) for Annual Exam.    Yuki Jaime PA-C  [facility]

## 2020-02-14 NOTE — TELEPHONE ENCOUNTER
"Called patient to triage symptoms. Patient states that he had a fall last night when he was trying to sit in swivel chair. He hit his head on a table behind him. He notes having a small \"nickel\" size bruise on the back of his head. He states his most concerning symptom is upper neck stiffness/pain at 8/10.     Patient also notes that he did not sleep last night at all after taking 2 Motrin and 2 Melatonin. He states the motrin is not helping his pain.    Has had a problem with R ear with with impacted cerumen and was seen yesterday for this. Has had some balance concerns prior. No balance concerns today.    Huddled with Provider who says okay for him to come in for office visit today.     Additional Information    Negative: ACUTE NEUROLOGIC SYMPTOM and symptom present now    Negative: Knocked out (unconscious) > 1 minute    Negative: Seizure (convulsion) occurred (Exception: prior history of seizures and now alert and without Acute Neurologic Symptoms)    Negative: Neck pain after dangerous injury (e.g., MVA, diving, trampoline, contact sports, fall > 10 feet or 3 meters) (Exception: neck pain began > 1 hour after injury)    Negative: Major bleeding (actively dripping or spurting) that can't be stopped    Negative: Penetrating head injury (e.g., knife, gunshot wound, metal object)    Negative: Sounds like a life-threatening emergency to the triager    Negative: Recently examined and diagnosed with a concussion by a healthcare provider and has questions about concussion symptoms    Negative: Can't remember what happened (amnesia)    Negative: Vomiting once or more    Negative: Watery or blood-tinged fluid dripping from the nose or ears    Negative: Large swelling or bruise > 2 inches (5 cm)    Negative: Skin is split open or gaping (length > 1/2 inch or 12 mm)    Negative: Dangerous injury (e.g., MVA, diving, trampoline, contact sports, fall > 10 feet or 3 meters) or severe blow from hard object (e.g., golf club or " "baseball bat)    Negative: Knocked out (unconscious) < 1 minute and now fine    Negative: ACUTE NEUROLOGIC SYMPTOM and now fine    Negative: Severe headache    Negative: Bleeding won't stop after 10 minutes of direct pressure (using correct technique)    Age over 65 years with and area of head swelling or bruise    Negative: One or two 'black eyes' (bruising, purple color of eyelids)    Negative: Taking Coumadin (warfarin) or other strong blood thinner, or known bleeding disorder (e.g., thrombocytopenia)    Answer Assessment - Initial Assessment Questions  1. MECHANISM: \"How did the injury happen?\" For falls, ask: \"What height did you fall from?\" and \"What surface did you fall against?\"       No open wound. There is a lump. Patient has a desk swivel chair. Yesterday evening, around 6 PM the chair moved backward and he fell backward and his head hit the table by his bed. He thinks he has strained muscles and has a bump on his head. Has trouble turning face one direction or the other. Looking down is more challenging.   2. ONSET: \"When did the injury happen?\" (Minutes or hours ago)       Last evening at 6 PM  3. NEUROLOGIC SYMPTOMS: \"Was there any loss of consciousness?\" \"Are there any other neurological symptoms?\"   Patient states he doesn't think so. He couldn't move very well. Laid there until he could get up.         4. MENTAL STATUS: \"Does the person know who he is, who you are, and where he is?\"    Talking coherently.     5. LOCATION: \"What part of the head was hit?\"   Back of head        6. SCALP APPEARANCE: \"What does the scalp look like? Is it bleeding now?\" If so, ask: \"Is it difficult to stop?\"   No bleeding     7. SIZE: For cuts, bruises, or swelling, ask: \"How large is it?\" (e.g., inches or centimeters)   Lump on back of head - \"not very big\"    Thinks it is going down in size. Total size - size of nickel (maybe slightly larger)  Bruising isn't bothering him - it is the turning of the neck.    8. PAIN: " "\"Is there any pain?\" If so, ask: \"How bad is it?\"  (e.g., Scale 1-10; or mild, moderate, severe)  Continual pain 8/10        9. TETANUS: For any breaks in the skin, ask: \"When was the last tetanus booster?\"      NO openings in skin.  10. OTHER SYMPTOMS: \"Do you have any other symptoms?\" (e.g., neck pain, vomiting)  Took motrin and 2 melatonin but didn't sleep.   This morning at 7 AM, taken 2 motrin and it doesn't relieve the pain.    Protocols used: HEAD INJURY-A-OH      "

## 2020-02-17 ENCOUNTER — OFFICE VISIT (OUTPATIENT)
Dept: FAMILY MEDICINE | Facility: CLINIC | Age: 85
End: 2020-02-17
Payer: COMMERCIAL

## 2020-02-17 VITALS
TEMPERATURE: 97.9 F | OXYGEN SATURATION: 95 % | BODY MASS INDEX: 31.42 KG/M2 | HEART RATE: 85 BPM | DIASTOLIC BLOOD PRESSURE: 72 MMHG | WEIGHT: 219 LBS | SYSTOLIC BLOOD PRESSURE: 120 MMHG | RESPIRATION RATE: 16 BRPM

## 2020-02-17 DIAGNOSIS — M54.2 NECK PAIN: Primary | ICD-10-CM

## 2020-02-17 PROCEDURE — 99213 OFFICE O/P EST LOW 20 MIN: CPT | Performed by: PHYSICIAN ASSISTANT

## 2020-02-17 RX ORDER — METHYLPREDNISOLONE 4 MG
TABLET, DOSE PACK ORAL
Qty: 21 TABLET | Refills: 0 | Status: SHIPPED | OUTPATIENT
Start: 2020-02-17 | End: 2020-03-02

## 2020-02-17 NOTE — PROGRESS NOTES
Subjective     Canelo Cruz is a 88 year old male who presents to clinic today for the following health issues:    HPI   Concern - head injury  Onset: 4 days ago    Description:   Chair he was sitting on gave way, patient hit head on bedroom nightstand    Intensity: severe    Progression of Symptoms:  worsening and constant    Accompanying Signs & Symptoms:  Severe neck pain, difficult to turn head, and to look down    Previous history of similar problem:   yes    Precipitating factors:   Worsened by: head moving    Alleviating factors:  Improved by: nothing    Therapies Tried and outcome: tramadol, tizanadine not helping  Recent Labs   Lab Test 02/14/20  1033 03/21/19  0833 11/26/18  1441   A1C  --   --  5.7*   * 139* 137*   HDL 32* 40 39*   TRIG 289* 225* 248*   ALT 20 22 25   CR 0.99 1.11 1.07   GFRESTIMATED 67 59* 65   GFRESTBLACK 78 69 79   POTASSIUM 3.8 3.7 3.9   TSH  --   --  2.74      BP Readings from Last 3 Encounters:   02/17/20 120/72   02/14/20 116/70   02/13/20 130/70    Wt Readings from Last 3 Encounters:   02/17/20 99.3 kg (219 lb)   02/14/20 101.6 kg (224 lb)   02/13/20 101.2 kg (223 lb)         Reviewed and updated as needed this visit by Provider  Tobacco  Allergies  Meds  Problems  Med Hx  Surg Hx  Fam Hx         Additional complaints: None    HPI additional notes: Hang presents today with   Chief Complaint   Patient presents with     Musculoskeletal Problem     neck pain            Review of Systems   C: Negative for fever, chills, recent change in weight  Skin: Negative for worrisome rashes or lesions  Resp: Negative for significant cough or SOB  CV: Negative for chest pain or peripheral edema  GI: Negative for nausea, abdominal pain, heartburn, or change in bowel habits  MS: Positive for bilateral neck pain  NEURO: NEGATIVE for numbness, tingling, or radicular pain.  P: Negative for changes in mood or affect  ROS otherwise negative.        Objective    /72   Pulse 85    Temp 97.9  F (36.6  C) (Tympanic)   Resp 16   Wt 99.3 kg (219 lb)   SpO2 95%   BMI 31.42 kg/m    Body mass index is 31.42 kg/m .  Physical Exam   Physical Exam   GENERAL: healthy, alert, in no acute distress  HENT: Mucous mebranes moist.  MS: tenderness to palpation of bilateral paracervical muscles, decreased ROM of cervical spine with flexion, lateral bending bilaterally and lateral rotation bilaterally.  SKIN: no suspicious lesions, no rashes, no ecchymoses  PSYCH: Alert and oriented times 3;  Able to articulate logical thoughts. Affect is normal.    Diagnostic test results: none         Assessment & Plan       ICD-10-CM    1. Neck pain M54.2 [facility] PT, HAND, AND CHIROPRACTIC REFERRAL     methylPREDNISolone (MEDROL DOSEPAK) 4 MG tablet therapy pack     Will call and try to get into PT tomorrow.  Will start on medrol dose pack.  Continue flexeril.  FROM of neck with pain, no tenderness to palpation of cervical spine, no indication for imaging at this time.    Please see patient instructions for treatment details.    Return in about 2 days (around 2/19/2020) for PT.    Yuki Jaime PA-C  [facility]

## 2020-02-17 NOTE — RESULT ENCOUNTER NOTE
Bruno Mauricio,    I just wanted to let you know that your lab results have been reviewed and are attached.    - Your cholesterol is high but the American Heart Association does not recommend starting a medication to lower this at this time.  We will recheck next year.  - Your glucose (screening for diabetes) was a little high because you were not fasting.  It is not a cause for concern.  - Your hemoglobin is normal (you do not have anemia).  - Your iron levels are normal.    Please let me know if you have any questions and have a great week!    Sincerely,    Sivan Jaime PA-C    Family Medicine  [facility]  [address]  [place], [place] [id number] [phone] (p)

## 2020-02-17 NOTE — PATIENT INSTRUCTIONS
Self-Care for Neck Pain    Principles to encourage healing of musculoskeletal neck pain:  1) Reduce Strain, triggers  2) Practice Relaxation  3) Good Posture  4) Progressive Stretching  5) Get a good night's sleep    Avoid muscle tensing habits and activities that put strain on the neck and shoulders.  Remind yourself regularly to determine if you are doing any tensing habits.  Use reminders methods such as stickers or timers.  If noticed, replace these negative habits with a positive habit of having your head up, chest up, chin in, and shoulders down and back.       Shoulder shrugging and tensing    Tensing your neck and jaw    Sleeping on your stomach    Neck strain from musical instrument    Forward head posture   Resting your head on your hand    Slouching while sitting in a chair    Carrying heavy purse/backpack on one shoulder    Turning head to one side for hours w/ computer    Rounding shoulders down and forward     Avoid events or activities that trigger the pain.    Use a pain diary to review daily activities that aggravate or trigger the pain and change your behavior.  Identify both risk factors and protective factors    Practice relaxation and deep breathing  Take a deep breath from your abdomen up and let it out slowly as you allow your neck and shoulders to relax.  This can calm you, reduces stress, and decreases muscle tensing.     Good Posture.  Keep your head up, chest up, and you chin in.  Keep your shoulders down and back.   This will help in reduce strain to the neck and shoulder muscles.  Closely monitor you head position over your shoulders to maintain balanced and relaxed head, neck, and shoulder muscles. Keep you chin in and do not hold your head in a forward position. In sitting, support your mid back with a rolled towel so that your the shoulders fall back, your neck is relaxed, and your head is up.      Progressive Stretching.  Stretch the neck and shoulder muscles and joints.    Turn  your head to one side and point your chin to that shoulder. Use your opposite hand to gently pull down the head to stretch your neck. Gently stretch 6 times per day for 6 times each to gain range of motion.     Get a good night s sleep.   Avoid caffeinated beverages (coffee, tea, and soft drinks) later in the day.  Improve your sleep environment.  Reduce light and noise and lie on a comfortable mattress.  Reduce stimulating activities in the late evening including computer work and exercising.  Avoid sleeping on your stomach.     Apply heat or cold and massage tender muscles.   Heat or ice applications used up to four times per day can relax the muscles and reduce pain. For heat, microwave a wet towel for approximately 1 minute or until towel is warm and wrap around a hot-water bottle or heated gel pack and apply for 15 to 20 minutes. For cold, use ice wrapped in a thin cloth on the area until you first feel some numbness. Use what feels best. Heat is often used for more chronic pain conditions and cold for acute conditions. Massage tender points in the muscles    Use anti-inflammatory and pain-reducing medications.  Short-term and occasional use of over-the-counter ibuprofen, naproxen, acetaminophen, or aspirin (without caffeine) can reduce joint and muscle pain.  Be sure to check with your provider to be sure this would be safe.  Please note that extended daily use can sometimes cause rebound pain and an extend your symptoms.

## 2020-02-19 ENCOUNTER — THERAPY VISIT (OUTPATIENT)
Dept: PHYSICAL THERAPY | Facility: CLINIC | Age: 85
End: 2020-02-19
Payer: COMMERCIAL

## 2020-02-19 DIAGNOSIS — M54.2 NECK PAIN: ICD-10-CM

## 2020-02-19 PROCEDURE — 97112 NEUROMUSCULAR REEDUCATION: CPT | Mod: GP | Performed by: PHYSICAL THERAPIST

## 2020-02-19 PROCEDURE — 97161 PT EVAL LOW COMPLEX 20 MIN: CPT | Mod: GP | Performed by: PHYSICAL THERAPIST

## 2020-02-19 PROCEDURE — 97110 THERAPEUTIC EXERCISES: CPT | Mod: GP | Performed by: PHYSICAL THERAPIST

## 2020-02-19 NOTE — PROGRESS NOTES
"[facility] Initial Evaluation -- Cervical    Evaluation Date: February 19, 2020  Canelo Cruz is a 88 year old male with a cervical condition.   Referral: IM  Work mechanical stresses: retired   Employment status: retired  Leisure mechanical stresses: sedentary  Functional disability score (NDI):  48%  VAS score (0-10): 7/10  Patient goals/expectations:  To get the pain to go away  Patient lives with his wife.     HISTORY:    Present symptoms:  R head/neck into R UT>L neck.  Pain quality (sharp/shooting/stabbing/aching/burning/cramping):   Aching, sharp.  Paresthesia (yes/no):  Yes--R neck    Present since (onset date): 02/12/2020.     Symptoms (improving/unchanging/worsening):  unchanging.    Symptoms commenced as a result of: fell backwards when his chair tipped over--hit the back of the R side of his head on the nightstand and also feels like he hit the front of his head somehow also   Condition occurred in the following environment:  home    Symptoms at onset (neck/arm/forearm/headache): R head and neck pain  Constant symptoms (neck/arm/forearm/headache): R head and neck pain  Intermittent symptoms (neck/arm/forearm/headache): L neck pain    Symptoms are made worse with the following: Always Sitting--has pain always and Always Lying, always turning neck either direction, always moving in bed; no effect time of day, feels \"off balance\" with position changes, sometimes dizziness  Symptoms are made better with the following: nothing    Disturbed sleep (yes/no): yes--losing 3-4 hours/night  Number of pillows: 1  Sleeping postures (prone/sup/side R/L): supine, L side    Previous episodes (0/1-5/6-10/11+): 0 Year of first episode: na    Previous history: none  Previous treatments: none    Specific Questions: (as reported by the patient)  Dizziness/Tinnitus/Nausea/Swallowing (pos/neg): dizziness  Gait/Upper Limbs (normal/abnormal): normal/functional UE ROM B;  Gait--appears normal, slightly " flexed posture--pt reports feeling unsteady/off balance since onset of neck pain after fall  Medications (nil/NSAIDS/anlag/steroids/anticoag/other):  pain  Medical allergies:  none  General health (excellent/good/fair/poor):  excellent  Pertinent medical history:  Overweight and unexplained weight loss over past week  Imaging (None/Xray/MRI/Other):  none  Recent or major surgery (yes/no): no  Night pain (yes/no): no  Accidents (yes/no): no  Unexplained weight loss (yes/no): no  Barriers at home: no  Other red flags: no    EXAMINATION    Posture:   Sitting (good/fair/poor): poor  Standing (good/fair/poor): fair     Protruded head (yes/no): yes    Wry Neck (right/left/nil):  nil  Relevant (yes/no):  na     Correction of posture(better/worse/no effect): NE  Other observations:  Significant forward head and rounded shoulders, kyphotic    Neurological:    Motor Deficit:  normal   Reflexes:  Not assessed  Sensory Deficit:  normal   Dural signs:  Not assessed    Movement Loss:   Hector Mod Min Nil Pain   Protrusion    x NE   Flexion x    Increases R neck/head pain   Retraction x    Increases R neck/head pain   Extension x    Increases R neck/head pain   Lateral flexion R  x   Increases R neck/head pain   Lateral flexion L  x   Increases R neck/head pain   Rotation R x    Increases R neck/head pain   Rotation L x    Increases R neck/head pain     Test Movements:   During: produces, abolishes, increases, decreases, no effect, centralizing, peripheralizing  After: better, worse, no better, no worse, no effect, centralized, peripheralized    Pretest symptoms sitting: R neck and head pain 7/10   Symptoms During Symptoms After ROM increased ROM decreased No Effect   PRO        Rep PRO        RET W/self-OP--increases R neck pain No Worse         Rep RET W/self-OP--increases R neck pain No Worse      x   RET EXT        Rep RET EXT        Pretest symptoms lying:     Symptoms During Symptoms After ROM increased ROM decreased No Effect    RET        Rep RET        RET EXT        Rep RET EXT        If required, pretest symptoms sitting:      Symptoms During Symptoms After ROM increased ROM decreased No Effect   LF-R        Rep LF-R        LF-L        Rep LF-L        ROT-R        Rep ROT-R        ROT-L        Rep ROT-L        FLEX        Rep FLEX            Static Tests:   Protrusion:    Flexion:    Retraction:    Extension (sitting/prone/supine):      Other Tests:     Provisional Classification:  Inconclusive/Other - Trauma/Recovering Trauma and versus derangement    Principle of Management:  Education:  Posture--impact/importance of posture, avoidance of fwd head and slouched postures, use of lumbar roll to correct posture, posture with computer use; POC, treatment rationale, expected response    Equipment provided:  none  Mechanical therapy (Y/N):  y   Extension principle:  Seated retraction with self-OP x10 reps, every 2 hours   Lateral principle:    Flexion principle:     Other:      ASSESSMENT/PLAN:    Patient is a 88 year old male with cervical complaints s/p backwards fall off a chair on 02/12/2020.  He has significantly limited cervical ROM and pain with all movements.  He sits with poor posture, and his forward head position places increased stress on his neck.  Treatment will focus on cervical retraction exercises and posture training to decrease pain and improve overall function and neck mobility.      Patient has the following significant findings with corresponding treatment plan.                Diagnosis 1:  R neck pain s/p fall  Pain -  self management, education, directional preference exercise and home program  Decreased ROM/flexibility - manual therapy, therapeutic exercise and home program  Decreased function - therapeutic activities and home program  Impaired posture - neuro re-education and home program    Cumulative Therapy Evaluation is: Low complexity.    Previous and current functional limitations:  (See Goal Flow Sheet for  this information)    Short term and Long term goals: (See Goal Flow Sheet for this information)     Communication ability:  Patient appears to be able to clearly communicate and understand verbal and written communication and follow directions correctly.  Treatment Explanation - The following has been discussed with the patient:   RX ordered/plan of care  Anticipated outcomes  Possible risks and side effects  This patient would benefit from PT intervention to resume normal activities.   Rehab potential is good.    Frequency:  1 X week, once daily  Duration:  for 4 weeks tapering to 2 X a month over 1 month  Discharge Plan:  Achieve all LTG.  Independent in home treatment program.  Reach maximal therapeutic benefit.    Please refer to the daily flowsheet for treatment today, total treatment time and time spent performing 1:1 timed codes.

## 2020-03-03 PROBLEM — H61.21 IMPACTED CERUMEN OF RIGHT EAR: Status: RESOLVED | Noted: 2020-02-13 | Resolved: 2020-03-03

## 2020-03-03 ASSESSMENT — ACTIVITIES OF DAILY LIVING (ADL): CURRENT_FUNCTION: NO ASSISTANCE NEEDED

## 2020-03-05 ENCOUNTER — OFFICE VISIT (OUTPATIENT)
Dept: FAMILY MEDICINE | Facility: CLINIC | Age: 85
End: 2020-03-05
Payer: COMMERCIAL

## 2020-03-05 VITALS
HEIGHT: 70 IN | RESPIRATION RATE: 12 BRPM | HEART RATE: 78 BPM | TEMPERATURE: 98.2 F | SYSTOLIC BLOOD PRESSURE: 124 MMHG | BODY MASS INDEX: 31.35 KG/M2 | WEIGHT: 219 LBS | DIASTOLIC BLOOD PRESSURE: 74 MMHG

## 2020-03-05 DIAGNOSIS — Z00.00 MEDICARE ANNUAL WELLNESS VISIT, SUBSEQUENT: Primary | ICD-10-CM

## 2020-03-05 DIAGNOSIS — N39.0 RECURRENT UTI: ICD-10-CM

## 2020-03-05 DIAGNOSIS — Z12.5 SCREENING FOR PROSTATE CANCER: ICD-10-CM

## 2020-03-05 PROCEDURE — G0439 PPPS, SUBSEQ VISIT: HCPCS | Performed by: PHYSICIAN ASSISTANT

## 2020-03-05 PROCEDURE — G0103 PSA SCREENING: HCPCS | Performed by: PHYSICIAN ASSISTANT

## 2020-03-05 PROCEDURE — 36415 COLL VENOUS BLD VENIPUNCTURE: CPT | Performed by: PHYSICIAN ASSISTANT

## 2020-03-05 RX ORDER — TAMSULOSIN HYDROCHLORIDE 0.4 MG/1
0.4 CAPSULE ORAL DAILY PRN
Qty: 90 CAPSULE | Refills: 3 | Status: SHIPPED | OUTPATIENT
Start: 2020-03-05 | End: 2020-07-30

## 2020-03-05 ASSESSMENT — ACTIVITIES OF DAILY LIVING (ADL): CURRENT_FUNCTION: NO ASSISTANCE NEEDED

## 2020-03-05 ASSESSMENT — MIFFLIN-ST. JEOR: SCORE: 1669.63

## 2020-03-05 NOTE — PATIENT INSTRUCTIONS
Patient Education   Personalized Prevention Plan  You are due for the preventive services outlined below.  Your care team is available to assist you in scheduling these services.  If you have already completed any of these items, please share that information with your care team to update in your medical record.  Health Maintenance Due   Topic Date Due     Diptheria Tetanus Pertussis (DTAP/TDAP/TD) Vaccine (1 - Tdap) 07/30/1942     Pneumococcal Vaccine (1 of 2 - PCV13) 07/30/1996     Zoster (Shingles) Vaccine (2 of 3) 10/27/2014     Annual Wellness Visit  11/26/2019       Exercise for a Healthier Heart     Exercise with a friend. When activity is fun, you're more likely to stick with it.   You may wonder how you can improve the health of your heart. If you re thinking about exercise, you re on the right track. You don t need to become an athlete, but you do need a certain amount of brisk exercise to help strengthen your heart. If you have been diagnosed with a heart condition, your doctor may recommend exercise to help stabilize your condition. To help make exercise a habit, choose safe, fun activities.  Be sure to check with your healthcare provider before starting an exercise program.  Why exercise?  Exercising regularly offers many healthy rewards. It can help you do all of the following:    Improve your blood cholesterol level to help prevent further heart trouble    Lower your blood pressure to help prevent a stroke or heart attack    Control diabetes, or reduce your risk of getting this disease    Improve your heart and lung function    Reach and maintain a healthy weight    Make your muscles stronger and more limber so you can stay active    Prevent falls and fractures by slowing the loss of bone mass (osteoporosis)    Manage stress better    Reduce your blood pressure    Improve your sense of self and your body image  Exercise tips  Ease into your routine. Set small goals. Then build on them.  Exercise on  most days. Aim for a total of 150 or more minutes of moderate to  vigorous intensity activity each week. Consider 40 minutes, 3 to 4 times a week. For best results, activity should last for 40 minutes on average. It is OK to work up to the 40 minute period over time. Examples of moderate-intensity activity is walking 1 mile in 15 minutes or 30 to 45 minutes of yard work.  Step up your daily activity level. Along with your exercise program, try being more active throughout the day. Walk instead of drive. Do more household tasks or yard work.  Choose one or more activities you enjoy. Walking is one of the easiest things you can do. You can also try swimming, riding a bike, dancing, or taking an exercise class.  Stop exercising and call your doctor if you:    Have chest pain or feel dizzy or lightheaded    Feel burning, tightness, pressure, or heaviness in your chest, neck, shoulders, back, or arms    Have unusual shortness of breath    Have increased joint or muscle pain    Have palpitations or an irregular heartbeat  Date Last Reviewed: 5/1/2016 2000-2019 [employer]. [address] [place] [address]. All rights reserved. This information is not intended as a substitute for professional medical care. Always follow your healthcare professional's instructions.          Signs of Hearing Loss     Hearing much better with one ear can be a sign of hearing loss.     Hearing loss is a problem shared by many people. In fact, it is one of the most common health conditions, particularly as people age. Most people over age 65 have some hearing loss, and by age 80, almost everyone does. Because hearing loss usually occurs slowly over the years, you may not realize your hearing ability has gotten worse.  Have your hearing checked  Contact your healthcare provider if you:    Have to strain to hear normal conversation    Have to watch other people s faces very carefully to follow what they re saying    Need  to ask people to repeat what they ve said    Often misunderstand what people are saying    Turn the volume of the television or radio up so high that others complain    Feel that people are mumbling when they re talking to you    Find that the effort to hear leaves you feeling tired and irritated    Notice, when using the phone, that you hear better with one ear than the other  Date Last Reviewed: 12/1/2016 2000-2019 [employer]. [address] [place] [address], [place] [address]. All rights reserved. This information is not intended as a substitute for professional medical care. Always follow your healthcare professional's instructions.

## 2020-03-05 NOTE — PROGRESS NOTES
"SUBJECTIVE:   Canelo Cruz is a 88 year old male who presents for Preventive Visit.  Are you in the first 12 months of your Medicare coverage?  No    Healthy Habits:     In general, how would you rate your overall health?  Good    Frequency of exercise:  None    Do you usually eat at least 4 servings of fruit and vegetables a day, include whole grains    & fiber and avoid regularly eating high fat or \"junk\" foods?  Yes    Taking medications regularly:  Yes    Medication side effects:  Not applicable    Ability to successfully perform activities of daily living:  No assistance needed    Home Safety:  No safety concerns identified    Hearing Impairment:  Difficult to understand a speaker at a public meeting or [employer] service and no hearing concerns    In the past 6 months, have you been bothered by leaking of urine?  No    In general, how would you rate your overall mental or emotional health?  Good      PHQ-2 Total Score: 1    Additional concerns today:  No    Do you feel safe in your environment? Yes    Have you ever done Advance Care Planning? (For example, a Health Directive, POLST, or a discussion with a medical provider or your loved ones about your wishes): No, advance care planning information given to patient to review.  Patient declined advance care planning discussion at this time.    Fall risk     click delete button to remove this line now  Cognitive Screening   1) Repeat 3 items (Leader, Season, Table)    2) Clock draw: NORMAL  3) 3 item recall: Recalls 2 objects   Results: NORMAL clock, 1-2 items recalled: COGNITIVE IMPAIRMENT LESS LIKELY    Mini-CogTM Copyright S Phil. Licensed by the author for use in [facility]; reprinted with permission (ana@.[facility]). All rights reserved.      Do you have sleep apnea, excessive snoring or daytime drowsiness?: no    Reviewed and updated as needed this visit by clinical staff  Tobacco  Allergies  Meds  Problems  Med Hx  Surg Hx  Fam Hx  Soc " Hx          Reviewed and updated as needed this visit by Provider  Tobacco  Allergies  Meds  Problems  Med Hx  Surg Hx  Fam Hx  Soc Hx         Social History     Tobacco Use     Smoking status: Never Smoker     Smokeless tobacco: Never Used   Substance Use Topics     Alcohol use: Yes     Comment: occ.     If you drink alcohol do you typically have >3 drinks per day or >7 drinks per week? No      AUDIT - Alcohol Use Disorders Identification Test - Reproduced from the World Health Organization Audit 2001 (Second Edition) 3/3/2020   1.  How often do you have a drink containing alcohol? 2 to 4 times a month   2.  How many drinks containing alcohol do you have on a typical day when you are drinking? 1 or 2   3.  How often do you have five or more drinks on one occasion? Never   4.  How often during the last year have you found that you were not able to stop drinking once you had started? Never   5.  How often during the last year have you failed to do what was normally expected of you because of drinking? Never   6.  How often during the last year have you needed a first drink in the morning to get yourself going after a heavy drinking session? Never   7.  How often during the last year have you had a feeling of guilt or remorse after drinking? Never   8.  How often during the last year have you been unable to remember what happened the night before because of your drinking? Never   9.  Have you or someone else been injured because of your drinking? No   10. Has a relative, friend, doctor or other health care worker been concerned about your drinking or suggested you cut down? No   TOTAL SCORE 2       Current providers sharing in care for this patient include:   Patient Care Team:  Yuki Jaime PA-C as PCP - General (Physician Assistant)  Yuki Jaime PA-C as Assigned PCP    The following health maintenance items are reviewed in Epic and correct as of today:  Health Maintenance  "  Topic Date Due     DTAP/TDAP/TD IMMUNIZATION (1 - Tdap) 07/30/1942     PNEUMOCOCCAL IMMUNIZATION 65+ LOW/MEDIUM RISK (1 of 2 - PCV13) 07/30/1996     ZOSTER IMMUNIZATION (2 of 3) 10/27/2014     MEDICARE ANNUAL WELLNESS VISIT  11/26/2019     COLONOSCOPY  10/21/2020     FALL RISK ASSESSMENT  12/17/2020     LIPID  02/14/2021     ADVANCE CARE PLANNING  03/03/2025     PHQ-2  Completed     INFLUENZA VACCINE  Completed     IPV IMMUNIZATION  Aged Out     MENINGITIS IMMUNIZATION  Aged Out     BP Readings from Last 3 Encounters:   03/05/20 124/74   03/03/20 120/74   02/17/20 120/72    Wt Readings from Last 3 Encounters:   03/05/20 99.3 kg (219 lb)   03/03/20 100.2 kg (221 lb)   02/17/20 99.3 kg (219 lb)                  Recent Labs   Lab Test 02/14/20  1033 03/21/19  0833 11/26/18  1441   A1C  --   --  5.7*   * 139* 137*   HDL 32* 40 39*   TRIG 289* 225* 248*   ALT 20 22 25   CR 0.99 1.11 1.07   GFRESTIMATED 67 59* 65   GFRESTBLACK 78 69 79   POTASSIUM 3.8 3.7 3.9   TSH  --   --  2.74        Review of Systems  Constitutional, HEENT, cardiovascular, pulmonary, GI, , musculoskeletal, neuro, skin, endocrine and psych systems are negative, except as otherwise noted.    OBJECTIVE:   /74 (Cuff Size: Adult Large)   Pulse 78   Temp 98.2  F (36.8  C) (Tympanic)   Resp 12   Ht 1.778 m (5' 10\")   Wt 99.3 kg (219 lb)   BMI 31.42 kg/m   Estimated body mass index is 31.42 kg/m  as calculated from the following:    Height as of this encounter: 1.778 m (5' 10\").    Weight as of this encounter: 99.3 kg (219 lb).  Physical Exam  GENERAL: healthy, alert and no distress  EYES: Eyes grossly normal to inspection, PERRL and conjunctivae and sclerae normal  HENT: ear canals and TM's normal, nose and mouth without ulcers or lesions  NECK: no adenopathy, no asymmetry, masses, or scars and thyroid normal to palpation  RESP: lungs clear to auscultation - no rales, rhonchi or wheezes  CV: regular rate and rhythm, normal S1 S2, no S3 " "or S4, no murmur, click or rub, no peripheral edema and peripheral pulses strong  ABDOMEN: soft, nontender, no hepatosplenomegaly, no masses and bowel sounds normal  MS: no gross musculoskeletal defects noted, no edema  SKIN: no suspicious lesions or rashes  NEURO: Normal strength and tone, mentation intact and speech normal  PSYCH: mentation appears normal, affect normal/bright    Diagnostic Test Results:  Labs reviewed in Epic    ASSESSMENT / PLAN:       ICD-10-CM    1. Medicare annual wellness visit, subsequent Z00.00      Will call insurance about shingrix shot.      COUNSELING:  Reviewed preventive health counseling, as reflected in patient instructions    Estimated body mass index is 31.42 kg/m  as calculated from the following:    Height as of this encounter: 1.778 m (5' 10\").    Weight as of this encounter: 99.3 kg (219 lb).    Weight management plan: Discussed healthy diet and exercise guidelines     reports that he has never smoked. He has never used smokeless tobacco.      Appropriate preventive services were discussed with this patient, including applicable screening as appropriate for cardiovascular disease, diabetes, osteopenia/osteoporosis, and glaucoma.  As appropriate for age/gender, discussed screening for colorectal cancer, prostate cancer, breast cancer, and cervical cancer. Checklist reviewing preventive services available has been given to the patient.    Reviewed patients plan of care and provided an AVS. The Basic Care Plan (routine screening as documented in Health Maintenance) for Canelo meets the Care Plan requirement. This Care Plan has been established and reviewed with the Patient.    Counseling Resources:  ATP IV Guidelines  Pooled Cohorts Equation Calculator  Breast Cancer Risk Calculator  FRAX Risk Assessment  ICSI Preventive Guidelines  Dietary Guidelines for Americans, 2010  USDA's MyPlate  ASA Prophylaxis  Lung CA Screening    Yuki Jaime PA-C  [facility] " [facility]    Identified Health Risks:

## 2020-03-06 ENCOUNTER — TELEPHONE (OUTPATIENT)
Dept: FAMILY MEDICINE | Facility: CLINIC | Age: 85
End: 2020-03-06

## 2020-03-06 LAB — PSA SERPL-ACNC: 4.58 UG/L (ref 0–4)

## 2020-03-06 NOTE — TELEPHONE ENCOUNTER
FYI:  Patient states a form is coming from [facility]. A clinical question about Shingrix. Once returned to [facility] approval will take 72 hours.   Case reference number [id number]. Patient just wanted to let Sivan know form was coming. Patient states to call to let him know when it is done [phone].

## 2020-03-10 NOTE — RESULT ENCOUNTER NOTE
Bruno Mauricio,    I just wanted to let you know that your lab results have been reviewed and are attached.    -PSA (prostate specific antigen) test is slightly elevated.  I recommend we recheck it 1 month with a lab only appointment.    Please let me know if you have any questions and have a great week!    Sincerely,    Sivan Jaime PA-C    [facility]  [address]  [place] [address]  [phone] (p)

## 2020-03-24 ENCOUNTER — TELEPHONE (OUTPATIENT)
Dept: FAMILY MEDICINE | Facility: CLINIC | Age: 85
End: 2020-03-24

## 2020-03-24 NOTE — TELEPHONE ENCOUNTER
I would have him wait until the pandemic starts to calm down unless there is a date per the approval that he needs to get the shot done by.

## 2020-03-24 NOTE — TELEPHONE ENCOUNTER
Pt realized he needed to come for PSA recheck due to elevation.    We scheduled this as well as his shingrix for 04/13/20 so he only has to come in one day.    Pt just wanted me to let you know!

## 2020-03-24 NOTE — TELEPHONE ENCOUNTER
Reason for Call:  Other call back    Detailed comments: Patient states he got the approval from [facility] for the shingrix shot. Patient would like to schedule an appointment at [facility]'s convenience. Please call to advise.    Phone Number Patient can be reached at: Home number on file [phone] (home)    Best Time: any    Can we leave a detailed message on this number? YES    Call taken on 3/24/2020 at 10:35 AM by EMERY NICK

## 2020-04-07 ENCOUNTER — TELEPHONE (OUTPATIENT)
Dept: PHYSICAL THERAPY | Facility: CLINIC | Age: 85
End: 2020-04-07

## 2020-04-13 ENCOUNTER — ALLIED HEALTH/NURSE VISIT (OUTPATIENT)
Dept: NURSING | Facility: CLINIC | Age: 85
End: 2020-04-13
Payer: COMMERCIAL

## 2020-04-13 DIAGNOSIS — Z12.5 SCREENING FOR PROSTATE CANCER: ICD-10-CM

## 2020-04-13 DIAGNOSIS — Z23 NEED FOR VACCINATION: Primary | ICD-10-CM

## 2020-04-13 LAB — PSA SERPL-ACNC: 4.24 UG/L (ref 0–4)

## 2020-04-13 PROCEDURE — G0103 PSA SCREENING: HCPCS | Performed by: PHYSICIAN ASSISTANT

## 2020-04-13 PROCEDURE — 90471 IMMUNIZATION ADMIN: CPT

## 2020-04-13 PROCEDURE — 36415 COLL VENOUS BLD VENIPUNCTURE: CPT | Performed by: PHYSICIAN ASSISTANT

## 2020-04-13 PROCEDURE — 99207 ZZC NO CHARGE LOS: CPT

## 2020-04-13 PROCEDURE — 90750 HZV VACC RECOMBINANT IM: CPT

## 2020-04-20 NOTE — RESULT ENCOUNTER NOTE
Bruno Mauricio,    I just wanted to let you know that your lab results have been reviewed and are attached.    PSA improved, within normal limits, recheck in 6 months.    Please let me know if you have any questions and have a great week!    Sincerely,    Sivan Jaime PA-C    Family Medicine  [facility]  [address]  [place] [address]  [phone] (p)

## 2020-05-11 ENCOUNTER — ANCILLARY PROCEDURE (OUTPATIENT)
Dept: GENERAL RADIOLOGY | Facility: CLINIC | Age: 85
End: 2020-05-11
Attending: INTERNAL MEDICINE
Payer: COMMERCIAL

## 2020-05-11 ENCOUNTER — OFFICE VISIT (OUTPATIENT)
Dept: FAMILY MEDICINE | Facility: CLINIC | Age: 85
End: 2020-05-11
Payer: COMMERCIAL

## 2020-05-11 VITALS
TEMPERATURE: 98.3 F | SYSTOLIC BLOOD PRESSURE: 140 MMHG | DIASTOLIC BLOOD PRESSURE: 72 MMHG | HEART RATE: 87 BPM | RESPIRATION RATE: 16 BRPM | OXYGEN SATURATION: 97 % | BODY MASS INDEX: 31.57 KG/M2 | WEIGHT: 220 LBS

## 2020-05-11 DIAGNOSIS — Z87.39 HISTORY OF GOUT: ICD-10-CM

## 2020-05-11 DIAGNOSIS — L03.119 CELLULITIS OF ANKLE: Primary | ICD-10-CM

## 2020-05-11 DIAGNOSIS — M25.572 PAIN IN JOINT INVOLVING ANKLE AND FOOT, LEFT: ICD-10-CM

## 2020-05-11 LAB
BASOPHILS # BLD AUTO: 0 10E9/L (ref 0–0.2)
BASOPHILS NFR BLD AUTO: 0.3 %
DIFFERENTIAL METHOD BLD: NORMAL
EOSINOPHIL # BLD AUTO: 0.2 10E9/L (ref 0–0.7)
EOSINOPHIL NFR BLD AUTO: 1.9 %
ERYTHROCYTE [DISTWIDTH] IN BLOOD BY AUTOMATED COUNT: 13.9 % (ref 10–15)
HCT VFR BLD AUTO: 46.7 % (ref 40–53)
HGB BLD-MCNC: 15.7 G/DL (ref 13.3–17.7)
LYMPHOCYTES # BLD AUTO: 1.6 10E9/L (ref 0.8–5.3)
LYMPHOCYTES NFR BLD AUTO: 16.3 %
MCH RBC QN AUTO: 30.3 PG (ref 26.5–33)
MCHC RBC AUTO-ENTMCNC: 33.6 G/DL (ref 31.5–36.5)
MCV RBC AUTO: 90 FL (ref 78–100)
MONOCYTES # BLD AUTO: 0.9 10E9/L (ref 0–1.3)
MONOCYTES NFR BLD AUTO: 9.8 %
NEUTROPHILS # BLD AUTO: 6.8 10E9/L (ref 1.6–8.3)
NEUTROPHILS NFR BLD AUTO: 71.7 %
PLATELET # BLD AUTO: 218 10E9/L (ref 150–450)
RBC # BLD AUTO: 5.18 10E12/L (ref 4.4–5.9)
WBC # BLD AUTO: 9.5 10E9/L (ref 4–11)

## 2020-05-11 PROCEDURE — 36415 COLL VENOUS BLD VENIPUNCTURE: CPT | Performed by: INTERNAL MEDICINE

## 2020-05-11 PROCEDURE — 84550 ASSAY OF BLOOD/URIC ACID: CPT | Performed by: INTERNAL MEDICINE

## 2020-05-11 PROCEDURE — 73610 X-RAY EXAM OF ANKLE: CPT | Mod: LT

## 2020-05-11 PROCEDURE — 85025 COMPLETE CBC W/AUTO DIFF WBC: CPT | Performed by: INTERNAL MEDICINE

## 2020-05-11 PROCEDURE — 99214 OFFICE O/P EST MOD 30 MIN: CPT | Performed by: INTERNAL MEDICINE

## 2020-05-11 RX ORDER — SULFAMETHOXAZOLE/TRIMETHOPRIM 800-160 MG
1 TABLET ORAL 2 TIMES DAILY
Qty: 20 TABLET | Refills: 0 | Status: SHIPPED | OUTPATIENT
Start: 2020-05-11 | End: 2020-08-18

## 2020-05-11 RX ORDER — LANOLIN ALCOHOL/MO/W.PET/CERES
6 CREAM (GRAM) TOPICAL
COMMUNITY
End: 2024-06-10

## 2020-05-11 RX ORDER — METHYLPREDNISOLONE 4 MG
TABLET, DOSE PACK ORAL
COMMUNITY
Start: 2020-02-17 | End: 2020-08-18

## 2020-05-11 RX ORDER — NAPROXEN 500 MG/1
500 TABLET ORAL 2 TIMES DAILY PRN
Qty: 30 TABLET | Refills: 0 | Status: SHIPPED | OUTPATIENT
Start: 2020-05-11 | End: 2020-07-20

## 2020-05-11 RX ORDER — TIZANIDINE 2 MG/1
TABLET ORAL
COMMUNITY
Start: 2020-02-14 | End: 2022-06-29

## 2020-05-11 NOTE — PATIENT INSTRUCTIONS
Please do the lab work and also the X ray given.    Medication sent to pharmacy.    =-====================    Patient Education     Cellulitis  Cellulitis is an infection of the deep layers of skin. A break in the skin, such as a cut or scratch, can let bacteria under the skin. If the bacteria get to deep layers of the skin, it can be serious. If not treated, cellulitis can get into the bloodstream and lymph nodes. The infection can then spread throughout the body. This causes serious illness.  Cellulitis causes the affected skin to become red, swollen, warm, and sore. The reddened areas have a visible border. An open sore may leak fluid (pus). You may have a fever, chills, and pain.  Cellulitis is treated with antibiotics taken for 7 to 10 days. An open sore may be cleaned and covered with cool wet gauze. Symptoms should get better 1 to 2 days after treatment is started. Make sure to take all the antibiotics for the full number of days until they are gone. Keep taking the medicine even if your symptoms go away.  Home care  Follow these tips:    Limit the use of the part of your body with cellulitis.     If the infection is on your leg, keep your leg raised while sitting. This will help to reduce swelling.    Take all of the antibiotic medicine exactly as directed until it is gone. Do not miss any doses, especially during the first 7 days. Don t stop taking the medicine when your symptoms get better.    Keep the affected area clean and dry.    Wash your hands with soap and warm water before and after touching your skin. Anyone else who touches your skin should also wash his or her hands. Don't share towels.  Follow-up care  Follow up with your healthcare provider, or as advised. If your infection does not go away on the first antibiotic, your healthcare provider will prescribe a different one.  When to seek medical advice  Call your healthcare provider right away if any of these occur:    Red areas that  spread    Swelling or pain that gets worse    Fluid leaking from the skin (pus)    Fever higher of 100.4  F (38.0  C) or higher after 2 days on antibiotics  Date Last Reviewed: 9/1/2016 2000-2019 [employer]. [address] [place] [address], [place] [address]. All rights reserved. This information is not intended as a substitute for professional medical care. Always follow your healthcare professional's instructions.

## 2020-05-11 NOTE — PROGRESS NOTES
Subjective     Canelo Cruz is a 88 year old male who presents to clinic today for the following health issues:    HPI   Joint Pain    Onset: sunday    Description:   Location: left ankle  Character: Sharp    Intensity: severe    Progression of Symptoms: worse    Accompanying Signs & Symptoms:  Other symptoms: numbness, tingling, swelling and redness, spasms every 2-4 minutes early this morning    History:   Previous similar pain: no       Precipitating factors:   Trauma or overuse: YES- possibly from fall in shower 1.5 weeks ago    Alleviating factors:  Improved by: nothing    Therapies Tried and outcome: elevated, volteran gel, ibuprofen at night time      History of gout  This is a chronic health problem that is uncontrolled with current medications and lifestyle measures. Patient states last attack of gout remotely more than last few months but only on big toe. This pain is different on a different location.    Pain in joint involving ankle and foot, left  This is a new problem which patient states that it started 2 days back. Denies any fever or chills. 10/10 severity of pain with worsening swelling and radiation of the pain upwards and spreading all over the foot and upwards towards the leg. Very hot to touch for himself. Patient does state he had a fall injury after which its worsening.      Patient Active Problem List   Diagnosis     History of colonic polyps     Constipation, unspecified constipation type     Post herpetic neuralgia     Decreased libido     ACP (advance care planning)     Erectile dysfunction, unspecified erectile dysfunction type     Osteoarthritis of right knee, unspecified osteoarthritis type     Primary insomnia     Other fatigue     Skin lesion     Elevated blood sugar     Hypertriglyceridemia     Class 1 obesity due to excess calories with serious comorbidity and body mass index (BMI) of 32.0 to 32.9 in adult     Recurrent UTI     Chronic fatigue     Hip pain, left     Impaired  fasting glucose     Balance problems-since 11-19     Decreased hearing of right ear- since 12-19     History of gout     Pain in joint involving ankle and foot, left     Past Surgical History:   Procedure Laterality Date     ARTHROSCOPY KNEE Right     ~2723-2500     ORTHOPEDIC SURGERY      right achilles rupture repair with 14 month MRSA infection       Social History     Tobacco Use     Smoking status: Never Smoker     Smokeless tobacco: Never Used   Substance Use Topics     Alcohol use: Yes     Comment: occ.     Family History   Problem Relation Age of Onset     Unknown/Adopted Mother      Unknown/Adopted Father      Diabetes No family hx of      Coronary Artery Disease No family hx of      Hypertension No family hx of      Hyperlipidemia No family hx of      Cerebrovascular Disease No family hx of      Breast Cancer No family hx of      Colon Cancer No family hx of      Prostate Cancer No family hx of      Other Cancer No family hx of      Depression No family hx of      Anxiety Disorder No family hx of      Mental Illness No family hx of      Substance Abuse No family hx of      Anesthesia Reaction No family hx of      Asthma No family hx of      Osteoporosis No family hx of      Genetic Disorder No family hx of      Thyroid Disease No family hx of      Obesity No family hx of            Reviewed and updated as needed this visit by Provider         Review of Systems   Constitutional, HEENT, cardiovascular, pulmonary, GI, , musculoskeletal, neuro, skin, endocrine and psych systems are negative, except as otherwise noted.      Objective    There were no vitals taken for this visit.  There is no height or weight on file to calculate BMI.  Physical Exam   GENERAL: healthy, alert and no distress  EYES: Eyes grossly normal to inspection, PERRL and conjunctivae and sclerae normal  HENT: nose and mouth without ulcers or lesions  RESP: lungs clear to auscultation - no rales, rhonchi or wheezes  CV: regular rate and  rhythm, normal S1 S2, no S3 or S4, no murmur, click or rub, no peripheral edema and peripheral pulses strong  ABDOMEN: soft, nontender, no hepatosplenomegaly, no masses and bowel sounds normal  MS: Positive for Left ankle lateral malleolar swelling, severe tenderness on touch, erythema and warmth felt. Does have similar pain on the dorsum of the foot and also restricted ROM.Negative for Homans sign.  SKIN: no suspicious lesions or rashes  PSYCH: mentation appears normal, affect normal/bright    Diagnostic Test Results:  Labs reviewed in Epic        Assessment and Plan  1. Cellulitis of ankle  2. Pain in joint involving ankle and foot, left  3. History of gout  New problem, my differential diagnosis being Cellulitis versus gout and also fracture to be ruled out given the trauma history. Will do the required labs and start on emperic antibiotic bactrim along with X ray to check for fracture.  - CBC with platelets differential  - sulfamethoxazole-trimethoprim (BACTRIM DS) 800-160 MG tablet; Take 1 tablet by mouth 2 times daily  Dispense: 20 tablet; Refill: 0  - naproxen (NAPROSYN) 500 MG tablet; Take 1 tablet (500 mg) by mouth 2 times daily as needed for moderate pain  Dispense: 30 tablet; Refill: 0  - XR Ankle Left G/E 3 Views; Future  - naproxen (NAPROSYN) 500 MG tablet; Take 1 tablet (500 mg) by mouth 2 times daily as needed for moderate pain  Dispense: 30 tablet; Refill: 0  - Uric acid      Patient Instructions   Please do the lab work and also the X ray given.    Medication sent to pharmacy.    =-====================    Patient Education     Cellulitis  Cellulitis is an infection of the deep layers of skin. A break in the skin, such as a cut or scratch, can let bacteria under the skin. If the bacteria get to deep layers of the skin, it can be serious. If not treated, cellulitis can get into the bloodstream and lymph nodes. The infection can then spread throughout the body. This causes serious illness.  Cellulitis  causes the affected skin to become red, swollen, warm, and sore. The reddened areas have a visible border. An open sore may leak fluid (pus). You may have a fever, chills, and pain.  Cellulitis is treated with antibiotics taken for 7 to 10 days. An open sore may be cleaned and covered with cool wet gauze. Symptoms should get better 1 to 2 days after treatment is started. Make sure to take all the antibiotics for the full number of days until they are gone. Keep taking the medicine even if your symptoms go away.  Home care  Follow these tips:    Limit the use of the part of your body with cellulitis.     If the infection is on your leg, keep your leg raised while sitting. This will help to reduce swelling.    Take all of the antibiotic medicine exactly as directed until it is gone. Do not miss any doses, especially during the first 7 days. Don t stop taking the medicine when your symptoms get better.    Keep the affected area clean and dry.    Wash your hands with soap and warm water before and after touching your skin. Anyone else who touches your skin should also wash his or her hands. Don't share towels.  Follow-up care  Follow up with your healthcare provider, or as advised. If your infection does not go away on the first antibiotic, your healthcare provider will prescribe a different one.  When to seek medical advice  Call your healthcare provider right away if any of these occur:    Red areas that spread    Swelling or pain that gets worse    Fluid leaking from the skin (pus)    Fever higher of 100.4  F (38.0  C) or higher after 2 days on antibiotics  Date Last Reviewed: 9/1/2016 2000-2019 [employer]. [address] [place] [address], [place] [address]. All rights reserved. This information is not intended as a substitute for professional medical care. Always follow your healthcare professional's instructions.               Return in about 2 weeks (around 5/25/2020), or if symptoms worsen or fail to  improve, for Preventative Visit.    Dasha Hyde MD  [facility]

## 2020-05-11 NOTE — ASSESSMENT & PLAN NOTE
This is a new problem which patient states that it started 2 days back. Denies any fever or chills. 10/10 severity of pain with worsening swelling and radiation of the pain upwards and spreading all over the foot and upwards towards the leg. Very hot to touch for himself. Patient does state he had a fall injury after which its worsening.

## 2020-05-11 NOTE — ASSESSMENT & PLAN NOTE
This is a chronic health problem that is uncontrolled with current medications and lifestyle measures. Patient states last attack of gout remotely more than last few months but only on big toe. This pain is different on a different location.

## 2020-05-12 DIAGNOSIS — M10.9 ACUTE GOUTY ARTHRITIS: Primary | ICD-10-CM

## 2020-05-12 LAB — URATE SERPL-MCNC: 7.9 MG/DL (ref 3.5–7.2)

## 2020-05-12 RX ORDER — METHYLPREDNISOLONE 4 MG
TABLET, DOSE PACK ORAL
Qty: 21 TABLET | Refills: 0 | Status: SHIPPED | OUTPATIENT
Start: 2020-05-12 | End: 2020-08-18

## 2020-06-29 ENCOUNTER — ALLIED HEALTH/NURSE VISIT (OUTPATIENT)
Dept: NURSING | Facility: CLINIC | Age: 85
End: 2020-06-29
Payer: COMMERCIAL

## 2020-06-29 DIAGNOSIS — Z23 NEED FOR VACCINATION: Primary | ICD-10-CM

## 2020-06-29 PROCEDURE — 99207 ZZC NO CHARGE LOS: CPT

## 2020-06-29 PROCEDURE — 90471 IMMUNIZATION ADMIN: CPT

## 2020-06-29 PROCEDURE — 90750 HZV VACC RECOMBINANT IM: CPT

## 2020-07-20 DIAGNOSIS — M25.572 PAIN IN JOINT INVOLVING ANKLE AND FOOT, LEFT: ICD-10-CM

## 2020-07-20 DIAGNOSIS — L03.119 CELLULITIS OF ANKLE: ICD-10-CM

## 2020-07-20 DIAGNOSIS — M17.11 OSTEOARTHRITIS OF RIGHT KNEE, UNSPECIFIED OSTEOARTHRITIS TYPE: ICD-10-CM

## 2020-07-20 RX ORDER — NAPROXEN 500 MG/1
500 TABLET ORAL 2 TIMES DAILY PRN
Qty: 30 TABLET | Refills: 0 | Status: SHIPPED | OUTPATIENT
Start: 2020-07-20 | End: 2022-06-29

## 2020-07-20 NOTE — TELEPHONE ENCOUNTER
Routing refill request to provider for review/approval because:  Labs out of range:  BP  Out of age range    BP Readings from Last 3 Encounters:   05/11/20 (!) 140/72   03/05/20 124/74   03/03/20 120/74

## 2020-07-29 DIAGNOSIS — N39.0 RECURRENT UTI: ICD-10-CM

## 2020-07-30 RX ORDER — TAMSULOSIN HYDROCHLORIDE 0.4 MG/1
0.4 CAPSULE ORAL DAILY PRN
Qty: 90 CAPSULE | Refills: 3 | Status: SHIPPED | OUTPATIENT
Start: 2020-07-30 | End: 2021-10-06

## 2020-07-31 ENCOUNTER — TRANSFERRED RECORDS (OUTPATIENT)
Dept: HEALTH INFORMATION MANAGEMENT | Facility: CLINIC | Age: 85
End: 2020-07-31

## 2020-08-18 ENCOUNTER — OFFICE VISIT (OUTPATIENT)
Dept: FAMILY MEDICINE | Facility: CLINIC | Age: 85
End: 2020-08-18
Payer: COMMERCIAL

## 2020-08-18 VITALS
RESPIRATION RATE: 14 BRPM | SYSTOLIC BLOOD PRESSURE: 124 MMHG | DIASTOLIC BLOOD PRESSURE: 70 MMHG | WEIGHT: 217 LBS | BODY MASS INDEX: 31.14 KG/M2 | HEART RATE: 68 BPM | TEMPERATURE: 97.5 F

## 2020-08-18 DIAGNOSIS — L98.9 SKIN LESION OF CHEST WALL: Primary | ICD-10-CM

## 2020-08-18 PROCEDURE — 88305 TISSUE EXAM BY PATHOLOGIST: CPT | Mod: TC | Performed by: PHYSICIAN ASSISTANT

## 2020-08-18 PROCEDURE — 11102 TANGNTL BX SKIN SINGLE LES: CPT | Performed by: PHYSICIAN ASSISTANT

## 2020-08-18 PROCEDURE — 99207 ZZC NO CHARGE LOS: CPT | Mod: 25 | Performed by: PHYSICIAN ASSISTANT

## 2020-08-18 PROCEDURE — 88304 TISSUE EXAM BY PATHOLOGIST: CPT | Performed by: PHYSICIAN ASSISTANT

## 2020-08-18 ASSESSMENT — ENCOUNTER SYMPTOMS
GASTROINTESTINAL NEGATIVE: 1
CARDIOVASCULAR NEGATIVE: 1
CONSTITUTIONAL NEGATIVE: 1
EYES NEGATIVE: 1
RESPIRATORY NEGATIVE: 1
ROS SKIN COMMENTS: AS IN HPI
PSYCHIATRIC NEGATIVE: 1
NEUROLOGICAL NEGATIVE: 1
MUSCULOSKELETAL NEGATIVE: 1

## 2020-08-18 NOTE — PROGRESS NOTES
Subjective     Canelo Cruz is a 89 year old male who presents to clinic today for the following health issues:    HPI       Derm Problem      Duration: 3 weeks    Description (location/character/radiation): hard lesion on lt side of chest    Intensity:  moderate    Accompanying signs and symptoms: very painful when he touches it    History (similar episodes/previous evaluation): None    Precipitating or alleviating factors: None    Therapies tried and outcome: calamine lotion     He noticed the lesion a few weeks ago  He has been very cautious with sun exposure for the past 5 years  The lesion hurts when he brushed over it from lateral to medial - no pain when pushing directly on it or brushing from medial to lateral         Patient Active Problem List   Diagnosis     History of colonic polyps     Constipation, unspecified constipation type     Post herpetic neuralgia     Decreased libido     ACP (advance care planning)     Erectile dysfunction, unspecified erectile dysfunction type     Osteoarthritis of right knee, unspecified osteoarthritis type     Primary insomnia     Other fatigue     Skin lesion     Elevated blood sugar     Hypertriglyceridemia     Class 1 obesity due to excess calories with serious comorbidity and body mass index (BMI) of 32.0 to 32.9 in adult     Recurrent UTI     Chronic fatigue     Hip pain, left     Impaired fasting glucose     Balance problems-since 11-19     Decreased hearing of right ear- since 12-19     History of gout     Pain in joint involving ankle and foot, left     Past Surgical History:   Procedure Laterality Date     ARTHROSCOPY KNEE Right     ~5966-7185     ORTHOPEDIC SURGERY      right achilles rupture repair with 14 month MRSA infection       Social History     Tobacco Use     Smoking status: Never Smoker     Smokeless tobacco: Never Used   Substance Use Topics     Alcohol use: Yes     Comment: occ.     Family History   Problem Relation Age of Onset     Unknown/Adopted  Mother      Unknown/Adopted Father      Diabetes No family hx of      Coronary Artery Disease No family hx of      Hypertension No family hx of      Hyperlipidemia No family hx of      Cerebrovascular Disease No family hx of      Breast Cancer No family hx of      Colon Cancer No family hx of      Prostate Cancer No family hx of      Other Cancer No family hx of      Depression No family hx of      Anxiety Disorder No family hx of      Mental Illness No family hx of      Substance Abuse No family hx of      Anesthesia Reaction No family hx of      Asthma No family hx of      Osteoporosis No family hx of      Genetic Disorder No family hx of      Thyroid Disease No family hx of      Obesity No family hx of          Current Outpatient Medications   Medication Sig Dispense Refill     tamsulosin (FLOMAX) 0.4 MG capsule Take 1 capsule (0.4 mg) by mouth daily as needed 90 capsule 3     diclofenac (VOLTAREN) 1 % topical gel Apply 4 grams to knees or 2 grams to elbows four times daily using enclosed dosing card. (Patient not taking: Reported on 8/18/2020) 350 g 2     melatonin 3 MG tablet Take 1 mg by mouth nightly as needed for sleep       naproxen (NAPROSYN) 500 MG tablet Take 1 tablet (500 mg) by mouth 2 times daily as needed for moderate pain (Patient not taking: Reported on 8/18/2020) 30 tablet 0     tiZANidine (ZANAFLEX) 2 MG tablet        Allergies   Allergen Reactions     Ciprofloxacin      history of achilles tendon pain       Reviewed and updated as needed this visit by Provider         Review of Systems   Constitutional: Negative.    HENT: Negative.    Eyes: Negative.    Respiratory: Negative.    Cardiovascular: Negative.    Gastrointestinal: Negative.    Genitourinary: Negative.    Musculoskeletal: Negative.    Skin:        As in HPI   Neurological: Negative.    Psychiatric/Behavioral: Negative.          Objective    /70 (Cuff Size: Adult Large)   Pulse 68   Temp 97.5  F (36.4  C) (Tympanic)   Resp 14    Wt 98.4 kg (217 lb)   BMI 31.14 kg/m    Physical Exam  Constitutional:       General: He is not in acute distress.     Appearance: He is well-developed. He is not diaphoretic.   HENT:      Head: Normocephalic.      Right Ear: External ear normal.      Left Ear: External ear normal.      Nose: Nose normal.   Eyes:      Conjunctiva/sclera: Conjunctivae normal.   Neck:      Musculoskeletal: Normal range of motion.   Pulmonary:      Effort: Pulmonary effort is normal.   Skin:     Findings: Lesion (left anterior chest - white, rough, raised, 2 mm by 2 mm lesion - after examination the crust peeled off the lesion - base is white and dry) present.   Neurological:      Mental Status: He is alert and oriented to person, place, and time.   Psychiatric:         Judgment: Judgment normal.         Diagnostic Test Results:  No results found for this or any previous visit (from the past 24 hour(s)).        Assessment & Plan   Problem List Items Addressed This Visit     None      Visit Diagnoses     Skin lesion of chest wall    -  Primary    Relevant Orders    Dermatological path order and indications (Completed)    HC TANGENTIAL BIOPSY OF SKIN, FIRST LESION (Completed)         TANGENTIAL BIOPSY:  After consent, anesthesia with LEC and prep, tangential biopsy performed.  No complications and routine wound care.  May grow back and will get a scar. Based on lesion type may need to completely remove lesion. Patient will be notified in 7-10 days of results. Wound care directions given.      DDx AK, SK, SCC.  Maybe wart or BCC.  Will wait for biopsy results to decide on next step if any.   Patient sees Shalini Morrison for dermatology    There are no Patient Instructions on file for this visit.    Return if symptoms worsen or fail to improve.    Neena Nunez PA-C  [facility]

## 2020-08-20 LAB — COPATH REPORT: NORMAL

## 2020-08-21 NOTE — RESULT ENCOUNTER NOTE
Hang    Your lab tests are complete and I have reviewed the results.     The skin lesion is benign (NOT cancer).  Great news!  No further follow up is needed.     If you have any questions or concerns, please feel free to call or send a [employer] message.    Sincerely,  Dave Nunez PA-C

## 2020-09-04 ENCOUNTER — ALLIED HEALTH/NURSE VISIT (OUTPATIENT)
Dept: NURSING | Facility: CLINIC | Age: 85
End: 2020-09-04
Payer: COMMERCIAL

## 2020-09-04 DIAGNOSIS — Z23 NEED FOR PROPHYLACTIC VACCINATION AND INOCULATION AGAINST INFLUENZA: Primary | ICD-10-CM

## 2020-09-04 PROCEDURE — 90662 IIV NO PRSV INCREASED AG IM: CPT

## 2020-09-04 PROCEDURE — G0008 ADMIN INFLUENZA VIRUS VAC: HCPCS

## 2020-09-04 PROCEDURE — 99207 ZZC NO CHARGE NURSE ONLY: CPT

## 2020-10-05 DIAGNOSIS — M10.9 ACUTE GOUTY ARTHRITIS: ICD-10-CM

## 2020-10-05 RX ORDER — METHYLPREDNISOLONE 4 MG
TABLET, DOSE PACK ORAL
Qty: 21 TABLET | Refills: 0 | Status: SHIPPED | OUTPATIENT
Start: 2020-10-05 | End: 2020-10-13

## 2020-10-05 NOTE — TELEPHONE ENCOUNTER
Reason for Call:  Medication or medication refill:    Do you use a [facility]?  Name of the pharmacy and phone number for the current request:  [facility]      Name of the medication requested: methylPREDNISolone (MEDROL DOSEPAK) 4 MG tablet therapy pack       Other request: The patient called and stated that he needs a refill for this medication.     Can we leave a detailed message on this number? YES    Phone number patient can be reached at: Home number on file [phone] (home)    Best Time: Any    Call taken on 10/5/2020 at 12:57 PM by Elida Painter

## 2020-10-12 DIAGNOSIS — M10.9 ACUTE GOUTY ARTHRITIS: ICD-10-CM

## 2020-10-13 RX ORDER — METHYLPREDNISOLONE 4 MG
TABLET, DOSE PACK ORAL
Qty: 21 TABLET | Refills: 0 | Status: SHIPPED | OUTPATIENT
Start: 2020-10-13 | End: 2022-06-29

## 2021-04-24 ENCOUNTER — HEALTH MAINTENANCE LETTER (OUTPATIENT)
Age: 86
End: 2021-04-24

## 2021-07-26 ENCOUNTER — TELEPHONE (OUTPATIENT)
Dept: FAMILY MEDICINE | Facility: CLINIC | Age: 86
End: 2021-07-26

## 2021-07-26 NOTE — TELEPHONE ENCOUNTER
Reason for Call:  Form, our goal is to have forms completed with 72 hours, however, some forms may require a visit or additional information.    Type of letter, form or note:  Rx    Who is the form from?: pharmacy  (if other please explain)    Where did the form come from: form was faxed in    What clinic location was the form placed at?: [facility]     Where the form was placed: [facility] Box/Folder    What number is listed as a contact on the form?: [phone]       Additional comments: please sign.date and fax to [phone]    Call taken on 7/26/2021 at 10:22 AM by Viji Salguero

## 2021-07-28 NOTE — TELEPHONE ENCOUNTER
Form signed by physician. Faxed by  to number listed below. Copy kept at station.     ~Sarah PIÑA,

## 2021-10-04 ENCOUNTER — HEALTH MAINTENANCE LETTER (OUTPATIENT)
Age: 86
End: 2021-10-04

## 2021-10-05 DIAGNOSIS — N39.0 RECURRENT UTI: ICD-10-CM

## 2021-10-06 RX ORDER — TAMSULOSIN HYDROCHLORIDE 0.4 MG/1
0.4 CAPSULE ORAL DAILY PRN
Qty: 90 CAPSULE | Refills: 3 | Status: SHIPPED | OUTPATIENT
Start: 2021-10-06 | End: 2022-06-29

## 2022-01-21 ENCOUNTER — APPOINTMENT (OUTPATIENT)
Dept: URBAN - METROPOLITAN AREA CLINIC 256 | Age: 87
Setting detail: DERMATOLOGY
End: 2022-01-23

## 2022-01-21 VITALS — HEIGHT: 72 IN | RESPIRATION RATE: 16 BRPM | WEIGHT: 188 LBS

## 2022-01-21 DIAGNOSIS — L20.9 ATOPIC DERMATITIS, UNSPECIFIED: ICD-10-CM

## 2022-01-21 PROBLEM — L30.9 DERMATITIS, UNSPECIFIED: Status: ACTIVE | Noted: 2022-01-21

## 2022-01-21 PROCEDURE — OTHER SEPARATE AND IDENTIFIABLE DOCUMENTATION: OTHER

## 2022-01-21 PROCEDURE — OTHER PRESCRIPTION: OTHER

## 2022-01-21 PROCEDURE — OTHER MIPS QUALITY: OTHER

## 2022-01-21 PROCEDURE — OTHER INTRAMUSCULAR KENALOG: OTHER

## 2022-01-21 PROCEDURE — OTHER COUNSELING: OTHER

## 2022-01-21 PROCEDURE — 96372 THER/PROPH/DIAG INJ SC/IM: CPT

## 2022-01-21 PROCEDURE — 99203 OFFICE O/P NEW LOW 30 MIN: CPT | Mod: 25

## 2022-01-21 RX ORDER — KETOCONAZOLE 20 MG/G
2% CREAM TOPICAL BID
Qty: 120 | Refills: 2 | Status: ERX | COMMUNITY
Start: 2022-01-21

## 2022-01-21 RX ORDER — KETOCONAZOLE 20 MG/ML
2% SHAMPOO, SUSPENSION TOPICAL QD
Qty: 120 | Refills: 2 | Status: ERX | COMMUNITY
Start: 2022-01-21

## 2022-01-21 RX ORDER — TRIAMCINOLONE ACETONIDE 1 MG/G
0.1% OINTMENT TOPICAL QHS
Qty: 453.6 | Refills: 0 | Status: ERX | COMMUNITY
Start: 2022-01-21

## 2022-01-21 ASSESSMENT — LOCATION DETAILED DESCRIPTION DERM
LOCATION DETAILED: RIGHT MEDIAL SUPERIOR CHEST
LOCATION DETAILED: RIGHT INFERIOR LATERAL LOWER BACK
LOCATION DETAILED: LEFT CENTRAL MALAR CHEEK
LOCATION DETAILED: LEFT VENTRAL PROXIMAL FOREARM
LOCATION DETAILED: RIGHT CLAVICULAR NECK
LOCATION DETAILED: RIGHT ANTERIOR PROXIMAL UPPER ARM
LOCATION DETAILED: LEFT ANTERIOR SHOULDER
LOCATION DETAILED: LEFT CLAVICULAR NECK
LOCATION DETAILED: RIGHT VENTRAL PROXIMAL FOREARM
LOCATION DETAILED: LEFT ANTERIOR PROXIMAL UPPER ARM
LOCATION DETAILED: LEFT MEDIAL SUPERIOR CHEST

## 2022-01-21 ASSESSMENT — LOCATION SIMPLE DESCRIPTION DERM
LOCATION SIMPLE: RIGHT LOWER BACK
LOCATION SIMPLE: LEFT CHEEK
LOCATION SIMPLE: LEFT SHOULDER
LOCATION SIMPLE: CHEST
LOCATION SIMPLE: RIGHT UPPER ARM
LOCATION SIMPLE: RIGHT FOREARM
LOCATION SIMPLE: LEFT FOREARM
LOCATION SIMPLE: RIGHT ANTERIOR NECK
LOCATION SIMPLE: LEFT ANTERIOR NECK
LOCATION SIMPLE: LEFT UPPER ARM

## 2022-01-21 ASSESSMENT — LOCATION ZONE DERM
LOCATION ZONE: FACE
LOCATION ZONE: TRUNK
LOCATION ZONE: ARM
LOCATION ZONE: NECK

## 2022-01-21 NOTE — PROCEDURE: MIPS QUALITY
Quality 111:Pneumonia Vaccination Status For Older Adults: Pneumococcal Vaccination Previously Received
Detail Level: Detailed
Quality 130: Documentation Of Current Medications In The Medical Record: Current Medications Documented
Quality 110: Preventive Care And Screening: Influenza Immunization: Influenza Immunization Administered during Influenza season
Quality 431: Preventive Care And Screening: Unhealthy Alcohol Use - Screening: Patient not identified as an unhealthy alcohol user when screened for unhealthy alcohol use using a systematic screening method
Quality 226: Preventive Care And Screening: Tobacco Use: Screening And Cessation Intervention: Patient screened for tobacco use and is an ex/non-smoker

## 2022-01-21 NOTE — HPI: RASH
What Type Of Note Output Would You Prefer (Optional)?: Standard Output
Is The Patient Presenting As Previously Scheduled?: Yes
How Severe Is Your Rash?: moderate
Is This A New Presentation, Or A Follow-Up?: Rash
Additional History: Patient recently finished six day course of prednisone started 1/11/22.  He started Tumeric in November for OA.  He started loosing weight due loss of appetite, and he took MVI.  Using Triple antibiotic and Neosporin.

## 2022-02-01 RX ORDER — TRIAMCINOLONE ACETONIDE 1 MG/G
OINTMENT TOPICAL QHS
Qty: 453.6 | Refills: 0 | Status: ERX

## 2022-02-18 ENCOUNTER — APPOINTMENT (OUTPATIENT)
Dept: URBAN - METROPOLITAN AREA CLINIC 256 | Age: 87
Setting detail: DERMATOLOGY
End: 2022-02-18

## 2022-02-18 VITALS — WEIGHT: 184 LBS | HEIGHT: 73 IN | RESPIRATION RATE: 15 BRPM

## 2022-02-18 DIAGNOSIS — L21.8 OTHER SEBORRHEIC DERMATITIS: ICD-10-CM

## 2022-02-18 DIAGNOSIS — R21 RASH AND OTHER NONSPECIFIC SKIN ERUPTION: ICD-10-CM

## 2022-02-18 DIAGNOSIS — L57.0 ACTINIC KERATOSIS: ICD-10-CM

## 2022-02-18 DIAGNOSIS — L72.0 EPIDERMAL CYST: ICD-10-CM

## 2022-02-18 PROCEDURE — OTHER BENIGN DESTRUCTION: OTHER

## 2022-02-18 PROCEDURE — 17110 DESTRUCT B9 LESION 1-14: CPT

## 2022-02-18 PROCEDURE — OTHER LIQUID NITROGEN: OTHER

## 2022-02-18 PROCEDURE — 17003 DESTRUCT PREMALG LES 2-14: CPT | Mod: 59

## 2022-02-18 PROCEDURE — 17000 DESTRUCT PREMALG LESION: CPT | Mod: 59

## 2022-02-18 PROCEDURE — 99213 OFFICE O/P EST LOW 20 MIN: CPT | Mod: 25

## 2022-02-18 PROCEDURE — OTHER COUNSELING: OTHER

## 2022-02-18 ASSESSMENT — LOCATION DETAILED DESCRIPTION DERM
LOCATION DETAILED: RIGHT SUPERIOR MEDIAL MALAR CHEEK
LOCATION DETAILED: LEFT INFERIOR MEDIAL UPPER BACK
LOCATION DETAILED: LEFT SUPERIOR MEDIAL MIDBACK
LOCATION DETAILED: RIGHT SUPERIOR CENTRAL MALAR CHEEK
LOCATION DETAILED: STERNUM
LOCATION DETAILED: LEFT CENTRAL MALAR CHEEK

## 2022-02-18 ASSESSMENT — LOCATION SIMPLE DESCRIPTION DERM
LOCATION SIMPLE: RIGHT CHEEK
LOCATION SIMPLE: LEFT UPPER BACK
LOCATION SIMPLE: CHEST
LOCATION SIMPLE: LEFT CHEEK
LOCATION SIMPLE: LEFT LOWER BACK

## 2022-02-18 ASSESSMENT — LOCATION ZONE DERM
LOCATION ZONE: FACE
LOCATION ZONE: TRUNK

## 2022-02-18 NOTE — PROCEDURE: BENIGN DESTRUCTION
Detail Level: Detailed
Consent: The patient's consent was obtained including but not limited to risks of crusting, scabbing, blistering, scarring, darker or lighter pigmentary change, recurrence, incomplete removal and infection.
Medical Necessity Information: It is in your best interest to select a reason for this procedure from the list below. All of these items fulfill various CMS LCD requirements except the new and changing color options.
Include Z78.9 (Other Specified Conditions Influencing Health Status) As An Associated Diagnosis?: No
Treatment Number (Will Not Render If 0): 0
Anesthesia Volume In Cc: 0.1
Medical Necessity Clause: This procedure was medically necessary because the lesions that were treated were:
Post-Care Instructions: I reviewed with the patient in detail post-care instructions. Patient is to wear sunprotection, and avoid picking at any of the treated lesions. Pt may apply Vaseline to crusted or scabbing areas.

## 2022-02-18 NOTE — PROCEDURE: LIQUID NITROGEN
Show Applicator Variable?: Yes
Duration Of Freeze Thaw-Cycle (Seconds): 10
Render Note In Bullet Format When Appropriate: No
Number Of Freeze-Thaw Cycles: 3 freeze-thaw cycles
Consent: The patient's consent was obtained including but not limited to risks of crusting, scabbing, blistering, scarring, darker or lighter pigmentary change, recurrence, incomplete removal and infection.
Post-Care Instructions: I reviewed with the patient in detail post-care instructions. Patient is to wear sunprotection, and avoid picking at any of the treated lesions. Pt may apply Vaseline to crusted or scabbing areas.
Detail Level: Detailed

## 2022-02-18 NOTE — PROCEDURE: COUNSELING
Detail Level: Detailed
Patient Specific Counseling (Will Not Stick From Patient To Patient): Stop the TMC for now.  Monitor any recurrent rash.

## 2022-03-19 NOTE — TELEPHONE ENCOUNTER
Cheratussin AC Oral Syrup 100-10 MG/5ML  Last Written Prescription Date:  06/18/18  Last Fill Quantity: 120 ml,  # refills: 0   Last office visit: 6/18/2018 with prescribing provider:  06/18/18   Future Office Visit:   Next 5 appointments (look out 90 days)     Jul 12, 2018  9:20 AM CDT   [place] Jahaira Morrison PA-C   [facility] [place]    [address] [place] [id number]   [phone]                 Requested Prescriptions   Pending Prescriptions Disp Refills     CHERATUSSIN -10 MG/5ML SYRP solution [Pharmacy Med Name: Cheratussin AC Oral Syrup 100-10 MG/5ML] 120 mL 0     Sig: TAKE TWO TEASPOONFULS BY MOUTH EVERY FOUR HOURS AS NEEDED FOR COUGH    There is no refill protocol information for this order           Attending with

## 2022-04-14 ENCOUNTER — APPOINTMENT (OUTPATIENT)
Dept: URBAN - METROPOLITAN AREA CLINIC 256 | Age: 87
Setting detail: DERMATOLOGY
End: 2022-04-16

## 2022-04-14 VITALS — HEIGHT: 73 IN | RESPIRATION RATE: 15 BRPM | WEIGHT: 183 LBS

## 2022-04-14 DIAGNOSIS — L82.0 INFLAMED SEBORRHEIC KERATOSIS: ICD-10-CM

## 2022-04-14 DIAGNOSIS — T1490XA CONTUSION OF UNSPECIFIED SITE: ICD-10-CM

## 2022-04-14 DIAGNOSIS — L82.1 OTHER SEBORRHEIC KERATOSIS: ICD-10-CM

## 2022-04-14 DIAGNOSIS — L57.0 ACTINIC KERATOSIS: ICD-10-CM

## 2022-04-14 PROBLEM — S80.12XA CONTUSION OF LEFT LOWER LEG, INITIAL ENCOUNTER: Status: ACTIVE | Noted: 2022-04-14

## 2022-04-14 PROBLEM — S50.12XA CONTUSION OF LEFT FOREARM, INITIAL ENCOUNTER: Status: ACTIVE | Noted: 2022-04-14

## 2022-04-14 PROCEDURE — 99213 OFFICE O/P EST LOW 20 MIN: CPT | Mod: 25

## 2022-04-14 PROCEDURE — 17003 DESTRUCT PREMALG LES 2-14: CPT | Mod: 59

## 2022-04-14 PROCEDURE — OTHER MIPS QUALITY: OTHER

## 2022-04-14 PROCEDURE — OTHER LIQUID NITROGEN: OTHER

## 2022-04-14 PROCEDURE — 17110 DESTRUCT B9 LESION 1-14: CPT

## 2022-04-14 PROCEDURE — 17000 DESTRUCT PREMALG LESION: CPT | Mod: 59

## 2022-04-14 PROCEDURE — OTHER COUNSELING: OTHER

## 2022-04-14 ASSESSMENT — LOCATION DETAILED DESCRIPTION DERM
LOCATION DETAILED: LEFT DISTAL PRETIBIAL REGION
LOCATION DETAILED: LEFT PROXIMAL RADIAL DORSAL FOREARM
LOCATION DETAILED: RIGHT SUPERIOR UPPER BACK
LOCATION DETAILED: RIGHT DISTAL DORSAL FOREARM
LOCATION DETAILED: LEFT DISTAL CALF
LOCATION DETAILED: LEFT ULNAR DORSAL HAND
LOCATION DETAILED: LEFT PROXIMAL PRETIBIAL REGION
LOCATION DETAILED: LEFT MID-UPPER BACK

## 2022-04-14 ASSESSMENT — LOCATION ZONE DERM
LOCATION ZONE: LEG
LOCATION ZONE: HAND
LOCATION ZONE: ARM
LOCATION ZONE: TRUNK

## 2022-04-14 ASSESSMENT — LOCATION SIMPLE DESCRIPTION DERM
LOCATION SIMPLE: RIGHT UPPER BACK
LOCATION SIMPLE: LEFT FOREARM
LOCATION SIMPLE: LEFT UPPER BACK
LOCATION SIMPLE: LEFT PRETIBIAL REGION
LOCATION SIMPLE: LEFT CALF
LOCATION SIMPLE: LEFT HAND
LOCATION SIMPLE: RIGHT FOREARM

## 2022-04-14 NOTE — PROCEDURE: LIQUID NITROGEN
Post-Care Instructions: I reviewed with the patient in detail post-care instructions. Patient is to wear sunprotection, and avoid picking at any of the treated lesions. Pt may apply Vaseline to crusted or scabbing areas.
Render Post-Care Instructions In Note?: no
Detail Level: Detailed
Medical Necessity Clause: This procedure was medically necessary because the lesions that were treated were:
Show Aperture Variable?: Yes
Duration Of Freeze Thaw-Cycle (Seconds): 5-10
Duration Of Freeze Thaw-Cycle (Seconds): 10
Medical Necessity Information: It is in your best interest to select a reason for this procedure from the list below. All of these items fulfill various CMS LCD requirements except the new and changing color options.
Consent: The patient's consent was obtained including but not limited to risks of crusting, scabbing, blistering, scarring, darker or lighter pigmentary change, recurrence, incomplete removal and infection.
Number Of Freeze-Thaw Cycles: 3 freeze-thaw cycles
Spray Paint Text: The liquid nitrogen was applied to the skin utilizing a spray paint frosting technique.

## 2022-04-28 ENCOUNTER — APPOINTMENT (OUTPATIENT)
Dept: URBAN - METROPOLITAN AREA CLINIC 256 | Age: 87
Setting detail: DERMATOLOGY
End: 2022-04-30

## 2022-04-28 DIAGNOSIS — D69.2 OTHER NONTHROMBOCYTOPENIC PURPURA: ICD-10-CM

## 2022-04-28 PROCEDURE — OTHER MIPS QUALITY: OTHER

## 2022-04-28 PROCEDURE — OTHER OTC TREATMENT REGIMEN: OTHER

## 2022-04-28 PROCEDURE — OTHER TREATMENT REGIMEN: OTHER

## 2022-04-28 PROCEDURE — 99212 OFFICE O/P EST SF 10 MIN: CPT

## 2022-04-28 PROCEDURE — OTHER COUNSELING: OTHER

## 2022-04-28 ASSESSMENT — LOCATION ZONE DERM: LOCATION ZONE: ARM

## 2022-04-28 ASSESSMENT — LOCATION SIMPLE DESCRIPTION DERM
LOCATION SIMPLE: LEFT FOREARM
LOCATION SIMPLE: RIGHT FOREARM

## 2022-04-28 ASSESSMENT — LOCATION DETAILED DESCRIPTION DERM
LOCATION DETAILED: LEFT PROXIMAL DORSAL FOREARM
LOCATION DETAILED: RIGHT DISTAL DORSAL FOREARM

## 2022-04-28 NOTE — PROCEDURE: OTC TREATMENT REGIMEN
Samples Given: Vanicream Ointment.
Detail Level: Zone
Patient Specific Otc Recommendations (Will Not Stick From Patient To Patient): 1. Patient purchased Arnica Gel from clinic today to apply twice daily until resolved.\\n2. Vanicream Ointment BID

## 2022-05-15 ENCOUNTER — HEALTH MAINTENANCE LETTER (OUTPATIENT)
Age: 87
End: 2022-05-15

## 2022-06-28 NOTE — PROGRESS NOTES
[facility] Consultation    CHIEF COMPLAINT:  Chief Complaint   Patient presents with     Consult     [facility]        HISTORY OF PRESENT ILLNESS:  Canelo Cruz is a 89 year old male who is seen in consultation at the request of Dr. Parker for evaluation of right inguinal hernia.  The patient reports that he has noticed a bulge present in the right inguinal area which has been present for a couple of months.  He does report having some discomfort associated with this bulging, particularly over the last week.  The bulging fluctuates in size and has always been reducible.  The patient reports that he has noticed some discomfort in the left inguinal area for several years.  He has not noticed any significant bulging in the left inguinal area.  He has not noticed any discomfort or bulging at the umbilicus.  Patient has not undergone any previous hernia repairs.  No significant family history of hernias.  Patient denies use of tobacco.  He has not undergone any previous abdominal surgical procedures.  He has undergone previous Achilles tendon repair, which was complicated by significant postoperative infection requiring hyperbaric treatment and repeat operation.  Patient reports history of MRSA.    REVIEW OF SYSTEMS:  Constitutional: No fevers or chills  Eyes: No blurred or double vision  HENT: Denies headaches, No rhinorrhea, No sore throat  Respiratory: No cough or shortness of breath  Cardiovascular: Denies chest pain or palpitations  Gastrointestinal: No abdominal pain or nausea/vomiting  Genitourinary: No hematuria or dysuria  Musculoskeletal: Denies arthralgias or myalgias  Neurologic: No numbness or tingling  Integumentary: No skin rashes    Past Medical History:   Diagnosis Date     Postherpetic neuralgia        Past Surgical History:   Procedure Laterality Date     ARTHROSCOPY KNEE Right     ~[id number]     ORTHOPEDIC SURGERY      right achilles rupture repair with 14 month MRSA infection        Family History   Problem Relation Age of Onset     Unknown/Adopted Mother      Unknown/Adopted Father      Diabetes No family hx of      Coronary Artery Disease No family hx of      Hypertension No family hx of      Hyperlipidemia No family hx of      Cerebrovascular Disease No family hx of      Breast Cancer No family hx of      Colon Cancer No family hx of      Prostate Cancer No family hx of      Other Cancer No family hx of      Depression No family hx of      Anxiety Disorder No family hx of      Mental Illness No family hx of      Substance Abuse No family hx of      Anesthesia Reaction No family hx of      Asthma No family hx of      Osteoporosis No family hx of      Genetic Disorder No family hx of      Thyroid Disease No family hx of      Obesity No family hx of        Social History     Tobacco Use     Smoking status: Never Smoker     Smokeless tobacco: Never Used   Substance Use Topics     Alcohol use: Yes     Comment: 10 per week       Patient Active Problem List   Diagnosis     History of colonic polyps     Constipation, unspecified constipation type     Post herpetic neuralgia     Decreased libido     ACP (advance care planning)     Erectile dysfunction, unspecified erectile dysfunction type     Osteoarthritis of right knee, unspecified osteoarthritis type     Primary insomnia     Other fatigue     Skin lesion     Elevated blood sugar     Hypertriglyceridemia     Class 1 obesity due to excess calories with serious comorbidity and body mass index (BMI) of 32.0 to 32.9 in adult     Recurrent UTI     Chronic fatigue     Hip pain, left     Impaired fasting glucose     Balance problems-since 11-19     Decreased hearing of right ear- since 12-19     History of gout     Pain in joint involving ankle and foot, left       Allergies   Allergen Reactions     Ciprofloxacin      history of achilles tendon pain       Current Outpatient Medications   Medication Sig Dispense Refill     melatonin 3 MG tablet Take  "1 mg by mouth nightly as needed for sleep         Vitals: /60   Pulse 68   Ht 1.854 m (6' 1\")   Wt 82.6 kg (182 lb)   SpO2 98%   BMI 24.01 kg/m    BMI= Body mass index is 24.01 kg/m .    EXAM:  General: Vital signs reviewed, in no apparent distress  Eyes: Anicteric  HENT: Normocephalic, atraumatic, trachea midline   Respiratory: Breathing nonlabored  Cardiovascular: Regular rate and rhythm  GI: Abdomen soft, nondistended, nontender; small umbilical hernia, moderate sized reducible right inguinal hernia, possible small left inguinal hernia  Musculoskeletal: No gross deformities  Neurologic: Grossly nonfocal exam  Psychiatric: Normal mood, affect and insight  Integumentary: Warm and dry      ASSESSMENT:  Canelo Cruz is a 89 year old who presents with umbilical hernia, moderate sized right inguinal hernia, possible small left inguinal hernia.  Significant pertinent co-morbidities include: Previous MRSA infection.       PLAN:  Following a thorough discussion with the patient, the decision was made to proceed to the operating room for robotic repair of the patient's right and possible left inguinal hernias with placement of mesh.  The risk and benefits of the procedure were discussed with the patient.  The patient has opted to continue with observation for his umbilical hernia, which I believe is very reasonable.  I have recommended overnight observation following the surgical procedure due to the patient's age and previous history of significant surgical infection.  Signs and symptoms of hernia incarceration were discussed today in clinic.  Patient understands that these signs/symptoms warrant urgent evaluation.    It was my pleasure to participate in the care of Canelo Cruz in clinic today. Thank you for this consultation.         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  Per [facility] policy, any patient questions/concerns regarding the income that I earn from [employer] were addressed today in clinic.    Ina Phillips MD

## 2022-06-29 ENCOUNTER — OFFICE VISIT (OUTPATIENT)
Dept: SURGERY | Facility: CLINIC | Age: 87
End: 2022-06-29
Payer: COMMERCIAL

## 2022-06-29 VITALS
HEART RATE: 68 BPM | OXYGEN SATURATION: 98 % | DIASTOLIC BLOOD PRESSURE: 60 MMHG | BODY MASS INDEX: 24.12 KG/M2 | WEIGHT: 182 LBS | SYSTOLIC BLOOD PRESSURE: 120 MMHG | HEIGHT: 73 IN

## 2022-06-29 DIAGNOSIS — K40.90 NON-RECURRENT UNILATERAL INGUINAL HERNIA WITHOUT OBSTRUCTION OR GANGRENE: Primary | ICD-10-CM

## 2022-06-29 PROCEDURE — 99204 OFFICE O/P NEW MOD 45 MIN: CPT | Performed by: SURGERY

## 2022-06-29 NOTE — LETTER
July 1, 2022          Miguelina Parker MD  [address] [place] [address]      RE:   Canelo Cruz 7/30/1931      Dear Colleague,    Thank you for referring your patient, Canelo Cruz, to [facility] at [facility]. Please see a copy of my visit note below.    Canelo Cruz is a 89 year old male who is seen in consultation at the request of Dr. Parker for evaluation of right inguinal hernia.  The patient reports that he has noticed a bulge present in the right inguinal area which has been present for a couple of months.  He does report having some discomfort associated with this bulging, particularly over the last week.  The bulging fluctuates in size and has always been reducible.  The patient reports that he has noticed some discomfort in the left inguinal area for several years.  He has not noticed any significant bulging in the left inguinal area.  He has not noticed any discomfort or bulging at the umbilicus.  Patient has not undergone any previous hernia repairs.  No significant family history of hernias.  Patient denies use of tobacco.  He has not undergone any previous abdominal surgical procedures.  He has undergone previous Achilles tendon repair, which was complicated by significant postoperative infection requiring hyperbaric treatment and repeat operation.  Patient reports history of MRSA.     REVIEW OF SYSTEMS:  Constitutional: No fevers or chills  Eyes: No blurred or double vision  HENT: Denies headaches, No rhinorrhea, No sore throat  Respiratory: No cough or shortness of breath  Cardiovascular: Denies chest pain or palpitations  Gastrointestinal: No abdominal pain or nausea/vomiting  Genitourinary: No hematuria or dysuria  Musculoskeletal: Denies arthralgias or myalgias  Neurologic: No numbness or tingling  Integumentary: No skin rashes     Past Medical History        Past Medical History:   Diagnosis Date     Postherpetic neuralgia              Past Surgical History          Past Surgical History:   Procedure Laterality Date     ARTHROSCOPY KNEE Right       ~6111-8773     ORTHOPEDIC SURGERY         right achilles rupture repair with 14 month MRSA infection            Family History         Family History   Problem Relation Age of Onset     Unknown/Adopted Mother       Unknown/Adopted Father       Diabetes No family hx of       Coronary Artery Disease No family hx of       Hypertension No family hx of       Hyperlipidemia No family hx of       Cerebrovascular Disease No family hx of       Breast Cancer No family hx of       Colon Cancer No family hx of       Prostate Cancer No family hx of       Other Cancer No family hx of       Depression No family hx of       Anxiety Disorder No family hx of       Mental Illness No family hx of       Substance Abuse No family hx of       Anesthesia Reaction No family hx of       Asthma No family hx of       Osteoporosis No family hx of       Genetic Disorder No family hx of       Thyroid Disease No family hx of       Obesity No family hx of              Social History            Tobacco Use     Smoking status: Never Smoker     Smokeless tobacco: Never Used   Substance Use Topics     Alcohol use: Yes       Comment: 10 per week             Patient Active Problem List   Diagnosis     History of colonic polyps     Constipation, unspecified constipation type     Post herpetic neuralgia     Decreased libido     ACP (advance care planning)     Erectile dysfunction, unspecified erectile dysfunction type     Osteoarthritis of right knee, unspecified osteoarthritis type     Primary insomnia     Other fatigue     Skin lesion     Elevated blood sugar     Hypertriglyceridemia     Class 1 obesity due to excess calories with serious comorbidity and body mass index (BMI) of 32.0 to 32.9 in adult     Recurrent UTI     Chronic fatigue     Hip pain, left     Impaired fasting glucose     Balance problems-since 11-19     Decreased hearing of right ear- since 12-19      "History of gout     Pain in joint involving ankle and foot, left               Allergies   Allergen Reactions     Ciprofloxacin         history of achilles tendon pain         Current Outpatient Prescriptions          Current Outpatient Medications   Medication Sig Dispense Refill     melatonin 3 MG tablet Take 1 mg by mouth nightly as needed for sleep                Vitals: /60   Pulse 68   Ht 1.854 m (6' 1\")   Wt 82.6 kg (182 lb)   SpO2 98%   BMI 24.01 kg/m    BMI= Body mass index is 24.01 kg/m .     EXAM:  General: Vital signs reviewed, in no apparent distress  Eyes: Anicteric  HENT: Normocephalic, atraumatic, trachea midline   Respiratory: Breathing nonlabored  Cardiovascular: Regular rate and rhythm  GI: Abdomen soft, nondistended, nontender; small umbilical hernia, moderate sized reducible right inguinal hernia, possible small left inguinal hernia  Musculoskeletal: No gross deformities  Neurologic: Grossly nonfocal exam  Psychiatric: Normal mood, affect and insight  Integumentary: Warm and dry        ASSESSMENT:  Canelo Cruz is a 89 year old who presents with umbilical hernia, moderate sized right inguinal hernia, possible small left inguinal hernia.  Significant pertinent co-morbidities include: Previous MRSA infection.         PLAN:  Following a thorough discussion with the patient, the decision was made to proceed to the operating room for robotic repair of the patient's right and possible left inguinal hernias with placement of mesh.  The risk and benefits of the procedure were discussed with the patient.  The patient has opted to continue with observation for his umbilical hernia, which I believe is very reasonable.  I have recommended overnight observation following the surgical procedure due to the patient's age and previous history of significant surgical infection.  Signs and symptoms of hernia incarceration were discussed today in clinic.  Patient understands that these signs/symptoms " warrant urgent evaluation.        Again, thank you for allowing me to participate in the care of your patient.      Sincerely,      Ina Phillips MD

## 2022-06-30 ENCOUNTER — TELEPHONE (OUTPATIENT)
Dept: SURGERY | Facility: CLINIC | Age: 87
End: 2022-06-30

## 2022-06-30 NOTE — TELEPHONE ENCOUNTER
Type of surgery: robotic repair right inguinal hernia with mesh possible robotic repair left inguinal hernia with mesh  Location of surgery: [facility]  Date and time of surgery: 7/18/22 1:00pm  Surgeon: Dr Phillips  Pre-Op Appt Date: pt to schedule  Post-Op Appt Date: pt to schedule   Packet sent out: Yes  Pre-cert/Authorization completed:  Not Applicable  Date: 6/30/22

## 2022-07-12 ENCOUNTER — TELEPHONE (OUTPATIENT)
Dept: SURGERY | Facility: CLINIC | Age: 87
End: 2022-07-12

## 2022-07-12 NOTE — TELEPHONE ENCOUNTER
"Planned procedure:  Robotic repair of right inguinal hernia with placement of mesh, possible robotic repair of left inguinal hernia with placement of mesh    Date: 07/18/2022    Surgeon: ishmael    Patient calling, very anxious about his upcoming surgery and is wanting to make Dr. Phillips aware of some things.    1.  Patient is not sure he wants to spend the night at this point, due to his anxiety. He thinks he wants same day surgery. He reports that he has made \"travel arrangements\"    2.  Pain in the right groin is greatly increasing.  Pt could hardly do his normal walk today.  Usually laying down and icing the area causes decrease in level of bulging. But he reports that did not work today and bulge seems larger.    He is having bowel movements.    2.  The Left groin pain is also increasing.  He does not think it is a hernia on this side, but muscular.    Informed him that Dr. Phillips will look at the left groin area during surgery and will intervene if appropriate    No further questions or concerns at this time    Will route to Dr Fernandez for review and have her call patient if she wants to discuss anything in greater detail    Paula Vazquez, RN-BSN    "

## 2022-07-12 NOTE — TELEPHONE ENCOUNTER
Patient is scheduled for surgery 7/18/22   robotic repair of right inguinal hernia with mesh, poss robotic repair of left inguinal hernia with mesh, with Dr. Phillips, and has some questions for Dr. Phillips or someone from her team he would like to discuss before surgery.     [phone]  Ok to leave VM

## 2022-07-14 RX ORDER — LIDOCAINE 50 MG/G
1 PATCH TOPICAL DAILY PRN
COMMUNITY
End: 2024-06-10

## 2022-07-14 RX ORDER — KETOCONAZOLE 20 MG/G
CREAM TOPICAL DAILY
COMMUNITY
End: 2022-07-15

## 2022-07-14 RX ORDER — KETOCONAZOLE 20 MG/ML
SHAMPOO TOPICAL DAILY PRN
COMMUNITY
End: 2022-07-15

## 2022-07-14 RX ORDER — TRIAMCINOLONE ACETONIDE 1 MG/G
OINTMENT TOPICAL EVERY EVENING
COMMUNITY
End: 2022-07-15

## 2022-07-14 RX ORDER — TAMSULOSIN HYDROCHLORIDE 0.4 MG/1
0.4 CAPSULE ORAL DAILY PRN
COMMUNITY
End: 2024-08-30

## 2022-07-14 NOTE — TELEPHONE ENCOUNTER
Patient calling back asking for Dr. Phillips to please call him today. He would like to decide today if he is going to go through with the surgery. He is very stressed about the whole thing and would really appreciate a call back today!    [phone]  OK to leave VM

## 2022-07-14 NOTE — TELEPHONE ENCOUNTER
Impression: S/P Cataract Extraction by phacoemulsification with IOL placement; ORA OS - 7 Days. Encounter for surgical aftercare following surgery on a sense organ  Z48.810. Plan: Doing well 1WK PO. Swelling and inflammation has resolved. Clear and centered IOL OS. Restrictions no longer apply until after next surgery. Recommend AT's at least 2-3 times per day or more OU. Okay to proceed with second eye, patient prefers. RTC as scheduled for cataract surgery OD. Started PA. Will wait for response.

## 2022-07-15 ENCOUNTER — TELEPHONE (OUTPATIENT)
Dept: SURGERY | Facility: CLINIC | Age: 87
End: 2022-07-15

## 2022-07-15 NOTE — PROGRESS NOTES
PTA medications updated by Medication Scribe prior to surgery via phone call with patient (last doses completed by Nurse)     Medication history sources: Patient, Surescripts and H&P  In the past week, patient estimated taking medication this percent of the time: Greater than 90%  Adherence assessment: N/A Not Observed    Significant changes made to the medication list:  Patient reports no longer taking the following meds (med scribe removed from PTA med list): Nizoral 2 % cream, Nizoral 2% shampoo, kenalog 0.1% ointment      Additional medication history information:   None    Medication reconciliation completed by provider prior to medication history? No    Time spent in this activity: 30 minutes    The information provided in this note is only as accurate as the sources available at the time of update(s)      Prior to Admission medications    Medication Sig Last Dose Taking? Auth Provider Long Term End Date   diclofenac (VOLTAREN) 1 % topical gel Apply 4 g topically 4 times daily as needed for moderate pain (To Elbows)  Yes Reported, Patient     diclofenac (VOLTAREN) 1 % topical gel Apply 2 g topically 4 times daily as needed for moderate pain (To right knee)  Yes Reported, Patient     lidocaine (LIDODERM) 5 % patch Place 1 patch onto the skin daily as needed for moderate pain To prevent lidocaine toxicity, patient should be patch free for 12 hrs daily.  Yes Reported, Patient     melatonin 3 MG tablet Take 6 mg by mouth nightly as needed for sleep (2 X 3 mg)  Yes Reported, Patient     Multiple Vitamins-Minerals (MENS 50+ MULTI VITAMIN/MIN) TABS Take 1 tablet by mouth every morning  Yes Reported, Patient     tamsulosin (FLOMAX) 0.4 MG capsule Take 0.4 mg by mouth every morning  Yes Reported, Patient

## 2022-07-15 NOTE — TELEPHONE ENCOUNTER
Surgery:    Patient calls in with multiple questions/concerns about his upcoming procedure.  We had a lengthy discussion about the procedure and the postoperative recovery.  The patient now states that he would like to discharge to home the day of surgery, if the surgery and anesthetic are straightforward.  We will do our best to accommodate this.  Surgery will proceed as scheduled for Monday, 7/18/22.    Ina Phillips MD

## 2022-07-15 NOTE — TELEPHONE ENCOUNTER
Patient calling to see if Dr. Phillips can call him back this afternoon?      [phone]  OK to leave VM

## 2022-07-18 ENCOUNTER — HOSPITAL ENCOUNTER (OUTPATIENT)
Facility: CLINIC | Age: 87
Discharge: HOME | End: 2022-07-19
Attending: SURGERY | Admitting: SURGERY
Payer: COMMERCIAL

## 2022-07-18 ENCOUNTER — ANESTHESIA (OUTPATIENT)
Dept: SURGERY | Facility: CLINIC | Age: 87
End: 2022-07-18
Payer: COMMERCIAL

## 2022-07-18 ENCOUNTER — ANESTHESIA EVENT (OUTPATIENT)
Dept: SURGERY | Facility: CLINIC | Age: 87
End: 2022-07-18
Payer: COMMERCIAL

## 2022-07-18 DIAGNOSIS — K40.90 NON-RECURRENT UNILATERAL INGUINAL HERNIA WITHOUT OBSTRUCTION OR GANGRENE: ICD-10-CM

## 2022-07-18 DIAGNOSIS — K40.20 NON-RECURRENT BILATERAL INGUINAL HERNIA WITHOUT OBSTRUCTION OR GANGRENE: Primary | ICD-10-CM

## 2022-07-18 DIAGNOSIS — G89.18 ACUTE POST-OPERATIVE PAIN: ICD-10-CM

## 2022-07-18 PROCEDURE — 250N000011 HC RX IP 250 OP 636: Performed by: PHYSICIAN ASSISTANT

## 2022-07-18 PROCEDURE — 360N000080 HC SURGERY LEVEL 7, PER MIN: Performed by: SURGERY

## 2022-07-18 PROCEDURE — 49650 LAP ING HERNIA REPAIR INIT: CPT | Mod: AS | Performed by: PHYSICIAN ASSISTANT

## 2022-07-18 PROCEDURE — 710N000009 HC RECOVERY PHASE 1, LEVEL 1, PER MIN: Performed by: SURGERY

## 2022-07-18 PROCEDURE — 258N000003 HC RX IP 258 OP 636: Performed by: NURSE ANESTHETIST, CERTIFIED REGISTERED

## 2022-07-18 PROCEDURE — 250N000013 HC RX MED GY IP 250 OP 250 PS 637: Performed by: PHYSICIAN ASSISTANT

## 2022-07-18 PROCEDURE — 999N000141 HC STATISTIC PRE-PROCEDURE NURSING ASSESSMENT: Performed by: SURGERY

## 2022-07-18 PROCEDURE — S2900 ROBOTIC SURGICAL SYSTEM: HCPCS | Performed by: SURGERY

## 2022-07-18 PROCEDURE — 250N000009 HC RX 250: Performed by: NURSE ANESTHETIST, CERTIFIED REGISTERED

## 2022-07-18 PROCEDURE — C1781 MESH (IMPLANTABLE): HCPCS | Performed by: SURGERY

## 2022-07-18 PROCEDURE — 250N000013 HC RX MED GY IP 250 OP 250 PS 637: Performed by: SURGERY

## 2022-07-18 PROCEDURE — 258N000003 HC RX IP 258 OP 636: Performed by: ANESTHESIOLOGY

## 2022-07-18 PROCEDURE — 250N000009 HC RX 250: Performed by: SURGERY

## 2022-07-18 PROCEDURE — 250N000011 HC RX IP 250 OP 636: Performed by: SURGERY

## 2022-07-18 PROCEDURE — 250N000011 HC RX IP 250 OP 636: Performed by: NURSE ANESTHETIST, CERTIFIED REGISTERED

## 2022-07-18 PROCEDURE — 250N000011 HC RX IP 250 OP 636: Performed by: ANESTHESIOLOGY

## 2022-07-18 PROCEDURE — 250N000025 HC SEVOFLURANE, PER MIN: Performed by: SURGERY

## 2022-07-18 PROCEDURE — 258N000003 HC RX IP 258 OP 636: Performed by: PHYSICIAN ASSISTANT

## 2022-07-18 PROCEDURE — 370N000017 HC ANESTHESIA TECHNICAL FEE, PER MIN: Performed by: SURGERY

## 2022-07-18 PROCEDURE — 272N000001 HC OR GENERAL SUPPLY STERILE: Performed by: SURGERY

## 2022-07-18 PROCEDURE — 49650 LAP ING HERNIA REPAIR INIT: CPT | Mod: 50 | Performed by: SURGERY

## 2022-07-18 DEVICE — MESH DEXTILE ANATOMICAL 15CM X 10CM LG RIGHT DXT1510AR: Type: IMPLANTABLE DEVICE | Site: ABDOMEN | Status: FUNCTIONAL

## 2022-07-18 DEVICE — MESH DEXTILE ANATOMICAL 15CM X 10CM LG LEFT DXT1510AL: Type: IMPLANTABLE DEVICE | Site: ABDOMEN | Status: FUNCTIONAL

## 2022-07-18 RX ORDER — HYDROCODONE BITARTRATE AND ACETAMINOPHEN 5; 325 MG/1; MG/1
1 TABLET ORAL
Status: DISCONTINUED | OUTPATIENT
Start: 2022-07-18 | End: 2022-07-19 | Stop reason: HOSPADM

## 2022-07-18 RX ORDER — HYDROMORPHONE HCL IN WATER/PF 6 MG/30 ML
0.4 PATIENT CONTROLLED ANALGESIA SYRINGE INTRAVENOUS
Status: DISCONTINUED | OUTPATIENT
Start: 2022-07-18 | End: 2022-07-19 | Stop reason: HOSPADM

## 2022-07-18 RX ORDER — BUPIVACAINE HYDROCHLORIDE AND EPINEPHRINE 5; 5 MG/ML; UG/ML
INJECTION, SOLUTION PERINEURAL PRN
Status: DISCONTINUED | OUTPATIENT
Start: 2022-07-18 | End: 2022-07-18 | Stop reason: HOSPADM

## 2022-07-18 RX ORDER — DEXAMETHASONE SODIUM PHOSPHATE 4 MG/ML
INJECTION, SOLUTION INTRA-ARTICULAR; INTRALESIONAL; INTRAMUSCULAR; INTRAVENOUS; SOFT TISSUE PRN
Status: DISCONTINUED | OUTPATIENT
Start: 2022-07-18 | End: 2022-07-18

## 2022-07-18 RX ORDER — NALOXONE HYDROCHLORIDE 0.4 MG/ML
0.4 INJECTION, SOLUTION INTRAMUSCULAR; INTRAVENOUS; SUBCUTANEOUS
Status: DISCONTINUED | OUTPATIENT
Start: 2022-07-18 | End: 2022-07-19 | Stop reason: HOSPADM

## 2022-07-18 RX ORDER — OXYCODONE HYDROCHLORIDE 5 MG/1
10 TABLET ORAL EVERY 4 HOURS PRN
Status: DISCONTINUED | OUTPATIENT
Start: 2022-07-18 | End: 2022-07-19 | Stop reason: HOSPADM

## 2022-07-18 RX ORDER — ONDANSETRON 2 MG/ML
4 INJECTION INTRAMUSCULAR; INTRAVENOUS EVERY 30 MIN PRN
Status: DISCONTINUED | OUTPATIENT
Start: 2022-07-18 | End: 2022-07-18 | Stop reason: HOSPADM

## 2022-07-18 RX ORDER — LIDOCAINE 40 MG/G
CREAM TOPICAL
Status: DISCONTINUED | OUTPATIENT
Start: 2022-07-18 | End: 2022-07-18 | Stop reason: HOSPADM

## 2022-07-18 RX ORDER — HYDROCODONE BITARTRATE AND ACETAMINOPHEN 5; 325 MG/1; MG/1
1-2 TABLET ORAL EVERY 4 HOURS PRN
Qty: 20 TABLET | Refills: 0 | Status: SHIPPED | OUTPATIENT
Start: 2022-07-18 | End: 2022-07-19

## 2022-07-18 RX ORDER — ONDANSETRON 2 MG/ML
4 INJECTION INTRAMUSCULAR; INTRAVENOUS EVERY 6 HOURS PRN
Status: DISCONTINUED | OUTPATIENT
Start: 2022-07-18 | End: 2022-07-19 | Stop reason: HOSPADM

## 2022-07-18 RX ORDER — ONDANSETRON 4 MG/1
4 TABLET, ORALLY DISINTEGRATING ORAL EVERY 6 HOURS PRN
Status: DISCONTINUED | OUTPATIENT
Start: 2022-07-18 | End: 2022-07-19 | Stop reason: HOSPADM

## 2022-07-18 RX ORDER — AMOXICILLIN 250 MG
1-2 CAPSULE ORAL 2 TIMES DAILY PRN
Qty: 12 TABLET | Refills: 0 | Status: SHIPPED | OUTPATIENT
Start: 2022-07-18 | End: 2024-06-10

## 2022-07-18 RX ORDER — OXYCODONE HYDROCHLORIDE 5 MG/1
5 TABLET ORAL EVERY 4 HOURS PRN
Status: DISCONTINUED | OUTPATIENT
Start: 2022-07-18 | End: 2022-07-19 | Stop reason: HOSPADM

## 2022-07-18 RX ORDER — FENTANYL CITRATE 0.05 MG/ML
25 INJECTION, SOLUTION INTRAMUSCULAR; INTRAVENOUS EVERY 5 MIN PRN
Status: DISCONTINUED | OUTPATIENT
Start: 2022-07-18 | End: 2022-07-18 | Stop reason: HOSPADM

## 2022-07-18 RX ORDER — PROPOFOL 10 MG/ML
INJECTION, EMULSION INTRAVENOUS CONTINUOUS PRN
Status: DISCONTINUED | OUTPATIENT
Start: 2022-07-18 | End: 2022-07-18

## 2022-07-18 RX ORDER — SODIUM CHLORIDE, SODIUM LACTATE, POTASSIUM CHLORIDE, CALCIUM CHLORIDE 600; 310; 30; 20 MG/100ML; MG/100ML; MG/100ML; MG/100ML
INJECTION, SOLUTION INTRAVENOUS CONTINUOUS
Status: DISCONTINUED | OUTPATIENT
Start: 2022-07-18 | End: 2022-07-18 | Stop reason: HOSPADM

## 2022-07-18 RX ORDER — PROPOFOL 10 MG/ML
INJECTION, EMULSION INTRAVENOUS PRN
Status: DISCONTINUED | OUTPATIENT
Start: 2022-07-18 | End: 2022-07-18

## 2022-07-18 RX ORDER — SODIUM CHLORIDE, SODIUM LACTATE, POTASSIUM CHLORIDE, CALCIUM CHLORIDE 600; 310; 30; 20 MG/100ML; MG/100ML; MG/100ML; MG/100ML
INJECTION, SOLUTION INTRAVENOUS CONTINUOUS
Status: DISCONTINUED | OUTPATIENT
Start: 2022-07-18 | End: 2022-07-19

## 2022-07-18 RX ORDER — MAGNESIUM HYDROXIDE 1200 MG/15ML
LIQUID ORAL PRN
Status: DISCONTINUED | OUTPATIENT
Start: 2022-07-18 | End: 2022-07-18 | Stop reason: HOSPADM

## 2022-07-18 RX ORDER — NALOXONE HYDROCHLORIDE 0.4 MG/ML
0.2 INJECTION, SOLUTION INTRAMUSCULAR; INTRAVENOUS; SUBCUTANEOUS
Status: DISCONTINUED | OUTPATIENT
Start: 2022-07-18 | End: 2022-07-19 | Stop reason: HOSPADM

## 2022-07-18 RX ORDER — CEFAZOLIN SODIUM/WATER 2 G/20 ML
2 SYRINGE (ML) INTRAVENOUS
Status: COMPLETED | OUTPATIENT
Start: 2022-07-18 | End: 2022-07-18

## 2022-07-18 RX ORDER — CEFAZOLIN SODIUM/WATER 2 G/20 ML
2 SYRINGE (ML) INTRAVENOUS SEE ADMIN INSTRUCTIONS
Status: DISCONTINUED | OUTPATIENT
Start: 2022-07-18 | End: 2022-07-18 | Stop reason: HOSPADM

## 2022-07-18 RX ORDER — FENTANYL CITRATE 0.05 MG/ML
25 INJECTION, SOLUTION INTRAMUSCULAR; INTRAVENOUS
Status: DISCONTINUED | OUTPATIENT
Start: 2022-07-18 | End: 2022-07-18 | Stop reason: HOSPADM

## 2022-07-18 RX ORDER — ACETAMINOPHEN 325 MG/1
650 TABLET ORAL EVERY 6 HOURS PRN
Status: DISCONTINUED | OUTPATIENT
Start: 2022-07-18 | End: 2022-07-19 | Stop reason: HOSPADM

## 2022-07-18 RX ORDER — HYDROMORPHONE HCL IN WATER/PF 6 MG/30 ML
0.2 PATIENT CONTROLLED ANALGESIA SYRINGE INTRAVENOUS EVERY 5 MIN PRN
Status: DISCONTINUED | OUTPATIENT
Start: 2022-07-18 | End: 2022-07-18 | Stop reason: HOSPADM

## 2022-07-18 RX ORDER — HYDROMORPHONE HCL IN WATER/PF 6 MG/30 ML
0.2 PATIENT CONTROLLED ANALGESIA SYRINGE INTRAVENOUS
Status: DISCONTINUED | OUTPATIENT
Start: 2022-07-18 | End: 2022-07-19 | Stop reason: HOSPADM

## 2022-07-18 RX ORDER — LORAZEPAM 0.5 MG/1
0.5 TABLET ORAL ONCE
Status: COMPLETED | OUTPATIENT
Start: 2022-07-18 | End: 2022-07-18

## 2022-07-18 RX ORDER — ONDANSETRON 4 MG/1
4 TABLET, ORALLY DISINTEGRATING ORAL EVERY 30 MIN PRN
Status: DISCONTINUED | OUTPATIENT
Start: 2022-07-18 | End: 2022-07-18 | Stop reason: HOSPADM

## 2022-07-18 RX ORDER — OXYCODONE HYDROCHLORIDE 5 MG/1
5 TABLET ORAL EVERY 4 HOURS PRN
Status: DISCONTINUED | OUTPATIENT
Start: 2022-07-18 | End: 2022-07-18 | Stop reason: HOSPADM

## 2022-07-18 RX ORDER — LIDOCAINE 40 MG/G
CREAM TOPICAL
Status: DISCONTINUED | OUTPATIENT
Start: 2022-07-18 | End: 2022-07-19 | Stop reason: HOSPADM

## 2022-07-18 RX ORDER — LIDOCAINE HYDROCHLORIDE 20 MG/ML
INJECTION, SOLUTION INFILTRATION; PERINEURAL PRN
Status: DISCONTINUED | OUTPATIENT
Start: 2022-07-18 | End: 2022-07-18

## 2022-07-18 RX ORDER — TAMSULOSIN HYDROCHLORIDE 0.4 MG/1
0.4 CAPSULE ORAL EVERY MORNING
Status: DISCONTINUED | OUTPATIENT
Start: 2022-07-19 | End: 2022-07-19 | Stop reason: HOSPADM

## 2022-07-18 RX ORDER — FENTANYL CITRATE 50 UG/ML
INJECTION, SOLUTION INTRAMUSCULAR; INTRAVENOUS PRN
Status: DISCONTINUED | OUTPATIENT
Start: 2022-07-18 | End: 2022-07-18

## 2022-07-18 RX ORDER — ONDANSETRON 2 MG/ML
INJECTION INTRAMUSCULAR; INTRAVENOUS PRN
Status: DISCONTINUED | OUTPATIENT
Start: 2022-07-18 | End: 2022-07-18

## 2022-07-18 RX ORDER — PROCHLORPERAZINE MALEATE 5 MG
5 TABLET ORAL EVERY 6 HOURS PRN
Status: DISCONTINUED | OUTPATIENT
Start: 2022-07-18 | End: 2022-07-19 | Stop reason: HOSPADM

## 2022-07-18 RX ORDER — EPHEDRINE SULFATE 50 MG/ML
INJECTION, SOLUTION INTRAMUSCULAR; INTRAVENOUS; SUBCUTANEOUS PRN
Status: DISCONTINUED | OUTPATIENT
Start: 2022-07-18 | End: 2022-07-18

## 2022-07-18 RX ADMIN — HYDROMORPHONE HYDROCHLORIDE 0.4 MG: 0.2 INJECTION, SOLUTION INTRAMUSCULAR; INTRAVENOUS; SUBCUTANEOUS at 19:38

## 2022-07-18 RX ADMIN — HYDROMORPHONE HYDROCHLORIDE 0.2 MG: 0.2 INJECTION, SOLUTION INTRAMUSCULAR; INTRAVENOUS; SUBCUTANEOUS at 16:16

## 2022-07-18 RX ADMIN — PROPOFOL 25 MCG/KG/MIN: 10 INJECTION, EMULSION INTRAVENOUS at 13:10

## 2022-07-18 RX ADMIN — SODIUM CHLORIDE, POTASSIUM CHLORIDE, SODIUM LACTATE AND CALCIUM CHLORIDE: 600; 310; 30; 20 INJECTION, SOLUTION INTRAVENOUS at 18:13

## 2022-07-18 RX ADMIN — Medication 2 G: at 12:57

## 2022-07-18 RX ADMIN — PROPOFOL 150 MG: 10 INJECTION, EMULSION INTRAVENOUS at 13:04

## 2022-07-18 RX ADMIN — FENTANYL CITRATE 25 MCG: 50 INJECTION, SOLUTION INTRAMUSCULAR; INTRAVENOUS at 13:03

## 2022-07-18 RX ADMIN — OXYCODONE HYDROCHLORIDE 10 MG: 5 TABLET ORAL at 18:46

## 2022-07-18 RX ADMIN — Medication 5 MG: at 14:46

## 2022-07-18 RX ADMIN — ONDANSETRON 4 MG: 4 TABLET, ORALLY DISINTEGRATING ORAL at 22:51

## 2022-07-18 RX ADMIN — HYDROMORPHONE HYDROCHLORIDE 0.2 MG: 0.2 INJECTION, SOLUTION INTRAMUSCULAR; INTRAVENOUS; SUBCUTANEOUS at 17:12

## 2022-07-18 RX ADMIN — PHENYLEPHRINE HYDROCHLORIDE 100 MCG: 10 INJECTION INTRAVENOUS at 13:27

## 2022-07-18 RX ADMIN — HYDROMORPHONE HYDROCHLORIDE 0.4 MG: 0.2 INJECTION, SOLUTION INTRAMUSCULAR; INTRAVENOUS; SUBCUTANEOUS at 21:55

## 2022-07-18 RX ADMIN — SODIUM CHLORIDE, POTASSIUM CHLORIDE, SODIUM LACTATE AND CALCIUM CHLORIDE: 600; 310; 30; 20 INJECTION, SOLUTION INTRAVENOUS at 12:56

## 2022-07-18 RX ADMIN — PHENYLEPHRINE HYDROCHLORIDE 100 MCG: 10 INJECTION INTRAVENOUS at 14:46

## 2022-07-18 RX ADMIN — FENTANYL CITRATE 25 MCG: 50 INJECTION, SOLUTION INTRAMUSCULAR; INTRAVENOUS at 15:46

## 2022-07-18 RX ADMIN — ROCURONIUM BROMIDE 20 MG: 50 INJECTION, SOLUTION INTRAVENOUS at 14:16

## 2022-07-18 RX ADMIN — PHENYLEPHRINE HYDROCHLORIDE 100 MCG: 10 INJECTION INTRAVENOUS at 13:03

## 2022-07-18 RX ADMIN — ROCURONIUM BROMIDE 10 MG: 50 INJECTION, SOLUTION INTRAVENOUS at 15:00

## 2022-07-18 RX ADMIN — DEXAMETHASONE SODIUM PHOSPHATE 4 MG: 4 INJECTION, SOLUTION INTRA-ARTICULAR; INTRALESIONAL; INTRAMUSCULAR; INTRAVENOUS; SOFT TISSUE at 13:04

## 2022-07-18 RX ADMIN — ONDANSETRON 4 MG: 2 INJECTION INTRAMUSCULAR; INTRAVENOUS at 14:44

## 2022-07-18 RX ADMIN — LORAZEPAM 0.5 MG: 0.5 TABLET ORAL at 22:51

## 2022-07-18 RX ADMIN — FENTANYL CITRATE 25 MCG: 50 INJECTION, SOLUTION INTRAMUSCULAR; INTRAVENOUS at 15:38

## 2022-07-18 RX ADMIN — ROCURONIUM BROMIDE 20 MG: 50 INJECTION, SOLUTION INTRAVENOUS at 13:29

## 2022-07-18 RX ADMIN — LIDOCAINE HYDROCHLORIDE 60 MG: 20 INJECTION, SOLUTION INFILTRATION; PERINEURAL at 13:04

## 2022-07-18 RX ADMIN — ROCURONIUM BROMIDE 30 MG: 50 INJECTION, SOLUTION INTRAVENOUS at 13:03

## 2022-07-18 ASSESSMENT — LIFESTYLE VARIABLES: TOBACCO_USE: 0

## 2022-07-18 ASSESSMENT — ENCOUNTER SYMPTOMS
SEIZURES: 0
DYSRHYTHMIAS: 0

## 2022-07-18 NOTE — OR NURSING
"Called to update wife, and tell her that her  will be staying overnight.  Patient states that \"there is no way I can go home like this\".    "

## 2022-07-18 NOTE — OR NURSING
"Pt is moaning a lot and expressing how much pain he is in.  Pt states that \"he has never been in so much pain in his life\".  Pt unable to put a \"number\" on it.  Medication given as ordered.  I applied an abdominal binder on with an ice pack for support.  When given report from the OR,  I was told that patient was unable to void prior to surgery, and that no rodriguez was placed during surgery.  Will do a bladder scan.  "

## 2022-07-18 NOTE — BRIEF OP NOTE
[facility]    Brief Operative Note    Pre-operative diagnosis: Non-recurrent unilateral inguinal hernia without obstruction or gangrene [K40.90]  Post-operative diagnosis Bilateral Inguinal Hernias    Procedure: Procedure(s):  Robotic repair of right inguinal hernia with placement of mesh,  robotic repair of left inguinal hernia with placement of mesh     Surgeon: Surgeon(s) and Role:     * Ina Phillips MD - Primary     * Roe Pompa PA-C - Assisting     Anesthesia: General   Estimated Blood Loss: 10 mL from 7/18/2022 12:56 PM to 7/18/2022  3:41 PM      Drains: None  Specimens: * No specimens in log *  Findings:   None.  Large right inguinal but also small to moderate sized inguinal hernia on Left as well.  Complications: None.    Implants:   Implant Name Type Inv. Item Serial No.  Lot No. LRB No. Used Action   MESH DEXTILE ANATOMICAL 15CM X 10CM LG RIGHT [id number] - [id number] Mesh MESH DEXTILE ANATOMICAL 15CM X 10CM LG RIGHT [id number]  delicious [id number] Right 1 Implanted   MESH DEXTILE ANATOMICAL 15CM X 10CM LG LEFT [id number] - [id number] Mesh MESH DEXTILE ANATOMICAL 15CM X 10CM LG LEFT [id number]  delicious [id number] Left 1 Implanted

## 2022-07-18 NOTE — OP NOTE
Procedure Date: 07/18/22    STAFF SURGEON:  Ina Phillips MD    ASSISTANT:  Roe Pompa PA-C     PREOPERATIVE DIAGNOSIS:  Right inguinal hernia, possible left inguinal hernia.    POSTOPERATIVE DIAGNOSIS:  Bilateral inguinal hernias.    PROCEDURE:  Robotic repair of bilateral inguinal hernias with placement of bilateral preperitoneal mesh.    INDICATION FOR OPERATION:  Hang is a 89-year-old male who presented to the [facility] with complaints of right inguinal pain.  On examination, the patient was found to have evidence of a reducible right inguinal hernia and a possible small left inguinal hernia.  The decision was made for the patient to proceed to the operating room for robotic repair of right and possible left inguinal hernias with placement of mesh.  The risks and benefits of the procedure were discussed with the patient.  Consent for the procedure was obtained.    ANESTHESIA:  General.    DESCRIPTION OF PROCEDURE:  The patient was brought to the preoperative area and preoperative antibiotics were administered.  The patient was brought back to the operating room and placed in the supine position on the operating table.  A timeout was completed.  Anesthesia was induced and the patient was intubated.  The patient was secured to the operating table and his pressure points were padded.  The patient's abdomen was prepped and draped in the sterile fashion.  Following infiltration with local anesthetic, the 11 blade scalpel was used to make a small incision overlying the patient's left upper quadrant.  Entrance into the abdomen was then gained under direct visualization using the 5 mm Visiport and the 5 mm laparoscope.  When the patient's abdomen had been safely entered, it was fully insufflated.  The laparoscope was reinserted into the abdominal cavity and initial inspection revealed no evidence of injury at the port entry site.  Under direct visualization, 2 additional 8 mm robotic ports were placed, one  in the supraumbilical position and one in the right upper quadrant.  The patient's left upper quadrant port was then up-sized to an 8 mm robotic port.  The patient was placed into the Trendelenburg position.  The robot was brought onto the field and docked.  Under direct visualization, the robotic fenestrated grasper and robotic scissors were introduced into the abdominal cavity.  The patient's bilateral inguinal areas were surveyed.  The patient was found to have evidence of moderate sized indirect right inguinal hernia and a small indirect left inguinal hernia.  We began our dissection on the patient's right side.  The peritoneum was incised from the level of the anterior-superior iliac spine to the abdominal midline superior to the patient's hernia defect, utilizing the robotic scissors.  The patient's preperitoneal space was then developed using the robotic scissors, electrocautery, and blunt dissection.  We dissected down to Marcellus's ligament medially and then continued our dissection in a medial to lateral fashion.  The patient's chronic, moderate sized indirect indirect sac was carefully dissected free from the cord structures using the electrocautery and blunt dissection.  We dissected out laterally to accommodate placement of the mesh.  The patient's peritoneal edge was then swept inferiorly along the length of our preperitoneal pocket utilizing blunt dissection and the electrocautery.  The patient did have a large cord lipoma on the right side, and this was carefully  free from the cord structures using blunt dissection and the electrocautery.  We then turned our attention to the patient's left side.  In a similar fashion, the peritoneum was incised and the preperitoneal pocket was developed.  We continued our medial dissection on the left side until we met up with our medial dissection from the right side.  The small indirect inguinal hernia sac on the left side was carefully  from the  cord structures using blunt dissection and the electrocautery.  The patient also had a moderate sized cord lipoma on the left side, which was carefully reduced from the inguinal canal and  from the cord structures.  The peritoneum was swept inferiorly along the length of our preperitoneal pocket.  A piece of large right-sided and large left-sided direct Dextile mesh were then rolled and inserted into the abdominal cavity.  Each piece of mesh was situated in the correct-sided pocket with excellent overlap of the potential hernia sites of the myopectineal orifice.  Each piece of mesh was sutured into place medially at Marcellus's ligament and just lateral to the epigastric vessels using interrupted 2-0 Vicryl sutures.  The mesh was overlapped medially at the pubis.  The peritoneum was then reapproximated over each piece of mesh using a running 3-0 Stratafix suture.  On the right side, the patient's large cord lipoma was incorporated into our peritoneal closure to keep it from reducing end of the mesh.  There were 2 small holes in the peritoneum on the right side and these were oversewn using interrupted figure-of-eight 2-0 Vicryl sutures.  The surgical sites were inspected and found to be hemostatic.  The remaining needles and suture material were removed from the abdominal cavity under direct visualization.  The robotic equipment was removed from the abdominal cavity and the robot was undocked.  The patient's abdomen was allowed to desufflate fully and the robotic ports were removed.  The port sites were inspected and hemostasis was ensured.  The port sites were then reapproximated using 4-0 Monocryl subcuticular stitches.  The incisions were washed and dried and skin glue was applied.  Lap, needle and instrument counts were correct at the end of the procedure.  The patient tolerated the procedure well and was extubated and taken to the recovery area in stable condition.    Roe Pompa PA-C assisted in  the above procedure. They provided assistance with pre-operative positioning, prepping, and draping of the patient. The assistant provided vital operative assistance with retraction using instruments thus providing the necessary exposure and visualization for the case, manipulation of tissues to achieve hemostasis, and assisted in closure of the wound. Post-operatively they assisted in transfer of the patient off the operative table and transition to the PACU physicians.    ESTIMATED BLOOD LOSS:  10 mL.    FINDINGS: Moderate sized indirect right inguinal hernia and small indirect left inguinal hernia repaired with sac reduction and placement of bilateral large Dextile preperitoneal mesh.  Large right-sided cord lipoma and moderate size left-sided cord lipoma reduced from the inguinal canals bilaterally.    COMPLICATIONS:  None.    SPECIMENS:  None.    DRAINS:  None.    CONDITION:  The patient will be discharged to home directly from the postanesthesia care unit with instructions for postoperative cares and medications for pain management.    Ina Phillips MD

## 2022-07-18 NOTE — ANESTHESIA PREPROCEDURE EVALUATION
Anesthesia Pre-Procedure Evaluation    Patient: Canelo Cruz   MRN: [id number] : 1931        Procedure : Procedure(s):  Robotic repair of right inguinal hernia with placement of mesh, possible robotic repair of left inguinal hernia with placement of mesh          Past Medical History:   Diagnosis Date     Gout      Heart murmur      Insomnia      Mixed hyperlipidemia      Postherpetic neuralgia       Past Surgical History:   Procedure Laterality Date     ARTHROSCOPY KNEE Right     ~4695-7205     ORTHOPEDIC SURGERY      right achilles rupture repair with 14 month MRSA infection      Allergies   Allergen Reactions     Ciprofloxacin      history of achilles tendon pain      Social History     Tobacco Use     Smoking status: Never Smoker     Smokeless tobacco: Never Used   Substance Use Topics     Alcohol use: Yes     Comment: 10 per week      Wt Readings from Last 1 Encounters:   22 82.5 kg (181 lb 12.8 oz)        Anesthesia Evaluation   Pt has had prior anesthetic. Type: General.    No history of anesthetic complications       ROS/MED HX  ENT/Pulmonary:    (-) tobacco use, asthma and sleep apnea   Neurologic:     (+) no peripheral neuropathy  (-) no seizures and no CVA   Cardiovascular:     (+) Dyslipidemia -----valvular problems/murmurs  (-) hypertension, CAD, syncope and arrhythmias   METS/Exercise Tolerance: >4 METS    Hematologic:       Musculoskeletal:       GI/Hepatic:    (-) GERD   Renal/Genitourinary:    (-) renal disease   Endo:    (-) Type II DM and thyroid disease   Psychiatric/Substance Use:       Infectious Disease:    (-) Recent Fever   Malignancy:       Other:            Physical Exam    Airway  airway exam normal      Mallampati: II   TM distance: > 3 FB   Neck ROM: full   Mouth opening: > 3 cm    Respiratory Devices and Support         Dental  no notable dental history         Cardiovascular           (+) murmur       Pulmonary   pulmonary exam normal                OUTSIDE LABS:  CBC:    Lab Results   Component Value Date    WBC 9.5 05/11/2020    WBC 6.4 02/14/2020    HGB 15.7 05/11/2020    HGB 15.3 02/14/2020    HCT 46.7 05/11/2020    HCT 46.2 02/14/2020     05/11/2020     02/14/2020     BMP:   Lab Results   Component Value Date     02/14/2020     03/21/2019    POTASSIUM 3.8 02/14/2020    POTASSIUM 3.7 03/21/2019    CHLORIDE 107 02/14/2020    CHLORIDE 107 03/21/2019    CO2 25 02/14/2020    CO2 27 03/21/2019    BUN 15 02/14/2020    BUN 13 03/21/2019    CR 0.99 02/14/2020    CR 1.11 03/21/2019     (H) 02/14/2020     (H) 03/21/2019     COAGS: No results found for: PTT, INR, FIBR  POC: No results found for: BGM, HCG, HCGS  HEPATIC:   Lab Results   Component Value Date    ALBUMIN 3.6 02/14/2020    PROTTOTAL 6.9 02/14/2020    ALT 20 02/14/2020    AST 18 02/14/2020    ALKPHOS 80 02/14/2020    BILITOTAL 0.4 02/14/2020     OTHER:   Lab Results   Component Value Date    A1C 5.7 (H) 11/26/2018    ROSA 9.0 02/14/2020    TSH 2.74 11/26/2018       Anesthesia Plan    ASA Status:  3   NPO Status:  NPO Appropriate    Anesthesia Type: General.     - Airway: ETT   Induction: Intravenous.   Maintenance: Balanced.        Consents    Anesthesia Plan(s) and associated risks, benefits, and realistic alternatives discussed. Questions answered and patient/representative(s) expressed understanding.    - Discussed:     - Discussed with:  Patient         Postoperative Care    Pain management: IV analgesics, Oral pain medications.   PONV prophylaxis: Ondansetron (or other 5HT-3), Dexamethasone or Solumedrol, Background Propofol Infusion     Comments:    Other Comments: No versed, minimize narcotics as able.            Rey Lewis MD

## 2022-07-18 NOTE — DISCHARGE INSTRUCTIONS
[facility] - [facility]  Discharge Instructions: Post-Operative Robotic Inguinal Hernia Repair    ACTIVITY  Take frequent, short walks and increase your activity gradually.    Avoid strenuous physical activity or heavy lifting greater than 15 lbs. for 3-4 weeks.  You may climb stairs.  You may drive without restrictions when you are not using any prescription pain medication and feel comfortable in a car.  You may return to work/school when you are comfortable without any prescription pain medication.    WOUND CARE  You may remove your outer dressing or Band-Aids and shower 48 hours after the surgery.  Pat your incisions dry and leave them open to air.  Re-apply dressing (Band-Aids or gauze/tape) as needed for comfort or drainage.  You may have steri-strips (looks like white tape) or surgical glue on your incisions.  You may peel off the steri-strips 2 weeks after your surgery if they have not peeled off on their own.  If you have glue, let this peel up and flake off on its own.  Do not soak your incisions in a tub or pool for 2 weeks.   Do not apply any lotions, creams, or ointments to your incisions.  A ridge under your incisions is normal and will gradually resolve.    DIET  Start with liquids, then gradually resume your regular diet as tolerated.   Drink plenty of fluids to stay hydrated.    PAIN  Expect some tenderness and discomfort at the incision site(s).  Use the prescribed pain medication at your discretion.  Expect gradual resolution of your pain over several days.  You may take ibuprofen with food (unless you have been told not to) or acetaminophen/Tylenol instead of or in addition to your prescribed pain medication.  If you are taking Norco, do not take any additional acetaminophen/Tylenol.  Do not drink alcohol or drive while you are taking pain medications.  You may apply ice to your incisions in 20 minute intervals as needed for the next 48 hours.  After that time, consider switching  to heat if you prefer.    EXPECTATIONS  You may notice air in your scrotum and/or penis after the surgery.  This is due to the gas used during the surgery.  You can expect some swelling and bruising involving the scrotum and/or penis as well as numbness.  These symptoms are expected and should gradually resolve in the next few days.  You may use ice to help with the swelling and try placing a rolled hand towel below your scrotum to help alleviate scrotal discomfort.  If you develop significant testicular or penile pain, please call our office and speak with a nurse.  Pain medications can cause constipation.  Limit use when possible.  Take over the counter stool softener/stimulant, such as Colace or Senna, 1-2 times a day with plenty of water.  You may take a mild over the counter laxative, such as Miralax or a suppository, as needed.  You may discontinue these medications once you are having regular bowel movements and/or are no longer taking your narcotic pain medication.   You may have shoulder or upper back discomfort due to the gas used in surgery.  This is temporary and should resolve in 48-72 hours.  Short, frequent walks may help with this.  If you are unable to urinate, or feel as though you are not emptying your bladder adequately, we recommend you call our office and/or seek care at an ER or Urgent Care facility if after hours.    FOLLOW UP  Our office will contact you in approximately 2 weeks to check on your progress and answer any questions you may have.  If you are doing well, you will not need to return for a follow up appointment.  If any concerns are identified over the phone, we will help you make an appointment to see a provider.   If you have not received a phone call, have any questions or concerns, or would like to be seen, please call us at [phone] and ask to speak with our nurse.  We are located at [address] [place] [address].    CALL OUR OFFICE -[phone] IF  YOU HAVE:   Chills or fever above 101 F.  Increased redness, warmth, or drainage at your incisions.  Significant bleeding.  Pain not relieved by your pain medication or rest.  Increasing pain after the first 48 hours.  Any other concerns or questions.               **If you have concerns or questions about your procedure,    please contact Dr. Phillips at  [phone]**

## 2022-07-18 NOTE — OR NURSING
While reviewing patient's chart prior to arrival to the recovery room, noted that patient had an MRSA infection after an achilles tendon repair.  No clarification of if patient has been cleared from that MRSA infection.  Call made to infection control, and advised to treat him with Mrsa and initiate contact isolation.   Infection control states that patient is a candidate to be cleared.

## 2022-07-18 NOTE — ANESTHESIA CARE TRANSFER NOTE
Patient: Canelo Cruz    Procedure: Procedure(s):  Robotic repair of right inguinal hernia with placement of mesh,  robotic repair of left inguinal hernia with placement of mesh       Diagnosis: Non-recurrent unilateral inguinal hernia without obstruction or gangrene [K40.90]  Diagnosis Additional Information: No value filed.    Anesthesia Type:   General     Note:    Oropharynx: oropharynx clear of all foreign objects and spontaneously breathing  Level of Consciousness: drowsy  Oxygen Supplementation: room air    Independent Airway: airway patency satisfactory and stable  Dentition: dentition unchanged  Vital Signs Stable: post-procedure vital signs reviewed and stable  Report to RN Given: handoff report given  Patient transferred to: PACU  Comments: Patient breathing spontaneously.  Follows commands.  Suctioned and extubated.  Exchanging air well.  Transferred to PACU.  Monitors on.  VSS.  Patent IV.  Report and transfer of care to RN.    Handoff Report: Identifed the Patient, Identified the Reponsible Provider, Reviewed the pertinent medical history, Discussed the surgical course, Reviewed Intra-OP anesthesia mangement and issues during anesthesia, Set expectations for post-procedure period and Allowed opportunity for questions and acknowledgement of understanding      Vitals:  Vitals Value Taken Time   /66 07/18/22 1547   Temp     Pulse 92 07/18/22 1552   Resp 17 07/18/22 1552   SpO2 91 % 07/18/22 1552   Vitals shown include unvalidated device data.    Electronically Signed By: AVERY Plata CRNA  July 18, 2022  3:53 PM

## 2022-07-18 NOTE — ANESTHESIA PROCEDURE NOTES
Airway       Patient location during procedure: OR  Staff -        CRNA: Marci Cunningham APRN CRNA       Performed By: CRNA  Consent for Airway        Urgency: elective  Indications and Patient Condition       Indications for airway management: riya-procedural       Induction type:intravenous       Mask difficulty assessment: 1 - vent by mask    Final Airway Details       Final airway type: endotracheal airway       Successful airway: ETT - single  Endotracheal Airway Details        ETT size (mm): 8.0       Cuffed: yes       Successful intubation technique: video laryngoscopy       VL Blade Size: Glidescope 4       Grade View of Cords: 1       Adjucts: stylet       Position: Right       Measured from: lips       Secured at (cm): 23       Bite block used: None    Post intubation assessment        Placement verified by: capnometry, equal breath sounds and chest rise        Number of attempts at approach: 1       Number of other approaches attempted: 0       Secured with: silk tape       Ease of procedure: easy       Dentition: Intact

## 2022-07-19 VITALS
HEART RATE: 78 BPM | DIASTOLIC BLOOD PRESSURE: 67 MMHG | BODY MASS INDEX: 24.09 KG/M2 | WEIGHT: 181.8 LBS | OXYGEN SATURATION: 94 % | TEMPERATURE: 97.9 F | HEIGHT: 73 IN | SYSTOLIC BLOOD PRESSURE: 136 MMHG | RESPIRATION RATE: 16 BRPM

## 2022-07-19 PROCEDURE — 250N000013 HC RX MED GY IP 250 OP 250 PS 637: Performed by: PHYSICIAN ASSISTANT

## 2022-07-19 PROCEDURE — 250N000011 HC RX IP 250 OP 636: Performed by: PHYSICIAN ASSISTANT

## 2022-07-19 PROCEDURE — 258N000003 HC RX IP 258 OP 636: Performed by: PHYSICIAN ASSISTANT

## 2022-07-19 RX ORDER — CYCLOBENZAPRINE HCL 10 MG
10 TABLET ORAL 3 TIMES DAILY
Qty: 15 TABLET | Refills: 0 | Status: SHIPPED | OUTPATIENT
Start: 2022-07-19 | End: 2022-07-28

## 2022-07-19 RX ORDER — CYCLOBENZAPRINE HCL 10 MG
10 TABLET ORAL 3 TIMES DAILY
Status: DISCONTINUED | OUTPATIENT
Start: 2022-07-19 | End: 2022-07-19 | Stop reason: HOSPADM

## 2022-07-19 RX ORDER — OXYCODONE HYDROCHLORIDE 5 MG/1
5 TABLET ORAL EVERY 4 HOURS PRN
Qty: 12 TABLET | Refills: 0 | Status: SHIPPED | OUTPATIENT
Start: 2022-07-19 | End: 2024-06-10

## 2022-07-19 RX ORDER — ACETAMINOPHEN 325 MG/1
650 TABLET ORAL EVERY 6 HOURS PRN
Qty: 30 TABLET | Refills: 0 | Status: SHIPPED | OUTPATIENT
Start: 2022-07-19 | End: 2024-06-10

## 2022-07-19 RX ADMIN — CYCLOBENZAPRINE 10 MG: 10 TABLET, FILM COATED ORAL at 10:35

## 2022-07-19 RX ADMIN — TAMSULOSIN HYDROCHLORIDE 0.4 MG: 0.4 CAPSULE ORAL at 10:33

## 2022-07-19 RX ADMIN — OXYCODONE HYDROCHLORIDE 10 MG: 5 TABLET ORAL at 01:18

## 2022-07-19 RX ADMIN — SODIUM CHLORIDE, POTASSIUM CHLORIDE, SODIUM LACTATE AND CALCIUM CHLORIDE: 600; 310; 30; 20 INJECTION, SOLUTION INTRAVENOUS at 00:45

## 2022-07-19 RX ADMIN — ONDANSETRON 4 MG: 4 TABLET, ORALLY DISINTEGRATING ORAL at 13:36

## 2022-07-19 RX ADMIN — OXYCODONE HYDROCHLORIDE 10 MG: 5 TABLET ORAL at 10:33

## 2022-07-19 NOTE — PLAN OF CARE
Diagnosis: bilateral inguinal hernia repair  POD#: 0  Mental Status: Aox4, anxious  Activity/dangle: SBA to BR  Diet: Full Liquid  Pain: 7-8/10 abdominal pain, Prn oxycodone x1 and prn dilaudid x2.  [place]/Voiding: Continent, voiding in BR  Tele/Restraints/Iso: Contact for MRSA  02/LDA: VSS on 1L NC  D/C Date: possibly home tomorrow.  Other Info: abd binder for comfort, 3 incisions to upper abdomen closed with liquid bandage,  JATINDER.

## 2022-07-19 NOTE — PLAN OF CARE
Summary: Bilateral inguinal hernia repair  DATE & TIME: 7/18/22 4023-3449    Cognitive Concerns/ Orientation : Pt A/Ox4   BEHAVIOR & AGGRESSION TOOL COLOR: Green   ABNL VS/O2: VSS on 2L NC  MOBILITY: SBA with gait belt, fall risk  PAIN MANAGMENT: Complaints of lower abdominal pain 7/10, managed with PRN oxycodone and ice packs  DIET: Full liquid  BOWEL/BLADDER: Continent, urinal at bedside  DRAIN/DEVICES: IVF infusing  SKIN: 3 upper abdominal incisions closed with liquid bandage, JATINDER.   D/C DATE: Possible discharge home today  OTHER IMPORTANT INFO: Abdominal binder in place. Capno in place.

## 2022-07-19 NOTE — PROVIDER NOTIFICATION
MD Notification    Notified Person: MD    Notified Person Name: Dr. Stein    Notification Date/Time: 7/18/22 2230     Notification Interaction: Phone    Purpose of Notification: Pt extremely anxious and worried about everything     Orders Received: Oral ativan 0.5mg x1     Comments:

## 2022-07-19 NOTE — PROGRESS NOTES
[facility] Progress Note  [facility]  Gen Surg    attempted to see Hang via admissions.  Attempted to see Hang throughout the day and on last attempt he was in the process of discharging.    FRANCIS Keenan  Intern    Phone: [phone]

## 2022-07-19 NOTE — PROGRESS NOTES
Pt has discharge order.  Called [facility] at [phone] to inquire if patient in Independent or assisted living.  Pt in independent living and has no services.  No CM discharge needs.    Sabrina Aguayo RN, BS  Care Coordinator  [email or web address]

## 2022-07-19 NOTE — PROGRESS NOTES
"General Surgery Note    Stable S/P Robot assisted Laparoscopic Bilateral Inguinal Hernia Repairs with mesh  POD1    -Pain much improved today.  Will add Flexeril though to see if can minimize narcotic.  Hang is \"not a pill vianney\" so would like to be off the narcotics sooner than later.  Rx's for Tylenol, Flexeril, and oxycodone for breakthrough pain.  Will also prescribe Senokot  -Discussed surgery details and recovery expectations.  -Voiding well.  Continue Flomax.  Discussed with patient his large bladder in surgery, and potential for retention post op, but appears to not be affected by this currently.  -Discussed increasing activity level, ice to groin, elevation of scrotum, return to swimming, when ok to shower, etc.  -He feel confident with plan going forward and will contact office if any concerns.  -Discharge to home after lunch (has not tried solids yet).  Question if has a ride, but then states he has a 's number.  RN will let me know if care coordinator or  consult is necessary.  -Follow up with phone call at ~2 weeks.  Pt will call office if any concerns.      Doing better today.  Pain better controlled and located in top half of the RLQ.  Not much for groin pain or incisional pain.  Denies nausea but even before surgery has had less of an appetite.  Voiding well and reports going 3-4 times since surgery.      NAD, pleasant, A&O  /67 (BP Location: Right arm)   Pulse 78   Temp 97.9  F (36.6  C) (Oral)   Resp 16   Ht 1.854 m (6' 1\")   Wt 82.5 kg (181 lb 12.8 oz)   SpO2 94%   BMI 23.99 kg/m      Intake/Output Summary (Last 24 hours) at 7/19/2022 1138  Last data filed at 7/19/2022 1027  Gross per 24 hour   Intake 820 ml   Output 10 ml   Net 810 ml     Abd: soft, appropriate tenderness, ND  Inc: Exofin looks good  "

## 2022-07-19 NOTE — ANESTHESIA POSTPROCEDURE EVALUATION
Patient: Canelo Cruz    Procedure: Procedure(s):  Robotic repair of right inguinal hernia with placement of mesh,  robotic repair of left inguinal hernia with placement of mesh       Anesthesia Type:  General    Note:     Postop Pain Control: Uneventful            Sign Out: Well controlled pain   PONV: No   Neuro/Psych: Uneventful            Sign Out: Acceptable/Baseline neuro status   Airway/Respiratory: Uneventful            Sign Out: Acceptable/Baseline resp. status   CV/Hemodynamics: Uneventful            Sign Out: Acceptable CV status; No obvious hypovolemia; No obvious fluid overload   Other NRE: NONE   DID A NON-ROUTINE EVENT OCCUR? No           Last vitals:  Vitals Value Taken Time   /87 07/18/22 1745   Temp 36.6  C (97.8  F) 07/18/22 1715   Pulse 95 07/18/22 1746   Resp 17 07/18/22 1746   SpO2 96 % 07/18/22 1747   Vitals shown include unvalidated device data.    Electronically Signed By: Howie Hernandez MD  July 18, 2022  7:22 PM

## 2022-07-28 ENCOUNTER — TELEPHONE (OUTPATIENT)
Dept: SURGERY | Facility: CLINIC | Age: 87
End: 2022-07-28

## 2022-07-28 DIAGNOSIS — K40.20 NON-RECURRENT BILATERAL INGUINAL HERNIA WITHOUT OBSTRUCTION OR GANGRENE: ICD-10-CM

## 2022-07-28 DIAGNOSIS — G89.18 ACUTE POST-OPERATIVE PAIN: ICD-10-CM

## 2022-07-28 RX ORDER — CYCLOBENZAPRINE HCL 10 MG
10 TABLET ORAL 3 TIMES DAILY
Qty: 15 TABLET | Refills: 0 | Status: SHIPPED | OUTPATIENT
Start: 2022-07-28 | End: 2024-06-10

## 2022-07-28 NOTE — TELEPHONE ENCOUNTER
Procedure: Robotic repair of bilateral inguinal hernias with placement of bilateral preperitoneal mesh    Date: 07/18/2022    Surgeon: Alan    Patient calling to discuss his concern of swelling. He reports left side has seemingly returned to normal, but the right side is still quite a bit swollen down into his groin.  Not quite as swollen as prior to surgery    No pain associated with this.  He is urinating and having bowel movements.  Sometimes has a little difficulty urinating    He is still wearing the abdominal binder.  He is also icing throughout the day    He reports that he only used a couple oxycodone because he doesn't like how they make him feel.  He has been using tylenol.  He also reports he used the flexeril, which has been very helpful with symptom management, but he does not have any left.  He would like a refill of the flexeril if possible    He is also wanting to know how much he can be or should be walking each day? He reports he walked about 600 feet yesterday, outside of the house. Is this okay?    Informed patient that the swelling he is experiencing is normal and expected at this point. Could have some fluid build up to the area, as well.  Continue icing and using abdominal binder.  Symptoms should not get worse. If swelling increases, or pain develops, he should call for clinic visit/evaluation    Will send refill of flexeril to his pharmacy    Okay to walk as much as he is able.  He may notice some muscle soreness, which is fine, but if he experiences pain he should ease back on activity level    He verbalized understanding of this and will call PRN    Will call patient once flexeril rx has been sent to his pharmacy    Paula Vazquez RN-BSN

## 2022-07-28 NOTE — TELEPHONE ENCOUNTER
Patient Robotic repair of right inguinal hernia with placement of mesh,  robotic repair of left inguinal hernia with placement of mesh KARLA 7/18/22 and is concerned with persistent swelling    Please call    Phone: [phone]  Message ok

## 2022-07-29 ENCOUNTER — TELEPHONE (OUTPATIENT)
Dept: SURGERY | Facility: CLINIC | Age: 87
End: 2022-07-29

## 2022-07-29 NOTE — TELEPHONE ENCOUNTER
Central Prior Authorization Team   Phone: [phone]      PA Initiation    Medication: Cyclobenzaprine HCL 10 mg tablet - INITIATED  Insurance Company: [employer] [place] - Phone [phone] Fax [phone]  Pharmacy Filling the Rx: [id number] [place] [facility] [place], [place] - [address]   Filling Pharmacy Phone: [phone]  Filling Pharmacy Fax:    Start Date: 7/29/2022

## 2022-07-29 NOTE — TELEPHONE ENCOUNTER
Prior Authorization Retail Medication Request    Medication/Dose: cyclobenzaprine hcl 10 mg tab  ICD code (if different than what is on RX):  G89.18  K40.20  Previously Tried and Failed:  oxycodone  Rationale:  Does not like the side effects of the narcotic pain medication. Flexeril working better for pain relief d/t muscle spasms    Insurance Name:  [employer] Medicare Advantage  Insurance ID:  [id number]      Pharmacy Information (if different than what is on RX)  Name:  [facility] [id number] in [place], [place] [address]  Phone:  [phone]    Paula Vazquez RN-BSN

## 2022-08-01 ENCOUNTER — TELEPHONE (OUTPATIENT)
Dept: SURGERY | Facility: CLINIC | Age: 87
End: 2022-08-01

## 2022-08-01 NOTE — TELEPHONE ENCOUNTER
Patient had a robotic repair of Robotic repair of right inguinal hernia with placement of mesh,  robotic repair of left inguinal hernia with placement of mesh 7/18/22 KARLA and is having swelling on the right side that is concerning to him    Phone: [phone]  Message ok

## 2022-08-01 NOTE — TELEPHONE ENCOUNTER
PROCEDURE:  Robotic repair of bilateral inguinal hernias with placement of bilateral preperitoneal mesh.     Date; 07/18/2022    Surgeon: Alan    Patient calling to discuss ongoing swelling in right groin. He reports that he thinks it is going down some.How long should he expect it to take?  Informed him it can take 4 plus weeks for swelling to resolve. Should not get worse, though.    Patient wondering what types of symptoms to watch out for. Informed him he should call if he has worsening pain, or sudden changes to bowels, bladder, fever, increasing blood pressure, etc.  He verbalizes understanding    He does have an appointment with Dr. Phillips on Wednesday, that he intends to keep    He also reports that he fell on Friday while getting into his care after grocery shopping. Did he do any harm to surgical area?    Informed him that he likely did not do any damage to surgery area as he is not having any pain    He verbalized understanding of conversation and will call CHAPO Vazquez RN-BSN

## 2022-08-02 NOTE — TELEPHONE ENCOUNTER
PRIOR AUTHORIZATION DENIED    Medication: Cyclobenzaprine HCL 10 mg tablet - DENIED    Denial Date: 7/30/2022    Denial Rational: REQUIRES MEDICALLY ACCEPTED INDICATION      Appeal Information: IF PROVIDER WOULD LIKE TO APPEAL THIS DECISION PLEASE PROVIDE PA TEAM WITH LETTER OF MEDICAL NECESSITY

## 2022-08-03 ENCOUNTER — OFFICE VISIT (OUTPATIENT)
Dept: SURGERY | Facility: CLINIC | Age: 87
End: 2022-08-03
Payer: COMMERCIAL

## 2022-08-03 DIAGNOSIS — Z09 SURGERY FOLLOW-UP EXAMINATION: ICD-10-CM

## 2022-08-03 DIAGNOSIS — G89.18 POST-OP PAIN: Primary | ICD-10-CM

## 2022-08-03 PROCEDURE — 99024 POSTOP FOLLOW-UP VISIT: CPT | Performed by: SURGERY

## 2022-08-03 RX ORDER — METHOCARBAMOL 500 MG/1
500 TABLET, FILM COATED ORAL 4 TIMES DAILY PRN
Qty: 20 TABLET | Refills: 0 | Status: SHIPPED | OUTPATIENT
Start: 2022-08-03 | End: 2024-06-10

## 2022-08-03 NOTE — PROGRESS NOTES
Surgery Postoperative Note    S: Hang is a 89-year-old male who recently underwent robotic repair of bilateral inguinal hernias with placement of mesh.  Overall, the patient has done quite well postoperatively.  He is having some swelling in the right inguinal area, with associated discomfort.  He is otherwise tolerating oral intake.  He has been increasing his walking.    Abdomen: incisions healing well without evidence of infection; palpable hematoma/seroma in the right inguinal area, no evidence of recurrent hernia    A/P  Canelo Cruz is recovering from robotic repair of bilateral inguinal hernias with placement of mesh.  He has done well following surgery.  The patient was provided with a prescription for muscle relaxant (Robaxin) for postoperative pain relief.  I did offer the patient an inguinal ultrasound to further evaluate his right inguinal complex fluid collection.  He reports that he is comfortable continuing with observation at this time.  The patient may follow-up in the general surgery clinic on an as-needed basis.    Thank you for the opportunity to help in his care.    Ina Phillips MD   [facility]  [phone]    Please route or send letter to:  Primary Care Provider (PCP) and Referring Provider

## 2022-08-03 NOTE — LETTER
August 4, 2022          Miguelina Parker MD  [facility]  [address]  [place] [address]      RE:   Canelo Cruz 7/30/1931      Dear Colleague,    Thank you for referring your patient, Canelo Cruz, to [facility] at [facility]. Please see a copy of my visit note below.    Hang is a 89-year-old male who recently underwent robotic repair of bilateral inguinal hernias with placement of mesh.  Overall, the patient has done quite well postoperatively.  He is having some swelling in the right inguinal area, with associated discomfort.  He is otherwise tolerating oral intake.  He has been increasing his walking.     Abdomen: incisions healing well without evidence of infection; palpable hematoma/seroma in the right inguinal area, no evidence of recurrent hernia     A/P  Canelo Cruz is recovering from robotic repair of bilateral inguinal hernias with placement of mesh.  He has done well following surgery.  The patient was provided with a prescription for muscle relaxant (Robaxin) for postoperative pain relief.  I did offer the patient an inguinal ultrasound to further evaluate his right inguinal complex fluid collection.  He reports that he is comfortable continuing with observation at this time.  The patient may follow-up in the general surgery clinic on an as-needed basis.       Again, thank you for allowing me to participate in the care of your patient.      Sincerely,      Ina Phillips MD

## 2022-08-05 ENCOUNTER — TELEPHONE (OUTPATIENT)
Dept: SURGERY | Facility: CLINIC | Age: 87
End: 2022-08-05

## 2022-08-05 NOTE — TELEPHONE ENCOUNTER
Name of caller: Patient    Reason for Call: Patient is feeling lowsy and shaky. He reports swelling and just overall unwell feeling.    Surgeon:  Dr. Phillips    Recent Surgery:  Yes.    If yes, when & what type:  7/18 Robotic repair of right inguinal hernia with placement of mesh,  robotic repair of left inguinal hernia with placement of mesh      Best phone number to reach pt at is: [phone]  Ok to leave a message with medical info? Yes.

## 2022-08-05 NOTE — TELEPHONE ENCOUNTER
"Procedure: Robotic repair of bilateral inguinal hernias with placement of bilateral preperitoneal mesh    Date: 07/18/2022    Surgeon: Alan    Pt was in for post op evaluation on 8/3/22.     He reports that he is still dealing with swelling and that it \"might\" even be getting worse! Patient reports feeling \"lousy and shaky\"     He reports that yesterday he had a hard time urinating, but today, he is urinating fine    He has not been drinking much water. He reports he is drinking a lot of orange juice.    Informed patient that he needs to increase water intake to prevent dehydration. Informed him that he could feeling shaky because of lack of water intake. He agreed to increase water intake    Patient has only been taking one robaxin daily. Can take up to one four times daily as needed if he wants.  Did inform him that muscle relaxers can make people feel \"shaky.\" Encouraged him to pay attention to how he feels after he takes his next one, as this may be contributing to present symptoms. He agreed    Patient will continue to monitor swelling over the weekend and he will call on Monday with an update    He also knows that there is an on call surgeon over the weekend, should he have any worsening symptoms    Paula Vazquez RN-BSN          "

## 2022-08-09 ENCOUNTER — TELEPHONE (OUTPATIENT)
Dept: SURGERY | Facility: CLINIC | Age: 87
End: 2022-08-09

## 2022-08-09 ENCOUNTER — HOSPITAL ENCOUNTER (OUTPATIENT)
Dept: ULTRASOUND IMAGING | Facility: CLINIC | Age: 87
Discharge: HOME | End: 2022-08-09
Attending: SURGERY | Admitting: SURGERY
Payer: COMMERCIAL

## 2022-08-09 DIAGNOSIS — R19.09 INGUINAL BULGE: Primary | ICD-10-CM

## 2022-08-09 DIAGNOSIS — R19.09 INGUINAL BULGE: ICD-10-CM

## 2022-08-09 PROCEDURE — 76705 ECHO EXAM OF ABDOMEN: CPT

## 2022-08-09 NOTE — TELEPHONE ENCOUNTER
Name of caller: Patient    Reason for Call:  Symptoms  Patient is still extremely swollen around incision site. He thinks he needs to be seen asap, and would like a call back to discuss the symptoms.    Surgeon:  Alan    Recent Surgery:  Yes.    If yes, when & what type:  7/18/22    Robotic repair of right inguinal hernia with placement of mesh,  robotic repair of left inguinal hernia with placement of mesh      Best phone number to reach pt at is: [phone]    Ok to leave a message with medical info? Yes.

## 2022-08-09 NOTE — TELEPHONE ENCOUNTER
Called patient with ultrasound appt info:    Ultrasound TODAY at 2:00. Check in at [facility] at 1:45    Pt verbalized understanding and is aware that Dr. Phillips will call him with the results. May not receive a call until tomorrow    Paula Vazquez RN-BSN

## 2022-08-09 NOTE — TELEPHONE ENCOUNTER
Per Dr. Phillips -    -inguinal ultrasound to evaluate for hernia recurrence or other post op abnormalities    -Dr. Phillips will call patient with results    Patient notified. He prefers that I schedule appt for him. Will call patient back once appt has been set    Paula Vazquez RN-BSN

## 2022-08-10 ENCOUNTER — TELEPHONE (OUTPATIENT)
Dept: SURGERY | Facility: CLINIC | Age: 87
End: 2022-08-10

## 2022-08-10 NOTE — TELEPHONE ENCOUNTER
Surgery:    Patient contacted and inguinal ultrasound results discussed.  Patient has complex postoperative fluid collection on the right side, no evidence of hernia recurrence.  Patient instructed to continue slowly increasing his activity as able.  He may follow-up in the general surgery clinic on an as-needed basis.    US HERNIA EVALUATION, 8/9/2022 2:19 PM     Findings:   Underlying the area of swelling in the right groin, there is a 9.7 x  5.3 x 4.8 cm complex cystic lesion with multiple linear echogenic  septations and no increased vascularity. No recurrent hernia.     Underlying the area of swelling in the left groin, there is no fluid  collection, mass. No definite recurrent hernia.                                                                      Impression:   1. Large right groin complex cystic lesion with internal septations,  either a resolving hematoma or seroma.  2. No sonographically evident abnormalities in the left groin.     Ina Phillips MD

## 2022-08-10 NOTE — TELEPHONE ENCOUNTER
Patient has U/S yesterday and is calling for results, please call    Phone: [phone]  Message ok

## 2022-08-18 ENCOUNTER — TELEPHONE (OUTPATIENT)
Dept: SURGERY | Facility: CLINIC | Age: 87
End: 2022-08-18

## 2022-08-18 NOTE — TELEPHONE ENCOUNTER
Name of caller: Patient    Reason for Call:  Patient continues to have a great deal of swelling on his right side. It has not improved at all.  He also has some tightening feeling in the stomach occasionally. He would like to discuss with Dr. Phillips.    I did try to get him to schedule a PO with Dr. Phillips, but he wants to speak with her first.    Surgeon:  Alan    Recent Surgery:  Yes.    If yes, when & what type:  7/18/2022      Robotic repair of right inguinal hernia with placement of mesh,  robotic repair of left inguinal hernia with placement of mesh    Best phone number to reach pt at is: [phone]    Ok to leave a message with medical info? Yes.

## 2022-08-18 NOTE — TELEPHONE ENCOUNTER
Called patient to inform him of Dr. Phillips's advice/recommendations based off present concerns/symptoms:    In the absence of nausea/vomiting, his symptoms are expected.  I could see him tomorrow in clinic at 1100 for 30 min visit, if he would prefer.      Informed patient of this    Patient is having bowel movements, urinating without issue. No increasing pain    Informed him that the swelling can take MONTHS to resolve. Pain he feels in abdomen with certain movements likely muscular/healing in nature and also expected    He reports he has been dealing with fatigue/low energy.  Encouraged hydration.  Water, Gatorade, etc.    Offered appointment for tomorrow    Patient reports that he will see how he feels after the weekend. Will call early next week with update    Paula Vazquez, BRIAN-BSN

## 2022-08-22 NOTE — TELEPHONE ENCOUNTER
Patient called today with an update. He still has a great deal of swelling and nothing seems to be improving. Wondering if he needs to be seen?    [phone]  Ok to leave VM

## 2022-08-23 NOTE — TELEPHONE ENCOUNTER
Procedure: robotic repair of bilateral inguinal hernias with placement of bilateral preperitoneal mesh    Date: 7/18/22    Surgeon: Alan    Patient concerned with ongoing swelling in R inguinal/groin     Patient is not having any increasing pain, other than occasion sharp nerve pains    Having bowel movements and urinating    Informed patient, again, that swelling can take months to resolve. It should NOT get worse and he should NOT have pain, If swelling gets worse, or he starts having pain/discomfort, he should come see Dr. Phillips for evaluation.  He agrees to this and will call next week when Dr. Phillips is back with an update.    He is wondering when he can swim again and when he can resume sexual activity    Informed him that he is clear to return to all activities.  If he does something that causes pain in the surgical area, he should stop    Patient verbalized understanding and will call PRGLORIA Vazquez RN-BSN

## 2022-08-29 ENCOUNTER — TELEPHONE (OUTPATIENT)
Dept: SURGERY | Facility: CLINIC | Age: 87
End: 2022-08-29

## 2022-08-29 NOTE — TELEPHONE ENCOUNTER
Patient had Robotic repair of right inguinal hernia with placement of mesh,  robotic repair of left inguinal hernia with placement of mesh 7/18/22 KARLA, has been calling about swelling for a few weeks and would like to speak to KARLA today.    Phone: [phone]

## 2022-08-29 NOTE — TELEPHONE ENCOUNTER
[facility]  Triage call note    Canelo Cruz was called regarding his complaints.  He underwent a laparoscopic bilateral inguinal hernia repair by Dr. Phillips on 7/18/22.  He complains of ongoing swelling in the right groin  He states the swelling is creeping to the left and is more tender. We discussed he may be finally mobilizing the fluid collection and resolution can take time.  He denies increased abdominal pain, n/v, fever, chills, and change in urination or BM.  He states he has had weight loss for the last 11 months and has been seen by his PCP with no clear etiology was determined.  He requests to be seen. An appointment was opened for Sept. 7th at 8:15am after my discussion with Dr. Phillips.  He was instructed to go to the ER if his symtpoms worsen; however, he refused.        Savannah Roberson PA-C  [facility]

## 2022-09-01 ENCOUNTER — TELEPHONE (OUTPATIENT)
Dept: SURGERY | Facility: CLINIC | Age: 87
End: 2022-09-01

## 2022-09-01 NOTE — TELEPHONE ENCOUNTER
Name of caller: Patient    Reason for Call:  Patient is very concerned about his healing process. The incision area continues to be very swollen and hard. There has been no improvement at all. Patient does have an appointment scheduled with Dr. Phillips next week, but he feels that he needs to speak with her today if possible.     Surgeon:  Alan    Recent Surgery:  Yes.    If yes, when & what type:  7/18/22    Robotic repair of right inguinal hernia with placement of mesh,  robotic repair of left inguinal hernia with placement of mesh      Best phone number to reach pt at is: [phone]    Ok to leave a message with medical info? Yes.

## 2022-09-02 NOTE — TELEPHONE ENCOUNTER
Patient reporting ongoing issues with swelling.  Becoming anxious and depressed and wonders if the fluid can be drained when he sees Dr. Phillips on Wednesday    Informed him that Dr. Phillips will have to evaluate when he is in clinic. If the area does need draining, she may be able to do it in clinic. Otherwise, it will need to be done using ultrasound    Pt also has other concerns regarding inability to have an erection as well as no energy/depression/anxiety.  Informed him that he should discuss these with his PCP    He agreed    He will call CHAPO Vazquez RN-BSN

## 2022-09-07 ENCOUNTER — OFFICE VISIT (OUTPATIENT)
Dept: SURGERY | Facility: CLINIC | Age: 87
End: 2022-09-07
Payer: COMMERCIAL

## 2022-09-07 DIAGNOSIS — Z09 SURGERY FOLLOW-UP EXAMINATION: Primary | ICD-10-CM

## 2022-09-07 PROCEDURE — 99024 POSTOP FOLLOW-UP VISIT: CPT | Performed by: SURGERY

## 2022-09-07 NOTE — LETTER
September 7, 2022          Yuki Jaime PA-C  [address]  [place] [address]      RE:   Canelo Cruz 7/30/1931      Dear Colleague,    Thank you for referring your patient, Canelo Cruz, to [facility] at [facility]. Please see a copy of my visit note below.    Hang is a 89-year-old male who previously underwent robotic repair of bilateral inguinal hernias.  Postoperatively, the patient developed hematoma/seroma in the right inguinal area.  He presents to clinic today for recheck.  He reports that his symptoms do not seem to be improving.  He also describes multiple additional concerns, including decreased appetite and fatigue.     Abdomen: incisions healing well without evidence of infection, right inguinal hematoma/seroma decreasing in size        A/P  Canelo Cruz is recovering from robotic repair of bilateral inguinal hernias.  The patient is frustrated with the speed of his postoperative recovery.  Global examination reveals the patient to appear more robust with much improved movement/mobility.  I again reinforced the fact that it will take a longer period of time for the patient to recover from this procedure, purely based on his advanced age.  His hematoma/seroma may take an additional several months to completely resolve.  I offered the patient a referral to physical therapy, and he declined.  I have therefore encouraged him to continue to increase his physical activity level on his own.  I also encouraged the patient to follow-up with his primary provider to discuss his decreased appetite and persistent fatigue.  The patient may follow-up in the general surgery clinic on an as-needed basis.    Again, thank you for allowing me to participate in the care of your patient.      Sincerely,      Ina Phillips MD

## 2022-09-07 NOTE — PROGRESS NOTES
Surgery Postoperative Note    S: Hang is a 89-year-old male who previously underwent robotic repair of bilateral inguinal hernias.  Postoperatively, the patient developed hematoma/seroma in the right inguinal area.  He presents to clinic today for recheck.  He reports that his symptoms do not seem to be improving.  He also describes multiple additional concerns, including decreased appetite and fatigue.    Abdomen: incisions healing well without evidence of infection, right inguinal hematoma/seroma decreasing in size      A/P  Canelo Cruz is recovering from robotic repair of bilateral inguinal hernias.  The patient is frustrated with the speed of his postoperative recovery.  Global examination reveals the patient to appear more robust with much improved movement/mobility.  I again reinforced the fact that it will take a longer period of time for the patient to recover from this procedure, purely based on his advanced age.  His hematoma/seroma may take an additional several months to completely resolve.  I offered the patient a referral to physical therapy, and he declined.  I have therefore encouraged him to continue to increase his physical activity level on his own.  I also encouraged the patient to follow-up with his primary provider to discuss his decreased appetite and persistent fatigue.  The patient may follow-up in the general surgery clinic on an as-needed basis.    Thank you for the opportunity to help in his care.    Ina Phillips MD   [facility]  [phone]    Please route or send letter to:  Primary Care Provider (PCP) and Referring Provider

## 2022-09-11 ENCOUNTER — HEALTH MAINTENANCE LETTER (OUTPATIENT)
Age: 87
End: 2022-09-11

## 2022-09-22 ENCOUNTER — TELEPHONE (OUTPATIENT)
Dept: SURGERY | Facility: CLINIC | Age: 87
End: 2022-09-22

## 2022-09-22 NOTE — TELEPHONE ENCOUNTER
Name of caller: Patient    Reason for Call:  Dian called to get a message to Dr. Phillips....    The swelling in the right side of groin area has not gone down since surgery, and surgery was 9.2 weeks ago. Hang is very concerned about this.     Surgeon:  Alan     Recent Surgery:  Yes.    If yes, when & what type:  7/18/22      Robotic repair of right inguinal hernia with placement of mesh,  robotic repair of left inguinal hernia with placement of mesh    Best phone number to reach pt at is: [phone]  Ok to leave a message with medical info? Yes.

## 2022-09-24 ENCOUNTER — TELEPHONE (OUTPATIENT)
Dept: SURGERY | Facility: CLINIC | Age: 87
End: 2022-09-24

## 2022-09-24 NOTE — TELEPHONE ENCOUNTER
"Surgery:    89-year-old male, status post robotic inguinal hernia repair.  Patient calls in today due to continued right inguinal symptoms related to postoperative hematoma/seroma.  He states that he still has discomfort and bulging in the right inguinal area.  I again reassured the patient that I would expect his symptoms to be present with continued slow resolution.  He tells me he is able to walk at least 1 mile every day and perform \"light workouts\".  I encouraged him to continue as much physical activity as he is able to tolerate.  He reports normal bowel function.  He tells me that he has not been eating well but states this is not related to his hernia repair.  The patient requests surgical intervention to resolve his right inguinal postoperative hematoma/seroma.  I again informed the patient that I would not recommend any type of surgical intervention and that his symptoms should resolve spontaneously over time.  He informs me that he is \"not happy \"and will contact my clinic again in 1 week for an update.    Ina Phillips MD      "

## 2022-10-17 ENCOUNTER — TELEPHONE (OUTPATIENT)
Dept: SURGERY | Facility: CLINIC | Age: 87
End: 2022-10-17

## 2022-10-17 NOTE — TELEPHONE ENCOUNTER
Patient had a hernia repair 7/1/22 KARLA.  Swelling in R groin 4x3x2  Inches, he says he was told swelling would go down in 2 months and it is persisting, he is concerned and would like to speak to KARLA    Phone: [phone]  Message ok

## 2022-10-17 NOTE — TELEPHONE ENCOUNTER
Surgery:    89-year-old male status post robotic repair of bilateral inguinal hernias.  Postoperatively, the patient developed hematoma/seroma in the right inguinal area.  He calls in today to provide a postoperative update.  He tells me that the dimensions of his swelling are currently 4 x 3 x 2 inches.  He has been exercising every other day on an exercise bike.  He still reports decreased appetite, which has been an issue since his second COVID booster in spring 2022.  Denies nausea/vomiting.  He is having regular bowel movements.  I am encouraged to hear that the patient's groin swelling continues to slowly improve, and I communicated this to the patient.  I have encouraged the patient to increase his physical activity as tolerated.  He may follow-up on an as-needed basis.    Ina Phillips MD

## 2022-12-08 ENCOUNTER — APPOINTMENT (OUTPATIENT)
Dept: URBAN - METROPOLITAN AREA CLINIC 256 | Age: 87
Setting detail: DERMATOLOGY
End: 2022-12-08

## 2022-12-08 VITALS — HEIGHT: 70 IN | WEIGHT: 177 LBS

## 2022-12-08 DIAGNOSIS — D485 NEOPLASM OF UNCERTAIN BEHAVIOR OF SKIN: ICD-10-CM

## 2022-12-08 PROBLEM — D48.5 NEOPLASM OF UNCERTAIN BEHAVIOR OF SKIN: Status: ACTIVE | Noted: 2022-12-08

## 2022-12-08 PROCEDURE — 11102 TANGNTL BX SKIN SINGLE LES: CPT

## 2022-12-08 PROCEDURE — OTHER BIOPSY BY SHAVE METHOD: OTHER

## 2022-12-08 PROCEDURE — OTHER MIPS QUALITY: OTHER

## 2022-12-08 PROCEDURE — OTHER COUNSELING: OTHER

## 2022-12-08 PROCEDURE — 11103 TANGNTL BX SKIN EA SEP/ADDL: CPT

## 2022-12-08 ASSESSMENT — LOCATION SIMPLE DESCRIPTION DERM
LOCATION SIMPLE: RIGHT UPPER ARM
LOCATION SIMPLE: CHEST

## 2022-12-08 ASSESSMENT — LOCATION ZONE DERM
LOCATION ZONE: ARM
LOCATION ZONE: TRUNK

## 2022-12-08 ASSESSMENT — LOCATION DETAILED DESCRIPTION DERM
LOCATION DETAILED: RIGHT LATERAL SUPERIOR CHEST
LOCATION DETAILED: RIGHT ANTERIOR DISTAL UPPER ARM

## 2022-12-08 NOTE — PROCEDURE: BIOPSY BY SHAVE METHOD
Dressing: bandage
Information: Selecting Yes will display possible errors in your note based on the variables you have selected. This validation is only offered as a suggestion for you. PLEASE NOTE THAT THE VALIDATION TEXT WILL BE REMOVED WHEN YOU FINALIZE YOUR NOTE. IF YOU WANT TO FAX A PRELIMINARY NOTE YOU WILL NEED TO TOGGLE THIS TO 'NO' IF YOU DO NOT WANT IT IN YOUR FAXED NOTE.
Biopsy Type: H and E
Validate Triangulation: No
Electrodesiccation And Curettage Text: The wound bed was treated with electrodesiccation and curettage after the biopsy was performed.
Post-Care Instructions: I reviewed with the patient in detail post-care instructions. Patient is to keep the biopsy site dry overnight, and then apply bacitracin twice daily until healed. Patient may apply hydrogen peroxide soaks to remove any crusting.
X Size Of Lesion In Cm: 0
Type Of Destruction Used: Curettage
Hemostasis: Electrocautery
Billing Type: Third-Party Bill
Depth Of Biopsy: dermis
Electrodesiccation Text: The wound bed was treated with electrodesiccation after the biopsy was performed.
Notification Instructions: Patient will be notified of biopsy results. However, patient instructed to call the office if not contacted within 2 weeks.
Cryotherapy Text: The wound bed was treated with cryotherapy after the biopsy was performed.
Silver Nitrate Text: The wound bed was treated with silver nitrate after the biopsy was performed.
Was A Bandage Applied: Yes
Curettage Text: The wound bed was treated with curettage after the biopsy was performed.
Biopsy Method: Dermablade
Consent: Written consent was obtained and risks were reviewed including but not limited to scarring, infection, bleeding, scabbing, incomplete removal, nerve damage and allergy to anesthesia.
Wound Care: Petrolatum
Anesthesia Type: 1% lidocaine with epinephrine
Detail Level: Detailed
Anesthesia Volume In Cc (Will Not Render If 0): 1.5

## 2022-12-29 ENCOUNTER — APPOINTMENT (OUTPATIENT)
Dept: URBAN - METROPOLITAN AREA CLINIC 256 | Age: 87
Setting detail: DERMATOLOGY
End: 2022-12-30

## 2022-12-29 VITALS — HEIGHT: 70 IN | WEIGHT: 175 LBS

## 2022-12-29 DIAGNOSIS — L20.89 OTHER ATOPIC DERMATITIS: ICD-10-CM

## 2022-12-29 PROBLEM — L20.84 INTRINSIC (ALLERGIC) ECZEMA: Status: ACTIVE | Noted: 2022-12-29

## 2022-12-29 PROCEDURE — OTHER MIPS QUALITY: OTHER

## 2022-12-29 PROCEDURE — OTHER COUNSELING: OTHER

## 2022-12-29 PROCEDURE — 99213 OFFICE O/P EST LOW 20 MIN: CPT

## 2022-12-29 ASSESSMENT — LOCATION DETAILED DESCRIPTION DERM
LOCATION DETAILED: RIGHT ANTERIOR DISTAL THIGH
LOCATION DETAILED: LEFT PROXIMAL DORSAL FOREARM
LOCATION DETAILED: RIGHT PROXIMAL DORSAL FOREARM
LOCATION DETAILED: LEFT ANTERIOR PROXIMAL THIGH

## 2022-12-29 ASSESSMENT — LOCATION SIMPLE DESCRIPTION DERM
LOCATION SIMPLE: LEFT FOREARM
LOCATION SIMPLE: LEFT THIGH
LOCATION SIMPLE: RIGHT THIGH
LOCATION SIMPLE: RIGHT FOREARM

## 2022-12-29 ASSESSMENT — LOCATION ZONE DERM
LOCATION ZONE: LEG
LOCATION ZONE: ARM

## 2022-12-29 NOTE — PROCEDURE: COUNSELING
Detail Level: Zone
Patient Specific Counseling (Will Not Stick From Patient To Patient): Discussed dupixent and the low chance of side effects, but patient is concerned about the side effects and is reluctant to start injections. Does not like the idea of self injection, but discussed we can educate him or have him come in for injections. \\nDiscussed that eczema is chronic and is a disease to manage and is not curable. \\nGave patient bleach bath hand out (1 weekly bath). \\nDiscussed vitamin c and zinc to help boost immune system. \\nPatient discussed that financial stress may be the cause of eczema flares and shingles.

## 2022-12-29 NOTE — PROCEDURE: MIPS QUALITY

## 2023-01-15 ENCOUNTER — RX ONLY (RX ONLY)
Age: 88
End: 2023-01-15

## 2023-01-15 RX ORDER — TRIAMCINOLONE ACETONIDE 1 MG/G
OINTMENT TOPICAL QHS
Qty: 453.6 | Refills: 5 | Status: ERX

## 2023-02-13 ENCOUNTER — TELEPHONE (OUTPATIENT)
Dept: SURGERY | Facility: CLINIC | Age: 88
End: 2023-02-13

## 2023-02-13 NOTE — TELEPHONE ENCOUNTER
Left message for patient informing him that hernia surgery should not have an effect on patient's lack of appetite    Advised him to proceed as scheduled with CT scan on Monday. If any findings of CT scan indicate anything surgical in nature, this can be discussed with Dr Phillips    Informed him that message will be sent to Dr. Phillips so she is aware that he is getting a CT scan on Monday    Call back number provided    Paula Vazquez RN-BSN

## 2023-02-13 NOTE — TELEPHONE ENCOUNTER
Name of caller: Patient    Reason for Call:  Patient states he has not been able to eat since his surgery. He has a CT scan scheduled for Monday for his abdomen at [facility] that was ordered by his primary care physician. He has been losing  weight rapidly (52lbs.) since his surgery. He is curious as to is this is due to his surgery. He said he has no other symptoms at this time.     Surgeon:  Dr. Phillips     Recent Surgery:  Yes.    If yes, when & what type:  7/18/2022 Robotic repair of right inguinal hernia with placement of mesh,  robotic repair of left inguinal hernia with placement of mesh    Best phone number to reach pt at is: [phone]  Ok to leave a message with medical info? Yes.

## 2023-04-24 ENCOUNTER — TELEPHONE (OUTPATIENT)
Dept: SURGERY | Facility: CLINIC | Age: 88
End: 2023-04-24
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
## 2023-04-25 NOTE — TELEPHONE ENCOUNTER
Left message for patient encouraging call back. Informed him that if he is just wanting to schedule an appointment, he can do that with whoever answers the phone.  If he is wanting to discuss symptoms further prior to scheduling, he should ask for nursing    Call back number provided    Paula Vazquez, RN-BSN

## 2023-05-03 ENCOUNTER — TELEPHONE (OUTPATIENT)
Dept: SURGERY | Facility: CLINIC | Age: 88
End: 2023-05-03

## 2023-05-03 NOTE — TELEPHONE ENCOUNTER
Attempted to call patient. No answer    Left message informing him that he can call and schedule with any of the providers here. They all see patients regarding hernias or concerns with prior hernia repairs    Call back number provided    Paula Vazquez RN-BSN

## 2023-05-03 NOTE — TELEPHONE ENCOUNTER
Name of caller: Patient    Reason for Call:  Patient had surgery 10 months ago, still has pain in groin area-mostly when lying in bed and lifts leg.  Also has lost over 50lbs since surgery.     Would like to discuss with nurse.    Surgeon:  Alan    Recent Surgery:  No    If yes, when & what type:  7/18/22      Robotic repair of right inguinal hernia with placement of mesh,  robotic repair of left inguinal hernia with placement of mesh    Best phone number to reach pt at is: [phone]    Ok to leave a message with medical info? Yes.

## 2023-05-10 ENCOUNTER — TELEPHONE (OUTPATIENT)
Dept: SURGERY | Facility: CLINIC | Age: 88
End: 2023-05-10

## 2023-05-10 NOTE — TELEPHONE ENCOUNTER
"Procedure: robotic repair of bilateral inguinal hernias with placement of bilateral preperitoneal mesh    Date: 7/18/2022    Surgeon: Alan    Patient calling with concerns of ongoing dull pain in both inguinal regions. Right, greater than left.  He is also VERY upset that he was not informed that Dr. Phillips was leaving the practice. The only reason he found out is because he called to schedule an appointment with her and was told that she no longer worked here.    He reports the pain as dull.  Feels it with certain movements. In particular when he lifts his legs in bed at night to \"reshuffle blankets.\"    Patient is not sure if it is over the hernia repair sites, or a new area.    He does not feel it much when walking.  Mainly feels it when he gets up after resting/sitting, etc.    He is frustrated because Dr. Phillips said it would take about 8 weeks and he would be \"bouncing around like new.\" Now, it has been close to 11 months and he is still \"not back to normal.\"    Patient requesting to be seen by female provider because \"women listen better and take more time.\"    Will have patient see Dr. Velasco on Friday at 0930    Will route to Dr. Velasco for FYI. Will also have [facility] send most recent CT (done in February) - reports and images to PACS prior to appointment for Dr. Velasco to review    Paula Vazquez RN-BSN    "

## 2023-05-18 ENCOUNTER — TELEPHONE (OUTPATIENT)
Dept: SURGERY | Facility: CLINIC | Age: 88
End: 2023-05-18

## 2023-05-18 NOTE — TELEPHONE ENCOUNTER
"Called patient and left a message informing him that I can not help him over the phone at this point.  His surgery was 10 months ago. Patient needs to be evaluated in clinic    Of note: Patient upset that Dr Phillips is no longer with the practice and he was not notified. He cancelled a recent appointment with Dr. Velasco because he doesn't \"trust\" our care anymore    Will route to management to deal with any further calls from patient as not appropriate for \"triage\" at this point.  Patient needs to be evaluated in clinic, or obtain care elsewhere.    Call back number provided in message to patient.    Paula Vazquez RN-BSN    "

## 2023-05-18 NOTE — TELEPHONE ENCOUNTER
Patient calling again regarding his discomfort from 7/18/22 Robotic repair of right inguinal hernia with placement of mesh,  robotic repair of left inguinal hernia with placement of mesh KARLA  Refused appointment again  Wants to speak to nurse    Phone: [phone]

## 2023-06-12 ENCOUNTER — TELEPHONE (OUTPATIENT)
Dept: SURGERY | Facility: CLINIC | Age: 88
End: 2023-06-12

## 2023-06-12 NOTE — TELEPHONE ENCOUNTER
Left message for patient informing him that I can not help him with anything over the phone at this point.      He needs to be seen in clinic.    I informed him that I understand he is upset that Dr. Phillips is no longer here, but that any one of her remaining colleagues can evaluate him as they all deal with hernia patients    Reiterated, again, that I can not provide assistance to him over the phone. He needs to be seen    Call back number provided    Paula Vazquez, RN-BSN

## 2023-06-12 NOTE — TELEPHONE ENCOUNTER
Would only say he wants to speak to the nurse and was retuning her call    Phone: [phone]  Message ok

## 2024-02-24 ENCOUNTER — HEALTH MAINTENANCE LETTER (OUTPATIENT)
Age: 89
End: 2024-02-24

## 2024-06-10 ENCOUNTER — APPOINTMENT (OUTPATIENT)
Dept: GENERAL RADIOLOGY | Facility: CLINIC | Age: 89
DRG: 280 | End: 2024-06-10
Attending: BEHAVIOR TECHNICIAN
Payer: COMMERCIAL

## 2024-06-10 ENCOUNTER — APPOINTMENT (OUTPATIENT)
Dept: CT IMAGING | Facility: CLINIC | Age: 89
DRG: 280 | End: 2024-06-10
Attending: BEHAVIOR TECHNICIAN
Payer: COMMERCIAL

## 2024-06-10 ENCOUNTER — HOSPITAL ENCOUNTER (INPATIENT)
Facility: CLINIC | Age: 89
LOS: 4 days | Discharge: HOME HEALTH | DRG: 280 | End: 2024-06-14
Attending: EMERGENCY MEDICINE | Admitting: INTERNAL MEDICINE
Payer: COMMERCIAL

## 2024-06-10 DIAGNOSIS — I35.0 SEVERE AORTIC STENOSIS BY PRIOR ECHOCARDIOGRAM: Primary | ICD-10-CM

## 2024-06-10 DIAGNOSIS — R79.89 ELEVATED BRAIN NATRIURETIC PEPTIDE (BNP) LEVEL: ICD-10-CM

## 2024-06-10 DIAGNOSIS — I50.9 ACUTE CONGESTIVE HEART FAILURE, UNSPECIFIED HEART FAILURE TYPE (H): ICD-10-CM

## 2024-06-10 DIAGNOSIS — J81.0 ACUTE PULMONARY EDEMA (H): ICD-10-CM

## 2024-06-10 LAB
ALBUMIN SERPL BCG-MCNC: 4.2 G/DL (ref 3.5–5.2)
ALBUMIN UR-MCNC: NEGATIVE MG/DL
ALP SERPL-CCNC: 70 U/L (ref 40–150)
ALT SERPL W P-5'-P-CCNC: 12 U/L (ref 0–70)
ANION GAP SERPL CALCULATED.3IONS-SCNC: 13 MMOL/L (ref 7–15)
APPEARANCE UR: CLEAR
AST SERPL W P-5'-P-CCNC: 26 U/L (ref 0–45)
ATRIAL RATE - MUSE: 93 BPM
BASE EXCESS BLDV CALC-SCNC: -1 MMOL/L (ref -3–3)
BASOPHILS # BLD AUTO: 0.1 10E3/UL (ref 0–0.2)
BASOPHILS NFR BLD AUTO: 1 %
BILIRUB SERPL-MCNC: 1 MG/DL
BILIRUB UR QL STRIP: NEGATIVE
BUN SERPL-MCNC: 15.5 MG/DL (ref 8–23)
CALCIUM SERPL-MCNC: 9.6 MG/DL (ref 8.2–9.6)
CAOX CRY #/AREA URNS HPF: ABNORMAL /HPF
CHLORIDE SERPL-SCNC: 101 MMOL/L (ref 98–107)
COLOR UR AUTO: YELLOW
CREAT SERPL-MCNC: 1.13 MG/DL (ref 0.67–1.17)
D DIMER PPP FEU-MCNC: 1.9 UG/ML FEU (ref 0–0.5)
DEPRECATED HCO3 PLAS-SCNC: 23 MMOL/L (ref 22–29)
DIASTOLIC BLOOD PRESSURE - MUSE: NORMAL MMHG
EGFRCR SERPLBLD CKD-EPI 2021: 61 ML/MIN/1.73M2
EOSINOPHIL # BLD AUTO: 0.1 10E3/UL (ref 0–0.7)
EOSINOPHIL NFR BLD AUTO: 1 %
ERYTHROCYTE [DISTWIDTH] IN BLOOD BY AUTOMATED COUNT: 13.9 % (ref 10–15)
FLUAV RNA SPEC QL NAA+PROBE: NEGATIVE
FLUBV RNA RESP QL NAA+PROBE: NEGATIVE
GLUCOSE SERPL-MCNC: 113 MG/DL (ref 70–99)
GLUCOSE UR STRIP-MCNC: NEGATIVE MG/DL
HCO3 BLDV-SCNC: 24 MMOL/L (ref 21–28)
HCT VFR BLD AUTO: 40.4 % (ref 40–53)
HGB BLD-MCNC: 13.6 G/DL (ref 13.3–17.7)
HGB UR QL STRIP: ABNORMAL
HOLD SPECIMEN: NORMAL
HOLD SPECIMEN: NORMAL
IMM GRANULOCYTES # BLD: 0 10E3/UL
IMM GRANULOCYTES NFR BLD: 0 %
INTERPRETATION ECG - MUSE: NORMAL
KETONES UR STRIP-MCNC: NEGATIVE MG/DL
LACTATE BLD-SCNC: 1.4 MMOL/L
LEUKOCYTE ESTERASE UR QL STRIP: NEGATIVE
LYMPHOCYTES # BLD AUTO: 1.3 10E3/UL (ref 0.8–5.3)
LYMPHOCYTES NFR BLD AUTO: 15 %
MCH RBC QN AUTO: 30.9 PG (ref 26.5–33)
MCHC RBC AUTO-ENTMCNC: 33.7 G/DL (ref 31.5–36.5)
MCV RBC AUTO: 92 FL (ref 78–100)
MONOCYTES # BLD AUTO: 0.6 10E3/UL (ref 0–1.3)
MONOCYTES NFR BLD AUTO: 6 %
MUCOUS THREADS #/AREA URNS LPF: PRESENT /LPF
NEUTROPHILS # BLD AUTO: 6.7 10E3/UL (ref 1.6–8.3)
NEUTROPHILS NFR BLD AUTO: 77 %
NITRATE UR QL: NEGATIVE
NRBC # BLD AUTO: 0 10E3/UL
NRBC BLD AUTO-RTO: 0 /100
NT-PROBNP SERPL-MCNC: 7186 PG/ML (ref 0–1800)
P AXIS - MUSE: 74 DEGREES
PCO2 BLDV: 42 MM HG (ref 40–50)
PH BLDV: 7.37 [PH] (ref 7.32–7.43)
PH UR STRIP: 6 [PH] (ref 5–7)
PLATELET # BLD AUTO: 258 10E3/UL (ref 150–450)
PO2 BLDV: 33 MM HG (ref 25–47)
POTASSIUM SERPL-SCNC: 4.2 MMOL/L (ref 3.4–5.3)
PR INTERVAL - MUSE: 210 MS
PROT SERPL-MCNC: 6.8 G/DL (ref 6.4–8.3)
PROT SERPL-MCNC: 7.2 G/DL (ref 6.4–8.3)
QRS DURATION - MUSE: 92 MS
QT - MUSE: 376 MS
QTC - MUSE: 467 MS
R AXIS - MUSE: 49 DEGREES
RBC # BLD AUTO: 4.4 10E6/UL (ref 4.4–5.9)
RBC URINE: 6 /HPF
RSV RNA SPEC NAA+PROBE: NEGATIVE
SAO2 % BLDV: 62 % (ref 70–75)
SARS-COV-2 RNA RESP QL NAA+PROBE: NEGATIVE
SODIUM SERPL-SCNC: 137 MMOL/L (ref 135–145)
SP GR UR STRIP: 1.02 (ref 1–1.03)
SYSTOLIC BLOOD PRESSURE - MUSE: NORMAL MMHG
T AXIS - MUSE: 93 DEGREES
TROPONIN T SERPL HS-MCNC: 40 NG/L
TROPONIN T SERPL HS-MCNC: 46 NG/L
UROBILINOGEN UR STRIP-MCNC: NORMAL MG/DL
VENTRICULAR RATE- MUSE: 93 BPM
WBC # BLD AUTO: 8.7 10E3/UL (ref 4–11)
WBC URINE: 2 /HPF

## 2024-06-10 PROCEDURE — 99223 1ST HOSP IP/OBS HIGH 75: CPT | Performed by: STUDENT IN AN ORGANIZED HEALTH CARE EDUCATION/TRAINING PROGRAM

## 2024-06-10 PROCEDURE — 87637 SARSCOV2&INF A&B&RSV AMP PRB: CPT | Performed by: BEHAVIOR TECHNICIAN

## 2024-06-10 PROCEDURE — 71275 CT ANGIOGRAPHY CHEST: CPT

## 2024-06-10 PROCEDURE — 36415 COLL VENOUS BLD VENIPUNCTURE: CPT | Performed by: EMERGENCY MEDICINE

## 2024-06-10 PROCEDURE — 210N000001 HC R&B IMCU HEART CARE

## 2024-06-10 PROCEDURE — 99285 EMERGENCY DEPT VISIT HI MDM: CPT | Mod: 25

## 2024-06-10 PROCEDURE — 84484 ASSAY OF TROPONIN QUANT: CPT | Performed by: BEHAVIOR TECHNICIAN

## 2024-06-10 PROCEDURE — 85025 COMPLETE CBC W/AUTO DIFF WBC: CPT | Performed by: EMERGENCY MEDICINE

## 2024-06-10 PROCEDURE — 81001 URINALYSIS AUTO W/SCOPE: CPT | Performed by: STUDENT IN AN ORGANIZED HEALTH CARE EDUCATION/TRAINING PROGRAM

## 2024-06-10 PROCEDURE — 84155 ASSAY OF PROTEIN SERUM: CPT | Performed by: STUDENT IN AN ORGANIZED HEALTH CARE EDUCATION/TRAINING PROGRAM

## 2024-06-10 PROCEDURE — 80053 COMPREHEN METABOLIC PANEL: CPT | Performed by: EMERGENCY MEDICINE

## 2024-06-10 PROCEDURE — 94640 AIRWAY INHALATION TREATMENT: CPT

## 2024-06-10 PROCEDURE — 250N000009 HC RX 250: Performed by: STUDENT IN AN ORGANIZED HEALTH CARE EDUCATION/TRAINING PROGRAM

## 2024-06-10 PROCEDURE — 250N000011 HC RX IP 250 OP 636: Mod: JZ | Performed by: BEHAVIOR TECHNICIAN

## 2024-06-10 PROCEDURE — 250N000009 HC RX 250: Performed by: EMERGENCY MEDICINE

## 2024-06-10 PROCEDURE — 250N000011 HC RX IP 250 OP 636: Mod: JZ | Performed by: EMERGENCY MEDICINE

## 2024-06-10 PROCEDURE — 250N000011 HC RX IP 250 OP 636: Performed by: EMERGENCY MEDICINE

## 2024-06-10 PROCEDURE — 5A09357 ASSISTANCE WITH RESPIRATORY VENTILATION, LESS THAN 24 CONSECUTIVE HOURS, CONTINUOUS POSITIVE AIRWAY PRESSURE: ICD-10-PCS | Performed by: STUDENT IN AN ORGANIZED HEALTH CARE EDUCATION/TRAINING PROGRAM

## 2024-06-10 PROCEDURE — 84484 ASSAY OF TROPONIN QUANT: CPT | Performed by: EMERGENCY MEDICINE

## 2024-06-10 PROCEDURE — 85379 FIBRIN DEGRADATION QUANT: CPT | Performed by: BEHAVIOR TECHNICIAN

## 2024-06-10 PROCEDURE — 94660 CPAP INITIATION&MGMT: CPT

## 2024-06-10 PROCEDURE — 83880 ASSAY OF NATRIURETIC PEPTIDE: CPT | Performed by: BEHAVIOR TECHNICIAN

## 2024-06-10 PROCEDURE — 82803 BLOOD GASES ANY COMBINATION: CPT

## 2024-06-10 PROCEDURE — 99418 PROLNG IP/OBS E/M EA 15 MIN: CPT | Performed by: STUDENT IN AN ORGANIZED HEALTH CARE EDUCATION/TRAINING PROGRAM

## 2024-06-10 PROCEDURE — 71046 X-RAY EXAM CHEST 2 VIEWS: CPT

## 2024-06-10 PROCEDURE — 93005 ELECTROCARDIOGRAM TRACING: CPT

## 2024-06-10 PROCEDURE — 250N000009 HC RX 250: Performed by: BEHAVIOR TECHNICIAN

## 2024-06-10 PROCEDURE — 96375 TX/PRO/DX INJ NEW DRUG ADDON: CPT

## 2024-06-10 PROCEDURE — 999N000157 HC STATISTIC RCP TIME EA 10 MIN

## 2024-06-10 PROCEDURE — 96374 THER/PROPH/DIAG INJ IV PUSH: CPT | Mod: 59

## 2024-06-10 PROCEDURE — 36415 COLL VENOUS BLD VENIPUNCTURE: CPT | Performed by: BEHAVIOR TECHNICIAN

## 2024-06-10 RX ORDER — AMOXICILLIN 250 MG
2 CAPSULE ORAL 2 TIMES DAILY PRN
Status: DISCONTINUED | OUTPATIENT
Start: 2024-06-10 | End: 2024-06-14 | Stop reason: HOSPADM

## 2024-06-10 RX ORDER — ONDANSETRON 2 MG/ML
4 INJECTION INTRAMUSCULAR; INTRAVENOUS EVERY 6 HOURS PRN
Status: DISCONTINUED | OUTPATIENT
Start: 2024-06-10 | End: 2024-06-14 | Stop reason: HOSPADM

## 2024-06-10 RX ORDER — TAMSULOSIN HYDROCHLORIDE 0.4 MG/1
0.4 CAPSULE ORAL DAILY
Status: DISCONTINUED | OUTPATIENT
Start: 2024-06-11 | End: 2024-06-14 | Stop reason: HOSPADM

## 2024-06-10 RX ORDER — LIDOCAINE 40 MG/G
CREAM TOPICAL
Status: DISCONTINUED | OUTPATIENT
Start: 2024-06-10 | End: 2024-06-14 | Stop reason: HOSPADM

## 2024-06-10 RX ORDER — LIDOCAINE 40 MG/G
CREAM TOPICAL
Status: DISCONTINUED | OUTPATIENT
Start: 2024-06-10 | End: 2024-06-10

## 2024-06-10 RX ORDER — ONDANSETRON 4 MG/1
4 TABLET, ORALLY DISINTEGRATING ORAL EVERY 6 HOURS PRN
Status: DISCONTINUED | OUTPATIENT
Start: 2024-06-10 | End: 2024-06-14 | Stop reason: HOSPADM

## 2024-06-10 RX ORDER — FUROSEMIDE 10 MG/ML
40 INJECTION INTRAMUSCULAR; INTRAVENOUS EVERY 12 HOURS
Status: DISCONTINUED | OUTPATIENT
Start: 2024-06-10 | End: 2024-06-11

## 2024-06-10 RX ORDER — ACETAMINOPHEN 650 MG/1
650 SUPPOSITORY RECTAL EVERY 4 HOURS PRN
Status: DISCONTINUED | OUTPATIENT
Start: 2024-06-10 | End: 2024-06-14 | Stop reason: HOSPADM

## 2024-06-10 RX ORDER — PROCHLORPERAZINE 25 MG
12.5 SUPPOSITORY, RECTAL RECTAL EVERY 12 HOURS PRN
Status: DISCONTINUED | OUTPATIENT
Start: 2024-06-10 | End: 2024-06-14 | Stop reason: HOSPADM

## 2024-06-10 RX ORDER — METHYLPREDNISOLONE SODIUM SUCCINATE 125 MG/2ML
125 INJECTION, POWDER, LYOPHILIZED, FOR SOLUTION INTRAMUSCULAR; INTRAVENOUS ONCE
Status: COMPLETED | OUTPATIENT
Start: 2024-06-10 | End: 2024-06-10

## 2024-06-10 RX ORDER — FUROSEMIDE 10 MG/ML
60 INJECTION INTRAMUSCULAR; INTRAVENOUS ONCE
Status: COMPLETED | OUTPATIENT
Start: 2024-06-10 | End: 2024-06-10

## 2024-06-10 RX ORDER — IPRATROPIUM BROMIDE AND ALBUTEROL SULFATE 2.5; .5 MG/3ML; MG/3ML
3 SOLUTION RESPIRATORY (INHALATION)
Status: COMPLETED | OUTPATIENT
Start: 2024-06-10 | End: 2024-06-10

## 2024-06-10 RX ORDER — AMOXICILLIN 250 MG
1 CAPSULE ORAL 2 TIMES DAILY PRN
Status: DISCONTINUED | OUTPATIENT
Start: 2024-06-10 | End: 2024-06-14 | Stop reason: HOSPADM

## 2024-06-10 RX ORDER — BISACODYL 10 MG
10 SUPPOSITORY, RECTAL RECTAL DAILY PRN
Status: DISCONTINUED | OUTPATIENT
Start: 2024-06-10 | End: 2024-06-14 | Stop reason: HOSPADM

## 2024-06-10 RX ORDER — POLYETHYLENE GLYCOL 3350 17 G/17G
17 POWDER, FOR SOLUTION ORAL 2 TIMES DAILY PRN
Status: DISCONTINUED | OUTPATIENT
Start: 2024-06-10 | End: 2024-06-14 | Stop reason: HOSPADM

## 2024-06-10 RX ORDER — CARBOXYMETHYLCELLULOSE SODIUM 5 MG/ML
1 SOLUTION/ DROPS OPHTHALMIC
Status: DISCONTINUED | OUTPATIENT
Start: 2024-06-10 | End: 2024-06-14 | Stop reason: HOSPADM

## 2024-06-10 RX ORDER — ACETAMINOPHEN 325 MG/1
650 TABLET ORAL EVERY 4 HOURS PRN
Status: DISCONTINUED | OUTPATIENT
Start: 2024-06-10 | End: 2024-06-14 | Stop reason: HOSPADM

## 2024-06-10 RX ORDER — PROCHLORPERAZINE MALEATE 5 MG
5 TABLET ORAL EVERY 6 HOURS PRN
Status: DISCONTINUED | OUTPATIENT
Start: 2024-06-10 | End: 2024-06-14 | Stop reason: HOSPADM

## 2024-06-10 RX ORDER — IOPAMIDOL 755 MG/ML
67 INJECTION, SOLUTION INTRAVASCULAR ONCE
Status: COMPLETED | OUTPATIENT
Start: 2024-06-10 | End: 2024-06-10

## 2024-06-10 RX ORDER — CALCIUM CARBONATE 500 MG/1
1000 TABLET, CHEWABLE ORAL 4 TIMES DAILY PRN
Status: DISCONTINUED | OUTPATIENT
Start: 2024-06-10 | End: 2024-06-14 | Stop reason: HOSPADM

## 2024-06-10 RX ORDER — LIDOCAINE HYDROCHLORIDE 20 MG/ML
JELLY TOPICAL ONCE
Status: COMPLETED | OUTPATIENT
Start: 2024-06-10 | End: 2024-06-10

## 2024-06-10 RX ADMIN — LIDOCAINE HYDROCHLORIDE: 20 JELLY TOPICAL at 20:52

## 2024-06-10 RX ADMIN — IPRATROPIUM BROMIDE AND ALBUTEROL SULFATE 3 ML: .5; 3 SOLUTION RESPIRATORY (INHALATION) at 16:52

## 2024-06-10 RX ADMIN — SODIUM CHLORIDE 91 ML: 9 INJECTION, SOLUTION INTRAVENOUS at 18:02

## 2024-06-10 RX ADMIN — FUROSEMIDE 60 MG: 10 INJECTION, SOLUTION INTRAMUSCULAR; INTRAVENOUS at 20:01

## 2024-06-10 RX ADMIN — METHYLPREDNISOLONE SODIUM SUCCINATE 125 MG: 125 INJECTION, POWDER, FOR SOLUTION INTRAMUSCULAR; INTRAVENOUS at 16:52

## 2024-06-10 RX ADMIN — IPRATROPIUM BROMIDE AND ALBUTEROL SULFATE 3 ML: .5; 3 SOLUTION RESPIRATORY (INHALATION) at 18:31

## 2024-06-10 RX ADMIN — IOPAMIDOL 67 ML: 755 INJECTION, SOLUTION INTRAVENOUS at 18:01

## 2024-06-10 RX ADMIN — IPRATROPIUM BROMIDE AND ALBUTEROL SULFATE 3 ML: .5; 3 SOLUTION RESPIRATORY (INHALATION) at 18:32

## 2024-06-10 ASSESSMENT — COLUMBIA-SUICIDE SEVERITY RATING SCALE - C-SSRS
2. HAVE YOU ACTUALLY HAD ANY THOUGHTS OF KILLING YOURSELF IN THE PAST MONTH?: NO
6. HAVE YOU EVER DONE ANYTHING, STARTED TO DO ANYTHING, OR PREPARED TO DO ANYTHING TO END YOUR LIFE?: NO
1. IN THE PAST MONTH, HAVE YOU WISHED YOU WERE DEAD OR WISHED YOU COULD GO TO SLEEP AND NOT WAKE UP?: NO

## 2024-06-10 ASSESSMENT — ACTIVITIES OF DAILY LIVING (ADL)
ADLS_ACUITY_SCORE: 37
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
## 2024-06-10 NOTE — ED NOTES
Pt became increasingly more short of breath- became more tachypnic and anxious. HR in the 110s and o2 86% on 3 liters NC. Pt getting neb and was unable to control breathing. Pt placed on bipap by RN and starting to improve at this time. RT called and will come down shortly.

## 2024-06-10 NOTE — ED NOTES
Bed: ED25  Expected date:   Expected time:   Means of arrival:   Comments:  [place] 89M SOB weakness ETA 1530
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
## 2024-06-11 ENCOUNTER — APPOINTMENT (OUTPATIENT)
Dept: OCCUPATIONAL THERAPY | Facility: CLINIC | Age: 89
DRG: 280 | End: 2024-06-11
Attending: STUDENT IN AN ORGANIZED HEALTH CARE EDUCATION/TRAINING PROGRAM
Payer: COMMERCIAL

## 2024-06-11 ENCOUNTER — APPOINTMENT (OUTPATIENT)
Dept: CARDIOLOGY | Facility: CLINIC | Age: 89
DRG: 280 | End: 2024-06-11
Attending: STUDENT IN AN ORGANIZED HEALTH CARE EDUCATION/TRAINING PROGRAM
Payer: COMMERCIAL

## 2024-06-11 LAB
ANION GAP SERPL CALCULATED.3IONS-SCNC: 12 MMOL/L (ref 7–15)
BUN SERPL-MCNC: 18.1 MG/DL (ref 8–23)
CALCIUM SERPL-MCNC: 9 MG/DL (ref 8.2–9.6)
CHLORIDE SERPL-SCNC: 98 MMOL/L (ref 98–107)
CREAT SERPL-MCNC: 1.09 MG/DL (ref 0.67–1.17)
DEPRECATED HCO3 PLAS-SCNC: 24 MMOL/L (ref 22–29)
EGFRCR SERPLBLD CKD-EPI 2021: 64 ML/MIN/1.73M2
ERYTHROCYTE [DISTWIDTH] IN BLOOD BY AUTOMATED COUNT: 13.9 % (ref 10–15)
GLUCOSE BLDC GLUCOMTR-MCNC: 156 MG/DL (ref 70–99)
GLUCOSE SERPL-MCNC: 177 MG/DL (ref 70–99)
HCT VFR BLD AUTO: 36.1 % (ref 40–53)
HGB BLD-MCNC: 12.4 G/DL (ref 13.3–17.7)
LDH SERPL L TO P-CCNC: 202 U/L (ref 0–250)
LVEF ECHO: NORMAL
MAGNESIUM SERPL-MCNC: 1.7 MG/DL (ref 1.7–2.3)
MCH RBC QN AUTO: 30.8 PG (ref 26.5–33)
MCHC RBC AUTO-ENTMCNC: 34.3 G/DL (ref 31.5–36.5)
MCV RBC AUTO: 90 FL (ref 78–100)
PLATELET # BLD AUTO: 234 10E3/UL (ref 150–450)
POTASSIUM SERPL-SCNC: 4.2 MMOL/L (ref 3.4–5.3)
RBC # BLD AUTO: 4.03 10E6/UL (ref 4.4–5.9)
SODIUM SERPL-SCNC: 134 MMOL/L (ref 135–145)
WBC # BLD AUTO: 6.2 10E3/UL (ref 4–11)

## 2024-06-11 PROCEDURE — 93306 TTE W/DOPPLER COMPLETE: CPT | Mod: 26 | Performed by: INTERNAL MEDICINE

## 2024-06-11 PROCEDURE — 80048 BASIC METABOLIC PNL TOTAL CA: CPT | Performed by: STUDENT IN AN ORGANIZED HEALTH CARE EDUCATION/TRAINING PROGRAM

## 2024-06-11 PROCEDURE — 99233 SBSQ HOSP IP/OBS HIGH 50: CPT | Performed by: INTERNAL MEDICINE

## 2024-06-11 PROCEDURE — 97165 OT EVAL LOW COMPLEX 30 MIN: CPT | Mod: GO

## 2024-06-11 PROCEDURE — 85027 COMPLETE CBC AUTOMATED: CPT | Performed by: STUDENT IN AN ORGANIZED HEALTH CARE EDUCATION/TRAINING PROGRAM

## 2024-06-11 PROCEDURE — 97535 SELF CARE MNGMENT TRAINING: CPT | Mod: GO

## 2024-06-11 PROCEDURE — 250N000013 HC RX MED GY IP 250 OP 250 PS 637: Performed by: STUDENT IN AN ORGANIZED HEALTH CARE EDUCATION/TRAINING PROGRAM

## 2024-06-11 PROCEDURE — 83615 LACTATE (LD) (LDH) ENZYME: CPT | Performed by: STUDENT IN AN ORGANIZED HEALTH CARE EDUCATION/TRAINING PROGRAM

## 2024-06-11 PROCEDURE — 36415 COLL VENOUS BLD VENIPUNCTURE: CPT | Performed by: STUDENT IN AN ORGANIZED HEALTH CARE EDUCATION/TRAINING PROGRAM

## 2024-06-11 PROCEDURE — 97110 THERAPEUTIC EXERCISES: CPT | Mod: GO

## 2024-06-11 PROCEDURE — 250N000011 HC RX IP 250 OP 636: Mod: JZ | Performed by: INTERNAL MEDICINE

## 2024-06-11 PROCEDURE — 83735 ASSAY OF MAGNESIUM: CPT | Performed by: STUDENT IN AN ORGANIZED HEALTH CARE EDUCATION/TRAINING PROGRAM

## 2024-06-11 PROCEDURE — 93306 TTE W/DOPPLER COMPLETE: CPT

## 2024-06-11 PROCEDURE — 99223 1ST HOSP IP/OBS HIGH 75: CPT | Mod: 25 | Performed by: INTERNAL MEDICINE

## 2024-06-11 PROCEDURE — 210N000001 HC R&B IMCU HEART CARE

## 2024-06-11 RX ORDER — FUROSEMIDE 10 MG/ML
20 INJECTION INTRAMUSCULAR; INTRAVENOUS EVERY 8 HOURS SCHEDULED
Status: DISCONTINUED | OUTPATIENT
Start: 2024-06-11 | End: 2024-06-11

## 2024-06-11 RX ORDER — FUROSEMIDE 10 MG/ML
20 INJECTION INTRAMUSCULAR; INTRAVENOUS 2 TIMES DAILY
Status: DISCONTINUED | OUTPATIENT
Start: 2024-06-11 | End: 2024-06-12

## 2024-06-11 RX ADMIN — TAMSULOSIN HYDROCHLORIDE 0.4 MG: 0.4 CAPSULE ORAL at 08:20

## 2024-06-11 RX ADMIN — FUROSEMIDE 20 MG: 10 INJECTION, SOLUTION INTRAMUSCULAR; INTRAVENOUS at 11:44

## 2024-06-11 ASSESSMENT — ACTIVITIES OF DAILY LIVING (ADL)
ADLS_ACUITY_SCORE: 32
ADLS_ACUITY_SCORE: 37
ADLS_ACUITY_SCORE: 31
ADLS_ACUITY_SCORE: 32
ADLS_ACUITY_SCORE: 26
ADLS_ACUITY_SCORE: 29
ADLS_ACUITY_SCORE: 31
ADLS_ACUITY_SCORE: 26
ADLS_ACUITY_SCORE: 32
ADLS_ACUITY_SCORE: 32
ADLS_ACUITY_SCORE: 31
ADLS_ACUITY_SCORE: 32
ADLS_ACUITY_SCORE: 32
ADLS_ACUITY_SCORE: 26
PREVIOUS_RESPONSIBILITIES: MEAL PREP;HOUSEKEEPING;LAUNDRY;SHOPPING;MEDICATION MANAGEMENT;FINANCES;DRIVING
ADLS_ACUITY_SCORE: 32
ADLS_ACUITY_SCORE: 31
ADLS_ACUITY_SCORE: 31
ADLS_ACUITY_SCORE: 32

## 2024-06-11 NOTE — ED NOTES
Patient becoming increasingly agitated. Patient demanding to take the Bipap off. Hospitalist consulted and they okayed trialing off bipap. Placed on 4L NC and is tolerating well.

## 2024-06-11 NOTE — PROGRESS NOTES
[facility]    Medicine Progress Note - Hospitalist Service    Date of Admission:  6/10/2024    Assessment & Plan     Canelo Cruz is a 89 year old male admitted on 6/10/2024. He presents with shortness of breath.  He is found to be in heart failure.     HFpEF exacerbation  Bilateral pleural effusions secondary to above  Reports approximately 3 weeks of progressive shortness of breath with associated orthopnea.  BNP elevated at 7186  CT demonstrates large bilateral pleural effusions as well as pulmonary edema.  TTE 3/7/23 LV EF 55-60% in care everywhere  Admitted as inpatient  Patient was given 60 mg of IV Lasix in the ED With good diuresis  Blood pressure is soft systolics in the 90s currently  Reduced her Lasix dosage to 20 mg IV every 8 hours  Echo pending  Cardiology consultation requested  Continue to monitor him on telemetry  Patient is hopeful to avoid thoracentesis as his breathing is improving.  Thoracentesis ordered discontinued after discussion with patient on 6/11/2024  Serial tropes flat 46-40 mild elevation due to type II MI with acute heart failure exacerbation    Acute hypoxic respiratory failure  Suspect related to heart failure  - Continue BiPAP as needed.  Titrate off as able.  - IMC  - Titrate oxygen as able.  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  Christianson placed in the emergency department with 2000ml out immediately.  CT demonstrating incompletely imaged hydronephrosis and elevated dome of the bladder.  - Continue PTA tamsulosin   - Consult urology to assist with outpatient follow-up.  - Expect patient will require 1 to 2-week course with Christianson and before initial trial of void     Concern for neurocognitive deficit  Patient extremely overwhelmed on admission and required multiple explanations and reiteration's  of the same information.  He was quite irascible on admission.  Unclear if this is due to baseline neurocognitive deficit or patient feeling overwhelmed with the situation.  - Monitor  - Consider outpatient neuropsych testing                      Diet: 2 g sodium diet, Fluid restriction 2000 ML FLUID (and additional linked orders)  Low Saturated Fat Na <2400 mg    DVT Prophylaxis: Pneumatic Compression Devices  Christianson Catheter: PRESENT, indication: Acute retention or obstruction  Lines: None     Cardiac Monitoring: ACTIVE order. Indication: Acute decompensated heart failure (48 hours)  Code Status: Full Code      Clinically Significant Risk Factors Present on Admission                    # Acute Respiratory Failure: Documented O2 saturation < 91%.  Continue supplemental oxygen as needed                       Disposition Plan     Medically Ready for Discharge: Anticipated in 2-4 Days after optimal diuresis, hypoxia improvement             Earlene Brower MD  Hospitalist Service  [facility]  Securely message with Broadlink (more info)  Text page via Outright Paging/Directory   ______________________________________________________________________    Interval History   Chart reviewed.  Discussed with bedside RN and patient  Patient is resting comfortably in bed.  Shortness of breath much improved since admission.  Patient wants to avoid thoracentesis if possible.  Discussed about congestive heart failure and diuretic management.  Patient is agreeable.  No other acute issues  Physical Exam   Vital Signs: Temp: 98.5  F (36.9  C) Temp src: Oral BP: 91/57 Pulse: 92   Resp: 14 SpO2: 90 % O2 Device: None (Room air) Oxygen Delivery: 3 LPM  Weight: 167 lbs 9.6 oz    General Appearance: Elderly male lying comfortably in bed, not in acute distress  Respiratory: Diminished in the bases, no tachypnea or respiratory distress noted  Cardiovascular: Normal rate rhythm regular  GI: Soft, nontender nondistended bowel  sounds positive  Skin: Warm and dry  Other: Christianson in place with light yellow-colored urine    Medical Decision Making       50 MINUTES SPENT BY ME on the date of service doing chart review, history, exam, documentation & further activities per the note.      Data     I have personally reviewed the following data over the past 24 hrs:    6.2  \   12.4 (L)   / 234     134 (L) 98 18.1 /  156 (H)   4.2 24 1.09 \     ALT: 12 AST: 26 AP: 70 TBILI: 1.0   ALB: 4.2 TOT PROTEIN: 6.8 LIPASE: N/A     Trop: 40 (H) BNP: 7,186 (H)     Procal: N/A CRP: N/A Lactic Acid: 1.4       INR:  N/A PTT:  N/A   D-dimer:  1.90 (H) Fibrinogen:  N/A     Ferritin:  N/A % Retic:  N/A LDH:  202       Imaging results reviewed over the past 24 hrs:   Recent Results (from the past 24 hour(s))   XR Chest 2 Views    Narrative    EXAM: XR CHEST 2 VIEWS  LOCATION: [facility]  DATE: 6/10/2024    INDICATION: shortness of breath  COMPARISON: None.      Impression    IMPRESSION: Heart size is normal. There are small bilateral pleural effusions and adjacent bibasilar opacities favored for atelectasis. No pneumothorax.   CT Chest Pulmonary Embolism w Contrast    Narrative    EXAM: CT CHEST PULMONARY EMBOLISM W CONTRAST  LOCATION: [facility]  DATE: 6/10/2024    INDICATION: elevated d dimer, shortness of breath  COMPARISON: CT abdomen pelvis dated 2/24/2023.  TECHNIQUE: CT chest pulmonary angiogram during arterial phase injection of IV contrast. Multiplanar reformats and MIP reconstructions were performed. Dose reduction techniques were used.   CONTRAST: 67mL Isovue 370    FINDINGS:  ANGIOGRAM CHEST: Pulmonary arteries are normal caliber and negative for pulmonary emboli. Thoracic aorta is not well opacified and is  indeterminate for dissection. No CT evidence of right heart strain.    LUNGS AND PLEURA: Large bilateral pleural effusions. Intralobular septal thickening and increased ground glass opacities.  Dependent compressive atelectasis bilaterally. No pneumothorax.    MEDIASTINUM/AXILLAE: No significant mediastinal or hilar adenopathy. Dense calcifications in the region of the aortic valve which may be seen in setting stenosis.    CORONARY ARTERY CALCIFICATION: Severe.    UPPER ABDOMEN: Cholelithiasis. Cystic region in the right lower quadrant is incompletely imaged although likely represents a markedly dilated bladder base on prior imaging. Mild to moderate bilateral hydronephrosis, may be related to degree of bladder   distention. There are several nonobstructing punctate left lower pole renal calculi.    MUSCULOSKELETAL: No acute bony abnormalities.      Impression    IMPRESSION:  1.  No evidence of pulmonary embolus.  2.  CT findings consistent with pulmonary edema, with large bilateral pleural effusions.  3.  Probable marked bladder distention, and associated hydronephrosis incompletely imaged.
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
## 2024-06-11 NOTE — PHARMACY-ADMISSION MEDICATION HISTORY
Pharmacist Admission Medication History    Admission medication history is complete. The information provided in this note is only as accurate as the sources available at the time of the update.    Information Source(s): Patient and CareEverywhere/SureScripts via in-person    Pertinent Information: He reports taking no medications (Rx or OTC/vitamins) except tamsulosin sometimes.    Changes made to PTA medication list:  Added: None  Deleted: Historical cyclobenzaprine, acetaminophen, diclofenac gel, lidocaine patch, melatonin, methocarbamol, MVI, oxycodone, senna-s  Changed: tamsulosin from daily to daily as needed    Allergies reviewed with patient and updates made in EHR: yes    Medication History Completed By: Anais Razo RP 6/10/2024 9:08 PM    PTA Med List   Medication Sig Last Dose    tamsulosin (FLOMAX) 0.4 MG capsule Take 0.4 mg by mouth daily as needed Past Week

## 2024-06-11 NOTE — PLAN OF CARE
Neuro- A/Ox4, forgetful  Most Recent Vitals- Temp: 98  F (36.7  C) Temp src: Oral BP: 91/51 Pulse: 87   Resp: 30 SpO2: 94 % O2 Device: Nasal cannula   Tele/Cardiac- SR w/ PVCs/PACs  Resp- on 3L NC, LS w/ crackles in bases  Activity- SBA  Pain- denies  Drips- n/a  Drains/Tubes- PIV  Skin- trace BLE edema  GI/- rodriguez in place for retention, no BM this shift  Plan- possible thoracentesis today, plan of care ongoing    Dana Mayfield RN

## 2024-06-11 NOTE — CONSULTS
Orders received for Congestive Heart Failure to assist with disposition and discharge planning.  CHF education to be completed by bedside nurse.  Will continue to follow and assist with discharge planning.
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
## 2024-06-11 NOTE — CARE PLAN
[id number]: Alert and oriented x4. VSS though BP is a bit soft. Denies pain, SOB, CP, numbness and tingling. Voiding well. Tolerating diet but does not have much of a appetite. Up with one. Tele NSR. Likely to discharge back to home.

## 2024-06-11 NOTE — PLAN OF CARE
Heart Failure Care Map  GOALS TO BE MET BEFORE DISCHARGE:    1. Decrease congestion and/or edema with diuretic therapy to achieve near optimal volume status.     Dyspnea improved: Yes, per pt report breathing at baseline   Edema improved: No, still trace LE edema        Last 24 hour I/O:   Intake/Output Summary (Last 24 hours) at 6/11/2024 1313  Last data filed at 6/11/2024 1200  Gross per 24 hour   Intake 1320 ml   Output 4375 ml   Net -3055 ml           Net I/O and Weights since admission:   05/12 1500 - 06/11 1459  In: 1320 [P.O.:1320]  Out: 4375 [Urine:4375]  Net: -3055     Vitals:    06/10/24 1544 06/11/24 0055   Weight: 80.3 kg (177 lb) 76 kg (167 lb 9.6 oz)       2.  O2 sats > 90% on room air, or at prior home O2 therapy level.      Able to wean O2 this shift to keep sats above 90%?: Yes, satisfactory for discharge.   Does patient use Home O2? No          Current oxygenation status:   SpO2: 92 %     O2 Device: None (Room air), Oxygen Delivery: 3 LPM    3.  Tolerates ambulation and mobility near baseline.     Ambulation: Therapy ambulated pt in handy today x1.       Please review the Heart Failure Care Map for additional HF goal outcomes.    BP's soft but asymptomatic.  Responding well to IV lasix.  Christianson patent.  LS dim with crackles.  Weaned to RA.  Pt up in chair with assist of 1.  Plan to continue IV diuretics.  Echo pending.  Family at bedside.      Morelia Aguilera, RN  6/11/2024

## 2024-06-11 NOTE — CONSULTS
[facility]    Cardiology Consultation     Date of Admission:  6/10/2024    Assessment & Plan   Canelo Cruz is a 89 year old male who was admitted on 6/10/2024.    Acute heart failure exacerbation, etiology unclear  Hypoxic respiratory failure, secondary to #1  Large bilateral pleural effusions  Chronic urinary retention    He presents with several week history of progressive dyspnea on exertion, orthopnea, PND and lower extremity edema.  His clinical presentation, general laboratory findings are consistent with acute heart failure exacerbation.  We do not have an echocardiogram yet therefore it is unclear whether he has preserved or reduced LV function.    He is currently off of BiPAP.  He responded to IV furosemide quite well and is now -3 L since admission.  Furosemide dose was decreased due to relative hypotension this morning.  Continue current furosemide IV dose for now.  Will likely increase the dose if blood pressure rebounds.    Hold off guideline directed medical therapy for now.  Echocardiogram this morning to assess LV function.    Will consider thoracentesis for a large bilateral pleural effusions    Prince Burnett MD, [facility]    High complexity     Primary Care Physician   Physician No Ref-Primary    Reason for Consult   Reason for consult: Heart failure exacerbation    History of Present Illness   Canelo Cruz is a 89 year old male who presents several week history of progressive dyspnea on exertion, weight gain, orthopnea and PND.  He is diagnosed with acute heart failure exacerbation.  He initially required BiPAP for hypoxic respiratory failure but is now off of positive pressure support.  He is diuresing quite well.    His heart failure symptoms improved overnight with diuresis.  Still remains volume overloaded.    He denies recent chest pain, palpitations, presyncope or syncope.    Past Medical History   Past Medical History:   Diagnosis Date    Gout     Heart murmur      Insomnia     Mixed hyperlipidemia     Postherpetic neuralgia        Past Surgical History   Past Surgical History:   Procedure Laterality Date    ARTHROSCOPY KNEE Right     ~[id number]    DAVINCI XI HERNIORRHAPHY INGUINAL Bilateral 7/18/2022    Procedure: Robotic repair of right inguinal hernia with placement of mesh,  robotic repair of left inguinal hernia with placement of mesh;  Surgeon: Ina Phillips MD;  Location: [facility] OR    ORTHOPEDIC SURGERY      right achilles rupture repair with 14 month MRSA infection       Prior to Admission Medications   Prior to Admission Medications   Prescriptions Last Dose Informant Patient Reported? Taking?   tamsulosin (FLOMAX) 0.4 MG capsule Past Week Self Yes Yes   Sig: Take 0.4 mg by mouth daily as needed      Facility-Administered Medications: None     Current Facility-Administered Medications   Medication Dose Route Frequency Provider Last Rate Last Admin    acetaminophen (TYLENOL) tablet 650 mg  650 mg Oral Q4H PRN Celestine Galaviz MD        Or    acetaminophen (TYLENOL) Suppository 650 mg  650 mg Rectal Q4H PRN Celestine Galaviz MD        bisacodyl (DULCOLAX) suppository 10 mg  10 mg Rectal Daily PRN Celestine Galaviz MD        calcium carbonate (TUMS) chewable tablet 1,000 mg  1,000 mg Oral 4x Daily PRN Celestine Galaviz MD        carboxymethylcellulose PF (REFRESH PLUS) 0.5 % ophthalmic solution 1 drop  1 drop Both Eyes Q1H PRN Celestine Galaviz MD        furosemide (LASIX) injection 20 mg  20 mg Intravenous Q8H Earlene Alvarado MD   20 mg at 06/11/24 1144    lidocaine (LMX4) cream   Topical Q1H PRCelestine Brown MD        lidocaine 1 % 0.1-1 mL  0.1-1 mL Other Q1H PRCelestine Brown MD        No lozenges or gum should be given while patient on BIPAP/AVAPS/AVAPS AE   Does not apply Continuous PRN Celestine Galaviz MD        ondansetron (ZOFRAN ODT) ODT tab 4 mg  4 mg Oral Q6H PRCelestine Brown MD        Or     ondansetron (ZOFRAN) injection 4 mg  4 mg Intravenous Q6H PRN Celestine Galaviz MD        Patient may continue current oral medications   Does not apply Continuous PRN Celestine Galaviz MD        polyethylene glycol (MIRALAX) Packet 17 g  17 g Oral BID PRN Celestine Galaviz MD        prochlorperazine (COMPAZINE) injection 5 mg  5 mg Intravenous Q6H PRN Celestine Galaviz MD        Or    prochlorperazine (COMPAZINE) tablet 5 mg  5 mg Oral Q6H PRN Celestine Galaviz MD        Or    prochlorperazine (COMPAZINE) suppository 12.5 mg  12.5 mg Rectal Q12H PRN Celestine Galaviz MD        senna-docusate (SENOKOT-S/PERICOLACE) 8.6-50 MG per tablet 1 tablet  1 tablet Oral BID PRN Celestine Galaviz MD        Or    senna-docusate (SENOKOT-S/PERICOLACE) 8.6-50 MG per tablet 2 tablet  2 tablet Oral BID PRN Celetsine Galaviz MD        sodium chloride (PF) 0.9% PF flush 3 mL  3 mL Intracatheter Q8H Celestine Galaviz MD   3 mL at 06/10/24 2230    sodium chloride (PF) 0.9% PF flush 3 mL  3 mL Intracatheter q1 min prn Celestine Galaviz MD        tamsulosin (FLOMAX) capsule 0.4 mg  0.4 mg Oral Daily Celestine Galaviz MD   0.4 mg at 06/11/24 0820     Current Facility-Administered Medications   Medication Dose Route Frequency Provider Last Rate Last Admin    acetaminophen (TYLENOL) tablet 650 mg  650 mg Oral Q4H PRCelestine Brown MD        Or    acetaminophen (TYLENOL) Suppository 650 mg  650 mg Rectal Q4H PRCelestine Brown MD        bisacodyl (DULCOLAX) suppository 10 mg  10 mg Rectal Daily PRN Celestine Galaviz MD        calcium carbonate (TUMS) chewable tablet 1,000 mg  1,000 mg Oral 4x Daily PRN Celestine Galaviz, MD        carboxymethylcellulose PF (REFRESH PLUS) 0.5 % ophthalmic solution 1 drop  1 drop Both Eyes Q1H PRN Celestine Galaviz MD        furosemide (LASIX) injection 20 mg  20 mg Intravenous Q8H Earlene Alvarado MD   20 mg at 06/11/24  1144    lidocaine (LMX4) cream   Topical Q1H PRN Celestine Galaviz MD        lidocaine 1 % 0.1-1 mL  0.1-1 mL Other Q1H PRN Celestine Glaaviz MD        No lozenges or gum should be given while patient on BIPAP/AVAPS/AVAPS AE   Does not apply Continuous PRN Celestine Galaviz MD        ondansetron (ZOFRAN ODT) ODT tab 4 mg  4 mg Oral Q6H PRN Celestine Galaviz MD        Or    ondansetron (ZOFRAN) injection 4 mg  4 mg Intravenous Q6H PRN Celestine Galaviz MD        Patient may continue current oral medications   Does not apply Continuous PRN Celestine Galaviz MD        polyethylene glycol (MIRALAX) Packet 17 g  17 g Oral BID PRN Celestine Galaviz MD        prochlorperazine (COMPAZINE) injection 5 mg  5 mg Intravenous Q6H PRN Celestine Galaviz MD        Or    prochlorperazine (COMPAZINE) tablet 5 mg  5 mg Oral Q6H PRN Celestine Galaviz MD        Or    prochlorperazine (COMPAZINE) suppository 12.5 mg  12.5 mg Rectal Q12H PRN Celestine Galaviz MD        senna-docusate (SENOKOT-S/PERICOLACE) 8.6-50 MG per tablet 1 tablet  1 tablet Oral BID PRN Celestine Galaviz MD        Or    senna-docusate (SENOKOT-S/PERICOLACE) 8.6-50 MG per tablet 2 tablet  2 tablet Oral BID PRN Celestine Galaviz MD        sodium chloride (PF) 0.9% PF flush 3 mL  3 mL Intracatheter Q8H Celestine Galaviz MD   3 mL at 06/10/24 2230    sodium chloride (PF) 0.9% PF flush 3 mL  3 mL Intracatheter q1 min prn Celestine Galaviz MD        tamsulosin (FLOMAX) capsule 0.4 mg  0.4 mg Oral Daily Celestine Galaviz MD   0.4 mg at 06/11/24 0820     Allergies   Allergies   Allergen Reactions    Ciprofloxacin      history of achilles tendon pain       Social History    reports that he has never smoked. He has never used smokeless tobacco. He reports current alcohol use. He reports that he does not use drugs.      Family History   I have reviewed this patient's family history and updated it with  "pertinent information if needed.  Family History   Problem Relation Age of Onset    Unknown/Adopted Mother     Unknown/Adopted Father     Diabetes No family hx of     Coronary Artery Disease No family hx of     Hypertension No family hx of     Hyperlipidemia No family hx of     Cerebrovascular Disease No family hx of     Breast Cancer No family hx of     Colon Cancer No family hx of     Prostate Cancer No family hx of     Other Cancer No family hx of     Depression No family hx of     Anxiety Disorder No family hx of     Mental Illness No family hx of     Substance Abuse No family hx of     Anesthesia Reaction No family hx of     Asthma No family hx of     Osteoporosis No family hx of     Genetic Disorder No family hx of     Thyroid Disease No family hx of     Obesity No family hx of           Review of Systems   A comprehensive review of system was performed and is negative other than that noted in the HPI or here.     Physical Exam   Vital Signs with Ranges  Temp:  [97.8  F (36.6  C)-98.5  F (36.9  C)] 98.5  F (36.9  C)  Pulse:  [] 93  Resp:  [14-30] 20  BP: ()/(30-76) 94/66  FiO2 (%):  [50 %] 50 %  SpO2:  [85 %-100 %] 92 %  Wt Readings from Last 4 Encounters:   06/11/24 76 kg (167 lb 9.6 oz)   07/18/22 82.5 kg (181 lb 12.8 oz)   06/29/22 82.6 kg (182 lb)   08/18/20 98.4 kg (217 lb)     I/O last 3 completed shifts:  In: 960 [P.O.:960]  Out: 4125 [Urine:4125]      Vitals: BP 94/66 (BP Location: Left arm)   Pulse 93   Temp 98.5  F (36.9  C) (Oral)   Resp 20   Ht 1.854 m (6' 1\")   Wt 76 kg (167 lb 9.6 oz)   SpO2 92%   BMI 22.11 kg/m      Physical Exam:   General - Alert and oriented to time place and person in no acute distress  Eyes - No scleral icterus  HEENT - Neck supple, moist mucous membranes  Cardiovascular - RRR   Extremities - There is 1+ edema  Respiratory -diminished breath sounds at the bases  Skin - No pallor or cyanosis  Gastrointestinal - Non tender and non distended without rebound " "or guarding  Psych - Appropriate affect   Neurological - No gross motor neurological focal deficits    No lab results found in last 7 days.    Invalid input(s): \"TROPONINIES\"    Recent Labs   Lab 06/11/24  0801 06/11/24  0542 06/10/24  1831 06/10/24  1550   WBC  --  6.2  --  8.7   HGB  --  12.4*  --  13.6   MCV  --  90  --  92   PLT  --  234  --  258   NA  --  134*  --  137   POTASSIUM  --  4.2  --  4.2   CHLORIDE  --  98  --  101   CO2  --  24  --  23   BUN  --  18.1  --  15.5   CR  --  1.09  --  1.13   GFRESTIMATED  --  64  --  61   ANIONGAP  --  12  --  13   ROSA  --  9.0  --  9.6   * 177*  --  113*   ALBUMIN  --   --   --  4.2   PROTTOTAL  --   --  6.8 7.2   BILITOTAL  --   --   --  1.0   ALKPHOS  --   --   --  70   ALT  --   --   --  12   AST  --   --   --  26     Recent Labs   Lab Test 02/14/20  1033 03/21/19  0833   CHOL 211* 224*   HDL 32* 40   * 139*   TRIG 289* 225*     Recent Labs   Lab 06/11/24  0542 06/10/24  1550   WBC 6.2 8.7   HGB 12.4* 13.6   HCT 36.1* 40.4   MCV 90 92    258     Recent Labs   Lab 06/10/24  1655   PHV 7.37   PO2V 33   PCO2V 42   HCO3V 24     Recent Labs   Lab 06/10/24  1550   NTBNPI 7,186*     Recent Labs   Lab 06/10/24  1550   DD 1.90*     No results for input(s): \"SED\", \"CRP\" in the last 168 hours.  Recent Labs   Lab 06/11/24  0542 06/10/24  1550    258     No results for input(s): \"TSH\" in the last 168 hours.  Recent Labs   Lab 06/10/24  2106   COLOR Yellow   APPEARANCE Clear   URINEGLC Negative   URINEBILI Negative   URINEKETONE Negative   SG 1.018   UBLD Trace*   URINEPH 6.0   PROTEIN Negative   NITRITE Negative   LEUKEST Negative   RBCU 6*   WBCU 2       Imaging:  Recent Results (from the past 48 hour(s))   XR Chest 2 Views    Narrative    EXAM: XR CHEST 2 VIEWS  LOCATION: [facility]  DATE: 6/10/2024    INDICATION: shortness of breath  COMPARISON: None.      Impression    IMPRESSION: Heart size is normal. There are small " bilateral pleural effusions and adjacent bibasilar opacities favored for atelectasis. No pneumothorax.   CT Chest Pulmonary Embolism w Contrast    Narrative    EXAM: CT CHEST PULMONARY EMBOLISM W CONTRAST  LOCATION: [facility]  DATE: 6/10/2024    INDICATION: elevated d dimer, shortness of breath  COMPARISON: CT abdomen pelvis dated 2/24/2023.  TECHNIQUE: CT chest pulmonary angiogram during arterial phase injection of IV contrast. Multiplanar reformats and MIP reconstructions were performed. Dose reduction techniques were used.   CONTRAST: 67mL Isovue 370    FINDINGS:  ANGIOGRAM CHEST: Pulmonary arteries are normal caliber and negative for pulmonary emboli. Thoracic aorta is not well opacified and is  indeterminate for dissection. No CT evidence of right heart strain.    LUNGS AND PLEURA: Large bilateral pleural effusions. Intralobular septal thickening and increased ground glass opacities. Dependent compressive atelectasis bilaterally. No pneumothorax.    MEDIASTINUM/AXILLAE: No significant mediastinal or hilar adenopathy. Dense calcifications in the region of the aortic valve which may be seen in setting stenosis.    CORONARY ARTERY CALCIFICATION: Severe.    UPPER ABDOMEN: Cholelithiasis. Cystic region in the right lower quadrant is incompletely imaged although likely represents a markedly dilated bladder base on prior imaging. Mild to moderate bilateral hydronephrosis, may be related to degree of bladder   distention. There are several nonobstructing punctate left lower pole renal calculi.    MUSCULOSKELETAL: No acute bony abnormalities.      Impression    IMPRESSION:  1.  No evidence of pulmonary embolus.  2.  CT findings consistent with pulmonary edema, with large bilateral pleural effusions.  3.  Probable marked bladder distention, and associated hydronephrosis incompletely imaged.       Echo:  No results found for this or any previous visit (from the past 4320 hour(s)).    Clinically  Significant Risk Factors Present on Admission                    # Acute Respiratory Failure: Documented O2 saturation < 91%.  Continue supplemental oxygen as needed

## 2024-06-11 NOTE — PROVIDER NOTIFICATION
MD Notification    Notified Person: MD    Notified Person Name: Johnson    Notification Date/Time: 06/11/24 2:24 AM    Notification Interaction: Vocera    Purpose of Notification: Pt admitted w/ CHF and was on BIPAP in the ED. He had a STAT ABG scheduled for 21:30 while on BIPAP, but is on 3L NC now. Do you still want ABG to be drawn? Please advise. *[id number] BRIAN Cortez    Orders Received: ABG not needed

## 2024-06-11 NOTE — PROGRESS NOTES
"   06/11/24 1124   Appointment Info   Signing Clinician's Name / Credentials (OT) Lenore Giron OTR/L   Living Environment   People in Home spouse   Current Living Arrangements   (Senior living)   Home Accessibility no concerns  (Has elevator, however likes to do the stairs)   Transportation Anticipated family or friend will provide  (Pt typically drives)   Self-Care   Usual Activity Tolerance good   Current Activity Tolerance moderate   Regular Exercise Yes   Activity/Exercise Type swimming;walking   Exercise Amount/Frequency 2 times/wk   Equipment Currently Used at Home none   Fall history within last six months yes   Number of times patient has fallen within last six months 1   Activity/Exercise/Self-Care Comment Independent in all ADLs and mobility at baseline.   Instrumental Activities of Daily Living (IADL)   Previous Responsibilities meal prep;housekeeping;laundry;shopping;medication management;finances;driving   General Information   Onset of Illness/Injury or Date of Surgery 06/10/24   Referring Physician Celestine Galaviz MD   Patient/Family Therapy Goal Statement (OT) Home   Additional Occupational Profile Info/Pertinent History of Current Problem PEr chart: Canelo Cruz is a 89 year old male admitted on 6/10/2024. He presents with shortness of breath.  He is found to be in heart failure.See chart for details.   Cognitive Status Examination   Orientation Status orientation to person, place and time   Safety Deficit at risk behavior observed;awareness of need for assistance;impulsivity;insight into deficits/self-awareness;judgment;problem-solving   Cognitive Status Comments Per chart: \"Concern for neurocognitive deficit\"   Transfers   Transfers bed-chair transfer;sit-stand transfer;toilet transfer   Transfer Skill: Bed to Chair/Chair to Bed   Bed-Chair [place] (Transfers) set up;verbal cues;contact guard   Sit-Stand Transfer   Sit-Stand [place] (Transfers) set up;verbal cues;contact " guard   Toilet Transfer   Type (Toilet Transfer) sit-stand;stand-sit   [place] Level (Toilet Transfer) set up;verbal cues;contact guard   Balance   Balance Assessment standing dynamic balance   Activities of Daily Living   BADL Assessment/Intervention lower body dressing   Additional Documentation Comment, BADL Assessment/Training (Row)   Lower Body Dressing Assessment/Training   [place] Level (Lower Body Dressing) doff;don;set up;verbal cues;contact guard assist   Clinical Impression   Criteria for Skilled Therapeutic Interventions Met (OT) Yes, treatment indicated   OT Diagnosis Decreased independence in I/ADLs   Influenced by the following impairments Decreased independence in I/ADLs   OT Problem List-Impairments impacting ADL activity tolerance impaired;problems related to;balance;cognition;strength   Assessment of Occupational Performance 1-3 Performance Deficits   Identified Performance Deficits Decreased independence in I/ADLs (Dressing, bathing, toileting)   Planned Therapy Interventions (OT) ADL retraining;IADL retraining;cognition;transfer training;home program guidelines;progressive activity/exercise;risk factor education   Clinical Decision Making Complexity (OT) problem focused assessment/low complexity   Risk & Benefits of therapy have been explained care plan/treatment goals reviewed;evaluation/treatment results reviewed;risks/benefits reviewed;patient   OT Total Evaluation Time   OT Eval, Low Complexity Minutes (27408) 7   OT Goals   Therapy Frequency (OT) Daily   OT Predicted Duration/Target Date for Goal Attainment 06/15/24   OT Goals Hygiene/Grooming;Upper Body Dressing;Lower Body Dressing;Toilet Transfer/Toileting;Cognition;Cardiac Phase 1   OT: Hygiene/Grooming modified independent;while standing   OT: Upper Body Dressing Modified independent;including set-up/clothing retrieval   OT: Lower Body Dressing Modified independent;including set-up/clothing retrieval   OT: Toilet  Transfer/Toileting Modified independent;toilet transfer;cleaning and garment management   OT: Cognitive Patient/caregiver will verbalize understanding of cognitive assessment results/recommendations as needed for safe discharge planning   OT: Understanding of cardiac education to maximize quality of life, condition management, and health outcomes Patient;Caregiver;Demonstrate;Verbalize   OT: Perform aerobic activity with stable cardiovascular response continuous;15 minutes;ambulation;NuStep;treadmill   OT: Navigation of stairs simulating home set up with stable cardiovascular response in order to return to home and community environment Modified independent;10 stairs   Interventions   Interventions Quick Adds Self-Care/Home Management;Therapeutic Procedures/Exercise;Cardiac Rehab   Self-Care/Home Management   Self-Care/Home Mgmt/ADL, Compensatory, Meal Prep Minutes (68664) 15   Symptoms Noted During/After Treatment (Meal Preparation/Planning Training) fatigue;shortness of breath   Treatment Detail/Skilled Intervention Session included set up, monitoring vitals throughout session, rest breaks, and symptoms. Educated on LE dressing with compensatory techniques. While seated/standing, pt completed LE dressing (don/doff underwear) with CGA and set up. Pt ambulated to the bathroom with no AD and CGA for toilet transfer. TRansfer to/from the toilet with CGA. Unsteadiness observed, however no LOB. Alarm on at the end of session.   Therapeutic Procedures/Exercise   Therapeutic Procedure: strength, endurance, ROM, flexibillity minutes (53826) 8   Symptoms Noted During/After Treatment fatigue   Treatment Detail/Skilled Intervention Session included set up, monitoring vitals throughout session, rest breaks, and symptoms. Pt completed timed ambulation for 2 minutes with no AD and SBA/CGA. Pt demonstrated unsteadiness, often reaching out to objects for support, no LOB. Discussed use of a walker, however pt sternly declined.  Discussed the importance of timed ambulation during the day with nursing, pt motivated.   Treatment Time Includes (CR Only) See specific exercise details intervention group(s);Monitoring of vital signs (see vital signs flowsheet for details)   Ambulation   Duration (minutes) 2 minutes   Effort Scale 4   Symptoms Fatigue  (SOB)   Vital Signs Details See vitals flow sheet for details.   OT Discharge Planning   OT Plan Cognitive screen; Timed ambulation; Progress IND in ADLS; CHF education   OT Discharge Recommendation (DC Rec) home with assist;home with outpatient occupational therapy  (OP PT)   OT Rationale for DC Rec At baseline pt lives in a senior independent apartment with his spouse and at baseline IND in all ADLs. Pt currently limited by decreased activity tolerance, decreased safety and IND in I/ADLs. Anticipate that with continued medical management and IP OT, pt will progress to home with assist in I/ADLs (home management tasks, shopping, laundry) and OP OT/PT to address cognition, IND in I/ADLs and balance/mobility.   Total Session Time   Timed Code Treatment Minutes 23   Total Session Time (sum of timed and untimed services) 30

## 2024-06-12 ENCOUNTER — APPOINTMENT (OUTPATIENT)
Dept: ULTRASOUND IMAGING | Facility: CLINIC | Age: 89
DRG: 280 | End: 2024-06-12
Attending: INTERNAL MEDICINE
Payer: COMMERCIAL

## 2024-06-12 ENCOUNTER — APPOINTMENT (OUTPATIENT)
Dept: OCCUPATIONAL THERAPY | Facility: CLINIC | Age: 89
DRG: 280 | End: 2024-06-12
Payer: COMMERCIAL

## 2024-06-12 LAB
ANION GAP SERPL CALCULATED.3IONS-SCNC: 9 MMOL/L (ref 7–15)
BUN SERPL-MCNC: 27.2 MG/DL (ref 8–23)
CALCIUM SERPL-MCNC: 8.8 MG/DL (ref 8.2–9.6)
CHLORIDE SERPL-SCNC: 102 MMOL/L (ref 98–107)
CREAT SERPL-MCNC: 1.16 MG/DL (ref 0.67–1.17)
DEPRECATED HCO3 PLAS-SCNC: 26 MMOL/L (ref 22–29)
EGFRCR SERPLBLD CKD-EPI 2021: 59 ML/MIN/1.73M2
GLUCOSE BODY FLUID SOURCE: NORMAL
GLUCOSE FLD-MCNC: 111 MG/DL
GLUCOSE SERPL-MCNC: 122 MG/DL (ref 70–99)
LD BODY BODY FLUID SOURCE: NORMAL
LDH FLD L TO P-CCNC: 63 U/L
LDH SERPL L TO P-CCNC: 232 U/L (ref 0–250)
MAGNESIUM SERPL-MCNC: 1.8 MG/DL (ref 1.7–2.3)
POTASSIUM SERPL-SCNC: 4.3 MMOL/L (ref 3.4–5.3)
PROT FLD-MCNC: 1.9 G/DL
PROT SERPL-MCNC: 6.3 G/DL (ref 6.4–8.3)
PROTEIN BODY FLUID SOURCE: NORMAL
SODIUM SERPL-SCNC: 137 MMOL/L (ref 135–145)

## 2024-06-12 PROCEDURE — 83735 ASSAY OF MAGNESIUM: CPT | Performed by: INTERNAL MEDICINE

## 2024-06-12 PROCEDURE — 82945 GLUCOSE OTHER FLUID: CPT | Performed by: INTERNAL MEDICINE

## 2024-06-12 PROCEDURE — 84155 ASSAY OF PROTEIN SERUM: CPT | Performed by: INTERNAL MEDICINE

## 2024-06-12 PROCEDURE — 89050 BODY FLUID CELL COUNT: CPT | Performed by: INTERNAL MEDICINE

## 2024-06-12 PROCEDURE — 250N000013 HC RX MED GY IP 250 OP 250 PS 637: Performed by: STUDENT IN AN ORGANIZED HEALTH CARE EDUCATION/TRAINING PROGRAM

## 2024-06-12 PROCEDURE — 83615 LACTATE (LD) (LDH) ENZYME: CPT | Performed by: INTERNAL MEDICINE

## 2024-06-12 PROCEDURE — 250N000009 HC RX 250: Performed by: RADIOLOGY

## 2024-06-12 PROCEDURE — 87205 SMEAR GRAM STAIN: CPT | Performed by: INTERNAL MEDICINE

## 2024-06-12 PROCEDURE — 0W9B3ZZ DRAINAGE OF LEFT PLEURAL CAVITY, PERCUTANEOUS APPROACH: ICD-10-PCS | Performed by: RADIOLOGY

## 2024-06-12 PROCEDURE — 97535 SELF CARE MNGMENT TRAINING: CPT | Mod: GO | Performed by: OCCUPATIONAL THERAPIST

## 2024-06-12 PROCEDURE — 250N000011 HC RX IP 250 OP 636: Mod: JZ | Performed by: INTERNAL MEDICINE

## 2024-06-12 PROCEDURE — 210N000001 HC R&B IMCU HEART CARE

## 2024-06-12 PROCEDURE — 99233 SBSQ HOSP IP/OBS HIGH 50: CPT | Performed by: INTERNAL MEDICINE

## 2024-06-12 PROCEDURE — 99233 SBSQ HOSP IP/OBS HIGH 50: CPT | Performed by: NURSE PRACTITIONER

## 2024-06-12 PROCEDURE — 36415 COLL VENOUS BLD VENIPUNCTURE: CPT | Performed by: INTERNAL MEDICINE

## 2024-06-12 PROCEDURE — 32555 ASPIRATE PLEURA W/ IMAGING: CPT

## 2024-06-12 PROCEDURE — 80048 BASIC METABOLIC PNL TOTAL CA: CPT | Performed by: INTERNAL MEDICINE

## 2024-06-12 PROCEDURE — 88305 TISSUE EXAM BY PATHOLOGIST: CPT | Mod: TC | Performed by: INTERNAL MEDICINE

## 2024-06-12 PROCEDURE — 84157 ASSAY OF PROTEIN OTHER: CPT | Performed by: INTERNAL MEDICINE

## 2024-06-12 RX ORDER — LIDOCAINE HYDROCHLORIDE 10 MG/ML
10 INJECTION, SOLUTION EPIDURAL; INFILTRATION; INTRACAUDAL; PERINEURAL ONCE
Status: COMPLETED | OUTPATIENT
Start: 2024-06-12 | End: 2024-06-12

## 2024-06-12 RX ORDER — FUROSEMIDE 10 MG/ML
20 INJECTION INTRAMUSCULAR; INTRAVENOUS EVERY 8 HOURS
Status: DISCONTINUED | OUTPATIENT
Start: 2024-06-12 | End: 2024-06-14

## 2024-06-12 RX ADMIN — FUROSEMIDE 20 MG: 10 INJECTION, SOLUTION INTRAMUSCULAR; INTRAVENOUS at 18:06

## 2024-06-12 RX ADMIN — LIDOCAINE HYDROCHLORIDE 7 ML: 10 INJECTION, SOLUTION EPIDURAL; INFILTRATION; INTRACAUDAL; PERINEURAL at 10:59

## 2024-06-12 RX ADMIN — TAMSULOSIN HYDROCHLORIDE 0.4 MG: 0.4 CAPSULE ORAL at 08:43

## 2024-06-12 RX ADMIN — FUROSEMIDE 20 MG: 10 INJECTION, SOLUTION INTRAMUSCULAR; INTRAVENOUS at 08:43

## 2024-06-12 ASSESSMENT — ACTIVITIES OF DAILY LIVING (ADL)
ADLS_ACUITY_SCORE: 32

## 2024-06-12 NOTE — PROGRESS NOTES
[facility] Cardiology Progress Note  Date of Service: 06/12/2024  Staff Cardiologist: Dr. Burnett    Assessment & Plan   Canelo Cruz is a 89 year old male admitted on 6/10/2024 with acute heart failure exacerbation.  He presents with several week history of progressive dyspnea on exertion, orthopnea, and lower extremity edema.  He was hypoxic requiring BiPAP.  Echocardiogram showed moderately reduced LV function with an EF of 40 to 45% with regional WMAs.  He has severe aortic stenosis with a valve area of 0.44 cm  and a mean gradient of 27 mmHg.  His stroke-volume index is low at 16 mL/m .  There is moderate 2+ MR and moderate left pleural effusion.    Interval history:   Patient is responding well to IV diuresis, net -1.5 L yesterday, down 10 lbs this admission.  EDW unknown, though I suspect around 165 lbs. SBP 's. Renal function stable with cr 1.16. K 4.3. Remains chest pain free.  Denies SOB or orthopnea. Maintaining oxygen saturations on room air.    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  His heart failure exacerbation is likely secondary to his severe valvular disease.  As such, would recommend aortic valve replacement. Given advanced age and other comorbidities, he would be best treated with transcatheter aortic valve replacement.       Plan:  Continue IV lasix 20 mg TID.   Plan for coronary angiogram and right heart catheterization tomorrow, keep n.p.o. at midnight.  Will obtain TAVR guided CT this admission to assess candidacy for transfemoral  approach.  CT dental for clearance.  CVTS consult to discuss surgical bailout.  Further optimization of GDMT is limited due to hypotension.  Will continue to follow.  Pending CT findings, will tentatively plan for outpatient TAVR.     Maria Del Rosario Peters, GODFREY  Pager:  [phone]  (7am - 5pm, M-F)      Physical Exam   Temp: 98  F (36.7  C) Temp src: Axillary BP: 92/59 Pulse: 82   Resp: 16 SpO2: 93 % O2 Device: None (Room air) Oxygen Delivery: 1 LPM  Vitals:    06/10/24 1544 06/11/24 0055 06/12/24 0600   Weight: 80.3 kg (177 lb) 76 kg (167 lb 9.6 oz) 76.6 kg (168 lb 14 oz)       Constitutional:   NAD   Skin:   Warm and dry   Head:   Nontraumatic   Neck:   no JVD   Lungs:   Decreased LS bilaterally   Cardiovascular:   regular rate and rhythm   Abdomen:   Benign   Extremities and Back:   1+ EL   Neurological:   Grossly nonfocal       Medications   Current Facility-Administered Medications   Medication Dose Route Frequency Provider Last Rate Last Admin    No lozenges or gum should be given while patient on BIPAP/AVAPS/AVAPS AE   Does not apply Continuous PRN Celestine Galaviz MD        Patient may continue current oral medications   Does not apply Continuous PRN Celestine Galaviz MD         Current Facility-Administered Medications   Medication Dose Route Frequency Provider Last Rate Last Admin    furosemide (LASIX) injection 20 mg  20 mg Intravenous Q8H Earlene Brower MD        sodium chloride (PF) 0.9% PF flush 3 mL  3 mL Intracatheter Q8H Celestine Galaviz MD   3 mL at 06/12/24 1419    tamsulosin (FLOMAX) capsule 0.4 mg  0.4 mg Oral Daily Celestine Galaviz MD   0.4 mg at 06/12/24 0843       Data     Most Recent 3 CBC's:  Recent Labs   Lab Test 06/11/24  0542 06/10/24  1550 05/11/20  1547   WBC 6.2 8.7 9.5   HGB 12.4* 13.6 15.7   MCV 90 92 90    258 218     Most Recent 3 BMP's:  Recent Labs   Lab Test 06/12/24  0608 06/11/24  0801 06/11/24  0542 06/10/24  1550     --  134* 137    POTASSIUM 4.3  --  4.2 4.2   CHLORIDE 102  --  98 101   CO2 26  --  24 23   BUN 27.2*  --  18.1 15.5   CR 1.16  --  1.09 1.13   ANIONGAP 9  --  12 13   ROSA 8.8  --  9.0 9.6   * 156* 177* 113*     Most Recent 3 BNP's:  Recent Labs   Lab Test 06/10/24  1550   NTBNPI 7,186*         Medical Decision Making       25 MINUTES SPENT BY ME on the date of service doing chart review, history, exam, documentation & further activities per the note.          Maria Del Rosario Peters, CNP      Clinically Significant Risk Factors                   # Heart failure, NOS: heart failure noted on the problem list and last echo with EF 40-50%

## 2024-06-12 NOTE — DISCHARGE INSTRUCTIONS
CATHETER CARE - GOING HOME WITH YOUR URINARY CATHETER     A catheter is a flexible tube that s put into your urethra and bladder to drain urine. It s held in place by a small, water filled balloon inside your bladder. Your catheter (often called a Rodriguez) is connected to a tube that leads to a drainage bag. Urine from the catheter drains through the tube into either the night bag or day bag.     Normal findings when a catheter is in place:     Urine can bypass around your catheter intermittently throughout the day, especially if you are dehydrated, passing a bowel movement, or having increased activity. There may be a cramping/urge to urinate sensation that we call a bladder spasm. If urine is also flowing into your bag, then there is no cause for concern.     When a catheter is placed, there can be irritation to your urethra, prostate (in males), and bladder neck. Sometimes this can cause pink tinged urine. This is not a concern as long as urine flows properly, and it resolves within 24 hours with increased fluids. This is especially true if you take a medication that thins your blood/effects your blood s ability to clot.     For male patients, the end of the penis can become chapped. If the occurs, please place bacitracin ointment at the end of your penis to prevent pain and infection.     Common problems with rodriguez catheters:     Urine stops flowing. If urine is not flowing into your bag, make sure there are no kinks/twists in the tubing. Next, increase your fluids to ensure that you are not dehydrated. Make sure your bag is placed below your waist, so gravity helps the flow of urine. If these interventions do not resolve the issue, call [facility] triage line at [phone] for assistance.     The bag is leaking. Sometimes, catheter bags can be faulty and therefore cause leaking. If you need a new bag, please go to your local medical supply store. Almost all brands of catheter bags are interchangeable. We  provide you with one catheter bag of your choice at the time of your appointment. There are additional charges for extra bags, and usually it is considerably cheaper to find them on [employer], [employer]com, or your local medical supply store.     Blood in your urine. Certain conditions, procedures, or medications can cause blood to appear in your urine. However, if you are concerned, we advise you to contact our office especially if you see large clots passing through your catheter tubing or if your urine does not appear  clear  (you are unable to see through your urine).     Infection. Having a catheter may increase your risk of a bladder infection. Your doctor will recommend how often your catheter needs to be changed in order to prevent infections. You may notice changes in how your urine looks (i.e.- intermittent cloudiness, mucus), but this does not necessarily indicate an infection. Please contact our office if you feel consistent pain in your urethra, fever/chills, nausea/vomiting, abnormal fatigue, increased confusion, or any other concerns that make you suspicious for a bladder infection. If you go to a different clinic, please make sure they change your catheter prior to testing your urine, as taking a sample from an existing catheter does impede accuracy of results.

## 2024-06-12 NOTE — PROGRESS NOTES
[facility]    Medicine Progress Note - Hospitalist Service    Date of Admission:  6/10/2024    Assessment & Plan     Caenlo Cruz is a 89 year old male admitted on 6/10/2024. He presents with shortness of breath.  He is found to be in heart failure.     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  BNP elevated at 7186  CT demonstrates large bilateral pleural effusions as well as pulmonary edema.  Admitted as inpatient  Patient was given 60 mg of IV Lasix in the ED With good diuresis  Blood pressure is soft systolics in the 90s currently  Reduced her Lasix dosage to 20 mg IV every 8 hours  Echo  done showed LVEF 40 to 45%.  Severe valvular aortic stenosis with valve area of 0.44.  There are regional wall motion abnormalities as specified.  Moderate left-sided pleural effusion.  There is trace aortic regurgitation.  There is moderate 2+ MR.  Cardiology consultation requested  Continue to monitor him on telemetry  Serial tropes flat 46-40 mild elevation due to type II MI with acute heart failure exacerbation  Diuresis limited by soft Bps at times     Left sided thoracentesis ordered with Fluid studies on 6/12/24     Acute hypoxic respiratory failure  Suspect related to heart failure  - Continue BiPAP as needed.  Titrate off as able.  Patient is off of BiPAP.  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  Christianson placed in the emergency department with 2000ml out immediately.  CT demonstrating incompletely imaged hydronephrosis and elevated dome of the bladder.  - Continue PTA  tamsulosin   - Consult urology to assist with outpatient follow-up.  - Expect patient will require 1 to 2-week course with Christianson and before initial trial of void     Concern for neurocognitive deficit  Patient extremely overwhelmed on admission and required multiple explanations and reiteration's of the same information.  He was quite irascible on admission.  Unclear if this is due to baseline neurocognitive deficit or patient feeling overwhelmed with the situation.  - Monitor  - Consider outpatient neuropsych testing                      Diet: 2 g sodium diet, Fluid restriction 2000 ML FLUID (and additional linked orders)  2 Gram Sodium Diet    DVT Prophylaxis: Pneumatic Compression Devices  Christianson Catheter: PRESENT, indication: Acute retention or obstruction  Lines: None     Cardiac Monitoring: ACTIVE order. Indication: Acute decompensated heart failure (48 hours)  Code Status: Full Code      Clinically Significant Risk Factors                   # Heart failure, NOS: heart failure noted on the problem list and last echo with EF 40-50%                          Disposition Plan     Medically Ready for Discharge: Anticipated in 2-4 Days after optimal diuresis, hypoxia improvement, and okay with cardiology             Earlene Brower MD  Hospitalist Service  [facility]  Securely message with [employer] (more info)  Text page via [employer] Paging/Directory   ______________________________________________________________________    Interval History   Chart reviewed.  Discussed with bedside RN and patient  Patient is resting comfortably in bed.  Denies shortness of breath currently.  He did not sleep well last night.  Blood pressure was soft overnight Lasix was held.  No other acute issues    Physical Exam   Vital Signs: Temp: 98  F (36.7  C) Temp src: Axillary BP: 95/66 Pulse: 95   Resp: 18 SpO2: 94 % O2 Device: Nasal cannula Oxygen Delivery: 1 LPM  Weight: 168 lbs 13.96 oz    General Appearance: Elderly  male lying comfortably in bed, not in acute distress  Respiratory: Diminished in the bases, no tachypnea or respiratory distress noted  Cardiovascular: Normal rate rhythm regular  GI: Soft, nontender nondistended bowel sounds positive  Skin: Warm and dry  Other: Christianson in place with light yellow-colored urine    Medical Decision Making       50 MINUTES SPENT BY ME on the date of service doing chart review, history, exam, documentation & further activities per the note.      Data     I have personally reviewed the following data over the past 24 hrs:    N/A  \   N/A   / N/A     137 102 27.2 (H) /  122 (H)   4.3 26 1.16 \       Imaging results reviewed over the past 24 hrs:   Recent Results (from the past 24 hour(s))   Echocardiogram Complete   Result Value    LVEF  40-45%    Narrative    [id number]  [id number]  [id number]  [id number]^MANFRED^FRANCES^LUBA     [facility]  Echocardiography Laboratory  [address] [place] [address]     Name: YUMIKO FERNANDEZ  MRN: [id number]  : 1931  Study Date: 2024 10:22 AM  Age: 89 yrs  Gender: Male  Patient Location: [facility]  Reason For Study: Heart Failure  Ordering Physician: FRANCES SHEARER  Performed By: Shanta Palmer     BSA: 2.0 m2  Height: 73 in  Weight: 177 lb  HR: 90  BP: 91/57 mmHg  ______________________________________________________________________________  Procedure  Complete Portable Echo Adult.  ______________________________________________________________________________  Interpretation Summary     Severe valvular aortic stenosis.  The visual ejection fraction is 40-45%.  Left ventricular systolic function is mildly reduced.  There are regional wall motion abnormalities as specified.  The image quality was nondiagnostic. Contrast would significantly improve  assessment of regional wall motion.  Moderate left pleural effusion  There is trace aortic regurgitation.  There is moderate (2+) mitral regurgitation.  The study was  technically limited. Technically difficult, suboptimal study.  ______________________________________________________________________________  Left Ventricle  The left ventricle is normal in size. Left ventricular hypertrophy is noted by  two-dimensional echocardiography. There was fusion of the mitral inflow E and  A wave making diastolic function difficult to assess. Medial E/E' was not  recorded for the study. The visual ejection fraction is 40-45%. Left  ventricular systolic function is mildly reduced. Moderate hypokinesis of the  apex. The anterolateral wall appears to be moderately hypokinetic. The  anterior wall myocardium was not visualized at all in the apical 2 chamber  view. The mid inferolateral wall appears to be moderately hypokinetic. There  are regional wall motion abnormalities as specified. A false chord is noted  (normal variant).     Right Ventricle  The right ventricle is normal in size and function.     Atria  The left atrium is mildly dilated. The right atrium is mildly dilated. There  is no color Doppler evidence of an atrial shunt. Lipomatous hypertrophy of the  interatrial septum is noted.     Mitral Valve  The mitral valve leaflets are mildly thickened. There is moderate (2+) mitral  regurgitation.     Tricuspid Valve  There is mild (1+) tricuspid regurgitation. The right ventricular systolic  pressure is approximated at 30mmHg plus the right atrial pressure.     Aortic Valve  The number of aortic valve leaflets cannot be determined from the study. There  is thickening and heavy calcification of the aortic valve. There is trace  aortic regurgitation. The calculated aortic valve are is 0.44 cm^2. The peak  AoV pressure gradient is 45.0 mmHg. The mean AoV pressure gradient is 27.9  mmHg. Severe valvular aortic stenosis.     Vessels  Ascending aorta dilatation is present. The ascending aorta was not well-seen.  A measurement of 4.2 cm was obtained but the image quality was very poor. CT  or  MRI would better assess the size of the ascending aorta. IVC diameter <2.1  cm collapsing >50% with sniff suggests a normal RA pressure of 3 mmHg.     Pericardium  There is no pericardial effusion. Moderate left pleural effusion.     Rhythm  Sinus rhythm was noted.     ______________________________________________________________________________  MMode/2D Measurements & Calculations  IVSd: 0.85 cm  LVIDd: 5.5 cm  LVIDs: 4.4 cm  LVPWd: 1.3 cm  FS: 18.5 %     LV mass(C)d: 232.9 grams  LV mass(C)dI: 114.0 grams/m2  Ao root diam: 3.8 cm  LA dimension: 4.9 cm  asc Aorta Diam: 4.2 cm  LA/Ao: 1.3  LVOT diam: 2.0 cm  LVOT area: 3.0 cm2  Ao root diam index Ht(cm/m): 2.1  Ao root diam index BSA (cm/m2): 1.9  Asc Ao diam index BSA (cm/m2): 2.0  Asc Ao diam index Ht(cm/m): 2.2  LA Volume (BP): 78.2 ml  LA Volume Index (BP): 38.3 ml/m2     RV Base: 3.8 cm  RWT: 0.48  TAPSE: 1.7 cm     Doppler Measurements & Calculations  MV E max zeb: 126.0 cm/sec  MV A max zeb: 74.1 cm/sec  MV E/A: 1.7  MV dec time: 0.15 sec  Ao V2 max: 337.8 cm/sec  Ao max P.0 mmHg  Ao V2 mean: 250.1 cm/sec  Ao mean P.9 mmHg  Ao V2 VTI: 74.9 cm  TIFFANY(I,D): 0.44 cm2  TIFFANY(V,D): 0.50 cm2  AI P1/2t: 262.9 msec  LV V1 max P.3 mmHg  LV V1 max: 56.8 cm/sec  LV V1 VTI: 11.1 cm  MR PISA: 0.49 cm2  SV(LVOT): 33.2 ml  SI(LVOT): 16.3 ml/m2  TR max zeb: 272.5 cm/sec  TR max P.7 mmHg  AV Zeb Ratio (DI): 0.17  TIFFANY Index (cm2/m2): 0.22  E/E' av.0     Lateral E/e': 15.0  Medial E/e': 21.1  RV S Zeb: 8.1 cm/sec     ______________________________________________________________________________  Report approved by: Mary Coffman 2024 02:19 PM

## 2024-06-12 NOTE — PROVIDER NOTIFICATION
MD Notification    Notified Person: MD    Notified Person Name: Kelvin    Notification Date/Time: 06/11/24 8:43 PM    Notification Interaction: Vocera    Purpose of Notification: Pt here w/ CHF exacerbation. BP 87/61, recheck 82/49, other VSS, pt asymptomatic. Will not give scheduled Lasix, but do you want any BP parameters on it? Please advise. *[id number] BRIAN Cortez    Orders Received: Continue to monitor.

## 2024-06-12 NOTE — PLAN OF CARE
Neuro- A/Ox4, occasional confusion and forgetfulness  Most Recent Vitals- Temp: 97.9  F (36.6  C) Temp src: Oral BP: 92/57 Pulse: 89   Resp: 18 SpO2: 93 % O2 Device: Nasal cannula   Tele/Cardiac- SR  Resp- on 1L NC, LS clear/diminished w/ crackles in LLL  Activity- 1A  Pain- denies  Drips- n/a  Drains/Tubes- PIV, rodriguez  Skin- trace BLE edema  GI/- rodriguez in place for retention, no BM this shift  Plan- plan of care ongoing, possible discharge home tomorrow    Dana Mayfield RN

## 2024-06-12 NOTE — CONSULTS
REASON FOR ASSESSMENT:  CHF Consult for 2 gm NA Diet Education    NUTRITION HISTORY:  Information obtained from:  Patient    Grocery shopping:  Self    Meal preparation:  Self    Breakfast:  None    Lunch:  None    Dinner:   Steak, fish, pasta  Potatoes  veggies    Previous diet instructions:  No previous low sodium diet education per patient.    CURRENT DIET:  2g Na Diet; 2000 mL fluid restriction    NUTRITION DIAGNOSIS:  Food- and nutrition-related knowledge deficit R/t no prior exposure as evidenced by pt report, need for RD to educate this admit      INTERVENTIONS:  Nutrition Prescription:  Patient to follow a low sodium diet    Implementation:  Assessed learning needs, learning preferences, and willingness to learn  Nutrition Education (Content):  Provided handouts:  Tips for Low Na Diet  Label Reading  Low Na Foods/Drinks  Seasoning Your Food Without Salt  Low Na Recipe Booklet  Discussed rational for limiting Na for CHF and stressed importance of following 2 gm Na guidelines   Nutrition Education (Application):  Discussed current eating habits and recommended alternative food choices  Diet Education - refer to Education Flowsheet    Goals:  Patient verbalizes understanding of diet  All of the above goals met during the education session    Follow Up:  Recommend Out-Patient Nutrition Referral, if further diet instructions are needed.    Deidra Hutchison RD, LD  Clinical Dietitian - [facility]

## 2024-06-12 NOTE — CONSULTS
[facility] Inpatient Consultation Note    Canelo Cruz MRN# [id number]   Age: 89 year old YOB: 1931     Date of Admission: 6/10/2024    Reason for consult: Urinary retention        Requesting physician: Dr. Galaviz                History of Present Illness:   Canelo Cruz is a pleasant 89 year old year old male who presented on 6/10 for evaluation of shortness of breath, weakness.     Was admitted secondary to bilateral pleural effusions, acute hypoxic respiratory failure, heart failure exacerbation.  While in emergency department bladder scan was completed showing greater than 1 L.  Christianson catheter was placed with 2 L of output immediately per chart notes.    Patient denies any significant urological history.  He does take Flomax at baseline.  PSA from 4/13/2020 was 4.24.  He denies any known history of prostate cancer.  Denies any dysuria.  He said that he had 1 episode of gross hematuria about 2 weeks ago he felt like it was secondary to a stitch breaking free from an inguinal hernia repair.     On chart review, he had a CT in 2023 showing distended bladder at that time.          Past Medical History:     Past Medical History:   Diagnosis Date    Gout     Heart murmur     Insomnia     Mixed hyperlipidemia     Postherpetic neuralgia              Past Surgical History:     Past Surgical History:   Procedure Laterality Date    ARTHROSCOPY KNEE Right     ~2021-8673    DAVINCI XI HERNIORRHAPHY INGUINAL Bilateral 7/18/2022    Procedure: Robotic repair of right inguinal hernia with placement of mesh,  robotic repair of left inguinal hernia with placement of mesh;  Surgeon: Ina Phillips MD;  Location:  OR    ORTHOPEDIC SURGERY      right achilles rupture repair with 14 month MRSA infection             Social History:     Social History     Socioeconomic History    Marital status:      Spouse name: Not on file    Number of children: Not on file    Years of education: Not on file     Highest education level: Not on file   Occupational History    Not on file   Tobacco Use    Smoking status: Never    Smokeless tobacco: Never   Substance and Sexual Activity    Alcohol use: Yes     Comment: 10 per week    Drug use: No    Sexual activity: Not Currently     Partners: Male   Other Topics Concern    Parent/sibling w/ CABG, MI or angioplasty before 65F 55M? No   Social History Narrative    Not on file     Social Determinants of Health     Financial Resource Strain: Low Risk  (4/22/2022)    Received from [employer] [facility], [facility]  [employer] [facility]    Financial Resource Strain     Difficulty of Paying Living Expenses: 3     Difficulty of Paying Living Expenses: Not on file   Food Insecurity: Food Insecurity Present (4/22/2022)    [facility] [employer]    Food Insecurity     Worried About Running Out of Food in the Last Year: 2   Transportation Needs: No Transportation Needs (4/22/2022)    Received from [employer] [facility], [facility] [employer] [facility]    Transportation Needs     Lack of Transportation (Medical): 1   Physical Activity: Not on file   Stress: Not on file   Social Connections: Unknown (4/23/2023)    Received from [employer] [facility], [facility] [employer] [facility]    Social Connections     Frequency of Communication with Friends and Family: Not on file   Interpersonal Safety: Not on file   Housing Stability: Low Risk  (4/22/2022)    Received from [employer] [facility], [facility] [employer] [facility]    Housing Stability     Unable to Pay for Housing in the Last Year: 1             Family History:     Family History   Problem Relation Age of Onset    Unknown/Adopted Mother     Unknown/Adopted Father     Diabetes No family hx of     Coronary Artery  Disease No family hx of     Hypertension No family hx of     Hyperlipidemia No family hx of     Cerebrovascular Disease No family hx of     Breast Cancer No family hx of     Colon Cancer No family hx of     Prostate Cancer No family hx of     Other Cancer No family hx of     Depression No family hx of     Anxiety Disorder No family hx of     Mental Illness No family hx of     Substance Abuse No family hx of     Anesthesia Reaction No family hx of     Asthma No family hx of     Osteoporosis No family hx of     Genetic Disorder No family hx of     Thyroid Disease No family hx of     Obesity No family hx of              Immunizations:     Immunization History   Administered Date(s) Administered    COVID-19 12+ (2023-24) (Pfizer) 10/09/2023    COVID-19 Bivalent 12+ (Pfizer) 09/15/2022    COVID-19 Bivalent 18+ (Moderna) 06/08/2023    COVID-19 Monovalent 18+ (Moderna) 01/16/2021, 02/13/2021, 11/11/2021, 05/05/2022    Influenza (High Dose) 3 valent vaccine 09/16/2015, 08/31/2016, 09/11/2017, 09/05/2018, 09/13/2019    Influenza Vaccine 65+ (Fluzone HD) 09/04/2020    TD,PF 7+ (Tenivac) 12/13/2016    Td (Adult), Adsorbed 12/13/2016    Zoster recombinant adjuvanted (SHINGRIX) 04/13/2020, 06/29/2020    Zoster vaccine, live 09/01/2014             Allergies:     Allergies   Allergen Reactions    Ciprofloxacin      history of achilles tendon pain             Medications:     Current Facility-Administered Medications   Medication Dose Route Frequency Provider Last Rate Last Admin    acetaminophen (TYLENOL) tablet 650 mg  650 mg Oral Q4H PRN Celestine Galaviz MD        Or    acetaminophen (TYLENOL) Suppository 650 mg  650 mg Rectal Q4H PRN Celestine Galaviz MD        bisacodyl (DULCOLAX) suppository 10 mg  10 mg Rectal Daily PRN Celestine Galaviz MD        calcium carbonate (TUMS) chewable tablet 1,000 mg  1,000 mg Oral 4x Daily PRN Celestine Galaviz MD        carboxymethylcellulose PF (REFRESH PLUS) 0.5 %  ophthalmic solution 1 drop  1 drop Both Eyes Q1H PRN Celestine Galaviz MD        furosemide (LASIX) injection 20 mg  20 mg Intravenous Q8H Earlene Brower MD        lidocaine (LMX4) cream   Topical Q1H PRN Celestine Galaviz MD        lidocaine 1 % 0.1-1 mL  0.1-1 mL Other Q1H PRN Celestine Galaviz MD        No lozenges or gum should be given while patient on BIPAP/AVAPS/AVAPS AE   Does not apply Continuous PRN Celestine Galaviz MD        ondansetron (ZOFRAN ODT) ODT tab 4 mg  4 mg Oral Q6H PRN Celestine Galaviz MD        Or    ondansetron (ZOFRAN) injection 4 mg  4 mg Intravenous Q6H PRCelestine Brown MD        Patient may continue current oral medications   Does not apply Continuous PRN Celestine Galaviz MD        polyethylene glycol (MIRALAX) Packet 17 g  17 g Oral BID PRN Celestine Galaviz MD        prochlorperazine (COMPAZINE) injection 5 mg  5 mg Intravenous Q6H PRCelestine Brown MD        Or    prochlorperazine (COMPAZINE) tablet 5 mg  5 mg Oral Q6H PRCelestine Brown MD        Or    prochlorperazine (COMPAZINE) suppository 12.5 mg  12.5 mg Rectal Q12H PRCelestine Brown MD        senna-docusate (SENOKOT-S/PERICOLACE) 8.6-50 MG per tablet 1 tablet  1 tablet Oral BID PRCelestine Brown MD        Or    senna-docusate (SENOKOT-S/PERICOLACE) 8.6-50 MG per tablet 2 tablet  2 tablet Oral BID PRCelestine Brown MD        sodium chloride (PF) 0.9% PF flush 3 mL  3 mL Intracatheter Q8H Celestine Galaviz MD   3 mL at 06/11/24 1407    sodium chloride (PF) 0.9% PF flush 3 mL  3 mL Intracatheter q1 min prn Celestine Galaviz MD        tamsulosin (FLOMAX) capsule 0.4 mg  0.4 mg Oral Daily Celestine Galaviz MD   0.4 mg at 06/12/24 0843             Review of Systems:   Comprehensive review of systems from the Admission note dated 6/12 at [facility] was reviewed with no changes except per HPI.     Examination:  BP  "92/59 (BP Location: Right arm)   Pulse 82   Temp 98  F (36.7  C) (Axillary)   Resp 16   Ht 1.854 m (6' 1\")   Wt 76.6 kg (168 lb 14 oz)   SpO2 93%   BMI 22.28 kg/m    General: Alert and oriented, no distress.  HEENT: Face symmetric, atraumatic.   Eyes: No scleral icterus, EOM intact.   Neck: Symmetric.  Chest wall: Symmetric.  Respiratory: Breathing unlabored, no signs of respiratory distress.   Abdomen: Soft, non tender, non distended.   : rodriguez in place with yellow urine output, secured to thigh   Extremities: No evidence of deformities or trauma.  Pysch: Normal mood and affect.  Skin: No evident rashes or lesions.            Data:     Lab Results   Component Value Date    WBC 6.2 06/11/2024    WBC 9.5 05/11/2020     Lab Results   Component Value Date    RBC 4.03 06/11/2024    RBC 5.18 05/11/2020     Lab Results   Component Value Date    HGB 12.4 06/11/2024    HGB 15.7 05/11/2020     Lab Results   Component Value Date    HCT 36.1 06/11/2024    HCT 46.7 05/11/2020     Lab Results   Component Value Date     06/11/2024     05/11/2020     Creatinine   Date Value Ref Range Status   06/12/2024 1.16 0.67 - 1.17 mg/dL Final   02/14/2020 0.99 0.66 - 1.25 mg/dL Final   ]  Lab Results   Component Value Date    BUN 27.2 06/12/2024    BUN 15 02/14/2020         Assessment/Plan:  89 year old male admitted due to shortness of breath, found to have significant urinary retention. Rodriguez placed 6/10 for nearly 2 L output.      - Continue rodriguez catheter. RN to teach rodriguez cares. Will discharge with rodriguez in place.    - Needs outpatient urology follow up in 10-14 days.    - Continue flomax.    - Concern that he has baseline urinary retention given abnormal CT from 2023.    - Consider Cystoscopy and/or UDS.   - May need rodriguez long term.     Sherin Santana (Etheridge), LYNETTE  [facility]            "

## 2024-06-13 LAB
% LINING CELLS, BODY FLUID: 3 %
ABO/RH(D): NORMAL
ANION GAP SERPL CALCULATED.3IONS-SCNC: 11 MMOL/L (ref 7–15)
ANTIBODY SCREEN: NEGATIVE
APPEARANCE FLD: CLEAR
ATRIAL RATE - MUSE: 75 BPM
BASE EXCESS BLDA CALC-SCNC: 2 MMOL/L (ref -3–3)
BASE EXCESS BLDV CALC-SCNC: 4 MMOL/L (ref -3–3)
BUN SERPL-MCNC: 27.2 MG/DL (ref 8–23)
CALCIUM SERPL-MCNC: 8.7 MG/DL (ref 8.2–9.6)
CELL COUNT BODY FLUID SOURCE: NORMAL
CHLORIDE SERPL-SCNC: 98 MMOL/L (ref 98–107)
COLOR FLD: YELLOW
CREAT SERPL-MCNC: 1.07 MG/DL (ref 0.67–1.17)
DEPRECATED HCO3 PLAS-SCNC: 27 MMOL/L (ref 22–29)
DIASTOLIC BLOOD PRESSURE - MUSE: NORMAL MMHG
EGFRCR SERPLBLD CKD-EPI 2021: 65 ML/MIN/1.73M2
GLUCOSE SERPL-MCNC: 90 MG/DL (ref 70–99)
HCO3 BLDA-SCNC: 25 MMOL/L (ref 21–28)
HCO3 BLDV-SCNC: 29 MMOL/L (ref 21–28)
INTERPRETATION ECG - MUSE: NORMAL
LACTATE BLD-SCNC: 0.4 MMOL/L
LACTATE BLD-SCNC: 0.4 MMOL/L
LYMPHOCYTES NFR FLD MANUAL: 39 %
MAGNESIUM SERPL-MCNC: 1.9 MG/DL (ref 1.7–2.3)
MAGNESIUM SERPL-MCNC: 1.9 MG/DL (ref 1.7–2.3)
MONOS+MACROS NFR FLD MANUAL: 33 %
NEUTS BAND NFR FLD MANUAL: 25 %
P AXIS - MUSE: 78 DEGREES
PATH REV: NORMAL
PCO2 BLDA: 34 MM HG (ref 35–45)
PCO2 BLDV: 43 MM HG (ref 40–50)
PH BLDA: 7.48 [PH] (ref 7.35–7.45)
PH BLDV: 7.43 [PH] (ref 7.32–7.43)
PO2 BLDA: 65 MM HG (ref 80–105)
PO2 BLDV: 33 MM HG (ref 25–47)
POTASSIUM SERPL-SCNC: 3.5 MMOL/L (ref 3.4–5.3)
PR INTERVAL - MUSE: 208 MS
QRS DURATION - MUSE: 106 MS
QT - MUSE: 400 MS
QTC - MUSE: 446 MS
R AXIS - MUSE: 78 DEGREES
SAO2 % BLDA: 94 % (ref 92–100)
SAO2 % BLDV: 66 % (ref 70–75)
SODIUM SERPL-SCNC: 136 MMOL/L (ref 135–145)
SPECIMEN EXPIRATION DATE: NORMAL
SYSTOLIC BLOOD PRESSURE - MUSE: NORMAL MMHG
T AXIS - MUSE: 113 DEGREES
VENTRICULAR RATE- MUSE: 75 BPM
WBC # FLD AUTO: 220 /UL

## 2024-06-13 PROCEDURE — 250N000011 HC RX IP 250 OP 636: Mod: JZ | Performed by: INTERNAL MEDICINE

## 2024-06-13 PROCEDURE — 99152 MOD SED SAME PHYS/QHP 5/>YRS: CPT | Performed by: INTERNAL MEDICINE

## 2024-06-13 PROCEDURE — 82803 BLOOD GASES ANY COMBINATION: CPT

## 2024-06-13 PROCEDURE — C1751 CATH, INF, PER/CENT/MIDLINE: HCPCS | Performed by: INTERNAL MEDICINE

## 2024-06-13 PROCEDURE — 93456 R HRT CORONARY ARTERY ANGIO: CPT | Mod: 26 | Performed by: INTERNAL MEDICINE

## 2024-06-13 PROCEDURE — 210N000001 HC R&B IMCU HEART CARE

## 2024-06-13 PROCEDURE — 99153 MOD SED SAME PHYS/QHP EA: CPT | Performed by: INTERNAL MEDICINE

## 2024-06-13 PROCEDURE — 93005 ELECTROCARDIOGRAM TRACING: CPT

## 2024-06-13 PROCEDURE — 99222 1ST HOSP IP/OBS MODERATE 55: CPT | Mod: GC | Performed by: STUDENT IN AN ORGANIZED HEALTH CARE EDUCATION/TRAINING PROGRAM

## 2024-06-13 PROCEDURE — 83735 ASSAY OF MAGNESIUM: CPT | Performed by: INTERNAL MEDICINE

## 2024-06-13 PROCEDURE — 250N000013 HC RX MED GY IP 250 OP 250 PS 637: Performed by: NURSE PRACTITIONER

## 2024-06-13 PROCEDURE — C1894 INTRO/SHEATH, NON-LASER: HCPCS | Performed by: INTERNAL MEDICINE

## 2024-06-13 PROCEDURE — 99233 SBSQ HOSP IP/OBS HIGH 50: CPT | Mod: 25 | Performed by: NURSE PRACTITIONER

## 2024-06-13 PROCEDURE — 4A023N6 MEASUREMENT OF CARDIAC SAMPLING AND PRESSURE, RIGHT HEART, PERCUTANEOUS APPROACH: ICD-10-PCS | Performed by: INTERNAL MEDICINE

## 2024-06-13 PROCEDURE — C1769 GUIDE WIRE: HCPCS | Performed by: INTERNAL MEDICINE

## 2024-06-13 PROCEDURE — 93456 R HRT CORONARY ARTERY ANGIO: CPT | Performed by: INTERNAL MEDICINE

## 2024-06-13 PROCEDURE — 99152 MOD SED SAME PHYS/QHP 5/>YRS: CPT | Mod: GC | Performed by: INTERNAL MEDICINE

## 2024-06-13 PROCEDURE — 250N000013 HC RX MED GY IP 250 OP 250 PS 637: Performed by: INTERNAL MEDICINE

## 2024-06-13 PROCEDURE — 80048 BASIC METABOLIC PNL TOTAL CA: CPT | Performed by: INTERNAL MEDICINE

## 2024-06-13 PROCEDURE — 250N000013 HC RX MED GY IP 250 OP 250 PS 637: Performed by: STUDENT IN AN ORGANIZED HEALTH CARE EDUCATION/TRAINING PROGRAM

## 2024-06-13 PROCEDURE — 99233 SBSQ HOSP IP/OBS HIGH 50: CPT | Performed by: INTERNAL MEDICINE

## 2024-06-13 PROCEDURE — B2111ZZ FLUOROSCOPY OF MULTIPLE CORONARY ARTERIES USING LOW OSMOLAR CONTRAST: ICD-10-PCS | Performed by: INTERNAL MEDICINE

## 2024-06-13 PROCEDURE — 272N000001 HC OR GENERAL SUPPLY STERILE: Performed by: INTERNAL MEDICINE

## 2024-06-13 PROCEDURE — 36415 COLL VENOUS BLD VENIPUNCTURE: CPT | Performed by: INTERNAL MEDICINE

## 2024-06-13 PROCEDURE — 250N000009 HC RX 250: Performed by: INTERNAL MEDICINE

## 2024-06-13 PROCEDURE — 258N000003 HC RX IP 258 OP 636: Mod: JZ | Performed by: STUDENT IN AN ORGANIZED HEALTH CARE EDUCATION/TRAINING PROGRAM

## 2024-06-13 PROCEDURE — 86900 BLOOD TYPING SEROLOGIC ABO: CPT | Performed by: NURSE PRACTITIONER

## 2024-06-13 RX ORDER — LIDOCAINE 40 MG/G
CREAM TOPICAL
Status: DISCONTINUED | OUTPATIENT
Start: 2024-06-13 | End: 2024-06-13

## 2024-06-13 RX ORDER — SODIUM CHLORIDE 9 MG/ML
INJECTION, SOLUTION INTRAVENOUS CONTINUOUS
Status: DISCONTINUED | OUTPATIENT
Start: 2024-06-13 | End: 2024-06-13 | Stop reason: HOSPADM

## 2024-06-13 RX ORDER — NALOXONE HYDROCHLORIDE 0.4 MG/ML
0.4 INJECTION, SOLUTION INTRAMUSCULAR; INTRAVENOUS; SUBCUTANEOUS
Status: ACTIVE | OUTPATIENT
Start: 2024-06-13 | End: 2024-06-13

## 2024-06-13 RX ORDER — OXYCODONE HYDROCHLORIDE 5 MG/1
5 TABLET ORAL EVERY 4 HOURS PRN
Status: DISCONTINUED | OUTPATIENT
Start: 2024-06-13 | End: 2024-06-14 | Stop reason: HOSPADM

## 2024-06-13 RX ORDER — IOPAMIDOL 755 MG/ML
INJECTION, SOLUTION INTRAVASCULAR
Status: DISCONTINUED | OUTPATIENT
Start: 2024-06-13 | End: 2024-06-13 | Stop reason: HOSPADM

## 2024-06-13 RX ORDER — FENTANYL CITRATE 50 UG/ML
25 INJECTION, SOLUTION INTRAMUSCULAR; INTRAVENOUS
Status: ACTIVE | OUTPATIENT
Start: 2024-06-13 | End: 2024-06-13

## 2024-06-13 RX ORDER — ASPIRIN 325 MG
325 TABLET ORAL ONCE
Status: COMPLETED | OUTPATIENT
Start: 2024-06-13 | End: 2024-06-13

## 2024-06-13 RX ORDER — NALOXONE HYDROCHLORIDE 0.4 MG/ML
0.2 INJECTION, SOLUTION INTRAMUSCULAR; INTRAVENOUS; SUBCUTANEOUS
Status: ACTIVE | OUTPATIENT
Start: 2024-06-13 | End: 2024-06-13

## 2024-06-13 RX ORDER — ASPIRIN 81 MG/1
243 TABLET, CHEWABLE ORAL ONCE
Status: COMPLETED | OUTPATIENT
Start: 2024-06-13 | End: 2024-06-13

## 2024-06-13 RX ORDER — FLUMAZENIL 0.1 MG/ML
0.2 INJECTION, SOLUTION INTRAVENOUS
Status: ACTIVE | OUTPATIENT
Start: 2024-06-13 | End: 2024-06-13

## 2024-06-13 RX ORDER — SODIUM CHLORIDE 9 MG/ML
75 INJECTION, SOLUTION INTRAVENOUS CONTINUOUS
Status: ACTIVE | OUTPATIENT
Start: 2024-06-13 | End: 2024-06-13

## 2024-06-13 RX ORDER — LORAZEPAM 0.5 MG/1
0.5 TABLET ORAL
Status: DISCONTINUED | OUTPATIENT
Start: 2024-06-13 | End: 2024-06-13 | Stop reason: HOSPADM

## 2024-06-13 RX ORDER — LORAZEPAM 2 MG/ML
0.5 INJECTION INTRAMUSCULAR
Status: DISCONTINUED | OUTPATIENT
Start: 2024-06-13 | End: 2024-06-13 | Stop reason: HOSPADM

## 2024-06-13 RX ORDER — ACETAMINOPHEN 325 MG/1
650 TABLET ORAL EVERY 4 HOURS PRN
Status: DISCONTINUED | OUTPATIENT
Start: 2024-06-13 | End: 2024-06-13

## 2024-06-13 RX ORDER — FENTANYL CITRATE 50 UG/ML
INJECTION, SOLUTION INTRAMUSCULAR; INTRAVENOUS
Status: DISCONTINUED | OUTPATIENT
Start: 2024-06-13 | End: 2024-06-13 | Stop reason: HOSPADM

## 2024-06-13 RX ORDER — POTASSIUM CHLORIDE 1500 MG/1
20 TABLET, EXTENDED RELEASE ORAL
Status: COMPLETED | OUTPATIENT
Start: 2024-06-13 | End: 2024-06-13

## 2024-06-13 RX ORDER — ATROPINE SULFATE 0.1 MG/ML
0.5 INJECTION INTRAVENOUS
Status: ACTIVE | OUTPATIENT
Start: 2024-06-13 | End: 2024-06-13

## 2024-06-13 RX ORDER — OXYCODONE HYDROCHLORIDE 5 MG/1
10 TABLET ORAL EVERY 4 HOURS PRN
Status: DISCONTINUED | OUTPATIENT
Start: 2024-06-13 | End: 2024-06-14 | Stop reason: HOSPADM

## 2024-06-13 RX ADMIN — FUROSEMIDE 20 MG: 10 INJECTION, SOLUTION INTRAMUSCULAR; INTRAVENOUS at 00:29

## 2024-06-13 RX ADMIN — Medication 5 MG: at 20:51

## 2024-06-13 RX ADMIN — ASPIRIN 325 MG ORAL TABLET 325 MG: 325 PILL ORAL at 09:21

## 2024-06-13 RX ADMIN — FUROSEMIDE 20 MG: 10 INJECTION, SOLUTION INTRAMUSCULAR; INTRAVENOUS at 09:23

## 2024-06-13 RX ADMIN — POTASSIUM CHLORIDE 20 MEQ: 1500 TABLET, EXTENDED RELEASE ORAL at 09:23

## 2024-06-13 RX ADMIN — SODIUM CHLORIDE 75 ML/HR: 9 INJECTION, SOLUTION INTRAVENOUS at 17:47

## 2024-06-13 RX ADMIN — FUROSEMIDE 20 MG: 10 INJECTION, SOLUTION INTRAMUSCULAR; INTRAVENOUS at 17:44

## 2024-06-13 RX ADMIN — TAMSULOSIN HYDROCHLORIDE 0.4 MG: 0.4 CAPSULE ORAL at 09:23

## 2024-06-13 ASSESSMENT — ACTIVITIES OF DAILY LIVING (ADL)
ADLS_ACUITY_SCORE: 32

## 2024-06-13 NOTE — PLAN OF CARE
Goal Outcome Evaluation:       [id number]    A/O x 3, forgetful, multiple questions and states that he has not been informed on what is going on. RA, afebrile, BP 96/55, up with SBA to bathroom, tele SR, c/o L shoulder pain and prn voltaren gel offered. patient diuresing well with iv lasix, Christianson in place with good urine output (to stay per Urology). Plan for R cath at 1230. CT TAVR work-up. OT for cognitive eval. Pt doesn't want med team to talk to spouse or sister.

## 2024-06-13 NOTE — PROGRESS NOTES
[facility]    Medicine Progress Note - Hospitalist Service    Date of Admission:  6/10/2024    Assessment & Plan     Canelo Cruz is a 89 year old male admitted on 6/10/2024. He presents with shortness of breath.  He is found to be in heart failure with bilateral pleural effusions on chest x-ray.  Patient was started on IV Lasix with diuresis.  Blood pressure was soft in the 80s with higher dose IV Lasix dosage of Lasix reduced to 20 mg IV 3 times daily.  Patient also underwent ultrasound-guided thoracentesis on the left side with 1.5 L of clear fluid removal.  Fluid cultures and cytology pending.  Echo showed severe arctic valve stenosis.  Patient to undergo left and right heart cath on 6/13/2024.  Being worked up for TAVR procedure.  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  BNP elevated at 7186  CT demonstrates large bilateral pleural effusions as well as pulmonary edema.  Admitted as inpatient  Patient was given 60 mg of IV Lasix in the ED With good diuresis  Blood pressure is soft systolics in the 90s currently  Reduced her Lasix dosage to 20 mg IV every 8 hours  Echo  done showed LVEF 40 to 45%.  Severe valvular aortic stenosis with valve area of 0.44.  There are regional wall motion abnormalities as specified.  Moderate left-sided pleural effusion.  There is trace aortic regurgitation.  There is moderate 2+ MR.  Cardiology consultation requested  Continue to monitor him on telemetry  Serial tropes flat 46-40  mild elevation due to type II MI with acute heart failure exacerbation  Diuresis limited by soft Bps at times     Left sided thoracentesis done on 6/12/2024 with removal of 1.5 L of clear fluid.  Fluid studies consistent with transudate    Patient is going to have left and right heart cath on 6/13/2024 for cardiology    Patient is being worked up for TAVR outpatient    Acute hypoxic respiratory failure  Suspect related to heart failure  - Continue BiPAP as needed.  Titrate off as able.  Patient is off of BiPAP.  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  Christianson placed in the emergency department with 2000ml out immediately.  CT demonstrating incompletely imaged hydronephrosis and elevated dome of the bladder.  - Continue PTA tamsulosin   Urology consulted.  Recommended continuing Flomax  Recommended to continue Christianson catheter.  Needs outpatient urology follow-up in 10 to 14 days with [facility].  Concerned that he has baseline urinary retention given abdominal CT from 2023.  Consider cystoscopy and/or UDS.  May need Christianson long-term    Patient needs to discharge with home RN to teach management of Christianson catheter at home to patient     Concern for neurocognitive deficit  Patient extremely overwhelmed on admission and required multiple explanations and reiteration's of the same information.  He was quite irascible on admission.  Unclear if this is due to baseline neurocognitive deficit or patient feeling overwhelmed with the situation.  - Monitor  - Consider outpatient neuropsych testing  OT consulted  for cognitive evaluation                   Diet: 2 g sodium diet, Fluid restriction 2000 ML FLUID (and additional linked orders)  2 Gram Sodium Diet  NPO for Medical/Clinical Reasons Except for: Meds    DVT Prophylaxis: Pneumatic Compression Devices  Christianson Catheter: PRESENT,  indication: Acute retention or obstruction  Lines: None     Cardiac Monitoring: ACTIVE order. Indication: Acute decompensated heart failure (48 hours)  Code Status: Full Code      Clinically Significant Risk Factors                   # Heart failure, NOS: heart failure noted on the problem list and last echo with EF 40-50%                          Disposition Plan     Medically Ready for Discharge: Anticipated in 2-4 Days after optimal diuresis, hypoxia improvement, and okay with cardiology             Earlene Brower MD  Hospitalist Service  [facility]  Securely message with [employer] (more info)  Text page via [employer] Paging/Directory   ______________________________________________________________________    Interval History   Chart reviewed.  Discussed with bedside RN and patient  Patient is resting comfortably in bed.  Denies shortness of breath currently.  Complains of some of left shoulder pain.  Does not want to take Tylenol.  Patient to have left and right heart cath today per cardiology.  No other acute issues    Physical Exam   Vital Signs: Temp: 97.5  F (36.4  C) Temp src: Oral BP: 96/55 Pulse: 68   Resp: 17 SpO2: 96 % O2 Device: None (Room air)    Weight: 157 lbs 9.6 oz    General Appearance: Elderly male lying comfortably in bed, not in acute distress  Respiratory: Clear to auscultation bilaterally  Cardiovascular: Normal rate rhythm regular  GI: Soft, nontender nondistended bowel sounds positive  Skin: Warm and dry  Other: Christianson in place with light yellow-colored urine    Medical Decision Making       50 MINUTES SPENT BY ME on the date of service doing chart review, history, exam, documentation & further activities per the note.      Data     I have personally reviewed the following data over the past 24 hrs:    N/A  \   N/A   / N/A     136 98 27.2 (H) /  90   3.5 27 1.07 \     ALT: N/A AST: N/A AP: N/A TBILI: N/A   ALB: N/A TOT PROTEIN: 6.3 (L) LIPASE: N/A     Ferritin:  N/A %  Retic:  N/A LDH:  232       Imaging results reviewed over the past 24 hrs:   Recent Results (from the past 24 hour(s))   US Thoracentesis    Narrative    EXAM:  1. LEFT THORACENTESIS  2. ULTRASOUND GUIDANCE  LOCATION: [facility]  DATE/TIME: 6/12/2024 11:19 AM    INDICATION: Pleural effusion.    PROCEDURE: Informed consent obtained. Time out performed. The chest  was prepped and draped in a sterile fashion. 10 mL of 1% lidocaine was  infused into local soft tissues. A 5 [place] catheter system was  introduced into the pleural effusion under ultrasound guidance.    1.5 liters of clear fluid were removed and sent to lab if requested.    Patient tolerated procedure well.    Ultrasound images have been permanently captured for documentation.      Impression    IMPRESSION:  Status post ultrasound-guided left thoracentesis.    GALINDO SAHU MD         SYSTEM ID:  [id number]

## 2024-06-13 NOTE — CONSULTS
"  Cardiac surgery consult  Note     Canelo Cruz  Code Status: Full Code  Primary Care Physician: Pamela Mitchell   : 1931   Age: 89 year old     Date of Service: 2024     Subjective     Chief Complaint: Shortness of breath    History of present illness:     Pleasant 89-year-old male with a past medical history of CHF, severe aortic stenosis.  Admitted with acute CHF exacerbation.  Currently undergoing expedited TAVR workup.  Admitted to the hospital for medical optimization as well as diuresis.  He is undergoing coronary angiogram today.  TAVR CT scheduled for tomorrow.  He states that he has been a good wise \"healthy\" and is independent at home.  He denies chest pain.  He endorsed shortness of breath but this has improved since admission.  No prior history of cardiac surgery.     Subjective   Review of Systems:   Negative except for noted above     Patient Active Problem List   Diagnosis     History of colonic polyps     Constipation, unspecified constipation type     Post herpetic neuralgia     Decreased libido     ACP (advance care planning)     Erectile dysfunction, unspecified erectile dysfunction type     Osteoarthritis of right knee, unspecified osteoarthritis type     Primary insomnia     Other fatigue     Skin lesion     Elevated blood sugar     Hypertriglyceridemia     Class 1 obesity due to excess calories with serious comorbidity and body mass index (BMI) of 32.0 to 32.9 in adult     Recurrent UTI     Chronic fatigue     Hip pain, left     Impaired fasting glucose     Balance problems-since      Decreased hearing of right ear- since      History of gout     Pain in joint involving ankle and foot, left     Non-recurrent unilateral inguinal hernia without obstruction or gangrene     Acute pulmonary edema (H)     Elevated brain natriuretic peptide (BNP) level     Acute congestive heart failure, unspecified heart failure type (H)     Past Medical History:   Diagnosis Date     Gout  "     Heart murmur      Insomnia      Mixed hyperlipidemia      Postherpetic neuralgia      Past Surgical History:   Procedure Laterality Date     ARTHROSCOPY KNEE Right     ~[id number]     DAVINCI XI HERNIORRHAPHY INGUINAL Bilateral 7/18/2022    Procedure: Robotic repair of right inguinal hernia with placement of mesh,  robotic repair of left inguinal hernia with placement of mesh;  Surgeon: Ina Phillips MD;  Location: [facility]     ORTHOPEDIC SURGERY      right achilles rupture repair with 14 month MRSA infection     Social History     Socioeconomic History     Marital status:      Spouse name: Not on file     Number of children: Not on file     Years of education: Not on file     Highest education level: Not on file   Occupational History     Not on file   Tobacco Use     Smoking status: Never     Smokeless tobacco: Never   Substance and Sexual Activity     Alcohol use: Yes     Comment: 10 per week     Drug use: No     Sexual activity: Not Currently     Partners: Male   Other Topics Concern     Parent/sibling w/ CABG, MI or angioplasty before 65F 55M? No   Social History Narrative     Not on file     Social Determinants of Health     Financial Resource Strain: Low Risk  (4/22/2022)    [facility]    Financial Resource Strain      Difficulty of Paying Living Expenses: 3      Difficulty of Paying Living Expenses: Not on file   Food Insecurity: Food Insecurity Present (4/22/2022)    [facility]    Food Insecurity      Worried About Running Out of Food in the Last Year: 2   Transportation Needs: No Transportation Needs (4/22/2022)    [facility]    Transportation Needs      Lack of Transportation (Medical): 1   Physical Activity: Not on  "file   Stress: Not on file   Social Connections: Unknown (4/23/2023)    [facility]    Social Connections      Frequency of Communication with Friends and Family: Not on file   Interpersonal Safety: Not on file   Housing Stability: Low Risk  (4/22/2022)    Received from [facility], [facility]    Housing Stability      Unable to Pay for Housing in the Last Year: 1     Family History   Problem Relation Age of Onset     Unknown/Adopted Mother      Unknown/Adopted Father      Diabetes No family hx of      Coronary Artery Disease No family hx of      Hypertension No family hx of      Hyperlipidemia No family hx of      Cerebrovascular Disease No family hx of      Breast Cancer No family hx of      Colon Cancer No family hx of      Prostate Cancer No family hx of      Other Cancer No family hx of      Depression No family hx of      Anxiety Disorder No family hx of      Mental Illness No family hx of      Substance Abuse No family hx of      Anesthesia Reaction No family hx of      Asthma No family hx of      Osteoporosis No family hx of      Genetic Disorder No family hx of      Thyroid Disease No family hx of      Obesity No family hx of      Medications Prior to Admission   Medication Sig Dispense Refill Last Dose     tamsulosin (FLOMAX) 0.4 MG capsule Take 0.4 mg by mouth daily as needed   Past Week     Allergies   Allergen Reactions     Ciprofloxacin      history of achilles tendon pain          Objective   Objective   BP 96/55 (BP Location: Right arm)   Pulse 68   Temp 97.5  F (36.4  C) (Oral)   Resp 17   Ht 1.854 m (6' 1\")   Wt 71.5 kg (157 lb 9.6 oz)   SpO2 96%   BMI 20.79 kg/m      Temp  Min: 97.5  F (36.4  C)  Max: 98.2  F (36.8  C)  BP  Min: 87/63  Max: 98/65     Intake/Output Summary (Last 24 hours) at 6/13/2024 1206  Last data filed at " 6/13/2024 0600  Gross per 24 hour   Intake --   Output 3500 ml   Net -3500 ml     I/O last 3 completed shifts:  In: -   Out: 3500 [Urine:3500]     Physical Exam:   Gen-tired, frail appearing  HEENT- NC/AT, EOMI  Cardiac- RRR  Pulm- normal respiratory effort  Abd- soft, NT/ND,   - deferred  Extremities- no peripheral edema  Neuro- gross motor intact  Skin- no obvious rashes, bruises, or cuts     Pertinent Labs: Reviewed.     Arterial Blood Gases   No lab results found in last 7 days.    Complete Blood Count   Recent Labs   Lab 06/11/24  0542 06/10/24  1550   WBC 6.2 8.7   HGB 12.4* 13.6    258       Basic Metabolic Panel  Recent Labs   Lab 06/13/24  0601 06/12/24  0608 06/11/24  0542 06/10/24  1550    137 134* 137   POTASSIUM 3.5 4.3 4.2 4.2   CHLORIDE 98 102 98 101   CO2 27 26 24 23   BUN 27.2* 27.2* 18.1 15.5   CR 1.07 1.16 1.09 1.13       Liver Function Tests  Recent Labs   Lab 06/10/24  1550   AST 26   ALT 12   ALKPHOS 70   BILITOTAL 1.0   ALBUMIN 4.2       Coagulation Profile  No lab results found in last 7 days.       Pertinent Imaging (Last 24 hours):  No results found for this or any previous visit (from the past 24 hour(s)).    Last Echo:  Echo result w/o MOPS: Interpretation Summary Severe valvular aortic stenosis.The visual ejection fraction is 40-45%.Left ventricular systolic function is mildly reduced.There are regional wall motion abnormalities as specified.The image quality was nondiagnostic. Contrast would significantly improveassessment of regional wall motion.Moderate left pleural effusionThere is trace aortic regurgitation.There is moderate (2+) mitral regurgitation.The study was technically limited. Technically difficult, suboptimal study.               Current Medications     S  C  H  E  D Current Facility-Administered Medications   Medication Dose Route Frequency Provider Last Rate Last Admin     furosemide (LASIX) injection 20 mg  20 mg Intravenous Q8H Earlene Brower MD   20 mg  at 06/13/24 0923     sodium chloride (PF) 0.9% PF flush 3 mL  3 mL Intracatheter Q8H Maria Del Rosario Peters CNP   3 mL at 06/13/24 0923     sodium chloride (PF) 0.9% PF flush 3 mL  3 mL Intracatheter Q8H Celestine Galaviz MD   3 mL at 06/12/24 1419     tamsulosin (FLOMAX) capsule 0.4 mg  0.4 mg Oral Daily Celestine Galaviz MD   0.4 mg at 06/13/24 0923      G  T  T Current Facility-Administered Medications   Medication Dose Route Frequency Provider Last Rate Last Admin     No lozenges or gum should be given while patient on BIPAP/AVAPS/AVAPS AE   Does not apply Continuous PRN Celestine Galaviz MD         Patient is NOT allergic to contrast dye   Does not apply DOES NOT GO TO MAR Maria Del Rosario Peters CNP         Patient may continue current oral medications   Does not apply Continuous PRN Celestine Galaviz MD         sodium chloride 0.9 % infusion   Intravenous Continuous Maria Del Rosario Peters CNP   Held at 06/13/24 0923      P  R  N Current Facility-Administered Medications   Medication Dose Route Frequency Provider Last Rate Last Admin     acetaminophen (TYLENOL) tablet 650 mg  650 mg Oral Q4H PRN Celestine Galaviz MD        Or     acetaminophen (TYLENOL) Suppository 650 mg  650 mg Rectal Q4H PRN Celestine Galaviz MD         bisacodyl (DULCOLAX) suppository 10 mg  10 mg Rectal Daily PRN Celestine Galaviz MD         calcium carbonate (TUMS) chewable tablet 1,000 mg  1,000 mg Oral 4x Daily PRN Celestine Galaviz MD         carboxymethylcellulose PF (REFRESH PLUS) 0.5 % ophthalmic solution 1 drop  1 drop Both Eyes Q1H PRN Celestine Galaviz MD         diclofenac (VOLTAREN) 1 % topical gel 4 g  4 g Topical Q6H PRN Earlene Brower MD         IF patient diabetic - HOLD: ALL ORAL HYPOGLYCEMICS: glipizide, glyburide, glimepiride, gliclazide, metformin (Glucophage), any metformin (Glucophage) containing medication, rosiglitazone (Avandia), pioglitazone (Actos), or sitagliptin (Januvia) on day  of the procedure   Does not apply HOLD Maria Del Rosario Peters CNP         lidocaine (LMX4) cream   Topical Q1H PRN Celestine Galaviz MD         lidocaine 1 % 0.1-1 mL  0.1-1 mL Other Q1H PRN Celestine Galaviz MD         LORazepam (ATIVAN) injection 0.5 mg  0.5 mg Intravenous Q2H PRN Maria Del Rosario Peters CNP        Or     LORazepam (ATIVAN) tablet 0.5 mg  0.5 mg Oral Q2H PRN Maria Del Rosario Peters CNP         No lozenges or gum should be given while patient on BIPAP/AVAPS/AVAPS AE   Does not apply Continuous PRN Celestine Galaviz MD         ondansetron (ZOFRAN ODT) ODT tab 4 mg  4 mg Oral Q6H PRN Celestine Galaviz MD        Or     ondansetron (ZOFRAN) injection 4 mg  4 mg Intravenous Q6H PRN Celestine Galaviz MD         Patient is NOT allergic to contrast dye   Does not apply DOES NOT GO TO MAR Maria Del Rosario Peters CNP         Patient may continue current oral medications   Does not apply Continuous PRN Celestine Galaviz MD         polyethylene glycol (MIRALAX) Packet 17 g  17 g Oral BID PRN Celestine Galaviz MD         prochlorperazine (COMPAZINE) injection 5 mg  5 mg Intravenous Q6H PRN Celestine Galaviz MD        Or     prochlorperazine (COMPAZINE) tablet 5 mg  5 mg Oral Q6H PRN Celestine Galaviz MD        Or     prochlorperazine (COMPAZINE) suppository 12.5 mg  12.5 mg Rectal Q12H PRN Celestine Galaviz MD         senna-docusate (SENOKOT-S/PERICOLACE) 8.6-50 MG per tablet 1 tablet  1 tablet Oral BID PRCelestine Brown MD        Or     senna-docusate (SENOKOT-S/PERICOLACE) 8.6-50 MG per tablet 2 tablet  2 tablet Oral BID PRN Celestine Galaviz MD         sodium chloride (PF) 0.9% PF flush 10 mL  10 mL Intravenous Q10 Min PRN Maria Del Rosario Peters, CNP         sodium chloride (PF) 0.9% PF flush 3 mL  3 mL Intracatheter Q1H PRN Maria Del Rosario Peters, CNP         sodium chloride (PF) 0.9% PF flush 3 mL  3 mL Intracatheter q1 min prn Maria Del Rosario Peters, GODFREY         sodium  "chloride (PF) 0.9% PF flush 3 mL  3 mL Intracatheter q1 min Celestine Montelongo MD                Assessment     89 year old male with severe LFLG aortic stenosis, TAVR workup     Plan     Patient with severe LF LG aortic stenosis.  Expedited TAVR workup.  Undergoing coronary angiogram today.  TAVR CT tomorrow.  Regardless of angiogram and CTA results.  Patient is high risk for surgery given his age..    Unfortunately, he has not completed angio or TAVR CT, Pt also seemed somewhat confused about TAVR and what that entails. When discussing open heart surgery pt expresses that he \"doesn't want that\"  .. Will defer to cardiology to further explain TAVR as pt had several questions.     Discussed with Dr. Mulvihill         Signed:    Bogdan oColey MD 6/13/2024 at 12:06 PM  Cardiothoracic Surgery Fellow        "

## 2024-06-13 NOTE — PLAN OF CARE
Goal Outcome Evaluation:      Plan of Care Reviewed With: patient      Patient had angiogram, Right groin site is dry and intact no bleeding no hematoma no bruit, continue to monitor. Blood pressure is soft, RA.

## 2024-06-13 NOTE — PLAN OF CARE
"A&Ox4 - forgetful and demanding. Sys 90s. No CP. ORTIZ. Reports, \"feeling frustrated and I have no idea what is going on or what the plan is.\" Education provided of plan based on provider notes. SBA to bathroom. Christianson for retention in place. Adequate UO. Diuresing with IV lasix. L nila site CDI and CMS intact.   Plan: RHC and NPO since 0000 and possible further TAVR workup     "

## 2024-06-13 NOTE — PROGRESS NOTES
Sw:  D: VM received from Jesse Gutierrez ([phone], [facility] at [facility] stating she is calling for an update with patient's spouse. Wanting to see what the plan of care is and if patient will be going to TCU at discharge     Writer called back and left a VM explaining TCU is not recommended at this time.    Jeanine Cherry, JEZ, [facility]   Social Work   [facility]

## 2024-06-13 NOTE — PROGRESS NOTES
A/O x 4, vss, Bp soft, up with SBA to bathroom, tele SR with BBB, denies pain or shortness of breath, patient diuresing well with iv lasix, Christianson in place with good urine output, patient had thoracentesis this morning left side back, 1.5 L fluid pulled out, dressing CDI.

## 2024-06-13 NOTE — PROGRESS NOTES
[facility] Cardiology Progress Note  Date of Service: 06/13/2024  Staff Cardiologist: Dr. Burnett    Assessment & Plan   Canelo Cruz is a 89 year old male admitted on 6/10/2024 with acute heart failure exacerbation.  He presents with several week history of progressive dyspnea on exertion, orthopnea, and lower extremity edema.  He was hypoxic requiring BiPAP.  Echocardiogram showed moderately reduced LV function with an EF of 40 to 45% with regional WMAs.  He has severe aortic stenosis with a valve area of 0.44 cm  and a mean gradient of 27 mmHg.  His stroke-volume index is low at 16 mL/m .  There is moderate 2+ MR and moderate left pleural effusion.    Interval history:   Patient is responding well to IV diuresis, net -1.7L yesterday, down 10lbs.  EDW unknown (155lbs?). BP remain soft with SBP 90's. Maintaining oxygen saturations on room air. Remains chest pain free without significant dyspnea.     Assessment:   Severe LFLG aortic stenosis with a valve area of 0.44 cm  and mean gradient 20 mmHg, SVI of 16 ml/m2  Acute congestive heart failure exacerbation 2/2 #1  Cardiomyopathy with an EF of 40 to 45%, likely valvular    Large bilateral pleural effusions s/p L thoracentesis with removal of 1.5L of fluid on 6/12  Relative hypotension in the setting of diuresis     The patient has severe aortic stenosis with mildly reduced LV systolic function.  His heart failure exacerbation is likely secondary to his severe valvular disease.  As such, would recommend aortic valve replacement. Given advanced age and other comorbidities, he would be best treated with transcatheter aortic valve replacement.       Plan:  Continue IV lasix 20 mg TID.   TAVR guided CT tomorrow.   CVTS consult to discuss bailout.   Further optimize GDMT once more compensated, limited due to hypotension.   Plan for coronary angiogram and right heart catheterization today, risks/benefits discussed, consent signed.      Maria Del Rosario Peters CNP  Pager:  [phone]  (7am - 5pm, M-F)      Physical Exam   Temp: 97.5  F (36.4  C) Temp src: Oral BP: 96/55 Pulse: 68   Resp: 17 SpO2: 96 % O2 Device: None (Room air)    Vitals:    06/11/24 0055 06/12/24 0600 06/13/24 0500   Weight: 76 kg (167 lb 9.6 oz) 76.6 kg (168 lb 14 oz) 71.5 kg (157 lb 9.6 oz)       Constitutional:   NAD   Skin:   Warm and dry   Head:   Nontraumatic   Neck:   no JVD   Lungs:   Decreased LS bilaterally   Cardiovascular:   regular rate and rhythm   Abdomen:   Benign   Extremities and Back:   1+ EL   Neurological:   Grossly nonfocal       Medications   Current Facility-Administered Medications   Medication Dose Route Frequency Provider Last Rate Last Admin    No lozenges or gum should be given while patient on BIPAP/AVAPS/AVAPS AE   Does not apply Continuous PRN Celestine Galaviz MD        Patient is NOT allergic to contrast dye   Does not apply DOES NOT GO TO MAR Maria Del Rosario Peters CNP        Patient may continue current oral medications   Does not apply Continuous PRN Celestine Galaviz MD        sodium chloride 0.9 % infusion   Intravenous Continuous Maria Del Rosario Peters CNP   Held at 06/13/24 0923     Current Facility-Administered Medications   Medication Dose Route Frequency Provider Last Rate Last Admin    furosemide (LASIX) injection 20 mg  20 mg Intravenous Q8H Earlene Brower MD   20 mg at 06/13/24 0923    sodium chloride (PF) 0.9% PF flush 3 mL  3 mL Intracatheter Q8H Maria Del Rosario Peters CNP   3 mL at 06/13/24 0923    sodium chloride (PF) 0.9% PF flush 3 mL  3 mL Intracatheter Q8H Celestine Galaviz MD   3 mL at 06/12/24 1419    tamsulosin (FLOMAX) capsule 0.4 mg  0.4 mg Oral Daily Celestine Galaviz MD   0.4 mg at 06/13/24 0923       Data     Most Recent 3 CBC's:  Recent Labs   Lab Test 06/11/24  0542 06/10/24  1550 05/11/20  1547   WBC 6.2 8.7 9.5   HGB 12.4* 13.6 15.7   MCV 90 92 90    258 218     Most Recent 3 BMP's:  Recent Labs    Lab Test 06/13/24  0601 06/12/24  0608 06/11/24  0801 06/11/24  0542    137  --  134*   POTASSIUM 3.5 4.3  --  4.2   CHLORIDE 98 102  --  98   CO2 27 26  --  24   BUN 27.2* 27.2*  --  18.1   CR 1.07 1.16  --  1.09   ANIONGAP 11 9  --  12   ROSA 8.7 8.8  --  9.0   GLC 90 122* 156* 177*     Most Recent 3 BNP's:  Recent Labs   Lab Test 06/10/24  1550   NTBNPI 7,186*         Medical Decision Making       25 MINUTES SPENT BY ME on the date of service doing chart review, history, exam, documentation & further activities per the note.          Maria Del Rosario Peters CNP      Clinically Significant Risk Factors                   # Heart failure, NOS: heart failure noted on the problem list and last echo with EF 40-50%

## 2024-06-14 ENCOUNTER — APPOINTMENT (OUTPATIENT)
Dept: CARDIOLOGY | Facility: CLINIC | Age: 89
DRG: 280 | End: 2024-06-14
Attending: NURSE PRACTITIONER
Payer: COMMERCIAL

## 2024-06-14 VITALS
DIASTOLIC BLOOD PRESSURE: 63 MMHG | SYSTOLIC BLOOD PRESSURE: 103 MMHG | RESPIRATION RATE: 18 BRPM | WEIGHT: 154.6 LBS | HEIGHT: 73 IN | HEART RATE: 87 BPM | TEMPERATURE: 97.7 F | OXYGEN SATURATION: 93 % | BODY MASS INDEX: 20.49 KG/M2

## 2024-06-14 LAB
ANION GAP SERPL CALCULATED.3IONS-SCNC: 11 MMOL/L (ref 7–15)
BUN SERPL-MCNC: 21.3 MG/DL (ref 8–23)
CALCIUM SERPL-MCNC: 9 MG/DL (ref 8.2–9.6)
CHLORIDE SERPL-SCNC: 98 MMOL/L (ref 98–107)
CREAT SERPL-MCNC: 1.03 MG/DL (ref 0.67–1.17)
DEPRECATED HCO3 PLAS-SCNC: 27 MMOL/L (ref 22–29)
EGFRCR SERPLBLD CKD-EPI 2021: 68 ML/MIN/1.73M2
GLUCOSE SERPL-MCNC: 92 MG/DL (ref 70–99)
HOLD SPECIMEN: 0
MAGNESIUM SERPL-MCNC: 1.9 MG/DL (ref 1.7–2.3)
POTASSIUM SERPL-SCNC: 3.8 MMOL/L (ref 3.4–5.3)
SODIUM SERPL-SCNC: 136 MMOL/L (ref 135–145)

## 2024-06-14 PROCEDURE — 74174 CTA ABD&PLVS W/CONTRAST: CPT

## 2024-06-14 PROCEDURE — 36415 COLL VENOUS BLD VENIPUNCTURE: CPT | Performed by: INTERNAL MEDICINE

## 2024-06-14 PROCEDURE — 99239 HOSP IP/OBS DSCHRG MGMT >30: CPT | Performed by: INTERNAL MEDICINE

## 2024-06-14 PROCEDURE — 71275 CT ANGIOGRAPHY CHEST: CPT | Mod: 26 | Performed by: INTERNAL MEDICINE

## 2024-06-14 PROCEDURE — 74174 CTA ABD&PLVS W/CONTRAST: CPT | Mod: 26 | Performed by: INTERNAL MEDICINE

## 2024-06-14 PROCEDURE — 250N000013 HC RX MED GY IP 250 OP 250 PS 637: Performed by: INTERNAL MEDICINE

## 2024-06-14 PROCEDURE — 83735 ASSAY OF MAGNESIUM: CPT | Performed by: INTERNAL MEDICINE

## 2024-06-14 PROCEDURE — 250N000011 HC RX IP 250 OP 636: Performed by: INTERNAL MEDICINE

## 2024-06-14 PROCEDURE — 250N000013 HC RX MED GY IP 250 OP 250 PS 637: Performed by: STUDENT IN AN ORGANIZED HEALTH CARE EDUCATION/TRAINING PROGRAM

## 2024-06-14 PROCEDURE — 80048 BASIC METABOLIC PNL TOTAL CA: CPT | Performed by: INTERNAL MEDICINE

## 2024-06-14 PROCEDURE — 99232 SBSQ HOSP IP/OBS MODERATE 35: CPT | Mod: 25 | Performed by: NURSE PRACTITIONER

## 2024-06-14 PROCEDURE — 250N000011 HC RX IP 250 OP 636: Mod: JZ | Performed by: INTERNAL MEDICINE

## 2024-06-14 RX ORDER — IOPAMIDOL 755 MG/ML
115 INJECTION, SOLUTION INTRAVASCULAR ONCE
Status: COMPLETED | OUTPATIENT
Start: 2024-06-14 | End: 2024-06-14

## 2024-06-14 RX ORDER — DIPHENHYDRAMINE HYDROCHLORIDE 50 MG/ML
25-50 INJECTION INTRAMUSCULAR; INTRAVENOUS
Status: DISCONTINUED | OUTPATIENT
Start: 2024-06-14 | End: 2024-06-14 | Stop reason: HOSPADM

## 2024-06-14 RX ORDER — METHYLPREDNISOLONE SODIUM SUCCINATE 125 MG/2ML
125 INJECTION, POWDER, LYOPHILIZED, FOR SOLUTION INTRAMUSCULAR; INTRAVENOUS
Status: DISCONTINUED | OUTPATIENT
Start: 2024-06-14 | End: 2024-06-14 | Stop reason: HOSPADM

## 2024-06-14 RX ORDER — DIPHENHYDRAMINE HCL 25 MG
25 CAPSULE ORAL
Status: DISCONTINUED | OUTPATIENT
Start: 2024-06-14 | End: 2024-06-14 | Stop reason: HOSPADM

## 2024-06-14 RX ORDER — ONDANSETRON 2 MG/ML
4 INJECTION INTRAMUSCULAR; INTRAVENOUS
Status: DISCONTINUED | OUTPATIENT
Start: 2024-06-14 | End: 2024-06-14

## 2024-06-14 RX ORDER — FUROSEMIDE 20 MG
20 TABLET ORAL DAILY
Status: DISCONTINUED | OUTPATIENT
Start: 2024-06-14 | End: 2024-06-14 | Stop reason: HOSPADM

## 2024-06-14 RX ORDER — FUROSEMIDE 20 MG
20 TABLET ORAL DAILY
Qty: 30 TABLET | Refills: 0 | Status: ON HOLD | OUTPATIENT
Start: 2024-06-14 | End: 2024-07-17

## 2024-06-14 RX ADMIN — TAMSULOSIN HYDROCHLORIDE 0.4 MG: 0.4 CAPSULE ORAL at 08:28

## 2024-06-14 RX ADMIN — IOPAMIDOL 115 ML: 755 INJECTION, SOLUTION INTRAVENOUS at 13:59

## 2024-06-14 RX ADMIN — DICLOFENAC SODIUM 4 G: 10 GEL TOPICAL at 05:36

## 2024-06-14 RX ADMIN — ACETAMINOPHEN 650 MG: 325 TABLET, FILM COATED ORAL at 08:28

## 2024-06-14 RX ADMIN — FUROSEMIDE 20 MG: 10 INJECTION, SOLUTION INTRAMUSCULAR; INTRAVENOUS at 00:14

## 2024-06-14 ASSESSMENT — ACTIVITIES OF DAILY LIVING (ADL)
ADLS_ACUITY_SCORE: 36
ADLS_ACUITY_SCORE: 32
ADLS_ACUITY_SCORE: 32
ADLS_ACUITY_SCORE: 36
ADLS_ACUITY_SCORE: 36
ADLS_ACUITY_SCORE: 32
ADLS_ACUITY_SCORE: 36
ADLS_ACUITY_SCORE: 32
ADLS_ACUITY_SCORE: 36
ADLS_ACUITY_SCORE: 32
ADLS_ACUITY_SCORE: 32
ADLS_ACUITY_SCORE: 36
ADLS_ACUITY_SCORE: 32
ADLS_ACUITY_SCORE: 32
ADLS_ACUITY_SCORE: 36
ADLS_ACUITY_SCORE: 32

## 2024-06-14 NOTE — PROGRESS NOTES
[facility] Cardiology Progress Note  Date of Service: 06/14/2024  Staff Cardiologist: Dr. Burnett    Assessment & Plan   Canelo Cruz is a 89 year old male admitted on 6/10/2024 with acute heart failure exacerbation.  He presents with several week history of progressive dyspnea on exertion, orthopnea, and lower extremity edema.  He was hypoxic requiring BiPAP.  Echocardiogram showed moderately reduced LV function with an EF of 40 to 45% with regional WMAs.  He has severe aortic stenosis with a valve area of 0.44 cm  and a mean gradient of 27 mmHg.  His stroke-volume index is low at 16 mL/m .  There is moderate 2+ MR and moderate left pleural effusion.    Interval history:   Robust output with IV diuresis, down 23lbs this admission, EDW unknown (155lbs?). BP remain soft with SBP 90's. Maintaining oxygen saturations on room air. Remains chest pain free without significant dyspnea.     Assessment:   Severe LFLG aortic stenosis with a valve area of 0.44 cm  and mean gradient 20 mmHg, SVI of 16 ml/m2  Acute congestive heart failure exacerbation 2/2 #1, normal right and left sided filling pressures per RHC 6/13  Cardiomyopathy with an EF of 40 to 45%, likely valvular    CAD with severe ostial LCx and pRCA disease, being medically managed, consider staged PCI post-AVR  Large bilateral pleural effusions s/p L thoracentesis with removal of 1.5L of fluid on 6/12    The patient has severe aortic stenosis with mildly reduced LV systolic function.  His heart failure exacerbation is likely secondary to his severe valvular disease.  As such, would recommend aortic valve replacement. Given advanced age and other comorbidities, he would be best treated with transcatheter aortic valve replacement.     Plan:  Transition to oral lasix 20 mg daily.   Obtain TAVR guided CT today.  GDMT is limited in the setting of hypotension, will further optimize post AVR.   Pending CT findings, will plan for  outpatient TAVR in the upcoming weeks.   Okay to discharge from a cardiac standpoint after CT today.   Will arrange follow-up in our valve clinic in 1-2 weeks.     Maria Del Rosario Peters CNP  Pager:  [phone]  (7am - 5pm, M-F)      Physical Exam   Temp: 97.8  F (36.6  C) Temp src: Axillary BP: 93/61 Pulse: 65   Resp: 18 SpO2: 92 % O2 Device: None (Room air)    Vitals:    06/12/24 0600 06/13/24 0500 06/14/24 0538   Weight: 76.6 kg (168 lb 14 oz) 71.5 kg (157 lb 9.6 oz) 70.1 kg (154 lb 9.6 oz)       Constitutional:   NAD   Skin:   Warm and dry   Head:   Nontraumatic   Neck:   no JVD   Lungs:   Decreased LS bilaterally   Cardiovascular:   regular rate and rhythm   Abdomen:   Benign   Extremities and Back:   1+ EL   Neurological:   Grossly nonfocal       Medications   Current Facility-Administered Medications   Medication Dose Route Frequency Provider Last Rate Last Admin    No lozenges or gum should be given while patient on BIPAP/AVAPS/AVAPS AE   Does not apply Continuous PRN Celestine Galaviz MD        Patient may continue current oral medications   Does not apply Continuous PRN Celestine Galaviz MD         Current Facility-Administered Medications   Medication Dose Route Frequency Provider Last Rate Last Admin    furosemide (LASIX) tablet 20 mg  20 mg Oral Daily Maria Del Rosario Peters CNP        sodium chloride (PF) 0.9% PF flush 3 mL  3 mL Intracatheter Q8H Celestine Galaviz MD   3 mL at 06/13/24 1524    tamsulosin (FLOMAX) capsule 0.4 mg  0.4 mg Oral Daily Celestine Galaviz MD   0.4 mg at 06/14/24 0828       Data     Most Recent 3 CBC's:  Recent Labs   Lab Test 06/11/24  0542 06/10/24  1550 05/11/20  1547   WBC 6.2 8.7 9.5   HGB 12.4* 13.6 15.7   MCV 90 92 90    258 218     Most Recent 3 BMP's:  Recent Labs   Lab Test 06/14/24  0616 06/13/24  0601 06/12/24  0608    136 137   POTASSIUM 3.8 3.5 4.3   CHLORIDE 98 98 102   CO2 27 27 26   BUN 21.3 27.2* 27.2*   CR 1.03 1.07 1.16    ANIONGAP 11 11 9   ROSA 9.0 8.7 8.8   GLC 92 90 122*     Most Recent 3 BNP's:  Recent Labs   Lab Test 06/10/24  1550   NTBNPI 7,186*         Medical Decision Making       25 MINUTES SPENT BY ME on the date of service doing chart review, history, exam, documentation & further activities per the note.          Maria Del Rosario Peters, CNP      Clinically Significant Risk Factors                   # Heart failure, NOS: heart failure noted on the problem list and last echo with EF 40-50%

## 2024-06-14 NOTE — PLAN OF CARE
[id number]  Alert and oriented times four, forgetful  Repeated requests/demands  Denies chest pain   Left should pain treated with Voltaren   Room air  Right groin site WDL  SBA   Rodriguez in place   Tele: SR   Plan : continue to monitor and CT TAVR    Yosi messaged Carmine Johnson 0216 about pt BP being 70 to 80s systolic. Plan to hold lasix and address in morning.    Notified provider about indwelling rodriguez catheter discussed removal or continued need.    Did provider choose to remove indwelling rodriguez catheter? No    Provider's rodriguez indication for keeping indwelling rodriguez catheter: Retention.    Is there an order for indwelling rodriguez catheter? Yes    *If there is a plan to keep rodriguez catheter in place at discharge daily notification with provider is not necessary, but please add a notation in the treatment team sticky note that the patient will be discharging with the catheter.

## 2024-06-14 NOTE — PROGRESS NOTES
Sw:  D: Updated by bedside RN, MD put orders in for RN/PT/OT. Met with patient at bedside. Discussed recommendation for homecare and patient is in agreement with services. Patient confirmed he no longer wants to see  since he does not feel she listens to him. Patient wants to be at the same clinic with a different female provider. Patient confirmed he lives with his spouse in [facility] in [place]. Patient had several medical questions regarding discharge paperwork. Writer explained bedside RN mary be in to review discharge paperwork/meds. Denied any other needs from SW.     AC Accepted for RN/PT/OT for SOC for RN on 6/17. Writer asked that they outsource to find an earlier start of care but confirmed if one cannot be located, 6/17 for SOC would be fine.     Writer called [facility] and scheduled post hospital follow up for:    Monday June 17th at 1:15pm with Maribell Stewart.   Location: [address] [place] [id number]     JEZ Saxena, [facility]   Social Work   [facility]

## 2024-06-14 NOTE — PROGRESS NOTES
NSG DISCHARGE NOTE    Patient discharged to home at 5:53 PM via wheel chair. Accompanied by daughter and staff. Discharge instructions reviewed with patient, opportunity offered to ask questions. Prescriptions filled and sent with patient upon discharge. All belongings sent with patient.  Reviewed catheter care, he will call for appointment with urology. Verbalized understanding of all instructions  Lisa Keith RN

## 2024-06-14 NOTE — PROGRESS NOTES
X-cover    Hypotensive 70-80s systolic  - hold lasix pending cardiology eval in am    Carmine Ornelas MD

## 2024-06-14 NOTE — DISCHARGE SUMMARY
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)  Severe aortic stenosis (new diagnosis)  Moderate mitral regurgitation  Bilateral pleural effusions s/p left thoracentesis (6/12/2024)  Elevated troponin due to demand ischemia, NSTEMI type 2  CAD, native heart, native vessels  Suspected chronic urinary retention  Hx of bladder outlet obstruction  Suspected cognitive impairment    Follow-ups Needed After Discharge   Follow-up Appointments     Follow Up ([facility])      Please call [phone] to schedule an appointment at [facility]   for a consult with our team to address the urinary retention. You will   need a trial of void. You may need cystoscopy or urodynamic testing to   better evaluate your bladder.     [facility] a division of [facility]  [address] [place] [address]  You may call [phone] with any questions or concerns.             Appointments on [place] and/or [facility] (with [facility] or [facility]   provider or service). Call [phone] if you haven't heard regarding   these appointments within 7 days of discharge.        Follow-up and recommended labs and tests       Follow up with Cardiology as scheduled          Unresulted Labs Ordered in the Past 30 Days of this Admission       Date and Time Order Name Status Description    6/12/2024  8:25 AM Cytology, non-gynecologic In process     6/12/2024  8:25 AM Pleural fluid Aerobic Bacterial Culture Routine With Gram Stain Preliminary           Discharge Disposition   Discharged to home  Condition at discharge: Stable    Hospital Course   Canelo Cruz is a 89 year old male with no known significant prior PMHx who presented to the ED on 6/10/2024 for evaluation of  shortness of breath, and was admitted for acute hypoxic respiratory failure due to new onset acute CHF. He was found to have a reduced EF to 40-45% as well as severe aortic stenosis and moderate MR     Presented with 3-week history of progressive shortness of breath and orthopnea. Placed on biPAP in the ED. BP borderline low to 90s systolic, but he was otherwise asymptomatic; other VSS. NTpBNP >7000. Initial trop 46 . D-dimer elevated. Chest CT on arrival negative for PE, but showed pulmonary edema and large bilateral pleural effusions     Initiated on IV Lasix in the ED with improvement.        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 TAVR wo  *TTE (6/10): EF 40-45%, +WMA involving apex, anterolateral wall, and mid inferolateral wall; severe aortic stenosis, valve area 0.44 cm2, MG 27.9 mmHg; 2+ MR.  *TAVR work-up initiated this admission  RHC/LHC (6/13): 2-vessel CAD noted with 80% stenosis of LCx, 70% stenosis of RCA; normal filling pressures; mild pulmonary hypertension  TAVR CT completed prior to discharge  CV Surgery consulted  *Weaned off biPAP following admission. Continued to improve with IV Lasix.  - Pt is breathing comfortably on room air at the time of discharge  - He will continue Lasix 20 mg PO daily at discharge  - He has been cleared by cardiac rehab; home PT/OT ordered at discharge  - Cardiology will arrange outpatient follow up in 1-2 weeks with potential TAVR a few weeks thereafter      Elevated troponin due to demand ischemia, NSTEMI type 2  CAD, native heart, native vessels  *Initial trop 46 > 40.   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 Christianson placed in the ED  with 2L immediate UOP. Chest CT on admission also incidentally noted hydronephrosis and elevated dome of the bladder.  *Urology consulted this admission. Recommended outpatient follow up  - Conts on PTA tamsulosin  - Follow up with [facility] in 2 weeks with possible cystoscopy; may need Christianson long-term.   - Home care RN ordered at discharge to assist with Christianson teaching/management     Suspected cognitive impairment  Pt noted to be extremely overwhelmed on admission, required multiple explanations and reiteration's of the same information throughout his hospitalization.   *At baseline, lives in a senior independent apartment with his spouse  *He was cleared by OT this admission. Home OT ordered for cognitive eval  - Consider outpatient Neuropsych referral    Consultations This Hospital Stay   CARDIOLOGY IP CONSULT  CARDIOTHORACIC SURGERY IP CONSULT  UROLOGY IP CONSULT  CORE CLINIC EVALUATION IP CONSULT  OCCUPATIONAL THERAPY ADULT IP CONSULT  NUTRITION SERVICES ADULT IP CONSULT  CARE MANAGEMENT / SOCIAL WORK IP CONSULT    Code Status   Full Code    Time Spent on this Encounter   I, Justyna Walden MD, personally saw the patient today and spent 45 minutes discharging this patient.       Justyna Walden MD  [facility] HE[facility] [address], [address]  [facility] [address]  Phone: [phone]  ______________________________________________________________________    Physical Exam   Vital Signs: Temp: 97.8  F (36.6  C) Temp src: Axillary BP: 96/55 Pulse: 65   Resp: 18 SpO2: 92 % O2 Device: None (Room air)    Weight: 154 lbs 9.6 oz    Constitutional: Resting in bed, NAD  HEENT: Sclera white, conjunctiva clear, EOMI, MMM  Respiratory: Breathing non-labored. Lungs CTAB - no wheezes/crackles/rhonchi  Cardiovascular: Heart sounds distant, +systolic murmur. No pedal edema.   GI: +BS. Abd soft/NT  Skin: Warm and dry. No rash.  Musculoskeletal: Normal muscle bulk and  tone  Neurologic: Alert and appropriate. Forgetful. STRATTON  Psychiatric: Calm and cooperative    Primary Care Physician   Pamela Mitchell    Discharge Orders      Follow Up ([facility])    Please call [phone] to schedule an appointment at [facility] for a consult with our team to address the urinary retention. You will need a trial of void. You may need cystoscopy or urodynamic testing to better evaluate your bladder.     [facility]  [address] [place] [address]  You may call [phone] with any questions or concerns.             Appointments on [place] and/or [facility] (with [facility] or [facility] provider or service). Call [phone] if you haven't heard regarding these appointments within 7 days of discharge.     Brief Discharge Instructions    Do NOT stop your aspirin or platelet inhibitor unless directed by your Cardiologist.  These medications help to prevent platelets in your blood from sticking together and forming a clot.  Examples of these medications are:  Ticagrelor (Brilinta), Clopidigrel (Plavix), Prasugrel (Effient)     When to call - Contact the Heart Clinic    You may experience symptoms that require follow-up before your scheduled appointment. Contact the Heart Clinic if you develop: Fever over 100.4o Fahrenheit, that lasts more than one day; Redness, heat, or pus at the puncture site; Change in color or temperature in your groin or leg.     When to call - Reasons to Call [phone]    If your groin starts to bleed or begins to swell suddenly after leaving the hospital, lie flat and apply firm pressure just above the puncture site for 15 minutes.  If bleeding continues, call 9-1-1.     Precautions - Lifting    NO lifting of more than 10 pounds for at least 3 days.  If you usually lift 50 pounds or more daily, talk with your Cardiologist.     Precautions - Household Activities    Avoid any hard work or tiring activities.  NO physical activity such  as mowing the lawn, raking, vacuuming, changing sheets on your bed, snow shoveling, or using a .     Precautions - Active Sports Activities    Avoid any tiring sports activities.  This includes, yard work, jogging, biking, bowling, swimming, tennis or golf, and sexual activity.     Precautions - Elective Dental Work    NO elective dental work for 6 weeks after receiving a stent.     Comfort and Pain Management - Pain after Surgery    Pain after surgery is normal and expected.  Your leg may be sore or stiff for a few days, and your pain will improve with time. You may take Tylenol or a pain medicine recommended by your Cardiologist.     Comfort and Pain Management - Bruising after Surgery    Bruising around the groin area is normal.  It may take 2-3 weeks for this to go away.  It is normal for the bruised area to turn green and/or yellow as it is healing.  A small lump may also be present and may last 2-3 months.     Activity - Daily Walking    During the day get up and walk around every 2 hours.     Activity - Light Household Activities    Light household activities are ok.     Activity - Elevate Legs    Elevate legs in between all activities.     Activity - Cardiac Rehab    You are encouraged to enroll in an Outpatient Cardiac Rehab program after discharge from the hospital.  Our Cardiac Rehab staff may visit briefly with you while you're in the hospital.  If they miss you, someone will contact you after you are home.     Return to Driving    Driving is NOT permitted for 24 hours after surgery     Return to work    You may return to work after 72 hours if you are feeling well and your job does not involve heavy lifting.     Dressing Removal    You may take off the dressing on your groin the day after your procedure.     Incision Care    Keep the incision area dry and clean.  You do not need to use a bandage on your incision.     Shower / Bathing    It is ok to shower with regular soap. Pat dry, do not  rub. No tub bath for 3 days. No swimming in a pool or hot tub immersion for 1 week     Reason for your hospital stay    New onset congestive heart failure due to aortic valve stenosis. Cardiology is recommending a valve replacement.     Follow-up and recommended labs and tests     Follow up with Cardiology as scheduled     Activity    Your activity upon discharge: activity as tolerated     Diet    Follow this diet upon discharge: Low salt diet. Limit fluid to 2 liters per day       Significant Results and Procedures   Most Recent 3 CBC's:  Recent Labs   Lab Test 06/11/24  0542 06/10/24  1550 05/11/20  1547   WBC 6.2 8.7 9.5   HGB 12.4* 13.6 15.7   MCV 90 92 90    258 218     Most Recent 3 BMP's:  Recent Labs   Lab Test 06/14/24  0616 06/13/24  0601 06/12/24  0608    136 137   POTASSIUM 3.8 3.5 4.3   CHLORIDE 98 98 102   CO2 27 27 26   BUN 21.3 27.2* 27.2*   CR 1.03 1.07 1.16   ANIONGAP 11 11 9   ROSA 9.0 8.7 8.8   GLC 92 90 122*   ,   Results for orders placed or performed during the hospital encounter of 06/10/24   XR Chest 2 Views    Narrative    EXAM: XR CHEST 2 VIEWS  LOCATION: [facility]  DATE: 6/10/2024    INDICATION: shortness of breath  COMPARISON: None.      Impression    IMPRESSION: Heart size is normal. There are small bilateral pleural effusions and adjacent bibasilar opacities favored for atelectasis. No pneumothorax.   CT Chest Pulmonary Embolism w Contrast    Narrative    EXAM: CT CHEST PULMONARY EMBOLISM W CONTRAST  LOCATION: [facility]  DATE: 6/10/2024    INDICATION: elevated d dimer, shortness of breath  COMPARISON: CT abdomen pelvis dated 2/24/2023.  TECHNIQUE: CT chest pulmonary angiogram during arterial phase injection of IV contrast. Multiplanar reformats and MIP reconstructions were performed. Dose reduction techniques were used.   CONTRAST: 67mL Isovue 370    FINDINGS:  ANGIOGRAM CHEST: Pulmonary arteries are normal caliber and  negative for pulmonary emboli. Thoracic aorta is not well opacified and is  indeterminate for dissection. No CT evidence of right heart strain.    LUNGS AND PLEURA: Large bilateral pleural effusions. Intralobular septal thickening and increased ground glass opacities. Dependent compressive atelectasis bilaterally. No pneumothorax.    MEDIASTINUM/AXILLAE: No significant mediastinal or hilar adenopathy. Dense calcifications in the region of the aortic valve which may be seen in setting stenosis.    CORONARY ARTERY CALCIFICATION: Severe.    UPPER ABDOMEN: Cholelithiasis. Cystic region in the right lower quadrant is incompletely imaged although likely represents a markedly dilated bladder base on prior imaging. Mild to moderate bilateral hydronephrosis, may be related to degree of bladder   distention. There are several nonobstructing punctate left lower pole renal calculi.    MUSCULOSKELETAL: No acute bony abnormalities.      Impression    IMPRESSION:  1.  No evidence of pulmonary embolus.  2.  CT findings consistent with pulmonary edema, with large bilateral pleural effusions.  3.  Probable marked bladder distention, and associated hydronephrosis incompletely imaged.   US Thoracentesis    Narrative    EXAM:  1. LEFT THORACENTESIS  2. ULTRASOUND GUIDANCE  LOCATION: [facility]  DATE/TIME: 6/12/2024 11:19 AM    INDICATION: Pleural effusion.    PROCEDURE: Informed consent obtained. Time out performed. The chest  was prepped and draped in a sterile fashion. 10 mL of 1% lidocaine was  infused into local soft tissues. A 5 Japanese catheter system was  introduced into the pleural effusion under ultrasound guidance.    1.5 liters of clear fluid were removed and sent to lab if requested.    Patient tolerated procedure well.    Ultrasound images have been permanently captured for documentation.      Impression    IMPRESSION:  Status post ultrasound-guided left thoracentesis.    GALINDO SAHU MD         SYSTEM ID:   [id number]   Echocardiogram Complete     Value    LVEF  40-45%    Narrative    [id number]  [id number]  [id number]  [id number]^MANFRED^FRANCES^LUBA     [facility]  Echocardiography Laboratory  [address] [place] [address]     Name: YUMIKO FERNANDEZ  MRN: [id number]  : 1931  Study Date: 2024 10:22 AM  Age: 89 yrs  Gender: Male  Patient Location: [facility]  Reason For Study: Heart Failure  Ordering Physician: FRANCES SEHARER  Performed By: Sahnta Palmer     BSA: 2.0 m2  Height: 73 in  Weight: 177 lb  HR: 90  BP: 91/57 mmHg  ______________________________________________________________________________  Procedure  Complete Portable Echo Adult.  ______________________________________________________________________________  Interpretation Summary     Severe valvular aortic stenosis.  The visual ejection fraction is 40-45%.  Left ventricular systolic function is mildly reduced.  There are regional wall motion abnormalities as specified.  The image quality was nondiagnostic. Contrast would significantly improve  assessment of regional wall motion.  Moderate left pleural effusion  There is trace aortic regurgitation.  There is moderate (2+) mitral regurgitation.  The study was technically limited. Technically difficult, suboptimal study.  ______________________________________________________________________________  Left Ventricle  The left ventricle is normal in size. Left ventricular hypertrophy is noted by  two-dimensional echocardiography. There was fusion of the mitral inflow E and  A wave making diastolic function difficult to assess. Medial E/E' was not  recorded for the study. The visual ejection fraction is 40-45%. Left  ventricular systolic function is mildly reduced. Moderate hypokinesis of the  apex. The anterolateral wall appears to be moderately hypokinetic. The  anterior wall myocardium was not visualized at all in the apical 2 chamber  view. The mid inferolateral wall  appears to be moderately hypokinetic. There  are regional wall motion abnormalities as specified. A false chord is noted  (normal variant).     Right Ventricle  The right ventricle is normal in size and function.     Atria  The left atrium is mildly dilated. The right atrium is mildly dilated. There  is no color Doppler evidence of an atrial shunt. Lipomatous hypertrophy of the  interatrial septum is noted.     Mitral Valve  The mitral valve leaflets are mildly thickened. There is moderate (2+) mitral  regurgitation.     Tricuspid Valve  There is mild (1+) tricuspid regurgitation. The right ventricular systolic  pressure is approximated at 30mmHg plus the right atrial pressure.     Aortic Valve  The number of aortic valve leaflets cannot be determined from the study. There  is thickening and heavy calcification of the aortic valve. There is trace  aortic regurgitation. The calculated aortic valve are is 0.44 cm^2. The peak  AoV pressure gradient is 45.0 mmHg. The mean AoV pressure gradient is 27.9  mmHg. Severe valvular aortic stenosis.     Vessels  Ascending aorta dilatation is present. The ascending aorta was not well-seen.  A measurement of 4.2 cm was obtained but the image quality was very poor. CT  or MRI would better assess the size of the ascending aorta. IVC diameter <2.1  cm collapsing >50% with sniff suggests a normal RA pressure of 3 mmHg.     Pericardium  There is no pericardial effusion. Moderate left pleural effusion.     Rhythm  Sinus rhythm was noted.     ______________________________________________________________________________  MMode/2D Measurements & Calculations  IVSd: 0.85 cm  LVIDd: 5.5 cm  LVIDs: 4.4 cm  LVPWd: 1.3 cm  FS: 18.5 %     LV mass(C)d: 232.9 grams  LV mass(C)dI: 114.0 grams/m2  Ao root diam: 3.8 cm  LA dimension: 4.9 cm  asc Aorta Diam: 4.2 cm  LA/Ao: 1.3  LVOT diam: 2.0 cm  LVOT area: 3.0 cm2  Ao root diam index Ht(cm/m): 2.1  Ao root diam index BSA (cm/m2): 1.9  Asc Ao diam  index BSA (cm/m2): 2.0  Asc Ao diam index Ht(cm/m): 2.2  LA Volume (BP): 78.2 ml  LA Volume Index (BP): 38.3 ml/m2     RV Base: 3.8 cm  RWT: 0.48  TAPSE: 1.7 cm     Doppler Measurements & Calculations  MV E max zeb: 126.0 cm/sec  MV A max zeb: 74.1 cm/sec  MV E/A: 1.7  MV dec time: 0.15 sec  Ao V2 max: 337.8 cm/sec  Ao max P.0 mmHg  Ao V2 mean: 250.1 cm/sec  Ao mean P.9 mmHg  Ao V2 VTI: 74.9 cm  TIFFANY(I,D): 0.44 cm2  TIFFANY(V,D): 0.50 cm2  AI P1/2t: 262.9 msec  LV V1 max P.3 mmHg  LV V1 max: 56.8 cm/sec  LV V1 VTI: 11.1 cm  MR PISA: 0.49 cm2  SV(LVOT): 33.2 ml  SI(LVOT): 16.3 ml/m2  TR max zeb: 272.5 cm/sec  TR max P.7 mmHg  AV Zeb Ratio (DI): 0.17  TIFFANY Index (cm2/m2): 0.22  E/E' av.0     Lateral E/e': 15.0  Medial E/e': 21.1  RV S Zeb: 8.1 cm/sec     ______________________________________________________________________________  Report approved by: Mary Coffman 2024 02:19 PM         Cardiac Catheterization    Narrative      2nd Mrg lesion is 70% stenosed.    Ost Cx to Prox Cx lesion is 80% stenosed.    1st Diag lesion is 80% stenosed.    2nd Diag lesion is 99% stenosed.    Prox RCA lesion is 70% stenosed.    RPDA lesion is 45% stenosed.    3rd RPL lesion is 60% stenosed.    Mid LAD lesion is 60% stenosed.    Two-three vessel severe coronary artery disease with ostial 80% stenosis   of the Lcx, and proximal 70% RCA stenosis. There is no apparent left main   or proximal LAD disease.   Normal right and left sided filling pressures.   Mild post-capillary pulmonary hypertension.   Normal cardiac hemodynamics with CO/CI of 4.7/2.4 (Luis).          Discharge Medications   Current Discharge Medication List        START taking these medications    Details   furosemide (LASIX) 20 MG tablet Take 1 tablet (20 mg) by mouth daily  Qty: 30 tablet, Refills: 0    Associated Diagnoses: Acute congestive heart failure, unspecified heart failure type (H); Severe aortic stenosis by prior  echocardiogram           CONTINUE these medications which have NOT CHANGED    Details   tamsulosin (FLOMAX) 0.4 MG capsule Take 0.4 mg by mouth daily as needed           Allergies   Allergies   Allergen Reactions    Ciprofloxacin      history of achilles tendon pain

## 2024-06-15 NOTE — PLAN OF CARE
Occupational Therapy Discharge Summary    Reason for therapy discharge:    Discharged to home with home therapy.    Progress towards therapy goal(s). See goals on Care Plan in [facility] electronic health record for goal details.  Goals not met.  Barriers to achieving goals:   Patient refusing therapy, stating he doesn't need it.    Therapy recommendation(s):    Continued therapy is recommended.  Rationale/Recommendations:  To maximize cognitive safety and IND in ADL/IADL.

## 2024-06-16 ENCOUNTER — NURSE TRIAGE (OUTPATIENT)
Dept: NURSING | Facility: CLINIC | Age: 89
End: 2024-06-16
Payer: COMMERCIAL

## 2024-06-16 ENCOUNTER — PATIENT OUTREACH (OUTPATIENT)
Dept: CARE COORDINATION | Facility: CLINIC | Age: 89
End: 2024-06-16
Payer: COMMERCIAL

## 2024-06-16 NOTE — TELEPHONE ENCOUNTER
"Patient calling reporting having a bout of chest pain. Reports the pain is mild and not happening now. Reports it lasts only a few seconds when it occurs. Denies difficulty breathing, chest pain longer than 5 minutes and confusion. Advised the patient see his PCP within 24 hours with appointment already scheduled.     Karissa Fairchild RN 06/16/24 10:01 AM   [facility] Triage Nurse Advisor      Reason for Disposition   [1] Chest pain lasts < 5 minutes AND [2] NO chest pain or cardiac symptoms (e.g., breathing difficulty, sweating) now  (Exception: Chest pains that last only a few seconds.)    Additional Information   Negative: SEVERE difficulty breathing (e.g., struggling for each breath, speaks in single words)   Negative: Difficult to awaken or acting confused (e.g., disoriented, slurred speech)   Negative: Shock suspected (e.g., cold/pale/clammy skin, too weak to stand, low BP, rapid pulse)   Negative: Passed out (i.e., lost consciousness, collapsed and was not responding)   Negative: Chest pain lasting longer than 5 minutes and ANY of the following:    history of heart disease  (i.e., heart attack, bypass surgery, angina, angioplasty, CHF; not just a heart murmur)    described as crushing, pressure-like, or heavy    age > 50    age > 30 AND at least one cardiac risk factor (i.e., hypertension, diabetes, obesity, smoker or strong family history of heart disease)    not relieved with nitroglycerin   Negative: Heart beating < 50 beats per minute OR > 140 beats per minute   Negative: Followed a chest injury   Negative: Visible sweat on face or sweat dripping down face   Negative: Sounds like a life-threatening emergency to the triager   Negative: SEVERE chest pain   Negative: [1] Chest pain (or \"angina\") comes and goes AND [2] is happening more often (increasing in frequency) or getting worse (increasing in severity)  (Exception: Chest pains that last only a few seconds.)   Negative: Pain also in shoulder(s) or arm(s) or " "jaw  (Exception: Pain is clearly made worse by movement.)   Negative: Difficulty breathing   Negative: Dizziness or lightheadedness   Negative: Coughing up blood   Negative: Cocaine use within last 3 days   Negative: Major surgery in past month   Negative: Hip or leg fracture (broken bone) in past month (or had cast on leg or ankle in past month)   Negative: Illness requiring prolonged bedrest in past month (e.g., immobilization, long hospital stay)   Negative: Long-distance travel in past month (e.g., car, bus, train, plane; with trip lasting 6 or more hours)   Negative: History of prior \"blood clot\" in leg or lungs (i.e., deep vein thrombosis, pulmonary embolism)   Negative: History of inherited increased risk of blood clots (e.g., Factor 5 Leiden, Anti-thrombin 3, Protein C or Protein S deficiency, Prothrombin mutation)   Negative: Cancer treatment in past six months (or has cancer now)   Negative: [1] Chest pain lasts > 5 minutes AND [2] occurred in past 3 days (72 hours) (Exception: Feels exactly the same as previously diagnosed heartburn and has accompanying sour taste in mouth.)   Negative: Taking a deep breath makes pain worse   Negative: Patient sounds very sick or weak to the triager   Negative: [1] Chest pain lasts > 5 minutes AND [2] occurred > 3 days ago (72 hours) AND [3] NO chest pain or cardiac symptoms now    Protocols used: Chest Pain-A-AH    "

## 2024-06-16 NOTE — PROGRESS NOTES
Clinic Care Coordination Contact  Community Health Worker Initial Outreach    Patient accepts CC: yes, Pt stated that he had a chest pain, not feeling well, transferred call to triage line to assit Pt.    Clinic Care Coordination Contact  Transitions of Care Outreach    No chief complaint on file.      Most Recent Admission Date: 6/10/2024   Most Recent Admission Diagnosis: Acute pulmonary edema (H) - J81.0  Elevated brain natriuretic peptide (BNP) level - R79.89  Acute congestive heart failure, unspecified heart failure type (H) - I50.9     Most Recent Discharge Date: 6/14/2024   Most Recent Discharge Diagnosis: Acute pulmonary edema (H) - J81.0  Elevated brain natriuretic peptide (BNP) level - R79.89  Acute congestive heart failure, unspecified heart failure type (H) - I50.9  Severe aortic stenosis by prior echocardiogram - I35.0     Transitions of Care Assessment    Discharge Assessment  How are you doing now that you are home?: feeling better  How are your symptoms? (Red Flag symptoms escalate to triage hotline per guidelines): Improved  Do you know how to contact your clinic care team if you have future questions or changes to your health status? : Yes  Does the patient have their discharge instructions? : Yes  Does the patient have questions regarding their discharge instructions? : No  Were you started on any new medications or were there changes to any of your previous medications? : Yes  Does the patient have all of their medications?: Yes  Do you have questions regarding any of your medications? : No    Post-op ([facility] CTA Only)  If the patient had a surgery or procedure, do they have any questions for a nurse?: No         Follow up Plan   No future appointments.  Outpatient Plan as outlined on AVS reviewed with patient.    Please call [phone] to schedule an appointment at [facility] for a consult with our team to address the  urinary retention. You will need a trial of void. You may need  cystoscopy or urodynamic testing to better evaluate your  bladder.  [facility] of [facility]  [address] [place] [id number]  You may call [phone] with any questions or concerns.  Appointments on [place] and/or [facility] (with [facility] or [facility] provider or service). Call [phone] if you  haven't heard regarding these appointments within 7 days of discharge.  Follow-up and recommended labs and tests  Follow up with Cardiology as scheduled.  Follow up with Primary Care Physician. Appointment set for:  Monday June 17th at 1:15pm with Maribell Stewart.  Location: [address] [place] [address]    For any urgent concerns, please contact our 24 hour nurse triage line: [phone]     OMAR Rojas  [phone]  [facility]

## 2024-06-17 ENCOUNTER — TELEPHONE (OUTPATIENT)
Dept: CARDIOLOGY | Facility: CLINIC | Age: 89
End: 2024-06-17
Payer: COMMERCIAL

## 2024-06-17 LAB
BACTERIA PLR CULT: NO GROWTH
GRAM STAIN RESULT: NORMAL
GRAM STAIN RESULT: NORMAL
PATH REPORT.COMMENTS IMP SPEC: NORMAL
PATH REPORT.FINAL DX SPEC: NORMAL
PATH REPORT.GROSS SPEC: NORMAL
PATH REPORT.MICROSCOPIC SPEC OTHER STN: NORMAL
PATH REPORT.RELEVANT HX SPEC: NORMAL

## 2024-06-17 PROCEDURE — 88341 IMHCHEM/IMCYTCHM EA ADD ANTB: CPT | Mod: 26

## 2024-06-17 PROCEDURE — 88342 IMHCHEM/IMCYTCHM 1ST ANTB: CPT | Mod: 26

## 2024-06-17 PROCEDURE — 88112 CYTOPATH CELL ENHANCE TECH: CPT | Mod: 26

## 2024-06-17 PROCEDURE — 88305 TISSUE EXAM BY PATHOLOGIST: CPT | Mod: 26

## 2024-06-17 NOTE — TELEPHONE ENCOUNTER
1st attempt- Left voicemail for the patient to call back and schedule the following:    Appointment type:  NEW Structural  Provider:  Lex Cedillo   Return date:  Next Available   Additional appointment(s) needed:  Consult   Additonal Notes:  Send patient to me to schedule [phone]    Nancy Gutierrez  Structural Heart Procedure   [facility]/ [facility]

## 2024-06-19 ENCOUNTER — TELEPHONE (OUTPATIENT)
Dept: CARDIOLOGY | Facility: CLINIC | Age: 89
End: 2024-06-19
Payer: COMMERCIAL

## 2024-06-19 NOTE — TELEPHONE ENCOUNTER
[facility] Call Center    Phone Message    May a detailed message be left on voicemail: yes     Reason for Call: Other: Patient would like a call back. Please reach out.      Action Taken: Other: cardiology     Travel Screening: Not Applicable     Date of Service:

## 2024-06-19 NOTE — TELEPHONE ENCOUNTER
Called to offer appt date 7/3 @ 10am per inbasket message      LVM and callback information    [phone]    Nancy Gutierrez  Structural Heart Procedure   [facility]/ [facility]

## 2024-06-25 DIAGNOSIS — I35.0 SEVERE AORTIC STENOSIS BY PRIOR ECHOCARDIOGRAM: Primary | ICD-10-CM

## 2024-06-25 DIAGNOSIS — R06.00 DYSPNEA, UNSPECIFIED: ICD-10-CM

## 2024-06-27 LAB
ABO/RH(D): NORMAL
ANTIBODY SCREEN: NEGATIVE
SPECIMEN EXPIRATION DATE: NORMAL

## 2024-06-28 ENCOUNTER — LAB (OUTPATIENT)
Dept: LAB | Facility: CLINIC | Age: 89
End: 2024-06-28
Payer: COMMERCIAL

## 2024-06-28 ENCOUNTER — OFFICE VISIT (OUTPATIENT)
Dept: CARDIOLOGY | Facility: CLINIC | Age: 89
End: 2024-06-28
Attending: INTERNAL MEDICINE
Payer: COMMERCIAL

## 2024-06-28 VITALS
BODY MASS INDEX: 20.4 KG/M2 | SYSTOLIC BLOOD PRESSURE: 100 MMHG | OXYGEN SATURATION: 96 % | HEART RATE: 78 BPM | HEIGHT: 73 IN | DIASTOLIC BLOOD PRESSURE: 57 MMHG

## 2024-06-28 DIAGNOSIS — I35.0 SEVERE AORTIC STENOSIS BY PRIOR ECHOCARDIOGRAM: ICD-10-CM

## 2024-06-28 DIAGNOSIS — R06.00 DYSPNEA, UNSPECIFIED: ICD-10-CM

## 2024-06-28 DIAGNOSIS — I50.9 ACUTE CONGESTIVE HEART FAILURE, UNSPECIFIED HEART FAILURE TYPE (H): Primary | ICD-10-CM

## 2024-06-28 LAB
ALBUMIN SERPL BCG-MCNC: 3.9 G/DL (ref 3.5–5.2)
ALP SERPL-CCNC: 100 U/L (ref 40–150)
ALT SERPL W P-5'-P-CCNC: 14 U/L (ref 0–70)
ANION GAP SERPL CALCULATED.3IONS-SCNC: 9 MMOL/L (ref 7–15)
AST SERPL W P-5'-P-CCNC: 25 U/L (ref 0–45)
BILIRUB SERPL-MCNC: 0.8 MG/DL
BUN SERPL-MCNC: 16.7 MG/DL (ref 8–23)
CALCIUM SERPL-MCNC: 9.1 MG/DL (ref 8.2–9.6)
CHLORIDE SERPL-SCNC: 103 MMOL/L (ref 98–107)
CREAT SERPL-MCNC: 1.1 MG/DL (ref 0.67–1.17)
DEPRECATED HCO3 PLAS-SCNC: 28 MMOL/L (ref 22–29)
EGFRCR SERPLBLD CKD-EPI 2021: 63 ML/MIN/1.73M2
ERYTHROCYTE [DISTWIDTH] IN BLOOD BY AUTOMATED COUNT: 14.5 % (ref 10–15)
GLUCOSE SERPL-MCNC: 103 MG/DL (ref 70–99)
HCT VFR BLD AUTO: 38.5 % (ref 40–53)
HGB BLD-MCNC: 12.8 G/DL (ref 13.3–17.7)
INR PPP: 0.96 (ref 0.85–1.15)
MCH RBC QN AUTO: 31.1 PG (ref 26.5–33)
MCHC RBC AUTO-ENTMCNC: 33.2 G/DL (ref 31.5–36.5)
MCV RBC AUTO: 93 FL (ref 78–100)
NT-PROBNP SERPL-MCNC: 9531 PG/ML (ref 0–1800)
PLATELET # BLD AUTO: 348 10E3/UL (ref 150–450)
POTASSIUM SERPL-SCNC: 3.9 MMOL/L (ref 3.4–5.3)
PROT SERPL-MCNC: 6.7 G/DL (ref 6.4–8.3)
RBC # BLD AUTO: 4.12 10E6/UL (ref 4.4–5.9)
SODIUM SERPL-SCNC: 140 MMOL/L (ref 135–145)
WBC # BLD AUTO: 11 10E3/UL (ref 4–11)

## 2024-06-28 PROCEDURE — 99215 OFFICE O/P EST HI 40 MIN: CPT | Performed by: NURSE PRACTITIONER

## 2024-06-28 PROCEDURE — 86900 BLOOD TYPING SEROLOGIC ABO: CPT | Performed by: INTERNAL MEDICINE

## 2024-06-28 PROCEDURE — 83880 ASSAY OF NATRIURETIC PEPTIDE: CPT | Performed by: INTERNAL MEDICINE

## 2024-06-28 PROCEDURE — 85027 COMPLETE CBC AUTOMATED: CPT | Performed by: INTERNAL MEDICINE

## 2024-06-28 PROCEDURE — 36415 COLL VENOUS BLD VENIPUNCTURE: CPT | Performed by: INTERNAL MEDICINE

## 2024-06-28 PROCEDURE — 93000 ELECTROCARDIOGRAM COMPLETE: CPT | Performed by: NURSE PRACTITIONER

## 2024-06-28 PROCEDURE — 86901 BLOOD TYPING SEROLOGIC RH(D): CPT | Performed by: INTERNAL MEDICINE

## 2024-06-28 PROCEDURE — 85610 PROTHROMBIN TIME: CPT | Performed by: INTERNAL MEDICINE

## 2024-06-28 PROCEDURE — 86850 RBC ANTIBODY SCREEN: CPT | Performed by: INTERNAL MEDICINE

## 2024-06-28 PROCEDURE — 80053 COMPREHEN METABOLIC PANEL: CPT | Performed by: INTERNAL MEDICINE

## 2024-06-28 RX ORDER — LANOLIN ALCOHOL/MO/W.PET/CERES
3 CREAM (GRAM) TOPICAL AT BEDTIME
COMMUNITY

## 2024-06-28 NOTE — PROGRESS NOTES
STRUCTURAL HEART CLINIC  H&P    Referring Provider: Dr. Burnett      History of Present Illness  Canelo Cruz is a pleasant 89 year old patient who presents for H&P in preparation for transcatheter aortic valve replacement at [facility].     The patient was recently hospitalized with a several week history of progressive dyspnea on exertion, orthopnea, PND, and lower extremity edema.  Echocardiogram showed moderately reduced LV function with an EF of 40 to 45% with regional wall motion abnormalities.  Findings also demonstrated severe aortic stenosis with a valve area of 0.44 cm  and a mean gradient of 27 mmHg. His stroke-volume index is low at 16 mL/m .  Findings consistent with severe low-flow low gradient aortic stenosis.  There is also moderate 2+ mitral regurgitation.  Coronary angiogram demonstrated multivessel disease with ostial 80% stenosis of the left circumflex, 70% proximal RCA stenosis which is being medically managed for now.  Subsequent TAVR guided CT showed favorable anatomy for transfemoral approach with an aortic valve calcium score of 2515.  He was appropriately diuresed.  GDMT was limited due to hypotension.  He was placed on low-dose oral Lasix 20 mg daily and has since done well.    He returns today for follow-up.  He reports intermittent exertional chest discomfort and dyspnea.  He feels the symptoms are stable.  He denies any syncope or near syncope, or palpitations.  No PND or orthopnea.  He has no infectious symptoms.    Assessment and Plan     Canelo Cruz presents for H&P in preparation for a TAVR at [facility] with Dr. Burnett.      1. Severe aortic valve stenosis: Patient reports progressive exertional dyspnea 6 months. His echo shows severe aortic stenosis. He has been evaluated by the our structural heart team and has been deemed an appropriate candidate for transcatheter aortic valve replacement. We discussed the procedure in detail, including pre and  post-procedure care, restrictions and follow-up. He understands risks including 5-10% risk of heart block leading to permanent pacemaker placement, 3% risk of bleeding, infection, vascular complication, 1-3% risk of stroke and very low risk (<1%) of serious complications such as cardiac perforation, aortic root rupture, dissection or death.     2. Chronic systolic heart failure with EF of 40 to 45%, NYHA class II, Stage B: Secondary to valvular heart disease. No acute volume overload on exam, though patient does endorse significant exertional dyspnea.  Currently on Lasix 20 mg daily.  GDMT has been limited due to hypotension.    3. CAD: Pre-TAVR coronary angiogram showed significant ostial circumflex and proximal RCA disease.  He currently denies angina.  Will continue to medically manage for now, consider staged PCI post AVR. Start aspirin 81 mg daily.     4.  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  Currently has Christianson catheter placed.  He has home care assisting with Christianson management.    5. Hyperlipidemia:  (2020). Not on statin therapy. Start atorvastatin 40 mg daily.     Medication Recommendations:  Antiplatelet: Continue ASA 81 mg perioperatively, take full dose AM of procedure.    Supplements: Hold morning of procedure    Patient is optimized and is acceptable candidate for the proposed procedure.  No further diagnostic evaluation is needed. Pre-procedure instructions provided in written format.     Maria Del Rosario Peters, KYAW, APRN, CNP  Structural Heart Nurse Practitioner  [facility]   Pager: [phone]    Current Medications:  Current Outpatient Medications   Medication Sig Dispense Refill    furosemide (LASIX) 20 MG tablet Take 1 tablet (20 mg) by mouth daily 30 tablet 0    melatonin 1 MG TABS tablet Take 1 mg by mouth nightly as needed for sleep      tamsulosin (FLOMAX) 0.4 MG capsule Take 0.4 mg by mouth daily as  needed (Patient not taking: Reported on 6/28/2024)         Allergies:     Allergies   Allergen Reactions    Ciprofloxacin      history of achilles tendon pain       Past Medical History:  Past Medical History:   Diagnosis Date    Gout     Heart murmur     Insomnia     Mixed hyperlipidemia     Postherpetic neuralgia        Past Surgical History:  Past Surgical History:   Procedure Laterality Date    ARTHROSCOPY KNEE Right     ~[id number]    CV CORONARY ANGIOGRAM N/A 6/13/2024    Procedure: Coronary Angiogram;  Surgeon: Jarett Odell MD;  Location:  HEART CARDIAC CATH LAB    CV RIGHT HEART CATH MEASUREMENTS RECORDED N/A 6/13/2024    Procedure: Right Heart Catheterization;  Surgeon: Jarett Odell MD;  Location:  HEART CARDIAC CATH LAB    DAVINCI XI HERNIORRHAPHY INGUINAL Bilateral 7/18/2022    Procedure: Robotic repair of right inguinal hernia with placement of mesh,  robotic repair of left inguinal hernia with placement of mesh;  Surgeon: Ina Phillips MD;  Location:  OR    ORTHOPEDIC SURGERY      right achilles rupture repair with 14 month MRSA infection       Family History:  Family History   Problem Relation Age of Onset    Unknown/Adopted Mother     Unknown/Adopted Father     Diabetes No family hx of     Coronary Artery Disease No family hx of     Hypertension No family hx of     Hyperlipidemia No family hx of     Cerebrovascular Disease No family hx of     Breast Cancer No family hx of     Colon Cancer No family hx of     Prostate Cancer No family hx of     Other Cancer No family hx of     Depression No family hx of     Anxiety Disorder No family hx of     Mental Illness No family hx of     Substance Abuse No family hx of     Anesthesia Reaction No family hx of     Asthma No family hx of     Osteoporosis No family hx of     Genetic Disorder No family hx of     Thyroid Disease No family hx of     Obesity No family hx of        Social History:  Social History     Socioeconomic History     Marital status:      Spouse name: None    Number of children: None    Years of education: None    Highest education level: None   Tobacco Use    Smoking status: Never    Smokeless tobacco: Never   Substance and Sexual Activity    Alcohol use: Yes     Comment: 10 per week    Drug use: No    Sexual activity: Not Currently     Partners: Male   Other Topics Concern    Parent/sibling w/ CABG, MI or angioplasty before 65F 55M? No     Social Determinants of Health     Financial Resource Strain: Medium Risk (6/17/2024)    Received from [facility]    Financial Resource Strain     Difficulty of Paying Living Expenses: 2     Difficulty of Paying Living Expenses: 1   Food Insecurity: No Food Insecurity (6/17/2024)    Received from [facility]    Food Insecurity     Worried About Running Out of Food in the Last Year: 1   Transportation Needs: No Transportation Needs (6/17/2024)    Received from [facility]    Transportation Needs     Lack of Transportation (Medical): 1   Social Connections: Socially Isolated (6/17/2024)    Received from [facility]    Social Connections     Frequency of Communication with Friends and Family: 4   Housing Stability: Low Risk  (6/17/2024)    [facility]    Housing Stability     Unable to Pay for Housing in the Last Year: 1       Review of Systems:  Constitutional: No fever, chills, or sweats. No weight gain/loss   ENT: No visual disturbance, ear ache, epistaxis, sore throat  Allergies/Immunologic: Negative.   Respiratory: No cough, hemoptysia  Cardiovascular: As per HPI  GI: No nausea, vomiting, hematemesis, melena, or hematochezia  : No urinary frequency, dysuria, or hematuria  Integument: Negative  Psychiatric: Negative  Neuro: Negative  Endocrinology: Negative   Musculoskeletal: Negative      Physical  "Exam:  Vitals: /57   Pulse 78   Ht 1.854 m (6' 1\")   SpO2 96%   BMI 20.40 kg/m     General: NAD  HEENT:  Dentition intact.    Neck: No jugular venous distension.   Heart: RRR with grade II SHANNON at RUSB  Lungs: CTA.    Abdomen: Soft, nontender, nondistended.   Extremities: No clubbing, cyanosis, or edema.  The pulses are +4/4 at the radial, brachial, femoral, popliteal, DP, and PT sites bilaterally.  No bruits are noted.  Neurologic: Alert and oriented to person/place/time, normal speech, gait and affect  Skin: No petechiae, purpura or rash.      Diagnostic Studies:  ECG: NSR  Personally reviewed and interpreted by me.    Coronary Angiogram: 6/2024  Conclusion         2nd Mrg lesion is 70% stenosed.    Ost Cx to Prox Cx lesion is 80% stenosed.    1st Diag lesion is 80% stenosed.    2nd Diag lesion is 99% stenosed.    Prox RCA lesion is 70% stenosed.    RPDA lesion is 45% stenosed.    3rd RPL lesion is 60% stenosed.    Mid LAD lesion is 60% stenosed.     Two-three vessel severe coronary artery disease with ostial 80% stenosis of the Lcx, and proximal 70% RCA stenosis. There is no apparent left main or proximal LAD disease.   Normal right and left sided filling pressures.   Mild post-capillary pulmonary hypertension.   Normal cardiac hemodynamics with CO/CI of 4.7/2.4 (Luis).          Plan     Follow bedrest per protocol   Continued medical management and lifestyle modifications for cardiovascular risk factor optimizations.   Return to the primary inpatient team for further evaluation and managmenet        1. Discussed findings with cardiology team and given no significant left main or ostial LAD disease, would opt for TAVR first and the could consider revascularization in the future.   2. Continue aspirin 81 mg daily and statin as tolerated.   3. Proceed with TAVR evaluation.       Echocardiogram: 6/2024  Interpretation Summary     Severe valvular aortic stenosis.  The visual ejection fraction is " 40-45%.  Left ventricular systolic function is mildly reduced.  There are regional wall motion abnormalities as specified.  The image quality was nondiagnostic. Contrast would significantly improve  assessment of regional wall motion.  Moderate left pleural effusion  There is trace aortic regurgitation.  There is moderate (2+) mitral regurgitation.  The study was technically limited. Technically difficult, suboptimal study.      Laboratory Studies:  Personally reviewed and interpreted by me.    LIPID RESULTS:  Lab Results   Component Value Date    CHOL 211 (H) 02/14/2020    HDL 32 (L) 02/14/2020     (H) 02/14/2020    TRIG 289 (H) 02/14/2020    CHOLHDLRATIO 4.5 10/13/2015       LIVER ENZYME RESULTS:  Lab Results   Component Value Date    AST 26 06/10/2024    AST 18 02/14/2020    ALT 12 06/10/2024    ALT 20 02/14/2020       CBC RESULTS:  Lab Results   Component Value Date    WBC 6.2 06/11/2024    WBC 9.5 05/11/2020    RBC 4.03 (L) 06/11/2024    RBC 5.18 05/11/2020    HGB 12.4 (L) 06/11/2024    HGB 15.7 05/11/2020    HCT 36.1 (L) 06/11/2024    HCT 46.7 05/11/2020    MCV 90 06/11/2024    MCV 90 05/11/2020    MCH 30.8 06/11/2024    MCH 30.3 05/11/2020    MCHC 34.3 06/11/2024    MCHC 33.6 05/11/2020    RDW 13.9 06/11/2024    RDW 13.9 05/11/2020     06/11/2024     05/11/2020       BMP RESULTS:  Lab Results   Component Value Date     06/14/2024     02/14/2020    POTASSIUM 3.8 06/14/2024    POTASSIUM 3.8 02/14/2020    CHLORIDE 98 06/14/2024    CHLORIDE 107 02/14/2020    CO2 27 06/14/2024    CO2 25 02/14/2020    ANIONGAP 11 06/14/2024    ANIONGAP 5 02/14/2020    GLC 92 06/14/2024     (H) 06/11/2024     (H) 02/14/2020    BUN 21.3 06/14/2024    BUN 15 02/14/2020    CR 1.03 06/14/2024    CR 0.99 02/14/2020    GFRESTIMATED 68 06/14/2024    GFRESTIMATED 67 02/14/2020    GFRESTBLACK 78 02/14/2020    ROSA 9.0 06/14/2024    ROSA 9.0 02/14/2020        A1C RESULTS:  Lab Results   Component  "Value Date    A1C 5.7 (H) 11/26/2018       INR RESULTS:  No results found for: \"INR\"        "

## 2024-06-28 NOTE — LETTER
6/28/2024    Maribell Stewart MD  [address]  [facility] [address]    RE: Canelo Cruz       Dear Colleague,     I had the pleasure of seeing Canelo Cruz in the [facility].      STRUCTURAL HEART CLINIC  H&P    Referring Provider: Dr. Burnett      History of Present Illness  Canelo Cruz is a pleasant 89 year old patient who presents for H&P in preparation for transcatheter aortic valve replacement at [facility].     The patient was recently hospitalized with a several week history of progressive dyspnea on exertion, orthopnea, PND, and lower extremity edema.  Echocardiogram showed moderately reduced LV function with an EF of 40 to 45% with regional wall motion abnormalities.  Findings also demonstrated severe aortic stenosis with a valve area of 0.44 cm  and a mean gradient of 27 mmHg. His stroke-volume index is low at 16 mL/m .  Findings consistent with severe low-flow low gradient aortic stenosis.  There is also moderate 2+ mitral regurgitation.  Coronary angiogram demonstrated multivessel disease with ostial 80% stenosis of the left circumflex, 70% proximal RCA stenosis which is being medically managed for now.  Subsequent TAVR guided CT showed favorable anatomy for transfemoral approach with an aortic valve calcium score of 2515.  He was appropriately diuresed.  GDMT was limited due to hypotension.  He was placed on low-dose oral Lasix 20 mg daily and has since done well.    He returns today for follow-up.  He reports intermittent exertional chest discomfort and dyspnea.  He feels the symptoms are stable.  He denies any syncope or near syncope, or palpitations.  No PND or orthopnea.  He has no infectious symptoms.    Assessment and Plan     Canelo Cruz presents for H&P in preparation for a TAVR at [facility] with Dr. Burnett.      1. Severe aortic valve stenosis: Patient reports progressive exertional dyspnea 6 months. His echo shows severe aortic  stenosis. He has been evaluated by the our structural heart team and has been deemed an appropriate candidate for transcatheter aortic valve replacement. We discussed the procedure in detail, including pre and post-procedure care, restrictions and follow-up. He understands risks including 5-10% risk of heart block leading to permanent pacemaker placement, 3% risk of bleeding, infection, vascular complication, 1-3% risk of stroke and very low risk (<1%) of serious complications such as cardiac perforation, aortic root rupture, dissection or death.     2. Chronic systolic heart failure with EF of 40 to 45%, NYHA class II, Stage B: Secondary to valvular heart disease. No acute volume overload on exam, though patient does endorse significant exertional dyspnea.  Currently on Lasix 20 mg daily.  GDMT has been limited due to hypotension.    3. CAD: Pre-TAVR coronary angiogram showed significant ostial circumflex and proximal RCA disease.  He currently denies angina.  Will continue to medically manage for now, consider staged PCI post AVR. Start aspirin 81 mg daily.     4.  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  Currently has Christianson catheter placed.  He has home care assisting with Christianson management.    5. Hyperlipidemia:  (2020). Not on statin therapy. Start atorvastatin 40 mg daily.     Medication Recommendations:  Antiplatelet: Continue ASA 81 mg perioperatively, take full dose AM of procedure.    Supplements: Hold morning of procedure    Patient is optimized and is acceptable candidate for the proposed procedure.  No further diagnostic evaluation is needed. Pre-procedure instructions provided in written format.     Maria Del Rosario Peters, KYAW, APRN, CNP  Structural Heart Nurse Practitioner  [facility]   Pager: [phone]    Current Medications:  Current Outpatient Medications   Medication Sig Dispense Refill    furosemide  (LASIX) 20 MG tablet Take 1 tablet (20 mg) by mouth daily 30 tablet 0    melatonin 1 MG TABS tablet Take 1 mg by mouth nightly as needed for sleep      tamsulosin (FLOMAX) 0.4 MG capsule Take 0.4 mg by mouth daily as needed (Patient not taking: Reported on 6/28/2024)         Allergies:     Allergies   Allergen Reactions    Ciprofloxacin      history of achilles tendon pain       Past Medical History:  Past Medical History:   Diagnosis Date    Gout     Heart murmur     Insomnia     Mixed hyperlipidemia     Postherpetic neuralgia        Past Surgical History:  Past Surgical History:   Procedure Laterality Date    ARTHROSCOPY KNEE Right     ~3194-2989    CV CORONARY ANGIOGRAM N/A 6/13/2024    Procedure: Coronary Angiogram;  Surgeon: Jarett Odell MD;  Location:  HEART CARDIAC CATH LAB    CV RIGHT HEART CATH MEASUREMENTS RECORDED N/A 6/13/2024    Procedure: Right Heart Catheterization;  Surgeon: Jarett Odell MD;  Location:  HEART CARDIAC CATH LAB    DAVINCI XI HERNIORRHAPHY INGUINAL Bilateral 7/18/2022    Procedure: Robotic repair of right inguinal hernia with placement of mesh,  robotic repair of left inguinal hernia with placement of mesh;  Surgeon: Ina Phillips MD;  Location:  OR    ORTHOPEDIC SURGERY      right achilles rupture repair with 14 month MRSA infection       Family History:  Family History   Problem Relation Age of Onset    Unknown/Adopted Mother     Unknown/Adopted Father     Diabetes No family hx of     Coronary Artery Disease No family hx of     Hypertension No family hx of     Hyperlipidemia No family hx of     Cerebrovascular Disease No family hx of     Breast Cancer No family hx of     Colon Cancer No family hx of     Prostate Cancer No family hx of     Other Cancer No family hx of     Depression No family hx of     Anxiety Disorder No family hx of     Mental Illness No family hx of     Substance Abuse No family hx of     Anesthesia Reaction No family hx of     Asthma No  family hx of     Osteoporosis No family hx of     Genetic Disorder No family hx of     Thyroid Disease No family hx of     Obesity No family hx of        Social History:  Social History     Socioeconomic History    Marital status:      Spouse name: None    Number of children: None    Years of education: None    Highest education level: None   Tobacco Use    Smoking status: Never    Smokeless tobacco: Never   Substance and Sexual Activity    Alcohol use: Yes     Comment: 10 per week    Drug use: No    Sexual activity: Not Currently     Partners: Male   Other Topics Concern    Parent/sibling w/ CABG, MI or angioplasty before 65F 55M? No     Social Determinants of Health     Financial Resource Strain: Medium Risk (6/17/2024)    Received from [facility]    Financial Resource Strain     Difficulty of Paying Living Expenses: 2     Difficulty of Paying Living Expenses: 1   Food Insecurity: No Food Insecurity (6/17/2024)    Received from [facility]    Food Insecurity     Worried About Running Out of Food in the Last Year: 1   Transportation Needs: No Transportation Needs (6/17/2024)    Received from [facility]    Transportation Needs     Lack of Transportation (Medical): 1   Social Connections: Socially Isolated (6/17/2024)    Received from [facility]    Social Connections     Frequency of Communication with Friends and Family: 4   Housing Stability: Low Risk  (6/17/2024)    Received from [facility]    Housing Stability     Unable to Pay for Housing in the Last Year: 1       Review of Systems:  Constitutional: No fever, chills, or sweats. No weight gain/loss   ENT: No visual disturbance, ear ache, epistaxis, sore throat  Allergies/Immunologic: Negative.   Respiratory: No cough, hemoptysia  Cardiovascular: As per HPI  GI: No nausea, vomiting,  "hematemesis, melena, or hematochezia  : No urinary frequency, dysuria, or hematuria  Integument: Negative  Psychiatric: Negative  Neuro: Negative  Endocrinology: Negative   Musculoskeletal: Negative      Physical Exam:  Vitals: /57   Pulse 78   Ht 1.854 m (6' 1\")   SpO2 96%   BMI 20.40 kg/m     General: NAD  HEENT:  Dentition intact.    Neck: No jugular venous distension.   Heart: RRR with grade II SHANNON at RUSB  Lungs: CTA.    Abdomen: Soft, nontender, nondistended.   Extremities: No clubbing, cyanosis, or edema.  The pulses are +4/4 at the radial, brachial, femoral, popliteal, DP, and PT sites bilaterally.  No bruits are noted.  Neurologic: Alert and oriented to person/place/time, normal speech, gait and affect  Skin: No petechiae, purpura or rash.      Diagnostic Studies:  ECG: NSR  Personally reviewed and interpreted by me.    Coronary Angiogram: 6/2024  Conclusion         2nd Mrg lesion is 70% stenosed.    Ost Cx to Prox Cx lesion is 80% stenosed.    1st Diag lesion is 80% stenosed.    2nd Diag lesion is 99% stenosed.    Prox RCA lesion is 70% stenosed.    RPDA lesion is 45% stenosed.    3rd RPL lesion is 60% stenosed.    Mid LAD lesion is 60% stenosed.     Two-three vessel severe coronary artery disease with ostial 80% stenosis of the Lcx, and proximal 70% RCA stenosis. There is no apparent left main or proximal LAD disease.   Normal right and left sided filling pressures.   Mild post-capillary pulmonary hypertension.   Normal cardiac hemodynamics with CO/CI of 4.7/2.4 (Luis).          Plan     Follow bedrest per protocol   Continued medical management and lifestyle modifications for cardiovascular risk factor optimizations.   Return to the primary inpatient team for further evaluation and managmenet        1. Discussed findings with cardiology team and given no significant left main or ostial LAD disease, would opt for TAVR first and the could consider revascularization in the future.   2. Continue " aspirin 81 mg daily and statin as tolerated.   3. Proceed with TAVR evaluation.       Echocardiogram: 6/2024  Interpretation Summary     Severe valvular aortic stenosis.  The visual ejection fraction is 40-45%.  Left ventricular systolic function is mildly reduced.  There are regional wall motion abnormalities as specified.  The image quality was nondiagnostic. Contrast would significantly improve  assessment of regional wall motion.  Moderate left pleural effusion  There is trace aortic regurgitation.  There is moderate (2+) mitral regurgitation.  The study was technically limited. Technically difficult, suboptimal study.      Laboratory Studies:  Personally reviewed and interpreted by me.    LIPID RESULTS:  Lab Results   Component Value Date    CHOL 211 (H) 02/14/2020    HDL 32 (L) 02/14/2020     (H) 02/14/2020    TRIG 289 (H) 02/14/2020    CHOLHDLRATIO 4.5 10/13/2015       LIVER ENZYME RESULTS:  Lab Results   Component Value Date    AST 26 06/10/2024    AST 18 02/14/2020    ALT 12 06/10/2024    ALT 20 02/14/2020       CBC RESULTS:  Lab Results   Component Value Date    WBC 6.2 06/11/2024    WBC 9.5 05/11/2020    RBC 4.03 (L) 06/11/2024    RBC 5.18 05/11/2020    HGB 12.4 (L) 06/11/2024    HGB 15.7 05/11/2020    HCT 36.1 (L) 06/11/2024    HCT 46.7 05/11/2020    MCV 90 06/11/2024    MCV 90 05/11/2020    MCH 30.8 06/11/2024    MCH 30.3 05/11/2020    MCHC 34.3 06/11/2024    MCHC 33.6 05/11/2020    RDW 13.9 06/11/2024    RDW 13.9 05/11/2020     06/11/2024     05/11/2020       BMP RESULTS:  Lab Results   Component Value Date     06/14/2024     02/14/2020    POTASSIUM 3.8 06/14/2024    POTASSIUM 3.8 02/14/2020    CHLORIDE 98 06/14/2024    CHLORIDE 107 02/14/2020    CO2 27 06/14/2024    CO2 25 02/14/2020    ANIONGAP 11 06/14/2024    ANIONGAP 5 02/14/2020    GLC 92 06/14/2024     (H) 06/11/2024     (H) 02/14/2020    BUN 21.3 06/14/2024    BUN 15 02/14/2020    CR 1.03  "06/14/2024    CR 0.99 02/14/2020    GFRESTIMATED 68 06/14/2024    GFRESTIMATED 67 02/14/2020    GFRESTBLACK 78 02/14/2020    ROSA 9.0 06/14/2024    ROSA 9.0 02/14/2020        A1C RESULTS:  Lab Results   Component Value Date    A1C 5.7 (H) 11/26/2018     INR RESULTS:  No results found for: \"INR\"    Thank you for allowing me to participate in the care of your patient.      Sincerely,     Maria Del Rosario Peters, [facility]  cc:   Justyna Walden MD  [id number] [id number]  "

## 2024-06-28 NOTE — PATIENT INSTRUCTIONS
Today's Plan:     - We will follow-up with you regarding next steps and timing of TAVR.     If you have questions or concerns please call my nurse team:  Elizabeth RN ([phone]) Aissatou RN ([phone]) and Elida RN ([phone])    After Hours: [phone], option #2, ask for cardiologist on-call    Scheduling phone number: [phone]    Reminder: Please bring in all current medications, over the counter supplements and vitamin bottles to your next appointment.-    It was a pleasure seeing you today!     Maria Del Rosario Peters, GODFREY  6/28/2024    -

## 2024-07-01 ENCOUNTER — TELEPHONE (OUTPATIENT)
Dept: CARDIOLOGY | Facility: CLINIC | Age: 89
End: 2024-07-01
Payer: COMMERCIAL

## 2024-07-01 DIAGNOSIS — I25.10 CORONARY ARTERY DISEASE: Primary | ICD-10-CM

## 2024-07-01 RX ORDER — ASPIRIN 81 MG/1
81 TABLET ORAL
COMMUNITY
Start: 2024-07-01

## 2024-07-01 NOTE — TELEPHONE ENCOUNTER
Left voicemail for patient requesting call back. Direct call back number of [phone] provided.

## 2024-07-01 NOTE — TELEPHONE ENCOUNTER
Received return call from patient. Reviewed Maria Del Rosario's recommendations. He states that he is taking 81mg of aspirin daily. Informed him that he should continue this. Mediation list was updated to reflect this.     Reviewed recommendation to start atorvastatin. Patient states that he has a bottle of simvastatin at home. He was taking 200mg daily and stopped about 6 months ago but is unsure why. He denies any side effects on simvastatin and is wondering if he can take this instead. Will route to Maria Del Rosario Peters CNP for review and recommendation.

## 2024-07-02 RX ORDER — ATORVASTATIN CALCIUM 40 MG/1
40 TABLET, FILM COATED ORAL DAILY
Qty: 90 TABLET | Refills: 3 | Status: SHIPPED | OUTPATIENT
Start: 2024-07-02

## 2024-07-02 NOTE — TELEPHONE ENCOUNTER
Returned call to patient and reviewed plan to start atorvastatin 40mg daily. Patient is aware that he should not be taking his simvastatin.    Ordered

## 2024-07-03 ENCOUNTER — TELEPHONE (OUTPATIENT)
Dept: CARDIOLOGY | Facility: CLINIC | Age: 89
End: 2024-07-03

## 2024-07-03 NOTE — TELEPHONE ENCOUNTER
Returned call to Karissa with [facility] and left a voicemail reviewing Maria Del Rosario's recommendations.     Attempted to contact patient x3. Phone was picked up then hung up.

## 2024-07-03 NOTE — TELEPHONE ENCOUNTER
"Received call from patient's home health nurse Karissa. She states that she is concerned about patient's breathing. She states that she has been sitting with him for about 20 minutes. She notes that diaphragmatic breathing with a respiratory rate of 22 at rest. She patient's lung sound clear. No lower extremity edema noted.   Patient states that he has no appetite and felt \"awful\" overnight. Vital signs per home health today show /68, HR 87, afebrile.     Patient planned for TAVR in the next couple weeks. Advised that Karissa send Hang to the ED if she feels this is indicated based on her assessment. In the meantime, will route to Maria Del Rosario Peters. CNP for review and recommendations.   "

## 2024-07-05 ENCOUNTER — TELEPHONE (OUTPATIENT)
Dept: CARDIOLOGY | Facility: CLINIC | Age: 89
End: 2024-07-05
Payer: COMMERCIAL

## 2024-07-05 NOTE — TELEPHONE ENCOUNTER
Patient left message on RN direct line. Called patient back. Patient wanting to know when he will be scheduled for TAVR procedure. Educated patient he will be presented at valve conference on 7/11 and RN coordinator will call him that day with plan of what was discussed at conference. Patient stated he is feeling well today and looking forward to getting procedure scheduled. Patient verbalized understanding and in agreement with plan.

## 2024-07-05 NOTE — TELEPHONE ENCOUNTER
Karissa from [facility] called back stating she received the message from BRIAN Marrero regarding recommendation that patient presents to the emergency room to be evaluated. Karissa stated she encouraged patient to go to the emergency room.

## 2024-07-09 ENCOUNTER — TELEPHONE (OUTPATIENT)
Dept: CARDIOLOGY | Facility: CLINIC | Age: 89
End: 2024-07-09
Payer: COMMERCIAL

## 2024-07-09 DIAGNOSIS — I35.0 SEVERE AORTIC STENOSIS BY PRIOR ECHOCARDIOGRAM: Primary | ICD-10-CM

## 2024-07-09 NOTE — TELEPHONE ENCOUNTER
Received phone call from patient to clarify what mediations he should be taking. We reviewed again that he should be taking Lipitor and aspirin 81mg daily. Patient did not start these as we discussed last week. He took notes today and states that he will pick these up to start.

## 2024-07-09 NOTE — TELEPHONE ENCOUNTER
Patient was presented at the multidisciplinary valve conference. Plan is to proceed with TAVR procedure.    Valve: Rodriguez  Approach: TF, perclose  Sedation: Conscious     Above information was called out to the patient. Plan for TAVR 7/16/24. H&P and labs were completed 6/28/24. Reviewed pre-procedure instructions below with patient today.       TAVR PRE-PROCEDURE INSTRUCTIONS:    - Your TAVR is scheduled Tuesday 7/16/24, 4th case. Please check-in at 11:30am at the Registration Desk in the [facility] Lobby at [facility].    - Use the Hibiclens soap I have provided you the night before and morning of the procedure. Wash from chin to toes, please avoid your face and eyes.    - Nothing to eat or drink the morning of your TAVR.     Medication instructions:  - You may take your morning medications with sips of water.   - Hold your furosemide the morning of your TAVR.  - Please hold all vitamins and supplements the morning of your procedure.  - You will need to take 325mg of aspirin the morning of your TAVR.    -On the day of the procedure, you may have two visitors in the pre-operative area. Two visitors may see you when you get to your room in the Heart Center (2nd floor of [facility]).    - You will stay overnight on Tuesday night. We will monitor you overnight for signs of bleeding, stroke, as well as need for pacemaker. On Wednesday morning, you will have an echo of your new valve and work with cardiac rehab.     Please call with any other questions, concerns, or any changes in your health: [phone]    Elida Paredes RN  Structural Heart Coordinator  [facility]

## 2024-07-10 DIAGNOSIS — I35.0 SEVERE AORTIC STENOSIS BY PRIOR ECHOCARDIOGRAM: Primary | ICD-10-CM

## 2024-07-10 RX ORDER — ASPIRIN 325 MG
325 TABLET ORAL ONCE
Status: CANCELLED | OUTPATIENT
Start: 2024-07-10 | End: 2024-07-10

## 2024-07-10 RX ORDER — CEFAZOLIN SODIUM 2 G/100ML
2 INJECTION, SOLUTION INTRAVENOUS
Status: CANCELLED | OUTPATIENT
Start: 2024-07-10

## 2024-07-10 RX ORDER — LIDOCAINE 40 MG/G
CREAM TOPICAL
Status: CANCELLED | OUTPATIENT
Start: 2024-07-10

## 2024-07-10 RX ORDER — SODIUM CHLORIDE 9 MG/ML
INJECTION, SOLUTION INTRAVENOUS CONTINUOUS
Status: CANCELLED | OUTPATIENT
Start: 2024-07-10

## 2024-07-11 NOTE — PROGRESS NOTES
PTA medications updated by Medication Scribe prior to surgery via phone call with patient (last doses completed by Nurse)     Medication history sources: Patient, Surescripts, and H&P  In the past week, patient estimated taking medication this percent of the time: Greater than 90%      Significant changes made to the medication list:  None      Additional medication history information:   None    Medication reconciliation completed by provider prior to medication history? No    Time spent in this activity: 30 minutes    The information provided in this note is only as accurate as the sources available at the time of update(s)      Prior to Admission medications    Medication Sig Last Dose Taking? Auth Provider Long Term End Date   aspirin 81 MG EC tablet Take 1 tablet (81 mg) by mouth daily  at am Yes Maria Del Rosario Peters CNP     atorvastatin (LIPITOR) 40 MG tablet Take 1 tablet (40 mg) by mouth daily Has not started at unknown Yes Maria Del Rosario Peters CNP Yes    furosemide (LASIX) 20 MG tablet Take 1 tablet (20 mg) by mouth daily  at am Yes Justyna Walden MD Yes    melatonin 3 MG tablet Take 3 mg by mouth at bedtime  at pm Yes Reported, Patient     tamsulosin (FLOMAX) 0.4 MG capsule Take 0.4 mg by mouth daily as needed  at pm Yes Reported, Patient         Medication history completed by: Kelly Connolly LPN

## 2024-07-15 RX ORDER — ALBUTEROL SULFATE 90 UG/1
2 AEROSOL, METERED RESPIRATORY (INHALATION) EVERY 6 HOURS PRN
COMMUNITY

## 2024-07-15 NOTE — PROGRESS NOTES
PTA medications updated by Medication Scribe prior to surgery via phone call with patient (last doses completed by Nurse)     Medication history sources: Patient, Surescripts, and H&P  In the past week, patient estimated taking medication this percent of the time: Greater than 90%      Significant changes made to the medication list:  None      Additional medication history information:   Patient was advised to bring: Voltaren gel, albuterol inhaler    Medication reconciliation completed by provider prior to medication history? No    Time spent in this activity: 25 minutes    The information provided in this note is only as accurate as the sources available at the time of update(s)      Prior to Admission medications    Medication Sig Last Dose Taking? Auth Provider Long Term End Date   albuterol (PROAIR HFA/PROVENTIL HFA/VENTOLIN HFA) 108 (90 Base) MCG/ACT inhaler Inhale 2 puffs into the lungs every 6 hours as needed for shortness of breath, wheezing or cough  at PRN Yes Reported, Patient No    aspirin 81 MG EC tablet Take 1 tablet (81 mg) by mouth daily  at am Yes Maria Del Rosario Peters CNP     atorvastatin (LIPITOR) 40 MG tablet Take 1 tablet (40 mg) by mouth daily Has not started at unknown Yes Maria Del Rosario Peters CNP Yes    diclofenac (VOLTAREN) 1 % topical gel Apply 4 g topically 4 times daily  at PRN Yes Reported, Patient     furosemide (LASIX) 20 MG tablet Take 1 tablet (20 mg) by mouth daily  at am Yes Justyna Walden MD Yes    melatonin 3 MG tablet Take 3 mg by mouth at bedtime  at pm Yes Reported, Patient     tamsulosin (FLOMAX) 0.4 MG capsule Take 0.4 mg by mouth daily as needed 7/14/2024 at pm Yes Reported, Patient         Medication history completed by: Jax Rodriguez

## 2024-07-16 ENCOUNTER — HOSPITAL ENCOUNTER (OUTPATIENT)
Dept: CARDIOLOGY | Facility: CLINIC | Age: 89
Discharge: HOME | DRG: 266 | End: 2024-07-16
Attending: INTERNAL MEDICINE
Payer: COMMERCIAL

## 2024-07-16 ENCOUNTER — HOSPITAL ENCOUNTER (INPATIENT)
Facility: CLINIC | Age: 89
LOS: 1 days | Discharge: HOME | DRG: 266 | End: 2024-07-17
Attending: INTERNAL MEDICINE | Admitting: INTERNAL MEDICINE
Payer: COMMERCIAL

## 2024-07-16 ENCOUNTER — MEDICAL CORRESPONDENCE (OUTPATIENT)
Dept: HEALTH INFORMATION MANAGEMENT | Facility: CLINIC | Age: 89
End: 2024-07-16

## 2024-07-16 ENCOUNTER — DOCUMENTATION ONLY (OUTPATIENT)
Dept: CARDIOLOGY | Facility: CLINIC | Age: 89
End: 2024-07-16
Payer: COMMERCIAL

## 2024-07-16 DIAGNOSIS — I50.9 ACUTE CONGESTIVE HEART FAILURE, UNSPECIFIED HEART FAILURE TYPE (H): Primary | ICD-10-CM

## 2024-07-16 DIAGNOSIS — I35.0 SEVERE AORTIC STENOSIS BY PRIOR ECHOCARDIOGRAM: ICD-10-CM

## 2024-07-16 DIAGNOSIS — E78.5 HYPERLIPIDEMIA LDL GOAL <70: ICD-10-CM

## 2024-07-16 DIAGNOSIS — I50.23 ACUTE ON CHRONIC SYSTOLIC HEART FAILURE (H): ICD-10-CM

## 2024-07-16 DIAGNOSIS — Z95.2 S/P TAVR (TRANSCATHETER AORTIC VALVE REPLACEMENT): ICD-10-CM

## 2024-07-16 LAB
ACT BLD: 232 SECONDS (ref 74–150)
ATRIAL RATE - MUSE: 91 BPM
DIASTOLIC BLOOD PRESSURE - MUSE: NORMAL MMHG
INTERPRETATION ECG - MUSE: NORMAL
P AXIS - MUSE: NORMAL DEGREES
POTASSIUM SERPL-SCNC: 3.3 MMOL/L (ref 3.4–5.3)
PR INTERVAL - MUSE: NORMAL MS
QRS DURATION - MUSE: 98 MS
QT - MUSE: 400 MS
QTC - MUSE: 486 MS
R AXIS - MUSE: 55 DEGREES
SYSTOLIC BLOOD PRESSURE - MUSE: NORMAL MMHG
T AXIS - MUSE: 105 DEGREES
VENTRICULAR RATE- MUSE: 89 BPM

## 2024-07-16 PROCEDURE — 84132 ASSAY OF SERUM POTASSIUM: CPT | Performed by: INTERNAL MEDICINE

## 2024-07-16 PROCEDURE — 999N000071 HC STATISTIC HEART CATH LAB OR EP LAB

## 2024-07-16 PROCEDURE — 250N000011 HC RX IP 250 OP 636: Performed by: NURSE PRACTITIONER

## 2024-07-16 PROCEDURE — 02RF38Z REPLACEMENT OF AORTIC VALVE WITH ZOOPLASTIC TISSUE, PERCUTANEOUS APPROACH: ICD-10-PCS | Performed by: INTERNAL MEDICINE

## 2024-07-16 PROCEDURE — 258N000003 HC RX IP 258 OP 636: Performed by: INTERNAL MEDICINE

## 2024-07-16 PROCEDURE — 33361 REPLACE AORTIC VALVE PERQ: CPT | Mod: 62 | Performed by: STUDENT IN AN ORGANIZED HEALTH CARE EDUCATION/TRAINING PROGRAM

## 2024-07-16 PROCEDURE — 99152 MOD SED SAME PHYS/QHP 5/>YRS: CPT | Mod: GC | Performed by: INTERNAL MEDICINE

## 2024-07-16 PROCEDURE — 85347 COAGULATION TIME ACTIVATED: CPT

## 2024-07-16 PROCEDURE — 93005 ELECTROCARDIOGRAM TRACING: CPT

## 2024-07-16 PROCEDURE — 210N000001 HC R&B IMCU HEART CARE

## 2024-07-16 PROCEDURE — 33361 REPLACE AORTIC VALVE PERQ: CPT | Mod: 62 | Performed by: INTERNAL MEDICINE

## 2024-07-16 PROCEDURE — 250N000013 HC RX MED GY IP 250 OP 250 PS 637: Performed by: NURSE PRACTITIONER

## 2024-07-16 PROCEDURE — C1730 CATH, EP, 19 OR FEW ELECT: HCPCS | Performed by: INTERNAL MEDICINE

## 2024-07-16 PROCEDURE — C1894 INTRO/SHEATH, NON-LASER: HCPCS | Performed by: INTERNAL MEDICINE

## 2024-07-16 PROCEDURE — 33361 REPLACE AORTIC VALVE PERQ: CPT | Performed by: INTERNAL MEDICINE

## 2024-07-16 PROCEDURE — 93010 ELECTROCARDIOGRAM REPORT: CPT | Mod: XU | Performed by: INTERNAL MEDICINE

## 2024-07-16 PROCEDURE — 250N000011 HC RX IP 250 OP 636: Performed by: INTERNAL MEDICINE

## 2024-07-16 PROCEDURE — 999N000184 HC STATISTIC TELEMETRY

## 2024-07-16 PROCEDURE — C1769 GUIDE WIRE: HCPCS | Performed by: INTERNAL MEDICINE

## 2024-07-16 PROCEDURE — 250N000009 HC RX 250: Performed by: INTERNAL MEDICINE

## 2024-07-16 PROCEDURE — 93325 DOPPLER ECHO COLOR FLOW MAPG: CPT

## 2024-07-16 PROCEDURE — C1889 IMPLANT/INSERT DEVICE, NOC: HCPCS | Performed by: INTERNAL MEDICINE

## 2024-07-16 PROCEDURE — 99152 MOD SED SAME PHYS/QHP 5/>YRS: CPT | Performed by: INTERNAL MEDICINE

## 2024-07-16 PROCEDURE — 250N000013 HC RX MED GY IP 250 OP 250 PS 637: Performed by: INTERNAL MEDICINE

## 2024-07-16 PROCEDURE — C1760 CLOSURE DEV, VASC: HCPCS | Performed by: INTERNAL MEDICINE

## 2024-07-16 PROCEDURE — 99153 MOD SED SAME PHYS/QHP EA: CPT | Performed by: INTERNAL MEDICINE

## 2024-07-16 PROCEDURE — 36415 COLL VENOUS BLD VENIPUNCTURE: CPT | Performed by: INTERNAL MEDICINE

## 2024-07-16 PROCEDURE — 272N000001 HC OR GENERAL SUPPLY STERILE: Performed by: INTERNAL MEDICINE

## 2024-07-16 DEVICE — VALVE AORTIC SAPEIN 3 UTLRA TAVR 26MM 9750TFX26A: Type: IMPLANTABLE DEVICE | Status: FUNCTIONAL

## 2024-07-16 RX ORDER — FUROSEMIDE 10 MG/ML
40 INJECTION INTRAMUSCULAR; INTRAVENOUS
Status: DISCONTINUED | OUTPATIENT
Start: 2024-07-16 | End: 2024-07-17 | Stop reason: HOSPADM

## 2024-07-16 RX ORDER — NITROGLYCERIN 0.4 MG/1
0.4 TABLET SUBLINGUAL EVERY 5 MIN PRN
Status: DISCONTINUED | OUTPATIENT
Start: 2024-07-16 | End: 2024-07-17 | Stop reason: HOSPADM

## 2024-07-16 RX ORDER — TAMSULOSIN HYDROCHLORIDE 0.4 MG/1
0.4 CAPSULE ORAL DAILY PRN
Status: DISCONTINUED | OUTPATIENT
Start: 2024-07-16 | End: 2024-07-17 | Stop reason: HOSPADM

## 2024-07-16 RX ORDER — ASPIRIN 325 MG
325 TABLET ORAL ONCE
Status: COMPLETED | OUTPATIENT
Start: 2024-07-16 | End: 2024-07-16

## 2024-07-16 RX ORDER — ATORVASTATIN CALCIUM 40 MG/1
40 TABLET, FILM COATED ORAL DAILY
Status: DISCONTINUED | OUTPATIENT
Start: 2024-07-16 | End: 2024-07-17 | Stop reason: HOSPADM

## 2024-07-16 RX ORDER — FENTANYL CITRATE 50 UG/ML
INJECTION, SOLUTION INTRAMUSCULAR; INTRAVENOUS
Status: DISCONTINUED | OUTPATIENT
Start: 2024-07-16 | End: 2024-07-16 | Stop reason: HOSPADM

## 2024-07-16 RX ORDER — ONDANSETRON 4 MG/1
4 TABLET, ORALLY DISINTEGRATING ORAL EVERY 6 HOURS PRN
Status: DISCONTINUED | OUTPATIENT
Start: 2024-07-16 | End: 2024-07-17 | Stop reason: HOSPADM

## 2024-07-16 RX ORDER — SODIUM CHLORIDE 9 MG/ML
INJECTION, SOLUTION INTRAVENOUS CONTINUOUS
Status: DISCONTINUED | OUTPATIENT
Start: 2024-07-16 | End: 2024-07-16 | Stop reason: HOSPADM

## 2024-07-16 RX ORDER — ONDANSETRON 2 MG/ML
4 INJECTION INTRAMUSCULAR; INTRAVENOUS EVERY 6 HOURS PRN
Status: DISCONTINUED | OUTPATIENT
Start: 2024-07-16 | End: 2024-07-17 | Stop reason: HOSPADM

## 2024-07-16 RX ORDER — HEPARIN SODIUM 1000 [USP'U]/ML
INJECTION, SOLUTION INTRAVENOUS; SUBCUTANEOUS
Status: DISCONTINUED | OUTPATIENT
Start: 2024-07-16 | End: 2024-07-16 | Stop reason: HOSPADM

## 2024-07-16 RX ORDER — FENTANYL CITRATE-0.9 % NACL/PF 10 MCG/ML
PLASTIC BAG, INJECTION (ML) INTRAVENOUS
Status: DISCONTINUED | OUTPATIENT
Start: 2024-07-16 | End: 2024-07-16 | Stop reason: HOSPADM

## 2024-07-16 RX ORDER — IOPAMIDOL 755 MG/ML
INJECTION, SOLUTION INTRAVASCULAR
Status: DISCONTINUED | OUTPATIENT
Start: 2024-07-16 | End: 2024-07-16 | Stop reason: HOSPADM

## 2024-07-16 RX ORDER — CEFAZOLIN SODIUM 2 G/100ML
2 INJECTION, SOLUTION INTRAVENOUS
Status: DISCONTINUED | OUTPATIENT
Start: 2024-07-16 | End: 2024-07-16 | Stop reason: HOSPADM

## 2024-07-16 RX ORDER — LIDOCAINE 40 MG/G
CREAM TOPICAL
Status: DISCONTINUED | OUTPATIENT
Start: 2024-07-16 | End: 2024-07-16 | Stop reason: HOSPADM

## 2024-07-16 RX ORDER — ALBUTEROL SULFATE 90 UG/1
2 AEROSOL, METERED RESPIRATORY (INHALATION) EVERY 6 HOURS PRN
Status: DISCONTINUED | OUTPATIENT
Start: 2024-07-16 | End: 2024-07-17 | Stop reason: HOSPADM

## 2024-07-16 RX ORDER — ASPIRIN 81 MG/1
81 TABLET ORAL DAILY
Status: DISCONTINUED | OUTPATIENT
Start: 2024-07-17 | End: 2024-07-17 | Stop reason: HOSPADM

## 2024-07-16 RX ORDER — ACETAMINOPHEN 325 MG/1
650 TABLET ORAL EVERY 4 HOURS PRN
Status: DISCONTINUED | OUTPATIENT
Start: 2024-07-16 | End: 2024-07-17 | Stop reason: HOSPADM

## 2024-07-16 RX ORDER — HYDRALAZINE HYDROCHLORIDE 20 MG/ML
10 INJECTION INTRAMUSCULAR; INTRAVENOUS
Status: DISCONTINUED | OUTPATIENT
Start: 2024-07-16 | End: 2024-07-17 | Stop reason: HOSPADM

## 2024-07-16 RX ORDER — CEFAZOLIN SODIUM 2 G/100ML
INJECTION, SOLUTION INTRAVENOUS CONTINUOUS PRN
Status: COMPLETED | OUTPATIENT
Start: 2024-07-16 | End: 2024-07-16

## 2024-07-16 RX ADMIN — SODIUM CHLORIDE: 9 INJECTION, SOLUTION INTRAVENOUS at 11:45

## 2024-07-16 RX ADMIN — ATORVASTATIN CALCIUM 40 MG: 40 TABLET, FILM COATED ORAL at 19:15

## 2024-07-16 RX ADMIN — ASPIRIN 243 MG: 81 TABLET, COATED ORAL at 13:38

## 2024-07-16 RX ADMIN — ACETAMINOPHEN 650 MG: 325 TABLET, FILM COATED ORAL at 23:21

## 2024-07-16 RX ADMIN — FUROSEMIDE 40 MG: 10 INJECTION, SOLUTION INTRAMUSCULAR; INTRAVENOUS at 19:15

## 2024-07-16 ASSESSMENT — ACTIVITIES OF DAILY LIVING (ADL)
ADLS_ACUITY_SCORE: 38
ADLS_ACUITY_SCORE: 38
ADLS_ACUITY_SCORE: 39
ADLS_ACUITY_SCORE: 38
ADLS_ACUITY_SCORE: 39
ADLS_ACUITY_SCORE: 38
ADLS_ACUITY_SCORE: 39
ADLS_ACUITY_SCORE: 38
ADLS_ACUITY_SCORE: 39
ADLS_ACUITY_SCORE: 39
ADLS_ACUITY_SCORE: 36
ADLS_ACUITY_SCORE: 38
ADLS_ACUITY_SCORE: 39

## 2024-07-16 NOTE — Clinical Note
"Well Woman VISIT      CHIEF COMPLAINT  Chief Complaint   Patient presents with    Annual Exam       HPI  Mildred Bender is a 30 y.o. female who presents physicla.     Social Factors  Tobacco use: No  Ready to Quit: No  Alcohol: Yes   Intimate partner violence screening  "Do you feel safe in your current relationship?" yes  "Have you ever been in a relationship in which your partner frightened you or hurt you?" No  Living Will/POA: No  Regular Exercise: No    Depression  Over the past two weeks, have you felt down, depressed, or hopeless? No  Over the past two weeks, have you felt little interest or pleasure in doing things? No    Reproductive Health  Followed by OBGYN    CHD, HTN, DM2  CHD Risk Factors: obesity (BMI >= 30 kg/m2)  Women 45 years and older should be screened for dyslipidemia if at increased risk of CHD  Women 20 to 45 years of age should be screened for dyslipidemia if at increased risk of CHD  Asymptomatic adults with sustained blood pressure greater than 135/80 mm Hg (treated or untreated) should be screened for type 2 diabetes mellitus    Estimated body mass index is 44.98 kg/m² as calculated from the following:    Height as of this encounter: 5' 7.01" (1.702 m).    Weight as of this encounter: 130.3 kg (287 lb 4.2 oz).      Screening  Mammogram needed: n/a  Colonoscopy needed: n/a  Osteoporosis screen needed: n/a     Women 50 to 74 years of age should be screened for breast cancer with mammography biennially.  Women should be screened for cervical cancer with Pap tests beginning at 21 years of age. Low-risk women should receive Pap testing every three years. Co-testing for human papillomavirus is an option beginning at 30 years of age, and can extend the screening interval to five years. Cervical cancer screening should be discontinued at 65 years of age or after total hysterectomy if the woman has a benign gynecologic history  Adults 50 to 75 years of age should be screened for colorectal " Catheter removed over wire. "cancer with an FOBT annually, sigmoidoscopy every five years with an FOBT every three years, or colonoscopy every 10 years.  Women 65 years and older should be screened for osteoporosis. Women younger than 65 years should be screened if the risk of fracture is greater than or equal to that of a 65-year-old white woman without additional risk factors.      Immunizations  delayed    ALLERGIES and MEDS were verified.   PMHx, PSHx, FHx, SOCIALHx were updated as pertinent.    REVIEW OF SYSTEMS  Review of Systems   Constitutional: Negative.    HENT: Negative.    Eyes: Negative.    Respiratory: Negative.    Cardiovascular: Negative.    Gastrointestinal: Negative.    Genitourinary: Negative.    Musculoskeletal: Negative.    Skin: Negative.    Neurological: Negative.          PHYSICAL EXAM  VITAL SIGNS: /84 (BP Location: Left arm, Patient Position: Sitting, BP Method: X-Large (Manual))   Pulse 66   Temp 98.2 °F (36.8 °C) (Oral)   Resp 17   Ht 5' 7.01" (1.702 m)   Wt 130.3 kg (287 lb 4.2 oz)   SpO2 98%   BMI 44.98 kg/m²   GEN: Well developed, Well nourished, No acute distress.  HENT: Normocephalic, Atraumatic, Bilateral external ears normal, Nose normal, Oropharynx moist, No oral exudates.   Eyes: PERRL, EOMI, Conjunctiva normal, No discharge.   Neck: Supple, No tenderness.  Lymphatic: No cervical or supraclavicular lymphadenopathy noted.   Cardiovascular: Normal heart rate, Normal rhythm, No murmurs, No rubs, No gallops.   Thorax & Lungs: Normal breath sounds, No respiratory distress, No wheezing.  Abdomen: Soft, No tenderness, Bowel sounds normal.  Breast: deferred  Genital: deferred   Skin: Warm, Dry, No erythema, No rash.   Extremities: No edema, No tenderness.       ASSESSMENT/PLAN    Mildred was seen today for annual exam.    Diagnoses and all orders for this visit:    Well woman exam without gynecological exam  -     CBC auto differential; Future  -     Comprehensive metabolic panel; Future  -     Lipid " panel; Future  -     Urinalysis; Future  -     Microalbumin/creatinine urine ratio; Future  -     Hemoglobin A1c; Future  -     Influenza - Quadrivalent (PF)    Weight gain  -     phentermine (ADIPEX-P) 37.5 mg tablet; Take 1 tablet (37.5 mg total) by mouth before breakfast.    Morbid obesity due to excess calories  -     phentermine (ADIPEX-P) 37.5 mg tablet; Take 1 tablet (37.5 mg total) by mouth before breakfast.           FOLLOW UP: 1 month or sooner if needed    Cedric Leroy MD

## 2024-07-16 NOTE — Clinical Note
8 Fr Angio-Seal utilized for closure of site.  Mechanical pressure applied applied by scrub person; hemostasis achieved.

## 2024-07-16 NOTE — PROGRESS NOTES
Care Suites Admission Nursing Note    Patient Information  Name: Canelo Cruz  Age: 89 year old  Reason for admission: TAVR  Care Suites arrival time: 1110    Visitor Information  Name: Kelin- wife and Dre-brother in law     Patient Admission/Assessment   Pre-procedure assessment complete: Yes  If abnormal assessment/labs, provider notified: N/A  NPO: Yes  Medications held per instructions/orders: N/A  Consent: deferred  If applicable, pregnancy test status: deferred  Patient oriented to room: Yes  Education/questions answered: Yes  Plan/other: proceed as planned    Discharge Planning  Discharge name/phone number: Dre [phone]  Overnight post sedation caregiver: pt will be overnight  Discharge location: home    Jahaira Quinteros RN

## 2024-07-16 NOTE — PROGRESS NOTES
Completed frailty testing and KCCQ completed prior to TAVR today.   5 meter walk: 6.1 seconds             6.2 seconds             6.2 seconds  Frailty score: 2/5  (eyeball, walk)     KCCQ Results:   1a. 2  1b. 2  1c. 1  2. 4  3. 4  4. 2  5. 5  6. 2  7. 1  8a. 2  8b. 2  8c. 3    Preliminary STS Risk Score: 3.97%  NYHA Class: II    Elida Paredes RN  Structural Heart Coordinator  [facility] Heart [facility]

## 2024-07-16 NOTE — H&P
[facility]      Cardiology H&P  Canelo Cruz MRN: [id number]  7/30/1931  Date of Admission:7/16/2024  Primary care provider: Maribell Stewart      Assessment and Plan:     Canelo Cruz is a pleasant 89 year old admitted on 7/16/2024 for elective TAVR.     # Severe aortic stenosis s/p TAVR with 26 mm Rodriguez mary 3 valve   Sheath sites soft without hematoma. No arrhythmias. Neuro's intact.   - Bedrest per protocol x 4 hours  - Neuro checks, per protocol  - Echocardiogram tomorrow  - Monitor groin sites  - EKG in morning   - ASA for anti-platelet therapy  - Cardiac rehab/PT/OT  - Continuous telemetry monitoring  - Lifelong antibiotic prophylaxis prior to all dental procedures.      # Acute on chronic systolic heart failure with LVEF 40-45%, NYHA class II   NTpBNP 9531, LVEDP 24 mmHg.   - Give IV lasix 40 mg BID [PTA lasix 20 mg daily]  - Daily weights  - Strict intake/output  - Daily BMP     # CAD   # Hyperlipidemia  Pre-TAVR coronary angiogram showed significant ostial circumflex and proximal RCA disease.  He has no angina.  Plan to medically manage for now, consider staged PCI if he has refractory angina.  -Continue PTA ASA 81 mg daily and atorvastatin 40 mg daily      FEN: Cardiac diet  Disposition: Home in 1-2 days with cardiac rehab pending stable post-op period  Code Status: FULL CODE    AVERY Welch, CNP  [facility] Structural/Interventional Cardiology Team  Pager: [phone]    Clinically Significant Risk Factors Present on Admission                # Drug Induced Platelet Defect: home medication list includes an antiplatelet medication            Chief Complaint:   Planned TAVR         History of Present Illness:   The patient is a pleasant is a pleasant 89-year-old male with past medical history of HFmrEF, CAD, hyperlipidemia, chronic urinary retention, and severe aortic stenosis who presents for elective TAVR.    The patient was recently hospitalized with a several week history  of progressive dyspnea on exertion, orthopnea, PND, and lower extremity edema.  Echocardiogram showed moderately reduced LV function with an EF of 40 to 45% with regional wall motion abnormalities.  Findings also demonstrated severe aortic stenosis with a valve area of 0.44 cm  and a mean gradient of 27 mmHg. His stroke-volume index is low at 16 mL/m .  Findings consistent with severe low-flow low gradient aortic stenosis.  There is also moderate 2+ mitral regurgitation.  Coronary angiogram demonstrated multivessel disease with ostial 80% stenosis of the left circumflex, 70% proximal RCA stenosis which is being medically managed for now.  Subsequent TAVR guided CT showed favorable anatomy for transfemoral approach with an aortic valve calcium score of 2515.  He was appropriately diuresed.  GDMT was limited due to hypotension.  He was placed on low-dose oral Lasix 20 mg daily and has since done well.     Patient is now s/p 26 mm Rodriguez Paul 3 valve via RCFA. Procedure went well without complication.  No hemodynamic instability or conduction abnormalities.  Groin site soft without hematoma.  Neuro's intact.            Review of Systems:    10 point review of systems negative except for stated above in HPI.          Past Medical History:   Medical History reviewed.   Past Medical History:   Diagnosis Date    Gout     Heart murmur     Insomnia     Mixed hyperlipidemia     Postherpetic neuralgia              Past Surgical History:   Surgical History reviewed.   Past Surgical History:   Procedure Laterality Date    ARTHROSCOPY KNEE Right     ~4107-2925    CV CORONARY ANGIOGRAM N/A 6/13/2024    Procedure: Coronary Angiogram;  Surgeon: Jarett Odell MD;  Location:  HEART CARDIAC CATH LAB    CV RIGHT HEART CATH MEASUREMENTS RECORDED N/A 6/13/2024    Procedure: Right Heart Catheterization;  Surgeon: Jarett Odell MD;  Location:  HEART CARDIAC CATH LAB    DAVINCI XI HERNIORRHAPHY INGUINAL Bilateral  7/18/2022    Procedure: Robotic repair of right inguinal hernia with placement of mesh,  robotic repair of left inguinal hernia with placement of mesh;  Surgeon: Ina Phillips MD;  Location: [facility] OR    ORTHOPEDIC SURGERY      right achilles rupture repair with 14 month MRSA infection             Social History:   Social History reviewed.  Social History     Tobacco Use    Smoking status: Never    Smokeless tobacco: Never   Substance Use Topics    Alcohol use: Yes     Comment: 10 per week             Family History:   Family History reviewed.   Family History   Problem Relation Age of Onset    Unknown/Adopted Mother     Unknown/Adopted Father     Diabetes No family hx of     Coronary Artery Disease No family hx of     Hypertension No family hx of     Hyperlipidemia No family hx of     Cerebrovascular Disease No family hx of     Breast Cancer No family hx of     Colon Cancer No family hx of     Prostate Cancer No family hx of     Other Cancer No family hx of     Depression No family hx of     Anxiety Disorder No family hx of     Mental Illness No family hx of     Substance Abuse No family hx of     Anesthesia Reaction No family hx of     Asthma No family hx of     Osteoporosis No family hx of     Genetic Disorder No family hx of     Thyroid Disease No family hx of     Obesity No family hx of              Allergies:     Allergies   Allergen Reactions    Ciprofloxacin      history of achilles tendon pain             Medications:   Medications Reviewed.   Current Facility-Administered Medications   Medication Dose Route Frequency Provider Last Rate Last Admin    ceFAZolin (ANCEF) 2 g in 100 mL D5W intermittent infusion  2 g Intravenous Pre-Op/Pre-procedure x 1 dose Prince Burnett MD        ceFAZolin (ANCEF) in 100 mL D5W intermittent infusion    Continuous PRN Prince Burnett MD   2 g at 07/16/24 1556    fentaNYL (PF) (SUBLIMAZE) injection    Once PRN Prince Burnett MD   25 mcg at 07/16/24 1606    heparin  "(porcine) injection    Once PRN Prince Burnett MD   11,000 Units at 07/16/24 1623    lidocaine (LMX4) cream   Topical Q1H PRN Prince Burnett MD        lidocaine 1 % 0.1-1 mL  0.1-1 mL Other Q1H PRN Prince Burnett MD        lidocaine 1 %    Once PRN Prince Burnett MD   10 mL at 07/16/24 1610    midazolam (VERSED) injection    Once PRN Prince Burnett MD   0.5 mg at 07/16/24 1606    Patient is NOT allergic to contrast dye   Does not apply DOES NOT GO TO MAR Prince Burnett MD        sodium chloride (PF) 0.9% PF flush 3 mL  3 mL Intracatheter Q8H Prince Burnett MD        sodium chloride (PF) 0.9% PF flush 3 mL  3 mL Intracatheter Q1H PRN Prince Burnett MD        sodium chloride (PF) 0.9% PF flush 3 mL  3 mL Intracatheter q1 min prn Prince Burnett MD        sodium chloride 0.9 % infusion   Intravenous Continuous Prince Burnett MD 10 mL/hr at 07/16/24 1350 Rate Verify at 07/16/24 1350             Physical Exam:   Vitals were reviewed.  Blood pressure 106/73, pulse 86, temperature 97.8  F (36.6  C), temperature source Oral, resp. rate 12, height 1.854 m (6' 1\"), weight 77.1 kg (170 lb), SpO2 97%.    General: AAOx3, NAD  Skin: Not jaundiced, no rash, no ecchymoses; incision site CDI, soft, non-tender, no signs of hematoma. No bruit  HEENT: MMM, PERRLA, EOM intact  CV: RRR, normal S1S2  Resp: Clear to auscultation bilaterally, no wheezes, rhonchi  Abd: Soft, non-tender, BS+, no masses appreciated  Extremities: warm and well perfused, palpable pulses, no edema  Neuro: No lateralizing symptoms or focal neurologic deficits          Labs:   Routine Labs:  No results found for: \"TROPI\", \"TROPONIN\", \"TROPR\", \"TROPN\"  CMPNo lab results found in last 7 days.  CBCNo lab results found in last 7 days.  INRNo lab results found in last 7 days.        Diagnostics:    EKG 12Lead: SR with PACs    Echo: 6/2024  Interpretation Summary     Severe valvular aortic stenosis.  The visual ejection fraction is " 40-45%.  Left ventricular systolic function is mildly reduced.  There are regional wall motion abnormalities as specified.  The image quality was nondiagnostic. Contrast would significantly improve  assessment of regional wall motion.  Moderate left pleural effusion  There is trace aortic regurgitation.  There is moderate (2+) mitral regurgitation.  The study was technically limited. Technically difficult, suboptimal study.    Coronary Angiogram/Right heart Cath: 6/2024  Conclusion         2nd Mrg lesion is 70% stenosed.    Ost Cx to Prox Cx lesion is 80% stenosed.    1st Diag lesion is 80% stenosed.    2nd Diag lesion is 99% stenosed.    Prox RCA lesion is 70% stenosed.    RPDA lesion is 45% stenosed.    3rd RPL lesion is 60% stenosed.    Mid LAD lesion is 60% stenosed.     Two-three vessel severe coronary artery disease with ostial 80% stenosis of the Lcx, and proximal 70% RCA stenosis. There is no apparent left main or proximal LAD disease.   Normal right and left sided filling pressures.   Mild post-capillary pulmonary hypertension.   Normal cardiac hemodynamics with CO/CI of 4.7/2.4 (Luis).          Plan     Follow bedrest per protocol   Continued medical management and lifestyle modifications for cardiovascular risk factor optimizations.   Return to the primary inpatient team for further evaluation and managmenet     Medical Decision Making       40 MINUTES SPENT BY ME on the date of service doing chart review, history, exam, documentation & further activities per the note.

## 2024-07-16 NOTE — PRE-PROCEDURE
GENERAL PRE-PROCEDURE:     Written consent obtained?: Yes    Risks and benefits: Risks, benefits and alternatives were discussed    Consent given by:  Patient  Patient states understanding of procedure being performed: Yes    Patient's understanding of procedure matches consent: Yes    Procedure consent matches procedure scheduled: Yes    Expected level of sedation:  Moderate  Appropriately NPO:  Yes  ASA Class:  2  Mallampati  :  Grade 2- soft palate, base of uvula, tonsillar pillars, and portion of posterior pharyngeal wall visible  Lungs:  Lungs clear with good breath sounds bilaterally  Heart:  RRR and systolic murmur  History & Physical reviewed:  History and physical reviewed and no updates needed  Statement of review:  I have reviewed the lab findings, diagnostic data, medications, and the plan for sedation

## 2024-07-17 ENCOUNTER — APPOINTMENT (OUTPATIENT)
Dept: CARDIOLOGY | Facility: CLINIC | Age: 89
DRG: 266 | End: 2024-07-17
Attending: NURSE PRACTITIONER
Payer: COMMERCIAL

## 2024-07-17 ENCOUNTER — APPOINTMENT (OUTPATIENT)
Dept: OCCUPATIONAL THERAPY | Facility: CLINIC | Age: 89
DRG: 266 | End: 2024-07-17
Attending: NURSE PRACTITIONER
Payer: COMMERCIAL

## 2024-07-17 VITALS
RESPIRATION RATE: 16 BRPM | OXYGEN SATURATION: 93 % | BODY MASS INDEX: 22.53 KG/M2 | DIASTOLIC BLOOD PRESSURE: 66 MMHG | SYSTOLIC BLOOD PRESSURE: 105 MMHG | TEMPERATURE: 97.4 F | WEIGHT: 170 LBS | HEART RATE: 78 BPM | HEIGHT: 73 IN

## 2024-07-17 DIAGNOSIS — Z95.2 S/P TAVR (TRANSCATHETER AORTIC VALVE REPLACEMENT): Primary | ICD-10-CM

## 2024-07-17 LAB
ANION GAP SERPL CALCULATED.3IONS-SCNC: 7 MMOL/L (ref 7–15)
ATRIAL RATE - MUSE: 73 BPM
ATRIAL RATE - MUSE: 73 BPM
BUN SERPL-MCNC: 13.7 MG/DL (ref 8–23)
CALCIUM SERPL-MCNC: 8.6 MG/DL (ref 8.8–10.4)
CHLORIDE SERPL-SCNC: 100 MMOL/L (ref 98–107)
CREAT SERPL-MCNC: 1.01 MG/DL (ref 0.67–1.17)
DIASTOLIC BLOOD PRESSURE - MUSE: NORMAL MMHG
DIASTOLIC BLOOD PRESSURE - MUSE: NORMAL MMHG
EGFRCR SERPLBLD CKD-EPI 2021: 70 ML/MIN/1.73M2
ERYTHROCYTE [DISTWIDTH] IN BLOOD BY AUTOMATED COUNT: 14.7 % (ref 10–15)
GLUCOSE SERPL-MCNC: 109 MG/DL (ref 70–99)
HCO3 SERPL-SCNC: 28 MMOL/L (ref 22–29)
HCT VFR BLD AUTO: 35.5 % (ref 40–53)
HGB BLD-MCNC: 12 G/DL (ref 13.3–17.7)
INTERPRETATION ECG - MUSE: NORMAL
INTERPRETATION ECG - MUSE: NORMAL
LVEF ECHO: NORMAL
MAGNESIUM SERPL-MCNC: 1.8 MG/DL (ref 1.7–2.3)
MCH RBC QN AUTO: 30.6 PG (ref 26.5–33)
MCHC RBC AUTO-ENTMCNC: 33.8 G/DL (ref 31.5–36.5)
MCV RBC AUTO: 91 FL (ref 78–100)
P AXIS - MUSE: 54 DEGREES
P AXIS - MUSE: 79 DEGREES
PHOSPHATE SERPL-MCNC: 3.1 MG/DL (ref 2.5–4.5)
PLATELET # BLD AUTO: 244 10E3/UL (ref 150–450)
POTASSIUM SERPL-SCNC: 3.7 MMOL/L (ref 3.4–5.3)
PR INTERVAL - MUSE: 194 MS
PR INTERVAL - MUSE: 228 MS
QRS DURATION - MUSE: 106 MS
QRS DURATION - MUSE: 96 MS
QT - MUSE: 446 MS
QT - MUSE: 446 MS
QTC - MUSE: 491 MS
QTC - MUSE: 491 MS
R AXIS - MUSE: 73 DEGREES
R AXIS - MUSE: 75 DEGREES
RBC # BLD AUTO: 3.92 10E6/UL (ref 4.4–5.9)
SODIUM SERPL-SCNC: 135 MMOL/L (ref 135–145)
SYSTOLIC BLOOD PRESSURE - MUSE: NORMAL MMHG
SYSTOLIC BLOOD PRESSURE - MUSE: NORMAL MMHG
T AXIS - MUSE: 114 DEGREES
T AXIS - MUSE: 92 DEGREES
VENTRICULAR RATE- MUSE: 73 BPM
VENTRICULAR RATE- MUSE: 73 BPM
WBC # BLD AUTO: 9.1 10E3/UL (ref 4–11)

## 2024-07-17 PROCEDURE — 83735 ASSAY OF MAGNESIUM: CPT | Performed by: NURSE PRACTITIONER

## 2024-07-17 PROCEDURE — 93005 ELECTROCARDIOGRAM TRACING: CPT

## 2024-07-17 PROCEDURE — 99239 HOSP IP/OBS DSCHRG MGMT >30: CPT | Mod: FS | Performed by: NURSE PRACTITIONER

## 2024-07-17 PROCEDURE — 250N000011 HC RX IP 250 OP 636: Performed by: NURSE PRACTITIONER

## 2024-07-17 PROCEDURE — 250N000013 HC RX MED GY IP 250 OP 250 PS 637: Performed by: INTERNAL MEDICINE

## 2024-07-17 PROCEDURE — 999N000208 ECHOCARDIOGRAM LIMITED

## 2024-07-17 PROCEDURE — 97165 OT EVAL LOW COMPLEX 30 MIN: CPT | Mod: GO

## 2024-07-17 PROCEDURE — 250N000013 HC RX MED GY IP 250 OP 250 PS 637: Performed by: NURSE PRACTITIONER

## 2024-07-17 PROCEDURE — 93308 TTE F-UP OR LMTD: CPT | Mod: 26 | Performed by: INTERNAL MEDICINE

## 2024-07-17 PROCEDURE — 93010 ELECTROCARDIOGRAM REPORT: CPT | Mod: XE | Performed by: INTERNAL MEDICINE

## 2024-07-17 PROCEDURE — 36415 COLL VENOUS BLD VENIPUNCTURE: CPT | Performed by: NURSE PRACTITIONER

## 2024-07-17 PROCEDURE — 84100 ASSAY OF PHOSPHORUS: CPT | Performed by: NURSE PRACTITIONER

## 2024-07-17 PROCEDURE — 93325 DOPPLER ECHO COLOR FLOW MAPG: CPT | Mod: 26 | Performed by: INTERNAL MEDICINE

## 2024-07-17 PROCEDURE — 97530 THERAPEUTIC ACTIVITIES: CPT | Mod: GO

## 2024-07-17 PROCEDURE — 255N000002 HC RX 255 OP 636: Performed by: INTERNAL MEDICINE

## 2024-07-17 PROCEDURE — 80048 BASIC METABOLIC PNL TOTAL CA: CPT | Performed by: NURSE PRACTITIONER

## 2024-07-17 PROCEDURE — 93321 DOPPLER ECHO F-UP/LMTD STD: CPT | Mod: 26 | Performed by: INTERNAL MEDICINE

## 2024-07-17 PROCEDURE — 85027 COMPLETE CBC AUTOMATED: CPT | Performed by: NURSE PRACTITIONER

## 2024-07-17 RX ORDER — POTASSIUM CHLORIDE 1500 MG/1
20 TABLET, EXTENDED RELEASE ORAL DAILY
Qty: 90 TABLET | Refills: 0 | Status: SHIPPED | OUTPATIENT
Start: 2024-07-17

## 2024-07-17 RX ORDER — POTASSIUM CHLORIDE 1500 MG/1
20 TABLET, EXTENDED RELEASE ORAL ONCE
Status: COMPLETED | OUTPATIENT
Start: 2024-07-17 | End: 2024-07-17

## 2024-07-17 RX ORDER — FUROSEMIDE 40 MG
40 TABLET ORAL DAILY
Qty: 90 TABLET | Refills: 0 | Status: SHIPPED | OUTPATIENT
Start: 2024-07-17

## 2024-07-17 RX ORDER — MAGNESIUM OXIDE 400 MG/1
400 TABLET ORAL EVERY 4 HOURS
Status: COMPLETED | OUTPATIENT
Start: 2024-07-17 | End: 2024-07-17

## 2024-07-17 RX ADMIN — FUROSEMIDE 40 MG: 10 INJECTION, SOLUTION INTRAMUSCULAR; INTRAVENOUS at 08:00

## 2024-07-17 RX ADMIN — ASPIRIN 81 MG: 81 TABLET, COATED ORAL at 08:00

## 2024-07-17 RX ADMIN — Medication 400 MG: at 12:19

## 2024-07-17 RX ADMIN — HUMAN ALBUMIN MICROSPHERES AND PERFLUTREN 3 ML: 10; .22 INJECTION, SOLUTION INTRAVENOUS at 10:42

## 2024-07-17 RX ADMIN — Medication 400 MG: at 08:00

## 2024-07-17 RX ADMIN — ATORVASTATIN CALCIUM 40 MG: 40 TABLET, FILM COATED ORAL at 08:00

## 2024-07-17 RX ADMIN — POTASSIUM CHLORIDE 20 MEQ: 1500 TABLET, EXTENDED RELEASE ORAL at 08:00

## 2024-07-17 ASSESSMENT — ACTIVITIES OF DAILY LIVING (ADL)
ADLS_ACUITY_SCORE: 30

## 2024-07-17 NOTE — DISCHARGE SUMMARY
[address] [address]  p: [phone]    Discharge Summary: Cardiology Service    Caenlo Cruz MRN# [id number]   YOB: 1931 Age: 89 year old       Admission Date: 7/16/2024  Discharge Date: 07/17/24      Brief HPI:  The patient is a pleasant is a pleasant 89-year-old male with past medical history of HFmrEF, CAD, hyperlipidemia, chronic urinary retention, and severe aortic stenosis who underwent TAVR with 26 mm Rodriguez DAMIEN 3 valve on 7/16/2024.  His procedure went well without complication.  No hemodynamic instability or conduction abnormalities.    Hospital Course by Diagnosis:  # Severe aortic stenosis s/p TAVR with 26 mm Rodriguez damien 3 valve   Sheath sites soft without hematoma. No arrhythmias. Neuro's intact.  EKG today shows sinus rhythm, unchanged. Cr 1.01, hgb 12.0. Post procedure echo shows well-seated bioprosthetic aortic valve with a mean gradient of 6 mmHg, no AI or PVL.  He is tolerating cardiac rehab.  - ASA for anti-platelet therapy  - Cardiac rehab/PT/OT  - Continuous telemetry monitoring  - Lifelong antibiotic prophylaxis prior to all dental procedures.  - Follow-up in structural clinic in 1 week.      # Acute on chronic systolic heart failure with LVEF 20 to 25% (previously 40-45%), NYHA class II   NTpBNP 9531, LVEDP 24 mmHg. S/p IV lasix with robust output. Echo today shows worsening LV function with EF 20-25%. He appears euvolemic on exam. GDMT has been limited due to hypotension.   - Increase Lasix to 40 mg daily.  - Start potassium chloride 10 mEq daily.   - Repeat BMP in 1 week.   - Low sodium diet.   - Further optimize GDMT as outpatient, as blood pressure allows.     # CAD   # Hyperlipidemia with goal LDL < 70  Pre-TAVR coronary angiogram showed significant ostial circumflex and proximal RCA disease.  He has no angina.  Plan to medically manage for now, consider staged PCI if he has refractory angina.  No ACE or BB due to soft BP. Due for lipid panel.   - Continue PTA ASA 81 mg daily and atorvastatin 40 mg daily  - Repeat lipid panel in 1 month.       Discharge medications:   Current Discharge Medication List        START taking these medications    Details   potassium chloride ER (K-TAB) 20 MEQ CR tablet Take 1 tablet (20 mEq) by mouth daily  Qty: 90 tablet, Refills: 0    Associated Diagnoses: Acute on chronic systolic heart failure (H)           CONTINUE these medications which have CHANGED    Details   furosemide (LASIX) 40 MG tablet Take 1 tablet (40 mg) by mouth daily  Qty: 90 tablet, Refills: 0    Associated Diagnoses: Severe aortic stenosis by prior echocardiogram; Acute congestive heart failure, unspecified heart failure type (H)           CONTINUE these medications which have NOT CHANGED    Details   albuterol (PROAIR HFA/PROVENTIL HFA/VENTOLIN HFA) 108 (90 Base) MCG/ACT inhaler Inhale 2 puffs into the lungs every 6 hours as needed for shortness of breath, wheezing or cough      aspirin 81 MG EC tablet Take 1 tablet (81 mg) by mouth daily      atorvastatin (LIPITOR) 40 MG tablet Take 1 tablet (40 mg) by mouth daily  Qty: 90 tablet, Refills: 3    Associated Diagnoses: Coronary artery disease      diclofenac (VOLTAREN) 1 % topical gel Apply 4 g topically 4 times daily      melatonin 3 MG tablet Take 3 mg by mouth at bedtime      tamsulosin (FLOMAX) 0.4 MG capsule Take 0.4 mg by mouth daily as needed             Condition on discharge  Temp:  [97.3  F (36.3  C)-98  F (36.7  C)] 97.4  F (36.3  C)  Pulse:  [66-84] 69  Resp:  [10-23] 16  BP: ()/(50-68) 102/60  SpO2:  [88 %-97 %] 94 %  General: Alert, interactive, NAD  Eyes: sclera anicteric, EOMI  Neck: JVP 0, carotid 2+ bilaterally  Cardiovascular: regular rate and rhythm, normal S1 and S2, no murmurs, gallops, or rubs  Resp: clear to auscultation bilaterally, no rales, wheezes, or rhonchi  GI: Soft, nontender, nondistended. +BS.  No HSM or masses, no  rebound or guarding.  Extremities: no edema, no cyanosis or clubbing, dorsalis pedis and posterior tibialis pulses 2+ bilaterally  Skin: Warm and dry, no jaundice or rash  Neuro: CN 2-12 intact, moves all extremities equally  Psych: Alert & oriented x 3    Imaging with results:  Echocardiogram: 7/17/2024  Interpretation Summary     Left ventricular systolic function is severely reduced.The visual ejection  fraction is 20-25%.There is severe global hypokinesia of the left ventricle.  The right ventricular systolic function is mildly reduced.  Post TAVR 26 mm Rodriguez Paul 3 Ultra valveThe gradient is normal for this  prosthetic aortic valve.No aortic regurgitation is present.  Trivial to small pericardial effusion.     On direct comparison to echo images dated 07/16/2024 the pericardial effusion  appears new, there is interval slight decline in LVEF, visually LVEF on prior  study appeared 30 to 35%.    TAVR: 7/16/2024  Conclusion    Lifestyle-limiting severe aortic stenosis.   Successful transfemoral transcatheter aortic valve replacement with a 26 mm Rodriguez Paul 3 Ultra valve.  Temporary pacing   Left heart catheterization with LVEDP of 25 mmHg prior to valve deployment.  Right and left common femoral arteriotomies successfully closed with closure devices.         Plan    1. Aspirin 81 mg po daily lifelong.  2. Bedrest per protocol.  3. Admit to the primary inpatient team for further evaluation and management.  5. Echocardiogram tomorrow.   6. Lifelong antibiotic prophylaxis prior to all dental procedures.     Patient Care Team:  Maribell Stewart MD as PCP - General (Internal Medicine)    Maria Del Rosario Peters DNP, APRN, CNP  [facility] Cardiology  [phone]    Time Spent on this Encounter   I, Maria Del Rosario Peters CNP, personally saw the patient today and spent greater than 30 minutes discharging this patient.

## 2024-07-17 NOTE — PLAN OF CARE
Neuro: A&Ox4, neuro intact overnight  Tele/Cardiac: SR. Hematoma developed on L groin at 0145. 15 minutes of manual pressure applied and pt put back on bedrest. Groin remained stable after.  Resp: WDL  Activity: A1  Pain: pt reported back pain while on bedrest. Shoulder pain reported- tylenol, heat and repositioning effective.   Drips/IV: SL  GI/: Chronic rodriguez for retention  Skin: WDL  Diet: Diet: 2 Gram Sodium Diet     Test/Procedures: Echo, cardiac rehab  Plan: possible discharge today

## 2024-07-17 NOTE — PLAN OF CARE
[id number]:    A&OX4, RA, denies pain, VSS. Neuros intact, right groin tender to touch, left groin WNL, CMS intact. Tele- SR, rodriguez in place with good urine output. On 2 gm Na, bedrest ended 2130, Complained of tenderness on left shoulder

## 2024-07-17 NOTE — PLAN OF CARE
Goal Outcome Evaluation:    A+Ox4. Bedrest until 2130. Bilat groin sites soft, no hematoma or bruit. Has some tenderness to R groin with touch. Pulses palpable. VSS on RA. SR. Chronic rodriguez bag changed rodriguez bag. Denies pain.

## 2024-07-17 NOTE — DISCHARGE INSTRUCTIONS
TAVR Discharge Instructions  You have just had your aortic valve replaced with a new biological tissue valve. You should feel better after your surgery, but complete recovery may take several weeks. Please follow these instructions carefully and please call your valve coordinator with any questions or concerns.      CONTACT INFORMATION:   [facility]:  [phone] (7 days a week)  Scheduling and general questions - Paige ([phone])  Valve Coordinators - Aissatou RN ([phone]) and Elida TORRES ([phone])    AFTER YOU GO HOME:  Drink extra fluids for 2 days.  You may resume your normal diet.  Do not smoke.  Do not drink alcohol.  Relax and take it easy.  Do not make any important or legal decisions.    FOR 1 WEEK AFTER TAVR:  Do not drive or operate machines at home or at work. No driving until you return for your 1 week follow-up appointment. You and the provider will discuss this at the appointment.   Refrain from sexual intercourse for 1 week.  You may shower the day after your procedure. Do NOT take a bath, or use a hot tub or pool for at least 1 week. Do NOT scrub the site. Do not use lotion or powder near the puncture site.  Do not lift more than 10 pounds.   Do not do any heavy activity such as exercise, lifting, or straining.  No housework, yard work, or any activities that make you sweat.    CARE OF WRIST AND GROIN SITES:  For 2 days, when you cough, sneeze, laugh or move your bowels, hold your hand over the puncture site and press firmly on/above the site.  Remove the bandage after 24 hours. If there is minor oozing, apply another bandage and remove it after 12 hours.  It is normal to have bruising or pea size lump at the site.  If you have a wrist site puncture: Do not use your hand or arm to support your weight (such as rising from a chair) or bend your wrist (such as lifting a garage door) for 1 week.  No stooping or squatting for 1 week.     BLEEDING:  If you start  bleeding from the site in your wrist:  Sit down and press firmly on/above the site with your fingers for 10 minutes.   Once bleeding stops, keep your arm still for 2 hours.   Call [facility] or valve coordinator as soon as you can.  If you start bleeding from the site in your groin:  Lie down flat and press firmly on/above the site for 10 minutes.  Once bleeding stops lay flat for 2 hours.   Call [facility] or valve coordinator as soon as you can.  Call 911 right away if you have heavy bleeding or bleeding that does not stop.    MEDICINES:  You may be started on an anti-platelet medication, such as Plavix or aspirin. Please call the [facility] with any questions regarding this medication.  If you have stopped any medicines, check with your provider about when to restart them.  You will require lifetime prophylactic antibiotics for dental cleanings and dental work after your TAVR, please inquire with the Heart Clinic prior to your scheduled cleanings.     FOLLOW-UP APPOINTMENTS:  Follow-up with a Structural Heart advanced practice provider (NP or PA) at [facility] in 7-10 days after your procedure.  You will also follow-up at [facility] 1 month after your procedure which will include a visit with an advanced practice provider an echocardiogram (echo), an electrocardiogram (ECG), and lab work. This follow-up should be scheduled for you prior to you leaving the hospital.  Cardiac Rehab will contact you for follow up care. You can enroll at a site convenient to you.  We will have you see your primary cardiologist about 6 months after your TAVR.  We will see you 1 year after your TAVR with a visit with an advanced practice provider (NP or PA) an echocardiogram (echo), electrocardiogram (ECG), and lab work.     CALL THE CLINIC IF:   You have increased pain or a large or growing hard lump around the site.  The site is  red, swollen, hot, or tender.  Blood or fluid is draining from the site.  You have chills or a fever greater than 101 F (38 C).  Your arm or leg feels numb, cool, or changes color.  You have hives, a rash, or unusual itching.  New pain in the back or belly that you cannot control with Tylenol.  Any questions or concerns.    OTHER INSTRUCTIONS:  You will receive a card with your new valve information in the mail, directly from the . Retain this card with your health care records.    DENTAL WORK:  You must have antibiotics before all dental work to prevent endocarditis, or an infection on your new heart valve. Please call the valve coordinators to get a prescription for this. Please do not schedule any dental cleanings for at least 3-6 months after your TAVR.

## 2024-07-17 NOTE — PROGRESS NOTES
"   07/17/24 1222   Appointment Info   Signing Clinician's Name / Credentials (OT) Ailyn Tucker, OTJELANI   Living Environment   People in Home spouse   Current Living Arrangements residential facility   Home Accessibility no concerns   Transportation Anticipated car, drives self;family or friend will provide   Living Environment Comments Pt lives in ILF facility with wife. Reports they do not get any services.   Self-Care   Usual Activity Tolerance good   Current Activity Tolerance moderate   Equipment Currently Used at Home none   Fall history within last six months yes   Activity/Exercise/Self-Care Comment Pt reports using a walker is a \"delth sentance\". Declines trial. Pt demos ability to don socks prior to ambulation.   General Information   Onset of Illness/Injury or Date of Surgery 07/16/24   Referring Physician Maria Del Rosario Peters, CNP   Additional Occupational Profile Info/Pertinent History of Current Problem The patient is a karlos is a pleasant 89-year-old male with past medical history of HFmrEF, CAD, hyperlipidemia, chronic urinary retention, and severe aortic stenosis who underwent TAVR with 26 mm Rodriguez DAMIEN 3 valve on 7/16/2024.   Existing Precautions/Restrictions cardiac;fall   Cognitive Status Examination   Orientation Status orientation to person, place and time   Visual Perception   Visual Impairment/Limitations WFL   Sensory   Sensory Quick Adds sensation intact   Posture   Posture protracted shoulders   Range of Motion Comprehensive   General Range of Motion bilateral upper extremity ROM WFL   Strength Comprehensive (MMT)   General Manual Muscle Testing (MMT) Assessment no strength deficits identified   Bed Mobility   Bed Mobility supine-sit;sit-supine   Supine-Sit Kleberg (Bed Mobility) supervision   Sit-Supine Kleberg (Bed Mobility) supervision   Transfers   Transfers sit-stand transfer   Transfer Comments CGA   Activities of Daily Living   BADL Assessment/Intervention lower body " dressing   Lower Body Dressing Assessment/Training   Sheridan Level (Lower Body Dressing) don;socks   Clinical Impression   Criteria for Skilled Therapeutic Interventions Met (OT) Yes, treatment indicated   OT Diagnosis impaired functional mobility   OT Problem List-Impairments impacting ADL problems related to;activity tolerance impaired;mobility   Assessment of Occupational Performance 1-3 Performance Deficits   Planned Therapy Interventions (OT) ADL retraining;IADL retraining;cognition;transfer training;home program guidelines;progressive activity/exercise;risk factor education   Clinical Decision Making Complexity (OT) problem focused assessment/low complexity   Risk & Benefits of therapy have been explained evaluation/treatment results reviewed;care plan/treatment goals reviewed   OT Total Evaluation Time   OT Eval, Low Complexity Minutes (37454) 5   OT Goals   Therapy Frequency (OT) One time eval and treatment   OT Predicted Duration/Target Date for Goal Attainment 07/17/24   OT Goals Cardiac Phase 1   OT: Understanding of cardiac education to maximize quality of life, condition management, and health outcomes Patient   OT: Perform aerobic activity with stable cardiovascular response continuous;5 minutes   OT: Functional/aerobic ambulation tolerance with stable cardiovascular response in order to return to home and community environment Supervision/SBA;Greater than 300 feet   Interventions   Interventions Quick Adds Cardiac Rehab;Therapeutic Activity   Therapeutic Activities   Therapeutic Activity Minutes (94462) 29   Symptoms noted during/after treatment none   Treatment Detail/Skilled Intervention Pt supine in bed, agrees to cardiac rehab. Pt has a very questioning attitude, asks many questions and wants clarification. Increased time to review hand outs because of this. Ambulates hallways, see ambulation details. Back in bed at end of session, VSS.   Ambulation   Duration (minutes) 7 minutes   Effort  "Scale 3   Cardiovascular Response Normal   Exercise Details Ambulates in hallway with SBA. Holds onto wall occasionally for balance. Encouraged to use a walker and pt states, \"a walker is a death sentence!\" No convincing him otherwise. Tolerates activity well.   Cardiac Education   Education Provided Diagnosis;Energy conservation;Home exercise program;OMNI Scale;Outpatient Cardiac Rehab;Precautions;Resuming home activities;Risk factors   Education Packet Given to Patient Yes   All Patient Education Handouts Reviewed with Patient and/or Family Yes   Cardiac Rehab Phase II Plan   Phase II Order Received Yes   Phase II Appointment Status Scheduled   Date/Time 7/23 10am   Location [facility]   OT Discharge Planning   OT Plan d/c inpatient cardiac rehab   OT Discharge Recommendation (DC Rec) home with outpatient occupational therapy   OT Rationale for DC Rec Pt with VSS during activity. SBA needed for unsteadiness, anticipate pt at baseline but refuses to use walker, stating it is a death sentence. Recommed slow introduction to activity, this may be hard for patient as he seems to be very active. Highly encouraged outpatient cardiac rehab.   OT Brief overview of current status SBA for 7min of ambulation.   Total Session Time   Timed Code Treatment Minutes 29   Total Session Time (sum of timed and untimed services) 34     Occupational Therapy Discharge Summary    Reason for therapy discharge:    All goals met    Progress towards therapy goal(s). See goals on Care Plan in [facility] electronic health record for goal details.  Goals met    Therapy recommendation(s):    Continued therapy is recommended.  Rationale/Recommendations:  outpatient cardiac rehab.      "

## 2024-07-17 NOTE — PROVIDER NOTIFICATION
Notified provider about indwelling rodriguez catheter discussed removal or continued need.    Did provider choose to remove indwelling rodriguez catheter? No    Provider's rodriguez indication for keeping indwelling rodriguez catheter: Retention.    Is there an order for indwelling rodriguez catheter? Yes- chronic came in with it.    *If there is a plan to keep rodriguez catheter in place at discharge daily notification with provider is not necessary, but please add a notation in the treatment team sticky note that the patient will be discharging with the catheter.

## 2024-07-17 NOTE — PLAN OF CARE
Goal Outcome Evaluation:      Plan of Care Reviewed With: patient, spouse, friend      Discharge home with wife and friend, up in the room with stand by assist, refused to eat food is very bad here, Christianson bag changed to leg bag. VSS, RA, groin site no bleeding no hematoma, no bruit. Will follow up with Cardiology next week.

## 2024-07-18 ENCOUNTER — PATIENT OUTREACH (OUTPATIENT)
Dept: CARE COORDINATION | Facility: CLINIC | Age: 89
End: 2024-07-18
Payer: COMMERCIAL

## 2024-07-18 NOTE — PROGRESS NOTES
Clinic Care Coordination Chart Review     Situation: Patient chart reviewed by care coordination leader.     Background: Primary Care Care Coordination referral entered by Inpatient Care Manager through IP to Amb CM handoff process.     Assessment: Per chart review, patient has primary care provider outside of [facility].  Primary Care Care Coordination team supports patients in collaboration with their [facility] PCP therefore this patient would not be a candidate for outreach.       Plan/Recommendations: Patient can be encouraged to follow discharge instructions as outlined on AVS. No intervention planned by [facility] Primary Care Care Coordination team at this time.     Michelle Adkins, LALYN, RN   Manager of Ambulatory Care Management  [facility]

## 2024-07-22 ENCOUNTER — TELEPHONE (OUTPATIENT)
Dept: CARDIOLOGY | Facility: CLINIC | Age: 89
End: 2024-07-22
Payer: COMMERCIAL

## 2024-07-22 NOTE — TELEPHONE ENCOUNTER
Health Call Center    Phone Message    May a detailed message be left on voicemail: yes     Reason for Call: Other: Hnag called in very upset stating that appointments are being made and rescheduled without his knowledge and would like to speak directly with Maria Del Rosario regarding this before he sees her on 7/24.  Please return his call at the earliest convenience.     Action Taken: Other: Cardiology    Travel Screening: Not Applicable    Thank you!  [facility]

## 2024-07-23 ENCOUNTER — TELEPHONE (OUTPATIENT)
Dept: CARDIOLOGY | Facility: CLINIC | Age: 89
End: 2024-07-23
Payer: COMMERCIAL

## 2024-07-23 NOTE — TELEPHONE ENCOUNTER
Attempted to return call to patient. Left voicemail with upcoming appointment information. Direct call back information provided.

## 2024-07-23 NOTE — TELEPHONE ENCOUNTER
[facility]    Phone Message    May a detailed message be left on voicemail: yes     Reason for Call: Other: Pt returned call to clinic. Previous encounter had not been published yet to advise pt it was Elida or be able to connect him accordingly. Pt had requested a call back. Thank you!     Action Taken: Message routed to:  Other: [facility]    Travel Screening: No     Date of Service:

## 2024-07-23 NOTE — TELEPHONE ENCOUNTER
Spoke with patient and reviewed 1 week appointment scheduled for tomorrow at 7/24/24. This was scheduled on 7/9/24. Patient states that he was never informed of this appointment. Patient is forgetful. Patient states that he is unable to make this appointment. Routing to scheduling to reschedule 1 week follow up and arrange 1 month.

## 2024-07-23 NOTE — TELEPHONE ENCOUNTER
Received call from BRIAN Hancock with home health care. She states that they discharged patient from home health care 2 week ago but have been repeatedly called by patient today. Karissa states that patient is very confused. She states that they have filed multiple vulnerable adult concerns regarding patient. She states that they have a county  involved with patient.   Karissa suggested that patient be seen for evaluation regarding his mental status.   Informed Karissa that I spoke with patient this morning as well and he declined to present for his scheduled office visit tomorrow. Informed her that we will attempt to reschedule patient's follow up but unfortunately cannot make patient be seen in clinic.

## 2024-07-31 NOTE — OP NOTE
OPERATIVE DATE: 7/16/2024    PRE-OPERATIVE DIAGNOSIS:  1) Severe aortic stenosis  2) Acute on chronic systolic heart failure  3) Hyperlipidemia    POST-OPERATIVE DIAGNOSIS:  1) Severe aortic stenosis  2) Acute on chronic systolic heart failure  3) Hyperlipidemia    PROCEDURE:  1) Temporary pacemaker insertion  2) Iliofemoral artery angiography  3) Ascending aorta angiography  4) Left heart catheterization  5) Successful transfemoral transcatheter aortic valve replacement with 29 mm Rodriguez Paul 3 valve  6) Arteriotomy closure with Perclose device    SURGEON: CLAUDIA Martin MD    CARDIOLOGIST: Prince Burnett MD; Timmy Sinha MD    ANESTHESIA: Monitored anesthesia care with local analgesia    ESTIMATED BLOOD LOSS: 25 mL    INDICATIONS:  Mr. Canelo Cruz is a 89 year old year old male with severe symptomatic aortic stenosis. We were asked to evaluate for transcatheter aortic valve replacement. Risks and benefits of the operation were explained to the patient and their family including, but not limited to, bleeding, infection, stroke and even death. They understood these risks and agreed to proceed electively.    OPERATIVE REPORT:  The patient was transferred to the operating room and positioned supine on the OR table. Monitored anesthesia care was begun. The patients chest, abdomen and bilateral lower extremities were clipped, prepped and draped in sterile fashion. A pre-procedure time-out was performed confirming the correct patient, correct site and correct procedure.    Intravenous heparin was administered. Arterial access of the right and left femoral arteries and left common femoral vein was performed. The side used for delivery was prepared for percutaneous closure at completion.    A temporary transvenous pacemaker was placed.    A Lunderquist wire was advanced to support the Rodriguez sheath insertion. A pigtail catheter was advanced to the aortic root and angiogram performed to confirm optimal implant  angle. The pigtail was placed in the non-coronary cusp.    The LV was accessed using an AL-1 catheter over a straight wire. The pigtail catheter was advanced into the LV. The pigtail catheter was used to advance a Safari wire into the LV and the pigtail catheter was removed.    The Paul 3 bioprosthetic valve was positioned across the native aortic valve and deployed using rapid pacing.    Transthoracic echocardiography showed no paravalvular insufficiency.    The deployment system and wires were removed. The femoral access was made hemostatic.    A final iliofemoral angiogram showed no extravasation or stenosis.    The patient was then transferred to the recovery area in stable condition.    All needle, sponge and instrument counts were correct at the end of the case.    CLAUDIA Martin MD  Cardiothoracic Surgery  Pager [phone]

## 2024-08-28 ENCOUNTER — TELEPHONE (OUTPATIENT)
Dept: CARDIOLOGY | Facility: CLINIC | Age: 89
End: 2024-08-28
Payer: COMMERCIAL

## 2024-08-28 NOTE — TELEPHONE ENCOUNTER
Called patient in regards to patient confused about 1 mo follow up appointments.   Left voicemail - Let patient know that I was the one who scheduled the follow up appointments, and that it is something that is standard for TAVR Registry requirements, and also so we can look at the TAVR placement, and healing of the heart.     LVM and callback information for patient, and asked him to also confirm whether or not he was able to come to these appts     Nancy Gutierrez  Structural Heart Procedure   [facility]

## 2024-08-29 ENCOUNTER — TELEPHONE (OUTPATIENT)
Dept: CARDIOLOGY | Facility: CLINIC | Age: 89
End: 2024-08-29
Payer: COMMERCIAL

## 2024-08-29 NOTE — TELEPHONE ENCOUNTER
Patient called regarding follow up appdanielle caba/ Maria Del Rosario for 8/30.     He confirmed that he would like to keep them because he feels off and would like to attend the appointments .     Let him know that we will go ahead and keep those on the books for him, and to let us know if anything changes.     Nancy Gutierrez  Structural Heart Procedure   [facility]/ [facility]

## 2024-08-30 ENCOUNTER — OFFICE VISIT (OUTPATIENT)
Dept: CARDIOLOGY | Facility: CLINIC | Age: 89
End: 2024-08-30
Attending: INTERNAL MEDICINE
Payer: COMMERCIAL

## 2024-08-30 ENCOUNTER — HOSPITAL ENCOUNTER (OUTPATIENT)
Dept: CARDIOLOGY | Facility: CLINIC | Age: 89
Discharge: HOME | End: 2024-08-30
Attending: INTERNAL MEDICINE | Admitting: INTERNAL MEDICINE
Payer: COMMERCIAL

## 2024-08-30 ENCOUNTER — LAB (OUTPATIENT)
Dept: LAB | Facility: CLINIC | Age: 89
End: 2024-08-30
Attending: INTERNAL MEDICINE
Payer: COMMERCIAL

## 2024-08-30 VITALS
HEART RATE: 75 BPM | HEIGHT: 72 IN | BODY MASS INDEX: 24.11 KG/M2 | WEIGHT: 178 LBS | OXYGEN SATURATION: 97 % | DIASTOLIC BLOOD PRESSURE: 57 MMHG | SYSTOLIC BLOOD PRESSURE: 124 MMHG

## 2024-08-30 DIAGNOSIS — E78.5 HYPERLIPIDEMIA LDL GOAL <70: ICD-10-CM

## 2024-08-30 DIAGNOSIS — R33.9 URINARY RETENTION: ICD-10-CM

## 2024-08-30 DIAGNOSIS — Z95.2 S/P TAVR (TRANSCATHETER AORTIC VALVE REPLACEMENT): ICD-10-CM

## 2024-08-30 DIAGNOSIS — Z95.2 S/P TAVR (TRANSCATHETER AORTIC VALVE REPLACEMENT): Primary | ICD-10-CM

## 2024-08-30 LAB
ANION GAP SERPL CALCULATED.3IONS-SCNC: 10 MMOL/L (ref 7–15)
BUN SERPL-MCNC: 13.3 MG/DL (ref 8–23)
CALCIUM SERPL-MCNC: 9.3 MG/DL (ref 8.8–10.4)
CHLORIDE SERPL-SCNC: 104 MMOL/L (ref 98–107)
CHOLEST SERPL-MCNC: 185 MG/DL
CREAT SERPL-MCNC: 1 MG/DL (ref 0.67–1.17)
EGFRCR SERPLBLD CKD-EPI 2021: 70 ML/MIN/1.73M2
ERYTHROCYTE [DISTWIDTH] IN BLOOD BY AUTOMATED COUNT: 15 % (ref 10–15)
GLUCOSE SERPL-MCNC: 98 MG/DL (ref 70–99)
HCO3 SERPL-SCNC: 25 MMOL/L (ref 22–29)
HCT VFR BLD AUTO: 38.2 % (ref 40–53)
HDLC SERPL-MCNC: 62 MG/DL
HGB BLD-MCNC: 12.5 G/DL (ref 13.3–17.7)
LDLC SERPL CALC-MCNC: 108 MG/DL
LVEF ECHO: NORMAL
MCH RBC QN AUTO: 31 PG (ref 26.5–33)
MCHC RBC AUTO-ENTMCNC: 32.7 G/DL (ref 31.5–36.5)
MCV RBC AUTO: 95 FL (ref 78–100)
NONHDLC SERPL-MCNC: 123 MG/DL
PLATELET # BLD AUTO: 242 10E3/UL (ref 150–450)
POTASSIUM SERPL-SCNC: 4.3 MMOL/L (ref 3.4–5.3)
RBC # BLD AUTO: 4.03 10E6/UL (ref 4.4–5.9)
SODIUM SERPL-SCNC: 139 MMOL/L (ref 135–145)
TRIGL SERPL-MCNC: 77 MG/DL
WBC # BLD AUTO: 8.1 10E3/UL (ref 4–11)

## 2024-08-30 PROCEDURE — 80061 LIPID PANEL: CPT | Performed by: NURSE PRACTITIONER

## 2024-08-30 PROCEDURE — 93000 ELECTROCARDIOGRAM COMPLETE: CPT | Performed by: INTERNAL MEDICINE

## 2024-08-30 PROCEDURE — 93306 TTE W/DOPPLER COMPLETE: CPT

## 2024-08-30 PROCEDURE — 85027 COMPLETE CBC AUTOMATED: CPT | Performed by: INTERNAL MEDICINE

## 2024-08-30 PROCEDURE — 99214 OFFICE O/P EST MOD 30 MIN: CPT | Mod: 25 | Performed by: NURSE PRACTITIONER

## 2024-08-30 PROCEDURE — 93306 TTE W/DOPPLER COMPLETE: CPT | Mod: 26 | Performed by: INTERNAL MEDICINE

## 2024-08-30 PROCEDURE — 80048 BASIC METABOLIC PNL TOTAL CA: CPT | Performed by: INTERNAL MEDICINE

## 2024-08-30 PROCEDURE — 36415 COLL VENOUS BLD VENIPUNCTURE: CPT | Performed by: INTERNAL MEDICINE

## 2024-08-30 RX ORDER — METOPROLOL SUCCINATE 25 MG/1
25 TABLET, EXTENDED RELEASE ORAL DAILY
Qty: 90 TABLET | Refills: 3 | Status: SHIPPED | OUTPATIENT
Start: 2024-08-30

## 2024-08-30 RX ORDER — TAMSULOSIN HYDROCHLORIDE 0.4 MG/1
0.4 CAPSULE ORAL DAILY PRN
Qty: 30 CAPSULE | Refills: 0 | Status: SHIPPED | OUTPATIENT
Start: 2024-08-30

## 2024-08-30 NOTE — LETTER
8/30/2024    Maribell Stewart MD  [address]  [facility] [address]    RE: Canelo Cruz       Dear Colleague,     I had the pleasure of seeing Canelo Cruz in the [facility].  Cardiology Clinic Progress Note  Canelo Cruz MRN# [id number]   YOB: 1931 Age: 89 year old     Primary cardiologist: Dr. Burnett     Reason for visit: s/p TAVR    History of presenting illness:    Canelo Cruz is a pleasant 89 year old patient with past medical history significant for HFmrEF, CAD, hyperlipidemia, chronic urinary retention, dementia, and severe aortic stenosis status post TAVR with 26 mm Rodriguez DAMIEN 3 valve on 7/16/2024.     His procedure went well without complication.  No conduction abnormalities.  Postprocedure echo showed well-seated valve with no PVL.  The ejection fraction was lower when compared to his last echo prior to his valve replacement with an EF of 20 to 25%, previously 40-45%.  His GDMT was limited due to hypotension.  He discharged on POD#1.     He returns today for follow-up.  Repeat echocardiogram shows well-seated valve with mean gradient of 9 mmHg and no PVL.  The LV systolic function has improved and is now moderately reduced with an EF of 40%.  His labs, which I reviewed, including BMP and CBC are unremarkable.  His hemoglobin is stable at 12.5.     He denies any overwhelming cardiac symptoms.  He has no chest pain, shortness of breath, syncope or near syncope, or palpitations.  He tells me he had a horrible experience during his admission for various reasons, but is happy to be here today and is feeling overall better since his valve replacement.           Assessment and Plan:     ASSESSMENT:    Severe aortic stenosis status post TAVR with 26 mm Rodriguez DAMIEN 3 valve.  Repeat TTE with normal functioning valve.  He has no exertional symptoms.  He opted out of cardiac rehab.  HFrEF with a EF of 40%, previously 20 to 25%, NYHA class II.  He does have  some mild peripheral edema, on Lasix 40 mg daily but unsure if he is taking this.  Further GDMT has been limited due to hypotension.  Coronary artery disease with known significant ostial LCx and prox RCA disease, being medically managed.  He denies angina, currently on aspirin and statin therapy.  Hyperlipidemia.  On high intensity statin.   Chronic urinary retention.  Currently with chronic Christianson in place, follows with urology.        PLAN:     Overall Hang appears to be doing well from a cardiovascular standpoint, he has no clinical signs of heart failure.  Recommend Toprol-XL 25 mg daily.  Encouraged him to take his Lasix 40 mg daily if he is not already.  Will further optimize his GDMT pending blood pressure.  Repeat echocardiogram in 1 year.  If his LV function remains depressed or the patient has refractory angina, consider PCI of the RCA and LCx.  Follow-up with Dr. Burnett in 6 months, sooner if needed.         Maria Del Rosario Peters, KYAW, APRN, CNP  Page: [phone] (8a-5p M-F)    Orders this Visit:  No orders of the defined types were placed in this encounter.    Orders Placed This Encounter   Medications     metoprolol succinate ER (TOPROL XL) 25 MG 24 hr tablet     Sig: Take 1 tablet (25 mg) by mouth daily.     Dispense:  90 tablet     Refill:  3     tamsulosin (FLOMAX) 0.4 MG capsule     Sig: Take 1 capsule (0.4 mg) by mouth daily as needed (urinary retention).     Dispense:  30 capsule     Refill:  0     Medications Discontinued During This Encounter   Medication Reason     tamsulosin (FLOMAX) 0.4 MG capsule Reorder (No AVS)       Today's clinic visit entailed:  Review of the result(s) of each unique test - echo, labs, EKG  Ordering of each unique test  Prescription drug management  35 minutes spent by me on the date of the encounter doing chart review, review of test results, interpretation of tests, patient visit, and documentation   Provider  Link to [facility] Help Grid     The level of medical decision making  during this visit was of moderate complexity.           Review of Systems:     Review of Systems:  Skin:        Eyes:       ENT:       Respiratory:  Positive for dyspnea on exertion  Cardiovascular:    fatigue;Positive for  Gastroenterology:      Genitourinary:       Musculoskeletal:       Neurologic:       Psychiatric:       Heme/Lymph/Imm:       Endocrine:                 Physical Exam:   Vitals: /57   Pulse 75   Ht 1.829 m (6')   Wt 80.7 kg (178 lb)   SpO2 97%   BMI 24.14 kg/m    Constitutional:  cooperative        Skin:  warm and dry to the touch        Head:  normocephalic        Eyes:  pupils equal and round        ENT:           Neck:  JVP normal        Chest:  normal breath sounds, clear to auscultation, normal A-P diameter, normal symmetry, normal respiratory excursion, no use of accessory muscles        Cardiac: regular rhythm;normal S1 and S2       systolic ejection murmur;grade 1          Abdomen:  abdomen soft        Vascular: pulses full and equal                                      Extremities and Back:           Neurological:  no gross motor deficits;affect appropriate             Medications:     Current Outpatient Medications   Medication Sig Dispense Refill     aspirin 81 MG EC tablet Take 81 mg by mouth. Taking 1-2 weekly       melatonin 3 MG tablet Take 3 mg by mouth at bedtime       metoprolol succinate ER (TOPROL XL) 25 MG 24 hr tablet Take 1 tablet (25 mg) by mouth daily. 90 tablet 3     tamsulosin (FLOMAX) 0.4 MG capsule Take 1 capsule (0.4 mg) by mouth daily as needed (urinary retention). 30 capsule 0     albuterol (PROAIR HFA/PROVENTIL HFA/VENTOLIN HFA) 108 (90 Base) MCG/ACT inhaler Inhale 2 puffs into the lungs every 6 hours as needed for shortness of breath, wheezing or cough (Patient not taking: Reported on 8/30/2024)       atorvastatin (LIPITOR) 40 MG tablet Take 1 tablet (40 mg) by mouth daily (Patient not taking: Reported on 8/30/2024) 90 tablet 3     diclofenac  (VOLTAREN) 1 % topical gel Apply 4 g topically 4 times daily       furosemide (LASIX) 40 MG tablet Take 1 tablet (40 mg) by mouth daily (Patient not taking: Reported on 8/30/2024) 90 tablet 0     potassium chloride ER (K-TAB) 20 MEQ CR tablet Take 1 tablet (20 mEq) by mouth daily (Patient not taking: Reported on 8/30/2024) 90 tablet 0       Family History   Problem Relation Age of Onset     Unknown/Adopted Mother      Unknown/Adopted Father      Diabetes No family hx of      Coronary Artery Disease No family hx of      Hypertension No family hx of      Hyperlipidemia No family hx of      Cerebrovascular Disease No family hx of      Breast Cancer No family hx of      Colon Cancer No family hx of      Prostate Cancer No family hx of      Other Cancer No family hx of      Depression No family hx of      Anxiety Disorder No family hx of      Mental Illness No family hx of      Substance Abuse No family hx of      Anesthesia Reaction No family hx of      Asthma No family hx of      Osteoporosis No family hx of      Genetic Disorder No family hx of      Thyroid Disease No family hx of      Obesity No family hx of        Social History     Socioeconomic History     Marital status:      Spouse name: Not on file     Number of children: Not on file     Years of education: Not on file     Highest education level: Not on file   Occupational History     Not on file   Tobacco Use     Smoking status: Never     Smokeless tobacco: Never   Substance and Sexual Activity     Alcohol use: Yes     Comment: 10 per week     Drug use: No     Sexual activity: Not Currently     Partners: Male   Other Topics Concern     Parent/sibling w/ CABG, MI or angioplasty before 65F 55M? No   Social History Narrative     Not on file     Social Determinants of Health     Financial Resource Strain: Medium Risk (6/17/2024)    [facility] [employer] [facility]    Financial Resource Strain      Difficulty of Paying Living  Expenses: 2      Difficulty of Paying Living Expenses: 1   Food Insecurity: No Food Insecurity (6/17/2024)    [facility]    Food Insecurity      Worried About Running Out of Food in the Last Year: 1   Transportation Needs: No Transportation Needs (6/17/2024)    Received from [facility] [facility]    Transportation Needs      Lack of Transportation (Medical): 1   Physical Activity: Not on file   Stress: Not on file   Social Connections: Socially Isolated (6/17/2024)    [facility]    Social Connections      Frequency of Communication with Friends and Family: 4   Interpersonal Safety: Not on file   Housing Stability: Low Risk  (6/17/2024)    [facility]    Housing Stability      Unable to Pay for Housing in the Last Year: 1            Past Medical History:     Past Medical History:   Diagnosis Date     Gout      Heart murmur      Insomnia      Mixed hyperlipidemia      Postherpetic neuralgia               Past Surgical History:     Past Surgical History:   Procedure Laterality Date     ARTHROSCOPY KNEE Right     ~[id number]     CV CORONARY ANGIOGRAM N/A 6/13/2024    Procedure: Coronary Angiogram;  Surgeon: Jarett Odell MD;  Location: [facility] CARDIAC CATH LAB     CV RIGHT HEART CATH MEASUREMENTS RECORDED N/A 6/13/2024    Procedure: Right Heart Catheterization;  Surgeon: Jarett Odell MD;  Location: [facility] CARDIAC CATH LAB     CV TRANSCATHETER AORTIC VALVE REPLACEMENT-FEMORAL APPROACH N/A 7/16/2024    Procedure: Transcatheter Aortic Valve Replacement-Femoral Approach;  Surgeon: Prince Burnett MD;  Location: [facility] CARDIAC CATH LAB     DAVINCI XI HERNIORRHAPHY INGUINAL Bilateral 7/18/2022    Procedure: Robotic repair of right inguinal hernia with placement of mesh,  robotic repair of left inguinal hernia with placement of mesh;   Surgeon: Ina Phillips MD;  Location:  OR     ORTHOPEDIC SURGERY      right achilles rupture repair with 14 month MRSA infection              Allergies:   Ciprofloxacin       Data:   All laboratory data reviewed:    Recent Labs   Lab Test 06/28/24  1414 02/14/20  1033 03/21/19  0833 11/26/18  1441   LDL  --  121* 139* 137*   HDL  --  32* 40 39*   NHDL  --  179* 184* 187*   CHOL  --  211* 224* 226*   TRIG  --  289* 225* 248*   TSH  --   --   --  2.74   NTBNP 9,531*  --   --   --    ALLYSON  --  268  --   --        Lab Results   Component Value Date    WBC 8.1 08/30/2024    WBC 9.5 05/11/2020    RBC 4.03 (L) 08/30/2024    RBC 5.18 05/11/2020    HGB 12.5 (L) 08/30/2024    HGB 15.7 05/11/2020    HCT 38.2 (L) 08/30/2024    HCT 46.7 05/11/2020    MCV 95 08/30/2024    MCV 90 05/11/2020    MCH 31.0 08/30/2024    MCH 30.3 05/11/2020    MCHC 32.7 08/30/2024    MCHC 33.6 05/11/2020    RDW 15.0 08/30/2024    RDW 13.9 05/11/2020     08/30/2024     05/11/2020       Lab Results   Component Value Date     08/30/2024     02/14/2020    POTASSIUM 4.3 08/30/2024    POTASSIUM 3.8 02/14/2020    CHLORIDE 104 08/30/2024    CHLORIDE 107 02/14/2020    CO2 25 08/30/2024    CO2 25 02/14/2020    ANIONGAP 10 08/30/2024    ANIONGAP 5 02/14/2020    GLC 98 08/30/2024     (H) 06/11/2024     (H) 02/14/2020    BUN 13.3 08/30/2024    BUN 15 02/14/2020    CR 1.00 08/30/2024    CR 0.99 02/14/2020    GFRESTIMATED 70 08/30/2024    GFRESTIMATED 67 02/14/2020    GFRESTBLACK 78 02/14/2020    ROSA 9.3 08/30/2024    ROSA 9.0 02/14/2020      Lab Results   Component Value Date    AST 25 06/28/2024    AST 18 02/14/2020    ALT 14 06/28/2024    ALT 20 02/14/2020       Lab Results   Component Value Date    A1C 5.7 (H) 11/26/2018       Lab Results   Component Value Date    INR 0.96 06/28/2024       [facility]  5  5  2  1  1  3  5  2  1  1  4  6        Thank you for allowing me to participate in the care of your  patient.      Sincerely,     Maria Del Rosario Peters, [facility]  cc:   Prince Burnett MD  [address]  [place] [address]

## 2024-08-30 NOTE — PROGRESS NOTES
Cardiology Clinic Progress Note  Canelo Cruz MRN# [id number]   YOB: 1931 Age: 89 year old     Primary cardiologist: Dr. Burnett     Reason for visit: s/p TAVR    History of presenting illness:    Canelo Cruz is a pleasant 89 year old patient with past medical history significant for HFmrEF, CAD, hyperlipidemia, chronic urinary retention, dementia, and severe aortic stenosis status post TAVR with 26 mm Rodriguez DAMIEN 3 valve on 7/16/2024.     His procedure went well without complication.  No conduction abnormalities.  Postprocedure echo showed well-seated valve with no PVL.  The ejection fraction was lower when compared to his last echo prior to his valve replacement with an EF of 20 to 25%, previously 40-45%.  His GDMT was limited due to hypotension.  He discharged on POD#1.     He returns today for follow-up.  Repeat echocardiogram shows well-seated valve with mean gradient of 9 mmHg and no PVL.  The LV systolic function has improved and is now moderately reduced with an EF of 40%.  His labs, which I reviewed, including BMP and CBC are unremarkable.  His hemoglobin is stable at 12.5.     He denies any overwhelming cardiac symptoms.  He has no chest pain, shortness of breath, syncope or near syncope, or palpitations.  He tells me he had a horrible experience during his admission for various reasons, but is happy to be here today and is feeling overall better since his valve replacement.  He is not entirely sure if he is taking all of his medications.           Assessment and Plan:     ASSESSMENT:    Severe aortic stenosis status post TAVR with 26 mm Rodriguez DAMIEN 3 valve.  Repeat TTE with normal functioning valve.  He has no exertional symptoms.  He opted out of cardiac rehab.  HFrEF with a EF of 40%, previously 20 to 25%, NYHA class II.  He does have some mild peripheral edema, on Lasix 40 mg daily but unsure if he is taking this.  Further GDMT has been limited due to hypotension.  Coronary  artery disease with known significant ostial LCx and prox RCA disease, being medically managed.  He denies angina, currently on aspirin and statin therapy, but unclear if he is actually taking this.  Hyperlipidemia.  On high intensity statin, again unclear if he is taking this.   Chronic urinary retention.  Currently with chronic Christianson in place, follows with urology.        PLAN:     Overall Hang appears to be doing well from a cardiovascular standpoint, he has no clinical signs of heart failure.  Recommend the addition of Toprol-XL 25 mg daily.  Encouraged him to take his aspirin, atorvastatin, and Lasix if he is not already.  Will further optimize his GDMT pending blood pressure, but given likelihood of medication noncompliance, will continue to reassess this at his upcoming outpatient follow-up.  Repeat echocardiogram in 1 year.  If his LV function remains depressed or the patient has refractory angina, consider PCI of the RCA and LCx.  Follow-up with Dr. Burnett in 6 months, sooner if needed.         Maria Del Rosario Peters, KYAW, APRN, CNP  Page: [phone] (8a-5p M-F)    Orders this Visit:  No orders of the defined types were placed in this encounter.    Orders Placed This Encounter   Medications    metoprolol succinate ER (TOPROL XL) 25 MG 24 hr tablet     Sig: Take 1 tablet (25 mg) by mouth daily.     Dispense:  90 tablet     Refill:  3    tamsulosin (FLOMAX) 0.4 MG capsule     Sig: Take 1 capsule (0.4 mg) by mouth daily as needed (urinary retention).     Dispense:  30 capsule     Refill:  0     Medications Discontinued During This Encounter   Medication Reason    tamsulosin (FLOMAX) 0.4 MG capsule Reorder (No AVS)       Today's clinic visit entailed:  Review of the result(s) of each unique test - echo, labs, EKG  Ordering of each unique test  Prescription drug management  35 minutes spent by me on the date of the encounter doing chart review, review of test results, interpretation of tests, patient visit, and  documentation   Provider  Link to [facility] Help Grid     The level of medical decision making during this visit was of moderate complexity.           Review of Systems:     Review of Systems:  Skin:        Eyes:       ENT:       Respiratory:  Positive for dyspnea on exertion  Cardiovascular:    fatigue;Positive for  Gastroenterology:      Genitourinary:       Musculoskeletal:       Neurologic:       Psychiatric:       Heme/Lymph/Imm:       Endocrine:                 Physical Exam:   Vitals: /57   Pulse 75   Ht 1.829 m (6')   Wt 80.7 kg (178 lb)   SpO2 97%   BMI 24.14 kg/m    Constitutional:  cooperative        Skin:  warm and dry to the touch        Head:  normocephalic        Eyes:  pupils equal and round        ENT:           Neck:  JVP normal        Chest:  normal breath sounds, clear to auscultation, normal A-P diameter, normal symmetry, normal respiratory excursion, no use of accessory muscles        Cardiac: regular rhythm;normal S1 and S2       systolic ejection murmur;grade 1          Abdomen:  abdomen soft        Vascular: pulses full and equal                                      Extremities and Back:           Neurological:  no gross motor deficits;affect appropriate             Medications:     Current Outpatient Medications   Medication Sig Dispense Refill    aspirin 81 MG EC tablet Take 81 mg by mouth. Taking 1-2 weekly      melatonin 3 MG tablet Take 3 mg by mouth at bedtime      metoprolol succinate ER (TOPROL XL) 25 MG 24 hr tablet Take 1 tablet (25 mg) by mouth daily. 90 tablet 3    tamsulosin (FLOMAX) 0.4 MG capsule Take 1 capsule (0.4 mg) by mouth daily as needed (urinary retention). 30 capsule 0    albuterol (PROAIR HFA/PROVENTIL HFA/VENTOLIN HFA) 108 (90 Base) MCG/ACT inhaler Inhale 2 puffs into the lungs every 6 hours as needed for shortness of breath, wheezing or cough (Patient not taking: Reported on 8/30/2024)      atorvastatin (LIPITOR) 40 MG tablet Take 1 tablet (40 mg) by mouth  daily (Patient not taking: Reported on 8/30/2024) 90 tablet 3    diclofenac (VOLTAREN) 1 % topical gel Apply 4 g topically 4 times daily      furosemide (LASIX) 40 MG tablet Take 1 tablet (40 mg) by mouth daily (Patient not taking: Reported on 8/30/2024) 90 tablet 0    potassium chloride ER (K-TAB) 20 MEQ CR tablet Take 1 tablet (20 mEq) by mouth daily (Patient not taking: Reported on 8/30/2024) 90 tablet 0       Family History   Problem Relation Age of Onset    Unknown/Adopted Mother     Unknown/Adopted Father     Diabetes No family hx of     Coronary Artery Disease No family hx of     Hypertension No family hx of     Hyperlipidemia No family hx of     Cerebrovascular Disease No family hx of     Breast Cancer No family hx of     Colon Cancer No family hx of     Prostate Cancer No family hx of     Other Cancer No family hx of     Depression No family hx of     Anxiety Disorder No family hx of     Mental Illness No family hx of     Substance Abuse No family hx of     Anesthesia Reaction No family hx of     Asthma No family hx of     Osteoporosis No family hx of     Genetic Disorder No family hx of     Thyroid Disease No family hx of     Obesity No family hx of        Social History     Socioeconomic History    Marital status:      Spouse name: Not on file    Number of children: Not on file    Years of education: Not on file    Highest education level: Not on file   Occupational History    Not on file   Tobacco Use    Smoking status: Never    Smokeless tobacco: Never   Substance and Sexual Activity    Alcohol use: Yes     Comment: 10 per week    Drug use: No    Sexual activity: Not Currently     Partners: Male   Other Topics Concern    Parent/sibling w/ CABG, MI or angioplasty before 65F 55M? No   Social History Narrative    Not on file     Social Determinants of Health     Financial Resource Strain: Medium Risk (6/17/2024)    Received from [employer] [facility]    Financial Resource  Strain     Difficulty of Paying Living Expenses: 2     Difficulty of Paying Living Expenses: 1   Food Insecurity: No Food Insecurity (6/17/2024)    Received from [facility]    Food Insecurity     Worried About Running Out of Food in the Last Year: 1   Transportation Needs: No Transportation Needs (6/17/2024)    Received from [employer] [facility] [facility]    Transportation Needs     Lack of Transportation (Medical): 1   Physical Activity: Not on file   Stress: Not on file   Social Connections: Socially Isolated (6/17/2024)    Received from [facility]    Social Connections     Frequency of Communication with Friends and Family: 4   Interpersonal Safety: Not on file   Housing Stability: Low Risk  (6/17/2024)    Received from [facility]    Housing Stability     Unable to Pay for Housing in the Last Year: 1            Past Medical History:     Past Medical History:   Diagnosis Date    Gout     Heart murmur     Insomnia     Mixed hyperlipidemia     Postherpetic neuralgia               Past Surgical History:     Past Surgical History:   Procedure Laterality Date    ARTHROSCOPY KNEE Right     ~9275-1414    CV CORONARY ANGIOGRAM N/A 6/13/2024    Procedure: Coronary Angiogram;  Surgeon: Jarett Odell MD;  Location: [facility] CARDIAC CATH LAB    CV RIGHT HEART CATH MEASUREMENTS RECORDED N/A 6/13/2024    Procedure: Right Heart Catheterization;  Surgeon: Jarett Odell MD;  Location: [facility] CARDIAC CATH LAB    CV TRANSCATHETER AORTIC VALVE REPLACEMENT-FEMORAL APPROACH N/A 7/16/2024    Procedure: Transcatheter Aortic Valve Replacement-Femoral Approach;  Surgeon: Prince Burnett MD;  Location: [facility] CARDIAC CATH LAB    DAVINCI XI HERNIORRHAPHY INGUINAL Bilateral 7/18/2022    Procedure: Robotic repair of right inguinal hernia with placement of mesh,  robotic repair of left inguinal hernia with  placement of mesh;  Surgeon: Ina Phillips MD;  Location: [facility] OR    ORTHOPEDIC SURGERY      right achilles rupture repair with 14 month MRSA infection              Allergies:   Ciprofloxacin       Data:   All laboratory data reviewed:    Recent Labs   Lab Test 06/28/24  1414 02/14/20  1033 03/21/19  0833 11/26/18  1441   LDL  --  121* 139* 137*   HDL  --  32* 40 39*   NHDL  --  179* 184* 187*   CHOL  --  211* 224* 226*   TRIG  --  289* 225* 248*   TSH  --   --   --  2.74   NTBNP 9,531*  --   --   --    ALLYSON  --  268  --   --        Lab Results   Component Value Date    WBC 8.1 08/30/2024    WBC 9.5 05/11/2020    RBC 4.03 (L) 08/30/2024    RBC 5.18 05/11/2020    HGB 12.5 (L) 08/30/2024    HGB 15.7 05/11/2020    HCT 38.2 (L) 08/30/2024    HCT 46.7 05/11/2020    MCV 95 08/30/2024    MCV 90 05/11/2020    MCH 31.0 08/30/2024    MCH 30.3 05/11/2020    MCHC 32.7 08/30/2024    MCHC 33.6 05/11/2020    RDW 15.0 08/30/2024    RDW 13.9 05/11/2020     08/30/2024     05/11/2020       Lab Results   Component Value Date     08/30/2024     02/14/2020    POTASSIUM 4.3 08/30/2024    POTASSIUM 3.8 02/14/2020    CHLORIDE 104 08/30/2024    CHLORIDE 107 02/14/2020    CO2 25 08/30/2024    CO2 25 02/14/2020    ANIONGAP 10 08/30/2024    ANIONGAP 5 02/14/2020    GLC 98 08/30/2024     (H) 06/11/2024     (H) 02/14/2020    BUN 13.3 08/30/2024    BUN 15 02/14/2020    CR 1.00 08/30/2024    CR 0.99 02/14/2020    GFRESTIMATED 70 08/30/2024    GFRESTIMATED 67 02/14/2020    GFRESTBLACK 78 02/14/2020    ROSA 9.3 08/30/2024    ROSA 9.0 02/14/2020      Lab Results   Component Value Date    AST 25 06/28/2024    AST 18 02/14/2020    ALT 14 06/28/2024    ALT 20 02/14/2020       Lab Results   Component Value Date    A1C 5.7 (H) 11/26/2018       Lab Results   Component Value Date    INR 0.96 06/28/2024       KCCQ-12  5  5  2  1  1  3  5  2  1  1  4  6

## 2024-08-30 NOTE — PATIENT INSTRUCTIONS
Today's Plan:     - Start metoprolol succinate 25 mg (Toprol XL) once daily.   - Recommend [facility] for your shoulder pain.   - Primary care doctors: Dr. Triana and Dr. Singh ([place]).  - Repeat echocardiogram in 1 year.   - Follow-up with Dr. Burnett in 6 months.      If you have questions or concerns please call my nurse team:  Rashida TORRES ([phone]) or Elida TORRES ([phone])    After Hours: [phone], option #2, ask for cardiologist on-call    Scheduling phone number: [phone]    Reminder: Please bring in all current medications, over the counter supplements and vitamin bottles to your next appointment.-    It was a pleasure seeing you today!     Maria Del Rosario Peters, GODFREY  8/30/2024    -

## 2024-09-05 ENCOUNTER — TELEPHONE (OUTPATIENT)
Dept: CARDIOLOGY | Facility: CLINIC | Age: 89
End: 2024-09-05
Payer: COMMERCIAL

## 2024-09-05 NOTE — TELEPHONE ENCOUNTER
Called to schedule 6 mon s/p TAVR follow up appt     LVM and callback information    [phone]    Nancy Gutierrez  Structural Heart Procedure   [facility]/ [facility]

## 2024-09-23 ENCOUNTER — HOSPITAL ENCOUNTER (EMERGENCY)
Facility: CLINIC | Age: 89
Discharge: HOME | End: 2024-09-23
Attending: EMERGENCY MEDICINE | Admitting: EMERGENCY MEDICINE
Payer: COMMERCIAL

## 2024-09-23 VITALS
RESPIRATION RATE: 18 BRPM | HEART RATE: 91 BPM | OXYGEN SATURATION: 97 % | DIASTOLIC BLOOD PRESSURE: 66 MMHG | SYSTOLIC BLOOD PRESSURE: 119 MMHG | TEMPERATURE: 98.6 F

## 2024-09-23 DIAGNOSIS — R33.9 URINARY RETENTION: ICD-10-CM

## 2024-09-23 DIAGNOSIS — T83.091A OBSTRUCTION OF FOLEY CATHETER, INITIAL ENCOUNTER (H): ICD-10-CM

## 2024-09-23 DIAGNOSIS — N39.0 URINARY TRACT INFECTION ASSOCIATED WITH INDWELLING URETHRAL CATHETER, INITIAL ENCOUNTER (H): ICD-10-CM

## 2024-09-23 DIAGNOSIS — T83.511A URINARY TRACT INFECTION ASSOCIATED WITH INDWELLING URETHRAL CATHETER, INITIAL ENCOUNTER (H): ICD-10-CM

## 2024-09-23 LAB
ALBUMIN UR-MCNC: 300 MG/DL
ANION GAP SERPL CALCULATED.3IONS-SCNC: 16 MMOL/L (ref 7–15)
APPEARANCE UR: ABNORMAL
BASOPHILS # BLD AUTO: 0.1 10E3/UL (ref 0–0.2)
BASOPHILS NFR BLD AUTO: 1 %
BILIRUB UR QL STRIP: NEGATIVE
BUN SERPL-MCNC: 31.4 MG/DL (ref 8–23)
CALCIUM SERPL-MCNC: 9.5 MG/DL (ref 8.8–10.4)
CHLORIDE SERPL-SCNC: 100 MMOL/L (ref 98–107)
COLOR UR AUTO: ABNORMAL
CREAT SERPL-MCNC: 1.15 MG/DL (ref 0.67–1.17)
EGFRCR SERPLBLD CKD-EPI 2021: 59 ML/MIN/1.73M2
EOSINOPHIL # BLD AUTO: 0.1 10E3/UL (ref 0–0.7)
EOSINOPHIL NFR BLD AUTO: 1 %
ERYTHROCYTE [DISTWIDTH] IN BLOOD BY AUTOMATED COUNT: 13.9 % (ref 10–15)
GLUCOSE SERPL-MCNC: 122 MG/DL (ref 70–99)
GLUCOSE UR STRIP-MCNC: NEGATIVE MG/DL
HCO3 SERPL-SCNC: 20 MMOL/L (ref 22–29)
HCT VFR BLD AUTO: 36.5 % (ref 40–53)
HGB BLD-MCNC: 12.6 G/DL (ref 13.3–17.7)
HGB UR QL STRIP: ABNORMAL
IMM GRANULOCYTES # BLD: 0.1 10E3/UL
IMM GRANULOCYTES NFR BLD: 1 %
KETONES UR STRIP-MCNC: NEGATIVE MG/DL
LEUKOCYTE ESTERASE UR QL STRIP: ABNORMAL
LYMPHOCYTES # BLD AUTO: 1 10E3/UL (ref 0.8–5.3)
LYMPHOCYTES NFR BLD AUTO: 7 %
MCH RBC QN AUTO: 31 PG (ref 26.5–33)
MCHC RBC AUTO-ENTMCNC: 34.5 G/DL (ref 31.5–36.5)
MCV RBC AUTO: 90 FL (ref 78–100)
MONOCYTES # BLD AUTO: 0.8 10E3/UL (ref 0–1.3)
MONOCYTES NFR BLD AUTO: 6 %
MUCOUS THREADS #/AREA URNS LPF: PRESENT /LPF
NEUTROPHILS # BLD AUTO: 12.1 10E3/UL (ref 1.6–8.3)
NEUTROPHILS NFR BLD AUTO: 86 %
NITRATE UR QL: NEGATIVE
NRBC # BLD AUTO: 0 10E3/UL
NRBC BLD AUTO-RTO: 0 /100
PH UR STRIP: 8 [PH] (ref 5–7)
PLATELET # BLD AUTO: 367 10E3/UL (ref 150–450)
POTASSIUM SERPL-SCNC: 4.5 MMOL/L (ref 3.4–5.3)
RBC # BLD AUTO: 4.07 10E6/UL (ref 4.4–5.9)
RBC URINE: >182 /HPF
SODIUM SERPL-SCNC: 136 MMOL/L (ref 135–145)
SP GR UR STRIP: 1.01 (ref 1–1.03)
SQUAMOUS EPITHELIAL: 5 /HPF
UROBILINOGEN UR STRIP-MCNC: 2 MG/DL
WBC # BLD AUTO: 14.1 10E3/UL (ref 4–11)
WBC CLUMPS #/AREA URNS HPF: PRESENT /HPF
WBC URINE: >182 /HPF

## 2024-09-23 PROCEDURE — 51798 US URINE CAPACITY MEASURE: CPT

## 2024-09-23 PROCEDURE — 51702 INSERT TEMP BLADDER CATH: CPT

## 2024-09-23 PROCEDURE — 250N000011 HC RX IP 250 OP 636: Performed by: EMERGENCY MEDICINE

## 2024-09-23 PROCEDURE — 85025 COMPLETE CBC W/AUTO DIFF WBC: CPT | Performed by: EMERGENCY MEDICINE

## 2024-09-23 PROCEDURE — 80048 BASIC METABOLIC PNL TOTAL CA: CPT | Performed by: EMERGENCY MEDICINE

## 2024-09-23 PROCEDURE — 36415 COLL VENOUS BLD VENIPUNCTURE: CPT | Performed by: EMERGENCY MEDICINE

## 2024-09-23 PROCEDURE — 81001 URINALYSIS AUTO W/SCOPE: CPT | Performed by: EMERGENCY MEDICINE

## 2024-09-23 PROCEDURE — 87086 URINE CULTURE/COLONY COUNT: CPT | Performed by: EMERGENCY MEDICINE

## 2024-09-23 PROCEDURE — 99284 EMERGENCY DEPT VISIT MOD MDM: CPT | Mod: 25

## 2024-09-23 PROCEDURE — 96365 THER/PROPH/DIAG IV INF INIT: CPT

## 2024-09-23 RX ORDER — CEFTRIAXONE 2 G/1
2 INJECTION, POWDER, FOR SOLUTION INTRAMUSCULAR; INTRAVENOUS ONCE
Status: COMPLETED | OUTPATIENT
Start: 2024-09-23 | End: 2024-09-23

## 2024-09-23 RX ORDER — SULFAMETHOXAZOLE/TRIMETHOPRIM 800-160 MG
1 TABLET ORAL 2 TIMES DAILY
Qty: 20 TABLET | Refills: 0 | Status: SHIPPED | OUTPATIENT
Start: 2024-09-23 | End: 2024-10-03

## 2024-09-23 RX ADMIN — CEFTRIAXONE SODIUM 2 G: 2 INJECTION, POWDER, FOR SOLUTION INTRAMUSCULAR; INTRAVENOUS at 20:16

## 2024-09-23 ASSESSMENT — ACTIVITIES OF DAILY LIVING (ADL)
ADLS_ACUITY_SCORE: 38
ADLS_ACUITY_SCORE: 38

## 2024-09-23 NOTE — ED NOTES
Bed: ED08  Expected date:   Expected time:   Means of arrival:   Comments:  Dalila 2 89M Cath Problem

## 2024-09-24 NOTE — ED TRIAGE NOTES
EMS report: has had rodriguez placed 5/16/2024. Reports white/milky drainage and not flowing. Patient in pain with distended abdomen and in distress/pain.     Triage Assessment (Adult)       Row Name 09/23/24 1911          Triage Assessment    Airway WDL WDL        Respiratory WDL    Respiratory WDL WDL        Skin Circulation/Temperature WDL    Skin Circulation/Temperature WDL WDL        Cardiac WDL    Cardiac WDL WDL        Cognitive/Neuro/Behavioral WDL    Cognitive/Neuro/Behavioral WDL WDL

## 2024-09-24 NOTE — ED PROVIDER NOTES
Emergency Department Note      History of Present Illness     Chief Complaint   Catheter Problem      HPI   Canelo Cruz is a 89 year old male with an indwelling urethral Christianson catheter which she states has been present for approximately 4 months.  He states this was placed after a surgery.  He states that it stopped functioning this morning and he is having significant discomfort in his suprapubic area.  He denies fevers or flank pain.  The fluid in the bag appears to be dark and blood-tinged.  There are no further aggravating or alleviating factors no further associated symptoms.    Independent Historian   Wife supplements the history as noted above    Review of External Notes   Discharge summary from July 16 of this year for congestive heart failure was reviewed    Past Medical History     Medical History and Problem List   Past Medical History:   Diagnosis Date    Gout     Heart murmur     Insomnia     Mixed hyperlipidemia     Postherpetic neuralgia        Medications   albuterol (PROAIR HFA/PROVENTIL HFA/VENTOLIN HFA) 108 (90 Base) MCG/ACT inhaler  aspirin 81 MG EC tablet  atorvastatin (LIPITOR) 40 MG tablet  diclofenac (VOLTAREN) 1 % topical gel  furosemide (LASIX) 40 MG tablet  melatonin 3 MG tablet  metoprolol succinate ER (TOPROL XL) 25 MG 24 hr tablet  potassium chloride ER (K-TAB) 20 MEQ CR tablet  sulfamethoxazole-trimethoprim (BACTRIM DS) 800-160 MG tablet  tamsulosin (FLOMAX) 0.4 MG capsule        Surgical History   Past Surgical History:   Procedure Laterality Date    ARTHROSCOPY KNEE Right     ~2546-4431    CV CORONARY ANGIOGRAM N/A 6/13/2024    Procedure: Coronary Angiogram;  Surgeon: Jarett Odell MD;  Location:  HEART CARDIAC CATH LAB    CV RIGHT HEART CATH MEASUREMENTS RECORDED N/A 6/13/2024    Procedure: Right Heart Catheterization;  Surgeon: Jarett Odell MD;  Location: [facility] CARDIAC CATH LAB    CV TRANSCATHETER AORTIC VALVE REPLACEMENT-FEMORAL APPROACH N/A 7/16/2024     Procedure: Transcatheter Aortic Valve Replacement-Femoral Approach;  Surgeon: Prince Burnett MD;  Location:  HEART CARDIAC CATH LAB    DAVRHONDAI XI HERNIORRHAPHY INGUINAL Bilateral 7/18/2022    Procedure: Robotic repair of right inguinal hernia with placement of mesh,  robotic repair of left inguinal hernia with placement of mesh;  Surgeon: Ina Phillips MD;  Location:  OR    ORTHOPEDIC SURGERY      right achilles rupture repair with 14 month MRSA infection       Physical Exam     Patient Vitals for the past 24 hrs:   BP Temp Temp src Pulse Resp SpO2   09/23/24 2000 119/66 -- -- 91 18 97 %   09/23/24 1907 -- 98.6  F (37  C) Temporal -- 18 --   09/23/24 1902 -- -- -- -- -- 97 %   09/23/24 1900 105/88 -- -- 99 -- --     Physical Exam  General: Alert, interactive   Head:  Scalp is atraumatic  Eyes:  No scleral icterus  ENT:      Nose:  The external nose is normal  Ears:  External ears are normal      Neck:  Normal range of motion.      There is no rigidity.    Trachea is in the midline         CV:  Regular rate and rhythm       Resp:  Breath sounds are clear bilaterally    Non-labored, no retractions or accessory muscle use      GI:  Suprapubic distention and tenderness  :  Christianson catheter exiting the urethra    MS:  Normal strength in all 4 extremities  Skin:  Warm and dry, No rash or lesions noted.  Psych: Awake. Alert.  Normal affect.      Appropriate interactions.  Diagnostics     Lab Results   Labs Ordered and Resulted from Time of ED Arrival to Time of ED Departure   BASIC METABOLIC PANEL - Abnormal       Result Value    Sodium 136      Potassium 4.5      Chloride 100      Carbon Dioxide (CO2) 20 (*)     Anion Gap 16 (*)     Urea Nitrogen 31.4 (*)     Creatinine 1.15      GFR Estimate 59 (*)     Calcium 9.5      Glucose 122 (*)    ROUTINE UA WITH MICROSCOPIC REFLEX TO CULTURE - Abnormal    Color Urine Brown (*)     Appearance Urine Cloudy (*)     Glucose Urine Negative      Bilirubin Urine Negative       Ketones Urine Negative      Specific Gravity Urine 1.012      Blood Urine Large (*)     pH Urine 8.0 (*)     Protein Albumin Urine 300 (*)     Urobilinogen Urine 2.0      Nitrite Urine Negative      Leukocyte Esterase Urine Large (*)     WBC Clumps Urine Present (*)     Mucus Urine Present (*)     RBC Urine >182 (*)     WBC Urine >182 (*)     Squamous Epithelials Urine 5 (*)    CBC WITH PLATELETS AND DIFFERENTIAL - Abnormal    WBC Count 14.1 (*)     RBC Count 4.07 (*)     Hemoglobin 12.6 (*)     Hematocrit 36.5 (*)     MCV 90      MCH 31.0      MCHC 34.5      RDW 13.9      Platelet Count 367      % Neutrophils 86      % Lymphocytes 7      % Monocytes 6      % Eosinophils 1      % Basophils 1      % Immature Granulocytes 1      NRBCs per 100 WBC 0      Absolute Neutrophils 12.1 (*)     Absolute Lymphocytes 1.0      Absolute Monocytes 0.8      Absolute Eosinophils 0.1      Absolute Basophils 0.1      Absolute Immature Granulocytes 0.1      Absolute NRBCs 0.0     URINE CULTURE         ED Course      Medications Administered   Medications   cefTRIAXone (ROCEPHIN) 2 g vial to attach to  ml bag for ADULTS or NS 50 ml bag for PEDS (0 g Intravenous Stopped 9/23/24 2043)       Procedures   Procedures   Bladder scan performed by nursing staff demonstrates greater than 800 cc in the bladder    Christianson catheter was placed by nursing staff without incident    Discussion of Management   I discussed case with our clinical pharmacist regarding antibiotic selections given the patient's previous urine cultures    ED Course        Additional Documentation  None    Medical Decision Making / Diagnosis     CMS Diagnoses: None    MIPS       None    [facility]   Canelo Cruz is a 89 year old male presenting with an occluded Christianson catheter and signs of a UTI.  There is no signs of sepsis syndrome, pyelonephritis, more concerning illness.  Christianson catheter was changed as noted above with complete resolution of the patient's symptoms.  Urine  culture is pending at this time.  He received ceftriaxone in the emergency department.  He will be discharged with Bactrim DS based on previous urine cultures.  Patient is feeling much improved and is requesting discharge to home and I think this is reasonable.  His kidney function is normal.  He will follow-up with his primary care provider and return to the emergency department if new symptoms develop.    Disposition   The patient was discharged.     Diagnosis     ICD-10-CM    1. Obstruction of Christianson catheter, initial encounter (H24)  T83.091A       2. Urinary retention  R33.9       3. Urinary tract infection associated with indwelling urethral catheter, initial encounter  (H24)  T83.511A     N39.0            Discharge Medications   Discharge Medication List as of 9/23/2024  8:43 PM        START taking these medications    Details   sulfamethoxazole-trimethoprim (BACTRIM DS) 800-160 MG tablet Take 1 tablet by mouth 2 times daily for 10 days., Disp-20 tablet, R-0, E-Prescribe               Franklin Vega MD      Trigger, Franklin Dickson MD  09/23/24 3915

## 2024-09-25 LAB
BACTERIA UR CULT: ABNORMAL

## 2024-09-25 NOTE — RESULT ENCOUNTER NOTE
Final urine culture report is negative.  Adult: Negative urine culture parameters per protocol: >3 Bacteria (Cath /midstream) per IDDL considered a contaminant. (UNLESS IDDL RUNS A SUSCEPTIBILITY REPORT ON THE BACTERIA'S, THEN TREAT AS A POSITIVE)   [facility] Emergency Dept discharge antibiotic prescribed (If applicable): Bactrim DS  Treatment recommendations per [facility] ED Lab Result Urine Culture protocol: No change in plan of care.

## 2024-11-09 ENCOUNTER — HOSPITAL ENCOUNTER (INPATIENT)
Facility: CLINIC | Age: 89
End: 2024-11-09
Attending: EMERGENCY MEDICINE | Admitting: INTERNAL MEDICINE
Payer: COMMERCIAL

## 2024-11-09 DIAGNOSIS — I35.0 SEVERE AORTIC STENOSIS BY PRIOR ECHOCARDIOGRAM: ICD-10-CM

## 2024-11-09 DIAGNOSIS — G93.41 METABOLIC ENCEPHALOPATHY: ICD-10-CM

## 2024-11-09 DIAGNOSIS — N39.0 URINARY TRACT INFECTION WITH HEMATURIA, SITE UNSPECIFIED: ICD-10-CM

## 2024-11-09 DIAGNOSIS — I50.9 ACUTE CONGESTIVE HEART FAILURE, UNSPECIFIED HEART FAILURE TYPE (H): ICD-10-CM

## 2024-11-09 DIAGNOSIS — R78.81 BACTEREMIA: Primary | ICD-10-CM

## 2024-11-09 DIAGNOSIS — R52 PAIN: ICD-10-CM

## 2024-11-09 DIAGNOSIS — Z78.9 TAKES DIETARY SUPPLEMENTS: ICD-10-CM

## 2024-11-09 DIAGNOSIS — R53.1 GENERALIZED WEAKNESS: ICD-10-CM

## 2024-11-09 DIAGNOSIS — R31.9 URINARY TRACT INFECTION WITH HEMATURIA, SITE UNSPECIFIED: ICD-10-CM

## 2024-11-09 LAB
ALBUMIN SERPL BCG-MCNC: 3.8 G/DL (ref 3.5–5.2)
ALP SERPL-CCNC: 91 U/L (ref 40–150)
ALT SERPL W P-5'-P-CCNC: 13 U/L (ref 0–70)
ANION GAP SERPL CALCULATED.3IONS-SCNC: 9 MMOL/L (ref 7–15)
AST SERPL W P-5'-P-CCNC: 31 U/L (ref 0–45)
BASOPHILS # BLD AUTO: 0 10E3/UL (ref 0–0.2)
BASOPHILS NFR BLD AUTO: 0 %
BILIRUB SERPL-MCNC: 0.6 MG/DL
BUN SERPL-MCNC: 12.3 MG/DL (ref 8–23)
CALCIUM SERPL-MCNC: 8.7 MG/DL (ref 8.8–10.4)
CHLORIDE SERPL-SCNC: 100 MMOL/L (ref 98–107)
CREAT SERPL-MCNC: 1.09 MG/DL (ref 0.67–1.17)
EGFRCR SERPLBLD CKD-EPI 2021: 63 ML/MIN/1.73M2
EOSINOPHIL # BLD AUTO: 0 10E3/UL (ref 0–0.7)
EOSINOPHIL NFR BLD AUTO: 0 %
ERYTHROCYTE [DISTWIDTH] IN BLOOD BY AUTOMATED COUNT: 15.1 % (ref 10–15)
GLUCOSE SERPL-MCNC: 101 MG/DL (ref 70–99)
HCO3 SERPL-SCNC: 23 MMOL/L (ref 22–29)
HCT VFR BLD AUTO: 36.2 % (ref 40–53)
HGB BLD-MCNC: 12.4 G/DL (ref 13.3–17.7)
IMM GRANULOCYTES # BLD: 0 10E3/UL
IMM GRANULOCYTES NFR BLD: 0 %
LYMPHOCYTES # BLD AUTO: 0.7 10E3/UL (ref 0.8–5.3)
LYMPHOCYTES NFR BLD AUTO: 6 %
MCH RBC QN AUTO: 30.5 PG (ref 26.5–33)
MCHC RBC AUTO-ENTMCNC: 34.3 G/DL (ref 31.5–36.5)
MCV RBC AUTO: 89 FL (ref 78–100)
MONOCYTES # BLD AUTO: 0.5 10E3/UL (ref 0–1.3)
MONOCYTES NFR BLD AUTO: 5 %
NEUTROPHILS # BLD AUTO: 10 10E3/UL (ref 1.6–8.3)
NEUTROPHILS NFR BLD AUTO: 89 %
NRBC # BLD AUTO: 0 10E3/UL
NRBC BLD AUTO-RTO: 0 /100
PLATELET # BLD AUTO: 244 10E3/UL (ref 150–450)
POTASSIUM SERPL-SCNC: 4.4 MMOL/L (ref 3.4–5.3)
PROT SERPL-MCNC: 6.8 G/DL (ref 6.4–8.3)
RBC # BLD AUTO: 4.07 10E6/UL (ref 4.4–5.9)
SODIUM SERPL-SCNC: 132 MMOL/L (ref 135–145)
WBC # BLD AUTO: 11.3 10E3/UL (ref 4–11)

## 2024-11-09 PROCEDURE — 250N000009 HC RX 250: Performed by: EMERGENCY MEDICINE

## 2024-11-09 PROCEDURE — 87149 DNA/RNA DIRECT PROBE: CPT | Performed by: EMERGENCY MEDICINE

## 2024-11-09 PROCEDURE — 85004 AUTOMATED DIFF WBC COUNT: CPT | Performed by: EMERGENCY MEDICINE

## 2024-11-09 PROCEDURE — 80053 COMPREHEN METABOLIC PANEL: CPT | Performed by: EMERGENCY MEDICINE

## 2024-11-09 PROCEDURE — 87040 BLOOD CULTURE FOR BACTERIA: CPT | Performed by: EMERGENCY MEDICINE

## 2024-11-09 PROCEDURE — 84145 PROCALCITONIN (PCT): CPT | Performed by: HOSPITALIST

## 2024-11-09 PROCEDURE — 82077 ASSAY SPEC XCP UR&BREATH IA: CPT | Performed by: INTERNAL MEDICINE

## 2024-11-09 PROCEDURE — 87186 SC STD MICRODIL/AGAR DIL: CPT | Performed by: EMERGENCY MEDICINE

## 2024-11-09 PROCEDURE — 99285 EMERGENCY DEPT VISIT HI MDM: CPT | Mod: 25

## 2024-11-09 PROCEDURE — 36415 COLL VENOUS BLD VENIPUNCTURE: CPT | Performed by: EMERGENCY MEDICINE

## 2024-11-09 PROCEDURE — 86140 C-REACTIVE PROTEIN: CPT | Performed by: HOSPITALIST

## 2024-11-09 RX ORDER — ACETAMINOPHEN 500 MG
1000 TABLET ORAL ONCE
Status: COMPLETED | OUTPATIENT
Start: 2024-11-09 | End: 2024-11-10

## 2024-11-09 RX ORDER — LIDOCAINE HYDROCHLORIDE 20 MG/ML
10 JELLY TOPICAL ONCE
Status: COMPLETED | OUTPATIENT
Start: 2024-11-09 | End: 2024-11-09

## 2024-11-09 RX ADMIN — LIDOCAINE HYDROCHLORIDE 10 ML: 20 JELLY TOPICAL at 23:45

## 2024-11-09 ASSESSMENT — ACTIVITIES OF DAILY LIVING (ADL): ADLS_ACUITY_SCORE: 0

## 2024-11-10 ENCOUNTER — APPOINTMENT (OUTPATIENT)
Dept: GENERAL RADIOLOGY | Facility: CLINIC | Age: 89
End: 2024-11-10
Attending: HOSPITALIST
Payer: COMMERCIAL

## 2024-11-10 ENCOUNTER — APPOINTMENT (OUTPATIENT)
Dept: OCCUPATIONAL THERAPY | Facility: CLINIC | Age: 89
End: 2024-11-10
Attending: INTERNAL MEDICINE
Payer: COMMERCIAL

## 2024-11-10 ENCOUNTER — APPOINTMENT (OUTPATIENT)
Dept: GENERAL RADIOLOGY | Facility: CLINIC | Age: 89
End: 2024-11-10
Attending: EMERGENCY MEDICINE
Payer: COMMERCIAL

## 2024-11-10 PROBLEM — R65.20 SEPSIS WITH ACUTE ORGAN DYSFUNCTION (H): Status: ACTIVE | Noted: 2024-11-10

## 2024-11-10 PROBLEM — G93.40 ENCEPHALOPATHY: Status: ACTIVE | Noted: 2024-11-10

## 2024-11-10 PROBLEM — A41.9 SEPSIS SECONDARY TO UTI (H): Status: ACTIVE | Noted: 2024-11-10

## 2024-11-10 PROBLEM — A41.9 SEPSIS WITH ACUTE ORGAN DYSFUNCTION (H): Status: ACTIVE | Noted: 2024-11-10

## 2024-11-10 PROBLEM — N39.0 UTI (URINARY TRACT INFECTION): Status: ACTIVE | Noted: 2024-11-10

## 2024-11-10 PROBLEM — N39.0 SEPSIS SECONDARY TO UTI (H): Status: ACTIVE | Noted: 2024-11-10

## 2024-11-10 LAB
ALBUMIN UR-MCNC: 10 MG/DL
APPEARANCE UR: CLEAR
BASE EXCESS BLDV CALC-SCNC: 0 MMOL/L (ref -3–3)
BILIRUB UR QL STRIP: NEGATIVE
COLOR UR AUTO: ABNORMAL
CRP SERPL-MCNC: 37.26 MG/L
ENTEROCOCCUS FAECALIS: NOT DETECTED
ENTEROCOCCUS FAECIUM: NOT DETECTED
ETHANOL SERPL-MCNC: <0.01 G/DL
FLUAV RNA SPEC QL NAA+PROBE: NEGATIVE
FLUBV RNA RESP QL NAA+PROBE: NEGATIVE
GLUCOSE UR STRIP-MCNC: NEGATIVE MG/DL
HCO3 BLDV-SCNC: 25 MMOL/L (ref 21–28)
HGB UR QL STRIP: ABNORMAL
HOLD SPECIMEN: NORMAL
KETONES UR STRIP-MCNC: 20 MG/DL
LACTATE BLD-SCNC: 0.8 MMOL/L
LEUKOCYTE ESTERASE UR QL STRIP: ABNORMAL
LISTERIA SPECIES (DETECTED/NOT DETECTED): NOT DETECTED
MRSA DNA SPEC QL NAA+PROBE: NEGATIVE
MUCOUS THREADS #/AREA URNS LPF: PRESENT /LPF
NITRATE UR QL: NEGATIVE
PCO2 BLDV: 41 MM HG (ref 40–50)
PH BLDV: 7.39 [PH] (ref 7.32–7.43)
PH UR STRIP: 7 [PH] (ref 5–7)
PO2 BLDV: 43 MM HG (ref 25–47)
PROCALCITONIN SERPL IA-MCNC: 0.11 NG/ML
RBC URINE: 5 /HPF
RSV RNA SPEC NAA+PROBE: NEGATIVE
SA TARGET DNA: POSITIVE
SAO2 % BLDV: 78 % (ref 70–75)
SARS-COV-2 RNA RESP QL NAA+PROBE: NEGATIVE
SP GR UR STRIP: 1.01 (ref 1–1.03)
STAPHYLOCOCCUS AUREUS: DETECTED
STAPHYLOCOCCUS EPIDERMIDIS: NOT DETECTED
STAPHYLOCOCCUS LUGDUNENSIS: NOT DETECTED
STREPTOCOCCUS AGALACTIAE: NOT DETECTED
STREPTOCOCCUS ANGINOSUS GROUP: NOT DETECTED
STREPTOCOCCUS PNEUMONIAE: NOT DETECTED
STREPTOCOCCUS PYOGENES: NOT DETECTED
STREPTOCOCCUS SPECIES: NOT DETECTED
UROBILINOGEN UR STRIP-MCNC: NORMAL MG/DL
WBC CLUMPS #/AREA URNS HPF: PRESENT /HPF
WBC URINE: 52 /HPF

## 2024-11-10 PROCEDURE — 87640 STAPH A DNA AMP PROBE: CPT | Performed by: HOSPITALIST

## 2024-11-10 PROCEDURE — 73110 X-RAY EXAM OF WRIST: CPT | Mod: RT

## 2024-11-10 PROCEDURE — 82803 BLOOD GASES ANY COMBINATION: CPT

## 2024-11-10 PROCEDURE — 99223 1ST HOSP IP/OBS HIGH 75: CPT | Performed by: INTERNAL MEDICINE

## 2024-11-10 PROCEDURE — 96365 THER/PROPH/DIAG IV INF INIT: CPT

## 2024-11-10 PROCEDURE — 87086 URINE CULTURE/COLONY COUNT: CPT | Performed by: EMERGENCY MEDICINE

## 2024-11-10 PROCEDURE — 71045 X-RAY EXAM CHEST 1 VIEW: CPT

## 2024-11-10 PROCEDURE — 81003 URINALYSIS AUTO W/O SCOPE: CPT | Performed by: EMERGENCY MEDICINE

## 2024-11-10 PROCEDURE — 96361 HYDRATE IV INFUSION ADD-ON: CPT

## 2024-11-10 PROCEDURE — 258N000003 HC RX IP 258 OP 636: Performed by: EMERGENCY MEDICINE

## 2024-11-10 PROCEDURE — 250N000013 HC RX MED GY IP 250 OP 250 PS 637: Performed by: EMERGENCY MEDICINE

## 2024-11-10 PROCEDURE — 73560 X-RAY EXAM OF KNEE 1 OR 2: CPT | Mod: RT

## 2024-11-10 PROCEDURE — 97165 OT EVAL LOW COMPLEX 30 MIN: CPT | Mod: GO

## 2024-11-10 PROCEDURE — 97530 THERAPEUTIC ACTIVITIES: CPT | Mod: GO

## 2024-11-10 PROCEDURE — 250N000011 HC RX IP 250 OP 636: Performed by: HOSPITALIST

## 2024-11-10 PROCEDURE — 250N000011 HC RX IP 250 OP 636: Performed by: EMERGENCY MEDICINE

## 2024-11-10 PROCEDURE — 99418 PROLNG IP/OBS E/M EA 15 MIN: CPT | Performed by: INTERNAL MEDICINE

## 2024-11-10 PROCEDURE — 87637 SARSCOV2&INF A&B&RSV AMP PRB: CPT | Performed by: EMERGENCY MEDICINE

## 2024-11-10 PROCEDURE — 99207 PR APP CREDIT; MD BILLING SHARED VISIT: CPT | Performed by: HOSPITALIST

## 2024-11-10 PROCEDURE — 120N000001 HC R&B MED SURG/OB

## 2024-11-10 PROCEDURE — 87641 MR-STAPH DNA AMP PROBE: CPT | Performed by: HOSPITALIST

## 2024-11-10 PROCEDURE — 250N000013 HC RX MED GY IP 250 OP 250 PS 637: Performed by: HOSPITALIST

## 2024-11-10 PROCEDURE — 258N000003 HC RX IP 258 OP 636: Performed by: HOSPITALIST

## 2024-11-10 PROCEDURE — 250N000013 HC RX MED GY IP 250 OP 250 PS 637: Performed by: INTERNAL MEDICINE

## 2024-11-10 RX ORDER — ATORVASTATIN CALCIUM 40 MG/1
40 TABLET, FILM COATED ORAL DAILY
Status: DISCONTINUED | OUTPATIENT
Start: 2024-11-10 | End: 2024-11-17 | Stop reason: HOSPADM

## 2024-11-10 RX ORDER — AMOXICILLIN 250 MG
2 CAPSULE ORAL 2 TIMES DAILY PRN
Status: DISCONTINUED | OUTPATIENT
Start: 2024-11-10 | End: 2024-11-17 | Stop reason: HOSPADM

## 2024-11-10 RX ORDER — TAMSULOSIN HYDROCHLORIDE 0.4 MG/1
0.4 CAPSULE ORAL DAILY PRN
Status: DISCONTINUED | OUTPATIENT
Start: 2024-11-10 | End: 2024-11-17 | Stop reason: HOSPADM

## 2024-11-10 RX ORDER — METOPROLOL SUCCINATE 25 MG/1
25 TABLET, EXTENDED RELEASE ORAL DAILY
Status: DISCONTINUED | OUTPATIENT
Start: 2024-11-10 | End: 2024-11-17 | Stop reason: HOSPADM

## 2024-11-10 RX ORDER — ACETAMINOPHEN 325 MG/1
325-650 TABLET ORAL EVERY 6 HOURS PRN
Status: ON HOLD | COMMUNITY
End: 2024-11-17

## 2024-11-10 RX ORDER — CEFTRIAXONE 1 G/1
1 INJECTION, POWDER, FOR SOLUTION INTRAMUSCULAR; INTRAVENOUS ONCE
Status: COMPLETED | OUTPATIENT
Start: 2024-11-10 | End: 2024-11-10

## 2024-11-10 RX ORDER — CEFTRIAXONE 1 G/1
1 INJECTION, POWDER, FOR SOLUTION INTRAMUSCULAR; INTRAVENOUS EVERY 24 HOURS
Status: DISCONTINUED | OUTPATIENT
Start: 2024-11-11 | End: 2024-11-11

## 2024-11-10 RX ORDER — ALBUTEROL SULFATE 90 UG/1
2 INHALANT RESPIRATORY (INHALATION) EVERY 6 HOURS PRN
Status: DISCONTINUED | OUTPATIENT
Start: 2024-11-10 | End: 2024-11-17 | Stop reason: HOSPADM

## 2024-11-10 RX ORDER — ASPIRIN 81 MG/1
81 TABLET ORAL DAILY
Status: DISCONTINUED | OUTPATIENT
Start: 2024-11-10 | End: 2024-11-17 | Stop reason: HOSPADM

## 2024-11-10 RX ORDER — ONDANSETRON 2 MG/ML
4 INJECTION INTRAMUSCULAR; INTRAVENOUS EVERY 6 HOURS PRN
Status: DISCONTINUED | OUTPATIENT
Start: 2024-11-10 | End: 2024-11-17 | Stop reason: HOSPADM

## 2024-11-10 RX ORDER — CALCIUM CARBONATE 500 MG/1
1000 TABLET, CHEWABLE ORAL 4 TIMES DAILY PRN
Status: DISCONTINUED | OUTPATIENT
Start: 2024-11-10 | End: 2024-11-17 | Stop reason: HOSPADM

## 2024-11-10 RX ORDER — LIDOCAINE 40 MG/G
CREAM TOPICAL
Status: DISCONTINUED | OUTPATIENT
Start: 2024-11-10 | End: 2024-11-17 | Stop reason: HOSPADM

## 2024-11-10 RX ORDER — ONDANSETRON 4 MG/1
4 TABLET, ORALLY DISINTEGRATING ORAL EVERY 6 HOURS PRN
Status: DISCONTINUED | OUTPATIENT
Start: 2024-11-10 | End: 2024-11-17 | Stop reason: HOSPADM

## 2024-11-10 RX ORDER — ACETAMINOPHEN 650 MG/1
650 SUPPOSITORY RECTAL EVERY 4 HOURS PRN
Status: DISCONTINUED | OUTPATIENT
Start: 2024-11-10 | End: 2024-11-17 | Stop reason: HOSPADM

## 2024-11-10 RX ORDER — ACETAMINOPHEN 325 MG/1
650 TABLET ORAL EVERY 4 HOURS PRN
Status: DISCONTINUED | OUTPATIENT
Start: 2024-11-10 | End: 2024-11-17 | Stop reason: HOSPADM

## 2024-11-10 RX ORDER — AMOXICILLIN 250 MG
1 CAPSULE ORAL 2 TIMES DAILY PRN
Status: DISCONTINUED | OUTPATIENT
Start: 2024-11-10 | End: 2024-11-17 | Stop reason: HOSPADM

## 2024-11-10 RX ORDER — METHOCARBAMOL 500 MG/1
250 TABLET ORAL 3 TIMES DAILY PRN
Status: DISCONTINUED | OUTPATIENT
Start: 2024-11-10 | End: 2024-11-17 | Stop reason: HOSPADM

## 2024-11-10 RX ADMIN — ACETAMINOPHEN 650 MG: 325 TABLET, FILM COATED ORAL at 20:55

## 2024-11-10 RX ADMIN — ASPIRIN 81 MG: 81 TABLET, COATED ORAL at 17:01

## 2024-11-10 RX ADMIN — Medication 1 MG: at 22:43

## 2024-11-10 RX ADMIN — DICLOFENAC 4 G: 10 GEL TOPICAL at 16:13

## 2024-11-10 RX ADMIN — ACETAMINOPHEN 1000 MG: 500 TABLET, FILM COATED ORAL at 00:21

## 2024-11-10 RX ADMIN — DICLOFENAC 4 G: 10 GEL TOPICAL at 20:48

## 2024-11-10 RX ADMIN — VANCOMYCIN HYDROCHLORIDE 1250 MG: 10 INJECTION, POWDER, LYOPHILIZED, FOR SOLUTION INTRAVENOUS at 14:25

## 2024-11-10 RX ADMIN — ATORVASTATIN CALCIUM 40 MG: 40 TABLET, FILM COATED ORAL at 17:01

## 2024-11-10 RX ADMIN — ACETAMINOPHEN 650 MG: 325 TABLET, FILM COATED ORAL at 05:31

## 2024-11-10 RX ADMIN — SODIUM CHLORIDE 2421 ML: 900 INJECTION, SOLUTION INTRAVENOUS at 00:24

## 2024-11-10 RX ADMIN — CEFTRIAXONE 1 G: 1 INJECTION, POWDER, FOR SOLUTION INTRAMUSCULAR; INTRAVENOUS at 02:25

## 2024-11-10 RX ADMIN — DICLOFENAC 4 G: 10 GEL TOPICAL at 09:43

## 2024-11-10 RX ADMIN — DICLOFENAC 4 G: 10 GEL TOPICAL at 13:08

## 2024-11-10 NOTE — PROVIDER NOTIFICATION
ID lab called with update on blood culture drawn 11/9 1054, growing Staphylococcus aureus. MD updated.

## 2024-11-10 NOTE — ED NOTES
"[facility]  ED Nurse Handoff Report    ED Chief complaint: Generalized Weakness (Confusion, weak, catheter, alcohol.  EMS said pt unable to reach son.  Pt and wife confused.  Pt and wife live alone and pt wife still driving.  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 Pt fell and called 911 because he couldn't get up.  Per EMS patient was too weak to walk and had a fever of 101.9. pt grabbed a drink of alcohol before leaving, and they noticed the container of alcohol had \"floaties\" in the bottle.  Pt arrived with a rodriguez cath in place , Pt wife son (Carrillo) reports was probably put in at the last [facility] visit. Pt urine has abnormal smell and sediment in the bag. Patient reports that he began feeling sick and weak today. He endorses chronic shortness of breath.  Pt and wife both confused but Per Carrillo, pt is more of the caregiver  for his wife.  Pt and wife both still drive.    Focused Assessment:  Pt pale with reddened face and hot to touch.  Pt is shaking and cold.    Treatments and/or interventions provided: IVF, abx, labs, tylenol  Results for orders placed or performed during the hospital encounter of 11/09/24   XR Chest Port 1 View     Status: None    Narrative    EXAM: XR CHEST PORT 1 VIEW  LOCATION: [facility]  DATE: 11/10/2024    INDICATION: COUGH  COMPARISON: 06/10/2024      Impression    IMPRESSION: Stable cardiomediastinal silhouette. Mild basilar atelectasis. No vascular congestion or significant effusion. Right hilar prominence similar. Atherosclerotic aorta. Degenerative change osseous structures. "   Comprehensive metabolic panel     Status: Abnormal   Result Value Ref Range    Sodium 132 (L) 135 - 145 mmol/L    Potassium 4.4 3.4 - 5.3 mmol/L    Carbon Dioxide (CO2) 23 22 - 29 mmol/L    Anion Gap 9 7 - 15 mmol/L    Urea Nitrogen 12.3 8.0 - 23.0 mg/dL    Creatinine 1.09 0.67 - 1.17 mg/dL    GFR Estimate 63 >60 mL/min/1.73m2    Calcium 8.7 (L) 8.8 - 10.4 mg/dL    Chloride 100 98 - 107 mmol/L    Glucose 101 (H) 70 - 99 mg/dL    Alkaline Phosphatase 91 40 - 150 U/L    AST 31 0 - 45 U/L    ALT 13 0 - 70 U/L    Protein Total 6.8 6.4 - 8.3 g/dL    Albumin 3.8 3.5 - 5.2 g/dL    Bilirubin Total 0.6 <=1.2 mg/dL   UA with Microscopic reflex to Culture     Status: Abnormal    Specimen: Urine, Catheter   Result Value Ref Range    Color Urine Light Yellow Colorless, Straw, Light Yellow, Yellow    Appearance Urine Clear Clear    Glucose Urine Negative Negative mg/dL    Bilirubin Urine Negative Negative    Ketones Urine 20 (A) Negative mg/dL    Specific Gravity Urine 1.015 1.003 - 1.035    Blood Urine Small (A) Negative    pH Urine 7.0 5.0 - 7.0    Protein Albumin Urine 10 (A) Negative mg/dL    Urobilinogen Urine Normal Normal, 2.0 mg/dL    Nitrite Urine Negative Negative    Leukocyte Esterase Urine Moderate (A) Negative    WBC Clumps Urine Present (A) None Seen /HPF    Mucus Urine Present (A) None Seen /LPF    RBC Urine 5 (H) <=2 /HPF    WBC Urine 52 (H) <=5 /HPF    Narrative    Urine Culture ordered based on laboratory criteria   CBC with platelets and differential     Status: Abnormal   Result Value Ref Range    WBC Count 11.3 (H) 4.0 - 11.0 10e3/uL    RBC Count 4.07 (L) 4.40 - 5.90 10e6/uL    Hemoglobin 12.4 (L) 13.3 - 17.7 g/dL    Hematocrit 36.2 (L) 40.0 - 53.0 %    MCV 89 78 - 100 fL    MCH 30.5 26.5 - 33.0 pg    MCHC 34.3 31.5 - 36.5 g/dL    RDW 15.1 (H) 10.0 - 15.0 %    Platelet Count 244 150 - 450 10e3/uL    % Neutrophils 89 %    % Lymphocytes 6 %    % Monocytes 5 %    % Eosinophils 0 %    % Basophils 0 %    %  Immature Granulocytes 0 %    NRBCs per 100 WBC 0 <1 /100    Absolute Neutrophils 10.0 (H) 1.6 - 8.3 10e3/uL    Absolute Lymphocytes 0.7 (L) 0.8 - 5.3 10e3/uL    Absolute Monocytes 0.5 0.0 - 1.3 10e3/uL    Absolute Eosinophils 0.0 0.0 - 0.7 10e3/uL    Absolute Basophils 0.0 0.0 - 0.2 10e3/uL    Absolute Immature Granulocytes 0.0 <=0.4 10e3/uL    Absolute NRBCs 0.0 10e3/uL   [place] Draw     Status: None    Narrative    The following orders were created for panel order [place] Draw.  Procedure                               Abnormality         Status                     ---------                               -----------         ------                     Extra Blue Top Tube[[id number]]                              Final result               Extra Red Top Tube[[id number]]                               Final result               Extra Blood Bank Purple ...[[id number]]                      Final result                 Please view results for these tests on the individual orders.   Extra Blue Top Tube     Status: None   Result Value Ref Range    Hold Specimen JIC    Extra Red Top Tube     Status: None   Result Value Ref Range    Hold Specimen JIC    Extra Blood Bank Purple Top Tube     Status: None   Result Value Ref Range    Hold Specimen JIC    Asymptomatic Influenza A/B, RSV, & SARS-CoV2 PCR (COVID-19) Nasopharyngeal     Status: Normal    Specimen: Nasopharyngeal; Swab   Result Value Ref Range    Influenza A PCR Negative Negative    Influenza B PCR Negative Negative    RSV PCR Negative Negative    SARS CoV2 PCR Negative Negative    Narrative    Testing was performed using the Xpert Xpress CoV2/Flu/RSV Assay on the [employer] GeneXpert Instrument. This test should be ordered for the detection of SARS-CoV2, influenza, and RSV viruses in individuals with signs and symptoms of respiratory tract infection. This test is for in vitro diagnostic use under the [place] FDA for laboratories certified under CLIA to perform high or moderate  complexity testing. This test has been [place] FDA cleared. A negative result does not rule out the presence of PCR inhibitors in the specimen or target RNA in concentration below the limit of detection for the assay. If only one viral target is positive but coinfection with multiple targets is suspected, the sample should be re-tested with another FDA cleared, approved, or authorized test, if coninfection would change clinical management. This test was validated by the [facility]. These laboratories are certified under the Clinical Laboratory Improvement Amendments of 1988 (CLIA-88) as qualified to perfom high complexity laboratory testing.   iStat Gases (lactate) venous, POCT     Status: Abnormal   Result Value Ref Range    Lactic Acid POCT 0.8 <=2.0 mmol/L    Bicarbonate Venous POCT 25 21 - 28 mmol/L    O2 Sat, Venous POCT 78 (H) 70 - 75 %    pCO2 Venous POCT 41 40 - 50 mm Hg    pH Venous POCT 7.39 7.32 - 7.43    pO2 Venous POCT 43 25 - 47 mm Hg    Base Excess/Deficit (+/-) POCT 0.0 -3.0 - 3.0 mmol/L   CBC with platelets differential     Status: Abnormal    Narrative    The following orders were created for panel order CBC with platelets differential.  Procedure                               Abnormality         Status                     ---------                               -----------         ------                     CBC with platelets and d...[[id number]]  Abnormal            Final result                 Please view results for these tests on the individual orders.       Patient's response to treatments and/or interventions: Pt temp sl improved and pt stopped shaking    To be done/followed up on inpatient unit: signed and held orders    Does this patient have any cognitive concerns?: Alcohol/Drugs temporary cognitive concerns and Forgetful    Activity level - Baseline/Home:  Stand with Assist  Activity Level - Current:   PT too weak to stand and walk by himself    Patient's Preferred  language: English   Needed?: No    Isolation: Contact  Infection: Not Applicable  MRSA  Patient tested for COVID 19 prior to admission: YES  Bariatric?: No    Vital Signs:   Vitals:    11/10/24 0145 11/10/24 0200 11/10/24 0215 11/10/24 0230   BP: 126/61 116/57 (!) 85/63 124/62   Pulse: 112 111 112 111   Resp: 26 28 29 28   Temp:    (!) 101.3  F (38.5  C)   TempSrc:    Oral   SpO2: 94% 94% 93% 94%       Cardiac Rhythm: Afib on EMS arrival whic continues in the ED    Was the PSS-3 completed:   Yes  What interventions are required if any?               Family Comments:   OBS brochure/video discussed/provided to patient/family: N/A              Name of person given brochure if not patient:               Relationship to patient:     For the majority of the shift this patient's behavior was Green.   Behavioral interventions performed were .    ED NURSE PHONE NUMBER: *[phone]       2

## 2024-11-10 NOTE — PROGRESS NOTES
RECEIVING UNIT ED HANDOFF REVIEW    ED Nurse Handoff Report was reviewed by: Noah Nance RN on November 10, 2024 at 3:37 AM

## 2024-11-10 NOTE — PROGRESS NOTES
"   11/10/24 1500   Appointment Info   Signing Clinician's Name / Credentials (OT) Stephanie Amanda, OTD, OTR/L   Living Environment   People in Home spouse   Current Living Arrangements independent living facility   Home Accessibility no concerns   Transportation Anticipated car, drives self   Living Environment Comments Pt cares for spouse with baseline dementia. Pt resides in [facility]. BR set up: tub/shower combo, grab bars, grab bars by toilet   Self-Care   Usual Activity Tolerance good   Regular Exercise Yes   Activity/Exercise Type swimming   Exercise Amount/Frequency 3-5 times/wk   Equipment Currently Used at Home cane, straight   Fall history within last six months yes   Number of times patient has fallen within last six months 1   Activity/Exercise/Self-Care Comment Pt is ind with ADLs at baseline.   Instrumental Activities of Daily Living (IADL)   Previous Responsibilities medication management;laundry;meal prep;driving;finances;shopping   IADL Comments Cleaning service   General Information   Onset of Illness/Injury or Date of Surgery 11/09/24   Referring Physician Justyna Bowser MD   Patient/Family Therapy Goal Statement (OT) To get stronger/To go home   Additional Occupational Profile Info/Pertinent History of Current Problem Per chart: \"Canelo Cruz is a 89 year old male with history of urinary retention with chronic rodriguez, hypertension, coronary artery disease, dyslipidemia, chronic systolic CHF, 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, suspected cognitive impairment who was admitted on 11/9/2024 with complicated UTI, encephalopathy.\"   Existing Precautions/Restrictions fall   Limitations/Impairments safety/cognitive   Cognitive Status Examination   Orientation Status person;place;time   Affect/Mental Status (Cognitive) confused   Follows Commands follows one-step commands;50-74% accuracy;physical/tactile prompts required;repetition of directions required;verbal cues/prompting required   Safety Deficit awareness of need " for assistance;impulsivity;insight into deficits/self-awareness;judgment;problem-solving   Cognitive Screens/Assessments   Cognitive Assessments Completed Blessed Orientation-Memory-Concentration   Bleed Orientation-Memory-Concentration Test:  Total Weighted Score out of 28 13/28   Blessed Orientation-Memory-Concentration Test Norms 9-19 equals moderate impairment   Blessed Orientation-Memory-Concentration Interpretation Patient participated in the Short Orientation Memory Concentration Test cognition screen to evaluate orientation, registration, and attention. The patient scored 13/28 overall (0-8 = Normal to Mild Impairment, 9-19 = Moderate Impairment, 20-28 = Severe Impairment). Pt's overall score indicates safety implications within completing higher level cognitive tasks such a stove-top cooking, finance management, medication management, driving, etc. Pt's overall score may warrant standardized cognitive testing.   Visual Perception   Visual Impairment/Limitations WFL   Sensory   Sensory Quick Adds sensation intact   Pain Assessment   Patient Currently in Pain Yes, see Vital Sign flowsheet  (R Wrist pain, R Knee pain)   Range of Motion Comprehensive   Comment, General Range of Motion BUEs WFL   Strength Comprehensive (MMT)   Comment, General Manual Muscle Testing (MMT) Assessment BUEs WFL   Coordination   Upper Extremity Coordination No deficits were identified   Bed Mobility   Bed Mobility supine-sit   Transfers   Transfers sit-stand transfer   Sit-Stand Transfer   Sit/Stand Transfer Comments CGA   Balance   Balance Comments Unsteadiness with hallway mobility without AD use   Activities of Daily Living   BADL Assessment/Intervention lower body dressing;toileting   Lower Body Dressing Assessment/Training   Comment, (Lower Body Dressing) Dep A B socks   Toileting   Comment, (Toileting) Min A per clinical judgement d/t decr balance, mobility   Clinical Impression   Criteria for Skilled Therapeutic  "Interventions Met (OT) Yes, treatment indicated   OT Diagnosis Decreased independence with I/ADLs   OT Problem List-Impairments impacting ADL problems related to;activity tolerance impaired;balance;cognition;mobility;pain   Assessment of Occupational Performance 3-5 Performance Deficits   Identified Performance Deficits dressing, bathing, toileting, IADLs   Planned Therapy Interventions (OT) ADL retraining;IADL retraining;cognition;transfer training   Clinical Decision Making Complexity (OT) problem focused assessment/low complexity   Risk & Benefits of therapy have been explained patient   OT Total Evaluation Time   OT Eval, Low Complexity Minutes (53477) 10   OT Goals   Therapy Frequency (OT) Daily   OT Predicted Duration/Target Date for Goal Attainment 11/22/24   OT Goals Hygiene/Grooming;Lower Body Dressing;Transfers;Toilet Transfer/Toileting;Cognition;OT Goal 1   OT: Hygiene/Grooming modified independent;while standing  (FWW)   OT: Lower Body Dressing Modified independent;using adaptive equipment  (FWW, AE)   OT: Transfer Modified independent;with assistive device  (FWW, tub)   OT: Toilet Transfer/Toileting Modified independent;toilet transfer;cleaning and garment management  (FWW)   OT: Cognitive Patient/caregiver will verbalize understanding of cognitive assessment results/recommendations as needed for safe discharge planning   OT: Goal 1 Pt will ind answer 100% of home safety Qs apppropriately for incr safety awareness/ind with I/ADLs.   Therapeutic Activities   Therapeutic Activity Minutes (67178) 24   Symptoms noted during/after treatment fatigue;increased pain   Treatment Detail/Skilled Intervention Pt greeted in supine, pt agreeable to OT session. Pt oriented x3, however, pt repeats self throughout session. Pt fixated on missing slide on shoes, states to writing therapist on 3 occasions throughout session \"have you looked under the bed?\" after therapist had checked and did not see shoes. Pt demos supine " "> sitting EOB with CGA for safety. Pt requires Dep A to don socks - pt attempting fig 4 position, pt reports unable to complete d/t knee pain. Pt edu on use of sock aide for donning socks. Pt declines GB and walker, states \"I dont need that\" Pt stands from EOB with CGA on back for safety, mild unsteadiness. Pt instructed to push IV pole, pt states \"I want you to do it.\" Pt ambulates at hallway level approx. 80 ft with CGA-SBA, pt observed to furniture walk throughout and grasp railing in hallway throughout - 1 minor LOB noted, able to correct with therapist CGA. Pt edu on benefits of incr stability with use of FWW or cane - pt continues to decline despite education. Pt ambulates back to room and sits in chair with close SBA. Pt at end of session with needs met, items/call light in reach, alarm set.   OT Discharge Planning   OT Plan SLUMS, home safety Qs, tub transfer, dressing with AE (handout), toileting/toileting, gh standing   OT Discharge Recommendation (DC Rec) Transitional Care Facility   OT Rationale for DC Rec Pt currently functioning below baseline d/t noted impairments in activity fercho, balance, mobility, cognition, pain, impacting safety/independence within I/ADLs. Pt at baseline resides with spouse who has dementia and he cares for. Pt drives, cooks, etc. Currently, pt completes room and hallway mobility with CGA, unsteadiness. Recommend skilled TCU for incr ind with I/ADL ind. Pt adament to return home. OT will continue to follow and update recommendations as appropriate.   OT Brief overview of current status Goals of therapy will be to address safe mobility and make recs for d/c to next level of care. Pt and RN will continue to follow all falls risk precautions as documented by RN staff while hospitalized   Total Session Time   Timed Code Treatment Minutes 24   Total Session Time (sum of timed and untimed services) 34     "

## 2024-11-10 NOTE — PHARMACY-VANCOMYCIN DOSING SERVICE
Pharmacy Vancomycin Initial Note  Date of Service November 10, 2024  Patient's  1931  89 year old, male    Indication: Bacteremia    Current estimated CrCl = Estimated Creatinine Clearance: 48.1 mL/min (based on SCr of 1.09 mg/dL).    Creatinine for last 3 days  2024: 10:54 PM Creatinine 1.09 mg/dL    Recent Vancomycin Level(s) for last 3 days  No results found for requested labs within last 3 days.      Vancomycin IV Administrations (past 72 hours)        No vancomycin orders with administrations in past 72 hours.                    Nephrotoxins and other renal medications (From now, onward)      Start     Dose/Rate Route Frequency Ordered Stop    11/10/24 1330  vancomycin (VANCOCIN) 1,250 mg in 0.9% NaCl 262.5 mL intermittent infusion         1,250 mg  over 90 Minutes Intravenous EVERY 24 HOURS 11/10/24 1310              Contrast Orders - past 72 hours (72h ago, onward)      None            [employer] Prediction of Planned Initial Vancomycin Regimen  Loading dose: N/A  Regimen: 1250 mg IV every 24 hours.  Start time: 13:08 on 11/10/2024  Exposure target: AUC24 (range)400-600 mg/L.hr   AUC24,ss: 525 mg/L.hr  Probability of AUC24 > 400: 80 %  Ctrough,ss: 16.5 mg/L  Probability of Ctrough,ss > 20: 29 %  Probability of nephrotoxicity (Lodise MONSE ): 12 %      Plan:  Start vancomycin  1250 mg IV q24h.   Vancomycin monitoring method: AUC  Vancomycin therapeutic monitoring goal: 400-600 mg*h/L  Pharmacy will check vancomycin levels as appropriate in 1-3 Days.    Serum creatinine levels will be ordered daily for the first week of therapy and at least twice weekly for subsequent weeks.      [facility]

## 2024-11-10 NOTE — H&P
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     Complicated urinary tract infection with encephalopathy  Sepsis secondary to above  Chronic rodriguez catheter  Last known change of rodriguez catheter was in September 2024 at the time of a hospital admission.   -ceftriaxone IV  -follow-up urine and blood cultures  -continue tamsulosin  -prn acetaminophen for fever, pain  -urology consultation. Consider for removal of rodriguez catheter with trial of voiding.  -must have outpatient urology follow up at discharge and plan for monthly rodriguez changes if it must remain in place    Chronic systolic congestive heart failure  S/p TAVR in July 2024  Coronary artery disease  Hypertension  Managed prior to admission with aspirin, lasix, toprol, lipitor.   -hold lasix  -resume aspirin, toprol, lipitor once doses are verified    Cognitive Impairment suspected  Paranoid thought content  Evasive about medical history and specifically requests no contact with his sister nor spouse about details of medical history or concerns about cognitive function. High risk for subacute delirium.  -OT cognitive evaluation   -Re-orient as needed  -Maintain normal day/night, sleep wake cycles  -Minimize sedating/altering medications as able  -Treat separate conditions as detailed above/below    Chronic knee pain  -prn voltaren gel to right knee        Diet:  regular diet  DVT Prophylaxis: Pneumatic Compression Devices and Ambulate every shift  Rodriguez Catheter: Not present  Lines: None     Cardiac Monitoring: None  Code Status:  full code    Clinically Significant Risk Factors Present on  Admission         # Hyponatremia: Lowest Na = 132 mmol/L in last 2 days, will monitor as appropriate         # Drug Induced Platelet Defect: home medication list includes an antiplatelet medication    # Heart failure, NOS: heart failure noted on the problem list and last echo with EF 40-50%                     Disposition Plan     Medically Ready for Discharge: Anticipated in 2-4 Days with probable need for TCU       Justyna Bowser MD  Hospitalist Service  [facility]  Securely message with [employer] (more info)  Text page via [facility] Paging/Directory     ______________________________________________________________________    Chief Complaint   Floating chunks in rodriguez catheter urine, Fever, Weakness, confusion    History is obtained from the patient, electronic health record, and emergency department physician    History of Present Illness   Canelo Cruz is a 89 year old male who history of urinary retention with chronic rodriguez, hypertension, coronary artery disease, dyslipidemia, chronic systolic CHF, 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 EMS notes that the patient wanted an additional drink of alcohol before getting in ambulance. Fever noted en route and continues in the ED.     Unable to provide accurate medical history and is evasive about details of recent events. Specifically states he doesn't want me to contact his sister, spouse or spouse's adult son about recent medical history.     Case discussed with Dr. Roper from the Emergency Department. Labs and imaging reviewed.     Past Medical History    Past Medical History:   Diagnosis Date    Gout     Heart murmur     Insomnia     Mixed hyperlipidemia     Postherpetic neuralgia        Past Surgical History   Past Surgical History:   Procedure Laterality Date    ARTHROSCOPY KNEE Right     ~[id number]    CV CORONARY ANGIOGRAM N/A  6/13/2024    Procedure: Coronary Angiogram;  Surgeon: Jarett Odell MD;  Location:  HEART CARDIAC CATH LAB    CV RIGHT HEART CATH MEASUREMENTS RECORDED N/A 6/13/2024    Procedure: Right Heart Catheterization;  Surgeon: Jarett Odell MD;  Location:  HEART CARDIAC CATH LAB    CV TRANSCATHETER AORTIC VALVE REPLACEMENT-FEMORAL APPROACH N/A 7/16/2024    Procedure: Transcatheter Aortic Valve Replacement-Femoral Approach;  Surgeon: Prince Burnett MD;  Location:  HEART CARDIAC CATH LAB    DAVINCI XI HERNIORRHAPHY INGUINAL Bilateral 7/18/2022    Procedure: Robotic repair of right inguinal hernia with placement of mesh,  robotic repair of left inguinal hernia with placement of mesh;  Surgeon: Ina Phillips MD;  Location:  OR    ORTHOPEDIC SURGERY      right achilles rupture repair with 14 month MRSA infection       Prior to Admission Medications   Prior to Admission Medications   Prescriptions Last Dose Informant Patient Reported? Taking?   albuterol (PROAIR HFA/PROVENTIL HFA/VENTOLIN HFA) 108 (90 Base) MCG/ACT inhaler  Self Yes No   Sig: Inhale 2 puffs into the lungs every 6 hours as needed for shortness of breath, wheezing or cough   Patient not taking: Reported on 8/30/2024   aspirin 81 MG EC tablet  Self Yes No   Sig: Take 81 mg by mouth. Taking 1-2 weekly   atorvastatin (LIPITOR) 40 MG tablet  Self No No   Sig: Take 1 tablet (40 mg) by mouth daily   Patient not taking: Reported on 8/30/2024   diclofenac (VOLTAREN) 1 % topical gel  Self Yes No   Sig: Apply 4 g topically 4 times daily   furosemide (LASIX) 40 MG tablet   No No   Sig: Take 1 tablet (40 mg) by mouth daily   Patient not taking: Reported on 8/30/2024   melatonin 3 MG tablet  Self Yes No   Sig: Take 3 mg by mouth at bedtime   metoprolol succinate ER (TOPROL XL) 25 MG 24 hr tablet   No No   Sig: Take 1 tablet (25 mg) by mouth daily.   potassium chloride ER (K-TAB) 20 MEQ CR tablet   No No   Sig: Take 1 tablet (20 mEq) by mouth  daily   Patient not taking: Reported on 8/30/2024   tamsulosin (FLOMAX) 0.4 MG capsule   No No   Sig: Take 1 capsule (0.4 mg) by mouth daily as needed (urinary retention).      Facility-Administered Medications: None        Review of Systems    The 10 point Review of Systems is negative other than noted in the HPI or here.      Physical Exam   Vital Signs: Temp: (!) 101.9  F (38.8  C) Temp src: Oral BP: (!) 157/85 Pulse: 107   Resp: 28 SpO2: 95 % O2 Device: Nasal cannula Oxygen Delivery: 2 LPM  Weight: 0 lbs 0 oz    Constitutional: chronically ill appearing elderly male  Eyes: sclera anicteric   ENT: atraumatic, edentulous   Respiratory: No increased work of breathing, good air exchange, clear to auscultation bilaterally, no crackles or wheezing  Cardiovascular: regular, normal S1 and S2, and no edema  GI: soft, nontender  Genitounirinary: new rodriguez in place  Skin: seborrheic dermatitis on face and neck  Musculoskeletal: STRATTON, normal muscle tone and bulk  Neurologic: oriented to person and place only, no tremors, speech normal  Neuropsychiatric: suspicious affect, poor short term memory    Medical Decision Making       80 MINUTES SPENT BY ME on the date of service doing chart review, history, exam, documentation & further activities per the note.      Data     I have personally reviewed the following data over the past 24 hrs:    11.3 (H)  \   12.4 (L)   / 244     132 (L) 100 12.3 /  101 (H)   4.4 23 1.09 \     ALT: 13 AST: 31 AP: 91 TBILI: 0.6   ALB: 3.8 TOT PROTEIN: 6.8 LIPASE: N/A     Procal: N/A CRP: N/A Lactic Acid: 0.8         Imaging results reviewed over the past 24 hrs:   Recent Results (from the past 24 hours)   XR Chest Port 1 View    Narrative    EXAM: XR CHEST PORT 1 VIEW  LOCATION: [facility]  DATE: 11/10/2024    INDICATION: COUGH  COMPARISON: 06/10/2024      Impression    IMPRESSION: Stable cardiomediastinal silhouette. Mild basilar atelectasis. No vascular congestion or  significant effusion. Right hilar prominence similar. Atherosclerotic aorta. Degenerative change osseous structures.

## 2024-11-10 NOTE — ED PROVIDER NOTES
"  Emergency Department Note      History of Present Illness     Chief Complaint   Generalized Weakness (Confusion, weak, catheter, alcohol.  EMS said pt unable to reach son.  Pt and wife confused.  Pt and wife live alone and pt wife still driving.  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 Nurse reports that upon EMS arrival, patient was too weak to walk and had a fever of 101.9. They further report that he attempted to grab a drink of alcohol before leaving, and they noticed the container of alcohol had \"floaties\" in it, though they are unsure where they came from. Patient has full length catheter in place for at least a month. Nurse notes an abnormal smell and sediment in the bag. Patient reports that he began feeling sick and weak today. He endorses chronic shortness of breath. He denies any cough, vomiting, diarrhea, chest pain, or headache. He further denies any exposure to sick individuals. He is unsure when he developed his fever. Patient lives with his wife in [facility].    Independent Historian   None    Review of External Notes   Internal medicine note reviewed from 10/28/2024.  Patient seen for wrist pain    Past Medical History     Medical History and Problem List   ACP (advance care planning)    Colonic polyps  Constipation  Post herpetic neuralgia  Decreased libido  Erectile dysfunction  Osteoarthritis of right knee  Primary insomnia  Elevated blood sugar  Hypertriglyceridemia  Class 1 obesity due to excess calories with serious comorbidity and body mass index (BMI) of 32.0 to 32.9 in adult  Recurrent UTI  Chronic fatigue  Impaired fasting glucose  Balance problems-since 11-19  Decreased hearing of right ear- since 12-19  Gout  Non-recurrent unilateral inguinal " hernia without obstruction or gangrene  Acute pulmonary edema  Elevated brain natriuretic peptide (BNP) level  Acute congestive heart failure  Aortic stenosis, severe  Bladder outlet obstruction  Diverticulosis of both small and large intestine  Gallstones  Heart murmur, systolic  Hemorrhoids  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  Prostatism  Hyperlipidemia     Medications   Albuterol  Aspirin  Lipitor  Voltaren  Lasix  Toprol  Flomax    Surgical History   Past Surgical History:   Procedure Laterality Date    ARTHROSCOPY KNEE Right     ~[id number]    CV CORONARY ANGIOGRAM N/A 6/13/2024    Procedure: Coronary Angiogram;  Surgeon: Jarett Odell MD;  Location:  HEART CARDIAC CATH LAB    CV RIGHT HEART CATH MEASUREMENTS RECORDED N/A 6/13/2024    Procedure: Right Heart Catheterization;  Surgeon: Jarett Odell MD;  Location:  HEART CARDIAC CATH LAB    CV TRANSCATHETER AORTIC VALVE REPLACEMENT-FEMORAL APPROACH N/A 7/16/2024    Procedure: Transcatheter Aortic Valve Replacement-Femoral Approach;  Surgeon: Prince Burnett MD;  Location:  HEART CARDIAC CATH LAB    DAVINCI XI HERNIORRHAPHY INGUINAL Bilateral 7/18/2022    Procedure: Robotic repair of right inguinal hernia with placement of mesh,  robotic repair of left inguinal hernia with placement of mesh;  Surgeon: Ina Phillips MD;  Location:  OR    ORTHOPEDIC SURGERY      right achilles rupture repair with 14 month MRSA infection       Physical Exam     Patient Vitals for the past 24 hrs:   BP Temp Temp src Pulse Resp SpO2   11/10/24 0413 120/51 99.8  F (37.7  C) Oral 94 18 95 %   11/10/24 0300 111/53 (!) 102.5  F (39.2  C) Temporal 106 27 96 %   11/10/24 0230 124/62 (!) 101.3  F (38.5  C) Oral 111 28 94 %   11/10/24 0215 (!) 85/63 -- -- 112 29 93 %   11/10/24 0200 116/57 -- -- 111 28 94 %   11/10/24 0145 126/61 -- -- 112 26 94 %   11/10/24 0130 131/63 -- -- 111 26 95 %   11/10/24 0115 120/67 -- -- 110 28 94 %   11/10/24 0100 110/67 -- -- 110 24 96 %    11/10/24 0045 135/77 -- -- 105 25 96 %   11/10/24 0030 (!) 142/72 -- -- 105 16 --   11/10/24 0015 (!) 143/74 -- -- 105 19 --   11/10/24 0000 (!) 148/76 (!) 103  F (39.4  C) Oral 106 (!) 7 96 %   11/09/24 2345 (!) 149/78 -- -- 112 21 --   11/09/24 2330 (!) 156/79 -- -- 102 21 --   11/09/24 2315 (!) 152/69 -- -- 97 (!) 9 --   11/09/24 2300 (!) 156/74 -- -- 100 12 96 %   11/09/24 2245 (!) 144/84 -- -- 106 27 96 %   11/09/24 2238 -- -- -- 107 28 95 %   11/09/24 2230 -- -- -- -- -- 94 %   11/09/24 2223 (!) 157/85 (!) 101.9  F (38.8  C) Oral 111 -- (!) 87 %     Physical Exam  GENERAL: Awake, alert. Appears fatigued and frail.  CARDIOVASCULAR: Regular rate and rhythm. Tachycardic.  LUNGS: Coarse breath sounds.  ABDOMEN: Soft, nontender, no rebound or guarding  EXTREMITIES: No peripheral edema  NEURO: Awake and alert, moves all extremities. No drift to all 4 extremities.    Diagnostics     Lab Results   Labs Ordered and Resulted from Time of ED Arrival to Time of ED Departure   COMPREHENSIVE METABOLIC PANEL - Abnormal       Result Value    Sodium 132 (*)     Potassium 4.4      Carbon Dioxide (CO2) 23      Anion Gap 9      Urea Nitrogen 12.3      Creatinine 1.09      GFR Estimate 63      Calcium 8.7 (*)     Chloride 100      Glucose 101 (*)     Alkaline Phosphatase 91      AST 31      ALT 13      Protein Total 6.8      Albumin 3.8      Bilirubin Total 0.6     ROUTINE UA WITH MICROSCOPIC REFLEX TO CULTURE - Abnormal    Color Urine Light Yellow      Appearance Urine Clear      Glucose Urine Negative      Bilirubin Urine Negative      Ketones Urine 20 (*)     Specific Gravity Urine 1.015      Blood Urine Small (*)     pH Urine 7.0      Protein Albumin Urine 10 (*)     Urobilinogen Urine Normal      Nitrite Urine Negative      Leukocyte Esterase Urine Moderate (*)     WBC Clumps Urine Present (*)     Mucus Urine Present (*)     RBC Urine 5 (*)     WBC Urine 52 (*)    CBC WITH PLATELETS AND DIFFERENTIAL - Abnormal    WBC Count  11.3 (*)     RBC Count 4.07 (*)     Hemoglobin 12.4 (*)     Hematocrit 36.2 (*)     MCV 89      MCH 30.5      MCHC 34.3      RDW 15.1 (*)     Platelet Count 244      % Neutrophils 89      % Lymphocytes 6      % Monocytes 5      % Eosinophils 0      % Basophils 0      % Immature Granulocytes 0      NRBCs per 100 WBC 0      Absolute Neutrophils 10.0 (*)     Absolute Lymphocytes 0.7 (*)     Absolute Monocytes 0.5      Absolute Eosinophils 0.0      Absolute Basophils 0.0      Absolute Immature Granulocytes 0.0      Absolute NRBCs 0.0     ISTAT GASES LACTATE VENOUS POCT - Abnormal    Lactic Acid POCT 0.8      Bicarbonate Venous POCT 25      O2 Sat, Venous POCT 78 (*)     pCO2 Venous POCT 41      pH Venous POCT 7.39      pO2 Venous POCT 43      Base Excess/Deficit (+/-) POCT 0.0     INFLUENZA A/B, RSV, & SARS-COV2 PCR - Normal    Influenza A PCR Negative      Influenza B PCR Negative      RSV PCR Negative      SARS CoV2 PCR Negative     ETHYL ALCOHOL LEVEL - Normal    Alcohol ethyl <0.01     ALCOHOL BREATH TEST POCT   BLOOD CULTURE   URINE CULTURE       Imaging   XR Chest Port 1 View   Final Result   IMPRESSION: Stable cardiomediastinal silhouette. Mild basilar atelectasis. No vascular congestion or significant effusion. Right hilar prominence similar. Atherosclerotic aorta. Degenerative change osseous structures.        Independent Interpretation   None    ED Course      Medications Administered   Medications   sodium chloride (PF) 0.9% PF flush 3 mL (has no administration in time range)   sodium chloride (PF) 0.9% PF flush 3 mL ( Intracatheter Canceled Entry 11/10/24 0035)   lidocaine 1 % 0.1-1 mL (has no administration in time range)   lidocaine (LMX4) cream (has no administration in time range)   sodium chloride (PF) 0.9% PF flush 3 mL (3 mLs Intracatheter $Given 11/10/24 1914)   sodium chloride (PF) 0.9% PF flush 3 mL (has no administration in time range)   senna-docusate (SENOKOT-S/PERICOLACE) 8.6-50 MG per tablet 1  tablet (has no administration in time range)     Or   senna-docusate (SENOKOT-S/PERICOLACE) 8.6-50 MG per tablet 2 tablet (has no administration in time range)   calcium carbonate (TUMS) chewable tablet 1,000 mg (has no administration in time range)   acetaminophen (TYLENOL) tablet 650 mg (has no administration in time range)     Or   acetaminophen (TYLENOL) Suppository 650 mg (has no administration in time range)   melatonin tablet 1 mg (has no administration in time range)   ondansetron (ZOFRAN ODT) ODT tab 4 mg (has no administration in time range)     Or   ondansetron (ZOFRAN) injection 4 mg (has no administration in time range)   benzocaine-menthol (CHLORASEPTIC) 6-10 MG lozenge 1 lozenge (has no administration in time range)   cefTRIAXone (ROCEPHIN) 1 g vial to attach to  mL bag for ADULTS or NS 50 mL bag for PEDS (has no administration in time range)   diclofenac (VOLTAREN) 1 % topical gel 4 g (has no administration in time range)   metoprolol succinate ER (TOPROL XL) 24 hr tablet 25 mg (has no administration in time range)   tamsulosin (FLOMAX) capsule 0.4 mg (has no administration in time range)   sodium chloride 0.9% BOLUS 2,421 mL (0 mLs Intravenous Stopped 11/10/24 0200)   acetaminophen (TYLENOL) tablet 1,000 mg (1,000 mg Oral $Given 11/10/24 0021)   lidocaine (XYLOCAINE) 2 % external gel 10 mL (10 mLs Urethral $Given 11/9/24 2345)   cefTRIAXone (ROCEPHIN) 1 g vial to attach to  mL bag for ADULTS or NS 50 mL bag for PEDS (0 g Intravenous Stopped 11/10/24 0241)       Procedures   Procedures     Discussion of Management   None    ED Course   ED Course as of 11/10/24 0420   Sat Nov 09, 2024 2251 I obtained history and examined the patient as noted above.         Additional Documentation  None    Reviewed chest x-ray: No infiltrate    Medical Decision Making / Diagnosis     CMS Diagnoses: Lactate was normal, no signs of sepsis    MIPS       None    MDM   aCnelo Cruz is a 89 year old male who  presents emergency department for evaluation of a fever.  Patient was initially tachycardic here, and septic workup was pursued.  Patient had leukocytosis to 11.3, normal lactate, had evidence of UTI, normal renal function, was given Rocephin.  Chest x-ray showed no infiltrate, viral respiratory panel was negative.  Patient will be admitted as he is quite weak, slightly confused, and accepted by the hospitalist.    Disposition   The patient was admitted to the hospital.     Diagnosis     ICD-10-CM    1. Urinary tract infection with hematuria, site unspecified  N39.0     R31.9       2. Metabolic encephalopathy  G93.41       3. Generalized weakness  R53.1            Discharge Medications   Current Discharge Medication List            Scribe Disclosure:  I, Miroslavacitlali Almazan, am serving as a scribe at 11:39 PM on 11/9/2024 to document services personally performed by Raven Roper MD based on my observations and the provider's statements to me.        Raven Roper MD  11/10/24 0429

## 2024-11-10 NOTE — CONSULTS
Urology Consult History and Physical    Name: Canelo Cruz    MRN: [id number]   YOB: 1931  Location: [facility]         I was asked to see Canelo Cruz at the request of Dr. Justyna Bowser for evaluation and treatment of urinary retention  .        Chief Complaint:   Urinary retention          History of Present Illness:   Canelo Cruz is a 89 year old male who is being seen for evaluation of encephalopathy, attributed to UTI upon arrival. Has chronic Christianson in place, since at least Jun 2024, when he was in retention. Over 2L was removed from that initial retention event.     Christianson last changed two months ago. Outpatient urology was recommend in June 2024 - did not see anyone.     Upon arrival this admission, was noted to be febrile to 103F, mild tachy, hyperdynamic. Fever curve pending, remains stable. Lactate was normal upon arrival, Ucx/Bcx pending.           Past Medical History:     Past Medical History:   Diagnosis Date    Gout     Heart murmur     Insomnia     Mixed hyperlipidemia     Postherpetic neuralgia             Past Surgical History:     Past Surgical History:   Procedure Laterality Date    ARTHROSCOPY KNEE Right     ~[id number]    CV CORONARY ANGIOGRAM N/A 6/13/2024    Procedure: Coronary Angiogram;  Surgeon: Jarett Odell MD;  Location:  HEART CARDIAC CATH LAB    CV RIGHT HEART CATH MEASUREMENTS RECORDED N/A 6/13/2024    Procedure: Right Heart Catheterization;  Surgeon: Jarett Odell MD;  Location: [facility] CARDIAC CATH LAB    CV TRANSCATHETER AORTIC VALVE REPLACEMENT-FEMORAL APPROACH N/A 7/16/2024    Procedure: Transcatheter Aortic Valve Replacement-Femoral Approach;  Surgeon: Prince Burnett MD;  Location: [facility] CARDIAC CATH LAB    DAVINCI XI HERNIORRHAPHY INGUINAL Bilateral 7/18/2022    Procedure: Robotic repair of right inguinal hernia with placement of mesh,  robotic repair of left inguinal hernia with placement of mesh;   Surgeon: Ina Phillips MD;  Location: [facility] OR    ORTHOPEDIC SURGERY      right achilles rupture repair with 14 month MRSA infection            Social History:     Social History     Tobacco Use    Smoking status: Never    Smokeless tobacco: Never   Substance Use Topics    Alcohol use: Yes     Comment: 10 per week            Family History:     Family History   Problem Relation Age of Onset    Unknown/Adopted Mother     Unknown/Adopted Father     Diabetes No family hx of     Coronary Artery Disease No family hx of     Hypertension No family hx of     Hyperlipidemia No family hx of     Cerebrovascular Disease No family hx of     Breast Cancer No family hx of     Colon Cancer No family hx of     Prostate Cancer No family hx of     Other Cancer No family hx of     Depression No family hx of     Anxiety Disorder No family hx of     Mental Illness No family hx of     Substance Abuse No family hx of     Anesthesia Reaction No family hx of     Asthma No family hx of     Osteoporosis No family hx of     Genetic Disorder No family hx of     Thyroid Disease No family hx of     Obesity No family hx of               Allergies:     Allergies   Allergen Reactions    Ciprofloxacin      history of achilles tendon pain            Medications:     Current Facility-Administered Medications   Medication Dose Route Frequency Provider Last Rate Last Admin    acetaminophen (TYLENOL) tablet 650 mg  650 mg Oral Q4H PRN Justyna Bowser MD   650 mg at 11/10/24 0531    Or    acetaminophen (TYLENOL) Suppository 650 mg  650 mg Rectal Q4H PRN Justyna Bowser MD        benzocaine-menthol (CHLORASEPTIC) 6-10 MG lozenge 1 lozenge  1 lozenge Buccal Q1H PRN Justyna Bowser MD        calcium carbonate (TUMS) chewable tablet 1,000 mg  1,000 mg Oral 4x Daily PRN Justyna Bowser MD        [START ON 11/11/2024] cefTRIAXone (ROCEPHIN) 1 g vial to attach to  mL bag for ADULTS or NS 50 mL bag for PEDS  1 g  Intravenous Q24H Justyna Bowser MD        diclofenac (VOLTAREN) 1 % topical gel 4 g  4 g Topical 4x Daily Justyna Bowser MD   4 g at 11/10/24 0943    lidocaine (LMX4) cream   Topical Q1H PRN Justyna Bowser MD        lidocaine 1 % 0.1-1 mL  0.1-1 mL Other Q1H PRJustyna Bailey MD        melatonin tablet 1 mg  1 mg Oral At Bedtime PRN Justyna Bowser MD        metoprolol succinate ER (TOPROL XL) 24 hr tablet 25 mg  25 mg Oral Daily Claritza Gabriel MD        ondansetron (ZOFRAN ODT) ODT tab 4 mg  4 mg Oral Q6H PRN Justyna Bowser MD        Or    ondansetron (ZOFRAN) injection 4 mg  4 mg Intravenous Q6H PRN Justyna Bowser MD        senna-docusate (SENOKOT-S/PERICOLACE) 8.6-50 MG per tablet 1 tablet  1 tablet Oral BID PRN Justyna Bowser MD        Or    senna-docusate (SENOKOT-S/PERICOLACE) 8.6-50 MG per tablet 2 tablet  2 tablet Oral BID PRJustyna Bailey MD        sodium chloride (PF) 0.9% PF flush 3 mL  3 mL Intracatheter Q8H Justyna Bowser MD   3 mL at 11/10/24 0336    sodium chloride (PF) 0.9% PF flush 3 mL  3 mL Intracatheter q1 min prn Justyna Bowser MD        sodium chloride (PF) 0.9% PF flush 3 mL  3 mL Intracatheter q1 min prn Raven Roper MD        sodium chloride (PF) 0.9% PF flush 3 mL  3 mL Intracatheter Q8H Raven Roper MD   3 mL at 11/10/24 0520    tamsulosin (FLOMAX) capsule 0.4 mg  0.4 mg Oral Daily PRN Justyna Bowser MD                 Review of Systems:    ROS: 10 point ROS neg other than the symptoms noted above in the HPI.          Physical Exam:   Patient Vitals for the past 24 hrs:   BP Temp Temp src Pulse Resp SpO2   11/10/24 0900 96/47 -- -- 75 -- 95 %   11/10/24 0742 96/44 -- -- -- -- --   11/10/24 0738 (!) 81/35 98.5  F (36.9  C) Oral 74 18 94 %   11/10/24 0430 -- -- -- -- -- 93 %   11/10/24 0413 120/51 99.8  F (37.7  C) Oral 94 18 95 %   11/10/24  0300 111/53 (!) 102.5  F (39.2  C) Temporal 106 27 96 %   11/10/24 0230 124/62 (!) 101.3  F (38.5  C) Oral 111 28 94 %   11/10/24 0215 (!) 85/63 -- -- 112 29 93 %   11/10/24 0200 116/57 -- -- 111 28 94 %   11/10/24 0145 126/61 -- -- 112 26 94 %   11/10/24 0130 131/63 -- -- 111 26 95 %   11/10/24 0115 120/67 -- -- 110 28 94 %   11/10/24 0100 110/67 -- -- 110 24 96 %   11/10/24 0045 135/77 -- -- 105 25 96 %   11/10/24 0030 (!) 142/72 -- -- 105 16 --   11/10/24 0015 (!) 143/74 -- -- 105 19 --   11/10/24 0000 (!) 148/76 (!) 103  F (39.4  C) Oral 106 (!) 7 96 %   11/09/24 2345 (!) 149/78 -- -- 112 21 --   11/09/24 2330 (!) 156/79 -- -- 102 21 --   11/09/24 2315 (!) 152/69 -- -- 97 (!) 9 --   11/09/24 2300 (!) 156/74 -- -- 100 12 96 %   11/09/24 2245 (!) 144/84 -- -- 106 27 96 %   11/09/24 2238 -- -- -- 107 28 95 %   11/09/24 2230 -- -- -- -- -- 94 %   11/09/24 2223 (!) 157/85 (!) 101.9  F (38.8  C) Oral 111 -- (!) 87 %     General: Age-appropriate appearing male in NAD  HEENT: Head AT/NC, EOMI, CN Grossly intact  Lungs:No respiratory distress, or pursed lip breathing, symmetric expansion  Heart: No obvious jugular venous distension present  Back: No bony midline tenderness, no CVAT bilaterally  Abdomen: Soft, non-distended, non-tender. No organomegaly  : Normal male external genitalia, Christianson secure/patent, cloudy output  Lymph: No palpable inguinal lymphadenopathy  Musculoskeltal: Extremities grossly normal, no peripheral edema  Skin: No suspicious lesions or rashes discovered during subtotal surface examination  Neuro: Alert, oriented, speech and mentation normal;  grossly intact peripherally  Psych: Affect and mood normal, conversation pace and content within normal limits          Data:   All laboratory data reviewed:    Recent Labs   Lab 11/09/24  2254   WBC 11.3*   HGB 12.4*          Recent Labs   Lab 11/09/24  2254   *   POTASSIUM 4.4   CHLORIDE 100   CO2 23   BUN 12.3   CR 1.09   *   ROSA 8.7*        Recent Labs   Lab 11/10/24  0023   COLOR Light Yellow   APPEARANCE Clear   URINEGLC Negative   URINEBILI Negative   URINEKETONE 20*   SG 1.015   URINEPH 7.0   PROTEIN 10*   NITRITE Negative   LEUKEST Moderate*   RBCU 5*   WBCU 52*       All pertinent imaging reviewed:    Recent Results (from the past 24 hours)   XR Chest Port 1 View    Narrative    EXAM: XR CHEST PORT 1 VIEW  LOCATION: [facility]  DATE: 11/10/2024    INDICATION: COUGH  COMPARISON: 06/10/2024      Impression    IMPRESSION: Stable cardiomediastinal silhouette. Mild basilar atelectasis. No vascular congestion or significant effusion. Right hilar prominence similar. Atherosclerotic aorta. Degenerative change osseous structures.   CT angiogram Jun 2024 reviewed personally, no hydro or concerning lesions noted with Christianson in place in bladder.   No new imaging for this admission relevant to the  tract noted.            Impression and Plan:   Impression:  Chronic urinary retention  Complicated UTI  Sepsis, suspect urinary source  Improving with resuscitation thus far, no shock identified    Canelo had severe retention in the summer, and nothing is certain but 89 year olds who present with retention over 2L rarely (if ever) resume volitional voiding again, even if outlet surgery is successful, which in his case was not (and probably) should not be undertaken. It is with backdrop painted that I do not recommend voiding trials amidst resolving sepsis from UTI.     Plan:  Continue indwelling Christianson  Can exchange Christianson tomorrow or Tuesday  Outpatient  follow up for monthly Christianson changes, or a new plan, once UTI treated.       Ariel Anderson MD

## 2024-11-10 NOTE — PHARMACY-ADMISSION MEDICATION HISTORY
"Pharmacy Intern Admission Medication History    Admission medication history is complete. The information provided in this note is only as accurate as the sources available at the time of the update.    Information Source(s): Family member, Facility (U/NH/) medication list/MAR, and CareEverywhere/SureScripts via in-person and phone    Pertinent Information:  - The patient's wife's son verified the medication list using medications that were at home. The patient stays at [facility]. The patient's family does not know last doses and could not verify with the patient. The living facility does not have nurses available until Monday.  - The patient has not started Atorvastatin yet due to not picking it up from the pharmacy yet.    Changes made to PTA medication list:  Added: Tylenol  Deleted: None  Changed: Marked albuterol inhaler as \"not taking\"    Allergies reviewed with patient and updates made in EHR: yes    Medication History Completed By: Diana Cheatham 11/10/2024 12:38 PM    PTA Med List   Medication Sig Last Dose/Taking    acetaminophen (TYLENOL) 325 MG tablet Take 325-650 mg by mouth every 6 hours as needed for mild pain. Taking As Needed    aspirin 81 MG EC tablet Take 81 mg by mouth. Taking 1-2 weekly Taking    diclofenac (VOLTAREN) 1 % topical gel Apply 4 g topically 4 times daily Taking    furosemide (LASIX) 40 MG tablet Take 1 tablet (40 mg) by mouth daily Taking    melatonin 3 MG tablet Take 3 mg by mouth nightly as needed. Taking As Needed    metoprolol succinate ER (TOPROL XL) 25 MG 24 hr tablet Take 1 tablet (25 mg) by mouth daily. Taking    potassium chloride ER (K-TAB) 20 MEQ CR tablet Take 1 tablet (20 mEq) by mouth daily Taking    tamsulosin (FLOMAX) 0.4 MG capsule Take 1 capsule (0.4 mg) by mouth daily as needed (urinary retention). Taking As Needed       "

## 2024-11-10 NOTE — PROGRESS NOTES
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     Gram-positive bacteremia.  Complicated urinary tract infection.  Sepsis secondary to above  Chronic rodriguez catheter  Last known change of rodriguez catheter was in September 2024 at the time of a hospital admission.   --Initiated on IV ceftriaxone, to continue.  Follow-up blood cultures and urine cultures.  Continue PTA tamsulosin.  -prn acetaminophen for fever, pain  -urology consultation. Consider for removal of rodriguez catheter with trial of voiding.  -must have outpatient urology follow up at discharge and plan for monthly rodriguez changes if it must remain in place.  Trend WBC count, fever curve.    Addendum.  3 PM.  Blood cultures with gram-positive bacteremia.  Staphylococcus orients.  Will send out MRSA nasal swab.  Repeat blood cultures.  Add IV Unasyn    Hyponatremia.  Sodium of 132 on admission.  Holding PTA Lasix.  Monitor sodium levels in AM.     Chronic systolic congestive heart failure  Coronary artery disease  S/p TAVR in July 2024  Hypertension  Echocardiogram 7/2024 with LVEF of 20 to 25%.  Repeat echo 8/2024 with LVEF of 40%  Managed prior to admission with aspirin, lasix, toprol, lipitor.   -hold lasix  Continue PTA aspirin, Toprol.  Continue PTA Lipitor.  Intake output monitoring, daily weights.     Acute on chronic knee pain.  --Patient now complaining of worsening right knee pain.   Reports unable to bear weight on right knee.  Noted swelling, no erythema.  Tenderness present.  X-ray right knee ordered  -prn voltaren gel to right knee.  As needed Tylenol.  As needed Robaxin.    Right wrist pain, swelling.  Complaining of  "right wrist pain.  Reports had a fall previously, has not had any workup from his primary provider.   On exam right wrist swelling noted, range of motion restricted.  Tenderness present.  No warmth or erythema.  X-ray right wrist ordered      Physical deconditioning from medical illness, senile frailty.  Reports lives at home with his wife, drives and is independent.  PT, OT evaluation.  Fall precautions     Acute encephalopathy likely multifactorial, infectious etiology, underlying cognitive impairment  Cognitive Impairment suspected  Paranoid thought content  -- Per admitting provider evasive about medical history and specifically requests no contact with his sister nor spouse about details of medical history or concerns about cognitive function.   -- On 11/10 with patient with his wife and brother-in-law by bedside.  During encounter patient frustrated, agitated.  Wife by bedside mostly calm ( report has dementia), brother-in-law reports - patient at around his baseline mental status.    -OT cognitive evaluation   -Re-orient as needed  -Maintain normal day/night, sleep wake cycles  -Minimize sedating/altering medications as able  -Treat separate conditions as detailed above/below chronic anemia.  Baseline hemoglobin in the 12 range.  Hemoglobin at around previous baseline.  Monitor.  Patient must hold off driving on discharge until outpatient cognitive screening    Alcohol use.  --Per ED note reviewed, They further report that he attempted to grab a drink of alcohol before leaving, and they noticed the container of alcohol had \"floaties\" in it, though they are unsure where they came from.   -- Patient denies any alcohol use.  Wife by bedside calm - during encounter.  Add on alcohol level.  Monitor closely for withdrawal symptoms.      Orders Placed This Encounter      Combination Diet Regular Diet Adult      DVT Prophylaxis: SCDs, ambulate.  Code Status: Full Code  Disposition: Expected discharge in greater than " 2 days pending clinical improvement    Medically Ready for Discharge: Anticipated in 2-4 Days       Discussed with patient, his wife, brother-in-law by bedside, bedside RN.  >45  minutes spent by me on the date of service doing chart review, history, exam, documentation & further activities per the note.      Claritza Gabriel MD        Interval History:        Patient lying in bed.  Denies any chest pain or palpitations.  Denies any headache or dizziness.  No nausea vomiting.  Denies any tingling or numbness.  During encounter patient appearing frustrated, wife and brother-in-law by bedside.  Reports would like to leave the hospital immediately.  Denies any urinary disturbance, expressing frustration with having Christianson catheter.  Complaining of right wrist, right knee swelling, discomfort.  Reports has been unable to bear weight.           Physical Exam:        Physical Exam   Temp:  [97.6  F (36.4  C)-103  F (39.4  C)] 97.6  F (36.4  C)  Pulse:  [] 69  Resp:  [7-29] 18  BP: ()/(35-85) 105/46  SpO2:  [87 %-96 %] 93 %    Intake/Output Summary (Last 24 hours) at 11/10/2024 1610  Last data filed at 11/10/2024 1435  Gross per 24 hour   Intake 240 ml   Output 650 ml   Net -410 ml       Admission Weight: 80.3 kg (177 lb)  Current Weight: 80.3 kg (177 lb)    PHYSICAL EXAM  GENERAL: Patient is in no distress.  Alert, oriented and able to answer most questions.  Appearing frustrated during encounter  HEENT: Oropharynx pink.  HEART: Regular rate and rhythm. S1S2. No murmurs  LUNGS: Clear to auscultation bilaterally. No expiratory wheeze.  Respirations unlabored  ABDOMEN: Soft, no abdominal tenderness, bowel sounds heard   NEURO: Moving all extremities  EXTREMITIES: No pedal edema.    Right wrist area swelling noted.No erythema.  No open wound.  No warmth.  Tenderness present.  Right knee area swelling noted.  No erythema.  No open wound.  No warmth.  Tenderness present.  SKIN: Warm, dry.   PSYCHIATRY slightly  agitated during encounter       Medications:        Current Facility-Administered Medications   Medication Dose Route Frequency Provider Last Rate Last Admin    [START ON 11/11/2024] cefTRIAXone (ROCEPHIN) 1 g vial to attach to  mL bag for ADULTS or NS 50 mL bag for PEDS  1 g Intravenous Q24H Justyna Bowser MD        diclofenac (VOLTAREN) 1 % topical gel 4 g  4 g Topical 4x Daily Justyna Bowser MD   4 g at 11/10/24 1308    metoprolol succinate ER (TOPROL XL) 24 hr tablet 25 mg  25 mg Oral Daily Claritza Gabriel MD        sodium chloride (PF) 0.9% PF flush 3 mL  3 mL Intracatheter Q8H Justyna Bowser MD   3 mL at 11/10/24 1308    sodium chloride (PF) 0.9% PF flush 3 mL  3 mL Intracatheter Q8H Raven Roper MD   3 mL at 11/10/24 1425    vancomycin (VANCOCIN) 1,250 mg in 0.9% NaCl 262.5 mL intermittent infusion  1,250 mg Intravenous Q24H Claritza Gabriel MD   1,250 mg at 11/10/24 1425     Current Facility-Administered Medications   Medication Dose Route Frequency Provider Last Rate Last Admin    acetaminophen (TYLENOL) tablet 650 mg  650 mg Oral Q4H PRN Justyna Bowser MD   650 mg at 11/10/24 0531    Or    acetaminophen (TYLENOL) Suppository 650 mg  650 mg Rectal Q4H PRN Justyna Bowser MD        benzocaine-menthol (CHLORASEPTIC) 6-10 MG lozenge 1 lozenge  1 lozenge Buccal Q1H PRN Justyna Bowser MD        calcium carbonate (TUMS) chewable tablet 1,000 mg  1,000 mg Oral 4x Daily PRN Justyna Bowser MD        lidocaine (LMX4) cream   Topical Q1H PRN Justyna Bowser MD        lidocaine 1 % 0.1-1 mL  0.1-1 mL Other Q1H PRN Justyna Bowser MD        melatonin tablet 1 mg  1 mg Oral At Bedtime PRN Justyna Bowser MD        ondansetron (ZOFRAN ODT) ODT tab 4 mg  4 mg Oral Q6H PRN Justyna Bowser MD        Or    ondansetron (ZOFRAN) injection 4 mg  4 mg Intravenous Q6H PRN Justyna Bowser MD         senna-docusate (SENOKOT-S/PERICOLACE) 8.6-50 MG per tablet 1 tablet  1 tablet Oral BID PRN Justyna Bowser MD        Or    senna-docusate (SENOKOT-S/PERICOLACE) 8.6-50 MG per tablet 2 tablet  2 tablet Oral BID PRN Justyna Bowser MD        sodium chloride (PF) 0.9% PF flush 3 mL  3 mL Intracatheter q1 min prn Justyna Bowser MD        sodium chloride (PF) 0.9% PF flush 3 mL  3 mL Intracatheter q1 min prn Raven Roper MD        tamsulosin (FLOMAX) capsule 0.4 mg  0.4 mg Oral Daily PRN Justyna Bowser MD                Data:      All new lab and imaging data was reviewed.

## 2024-11-10 NOTE — PLAN OF CARE
Goal Outcome Evaluation:       Date & Time: 11/10/2024  Surgery/POD#: Admitted 10/09 for UTI w/ encephalopathy   Behavior & Aggression: Calm and cooperative   Fall Risk: Yes  Orientation:Disoriented to situation only   ABNL VS/O2: VSS, 1LO2  ABNL Labs: Urinalysis   Pain Management: Denies pain   Bowel/Bladder: Chronic rodriguez in place   Drains: Rodriguez   Wounds/incisions: Scattered bruising, RB sacrum/coccyx   Diet:Reg  Activity Level: Ass. 1 w/ gait belt and walker   Tests/Procedures: NA   Anticipated  DC Date: TBD, Urology consult   Significant Information:

## 2024-11-11 ENCOUNTER — APPOINTMENT (OUTPATIENT)
Dept: PHYSICAL THERAPY | Facility: CLINIC | Age: 89
End: 2024-11-11
Attending: INTERNAL MEDICINE
Payer: COMMERCIAL

## 2024-11-11 ENCOUNTER — APPOINTMENT (OUTPATIENT)
Dept: OCCUPATIONAL THERAPY | Facility: CLINIC | Age: 89
End: 2024-11-11
Payer: COMMERCIAL

## 2024-11-11 LAB
ANION GAP SERPL CALCULATED.3IONS-SCNC: 6 MMOL/L (ref 7–15)
BACTERIA UR CULT: ABNORMAL
BUN SERPL-MCNC: 18.3 MG/DL (ref 8–23)
CALCIUM SERPL-MCNC: 8.2 MG/DL (ref 8.8–10.4)
CHLORIDE SERPL-SCNC: 100 MMOL/L (ref 98–107)
CREAT SERPL-MCNC: 1.08 MG/DL (ref 0.67–1.17)
EGFRCR SERPLBLD CKD-EPI 2021: 64 ML/MIN/1.73M2
ERYTHROCYTE [DISTWIDTH] IN BLOOD BY AUTOMATED COUNT: 15.3 % (ref 10–15)
GLUCOSE SERPL-MCNC: 98 MG/DL (ref 70–99)
HCO3 SERPL-SCNC: 23 MMOL/L (ref 22–29)
HCT VFR BLD AUTO: 32.5 % (ref 40–53)
HGB BLD-MCNC: 10.9 G/DL (ref 13.3–17.7)
MCH RBC QN AUTO: 30.3 PG (ref 26.5–33)
MCHC RBC AUTO-ENTMCNC: 33.5 G/DL (ref 31.5–36.5)
MCV RBC AUTO: 90 FL (ref 78–100)
PLATELET # BLD AUTO: 150 10E3/UL (ref 150–450)
POTASSIUM SERPL-SCNC: 3.9 MMOL/L (ref 3.4–5.3)
RBC # BLD AUTO: 3.6 10E6/UL (ref 4.4–5.9)
SODIUM SERPL-SCNC: 129 MMOL/L (ref 135–145)
WBC # BLD AUTO: 7.2 10E3/UL (ref 4–11)

## 2024-11-11 PROCEDURE — 97535 SELF CARE MNGMENT TRAINING: CPT | Mod: GO | Performed by: OCCUPATIONAL THERAPIST

## 2024-11-11 PROCEDURE — 87040 BLOOD CULTURE FOR BACTERIA: CPT | Performed by: HOSPITALIST

## 2024-11-11 PROCEDURE — 99233 SBSQ HOSP IP/OBS HIGH 50: CPT | Performed by: HOSPITALIST

## 2024-11-11 PROCEDURE — 36415 COLL VENOUS BLD VENIPUNCTURE: CPT | Performed by: HOSPITALIST

## 2024-11-11 PROCEDURE — 85014 HEMATOCRIT: CPT | Performed by: HOSPITALIST

## 2024-11-11 PROCEDURE — 99223 1ST HOSP IP/OBS HIGH 75: CPT | Performed by: INTERNAL MEDICINE

## 2024-11-11 PROCEDURE — 97161 PT EVAL LOW COMPLEX 20 MIN: CPT | Mod: GP | Performed by: PHYSICAL THERAPIST

## 2024-11-11 PROCEDURE — 250N000011 HC RX IP 250 OP 636: Performed by: INTERNAL MEDICINE

## 2024-11-11 PROCEDURE — 250N000011 HC RX IP 250 OP 636: Mod: JZ | Performed by: INTERNAL MEDICINE

## 2024-11-11 PROCEDURE — 97116 GAIT TRAINING THERAPY: CPT | Mod: GP | Performed by: PHYSICAL THERAPIST

## 2024-11-11 PROCEDURE — 80048 BASIC METABOLIC PNL TOTAL CA: CPT | Performed by: HOSPITALIST

## 2024-11-11 PROCEDURE — 120N000001 HC R&B MED SURG/OB

## 2024-11-11 PROCEDURE — 250N000013 HC RX MED GY IP 250 OP 250 PS 637: Performed by: HOSPITALIST

## 2024-11-11 PROCEDURE — 97530 THERAPEUTIC ACTIVITIES: CPT | Mod: GP | Performed by: PHYSICAL THERAPIST

## 2024-11-11 PROCEDURE — 82374 ASSAY BLOOD CARBON DIOXIDE: CPT | Performed by: HOSPITALIST

## 2024-11-11 RX ORDER — POTASSIUM CHLORIDE 1500 MG/1
20 TABLET, EXTENDED RELEASE ORAL DAILY
Status: DISCONTINUED | OUTPATIENT
Start: 2024-11-11 | End: 2024-11-17 | Stop reason: HOSPADM

## 2024-11-11 RX ORDER — FUROSEMIDE 20 MG/1
20 TABLET ORAL DAILY
Status: DISCONTINUED | OUTPATIENT
Start: 2024-11-11 | End: 2024-11-17 | Stop reason: HOSPADM

## 2024-11-11 RX ORDER — POTASSIUM CHLORIDE 1500 MG/1
20 TABLET, EXTENDED RELEASE ORAL DAILY
Status: DISCONTINUED | OUTPATIENT
Start: 2024-11-11 | End: 2024-11-11

## 2024-11-11 RX ORDER — CEFAZOLIN SODIUM 2 G/100ML
2 INJECTION, SOLUTION INTRAVENOUS EVERY 8 HOURS
Status: DISCONTINUED | OUTPATIENT
Start: 2024-11-11 | End: 2024-11-17 | Stop reason: HOSPADM

## 2024-11-11 RX ADMIN — CEFTRIAXONE SODIUM 1 G: 1 INJECTION, POWDER, FOR SOLUTION INTRAMUSCULAR; INTRAVENOUS at 04:30

## 2024-11-11 RX ADMIN — DICLOFENAC 4 G: 10 GEL TOPICAL at 07:54

## 2024-11-11 RX ADMIN — ASPIRIN 81 MG: 81 TABLET, COATED ORAL at 07:56

## 2024-11-11 RX ADMIN — FUROSEMIDE 20 MG: 20 TABLET ORAL at 15:06

## 2024-11-11 RX ADMIN — METOPROLOL SUCCINATE 25 MG: 25 TABLET, EXTENDED RELEASE ORAL at 07:54

## 2024-11-11 RX ADMIN — DICLOFENAC 4 G: 10 GEL TOPICAL at 13:33

## 2024-11-11 RX ADMIN — DICLOFENAC 4 G: 10 GEL TOPICAL at 20:16

## 2024-11-11 RX ADMIN — POTASSIUM CHLORIDE 20 MEQ: 1500 TABLET, EXTENDED RELEASE ORAL at 15:06

## 2024-11-11 RX ADMIN — ATORVASTATIN CALCIUM 40 MG: 40 TABLET, FILM COATED ORAL at 07:56

## 2024-11-11 RX ADMIN — CEFAZOLIN SODIUM 2 G: 2 INJECTION, SOLUTION INTRAVENOUS at 13:55

## 2024-11-11 RX ADMIN — DICLOFENAC 4 G: 10 GEL TOPICAL at 15:06

## 2024-11-11 RX ADMIN — CEFAZOLIN SODIUM 2 G: 2 INJECTION, SOLUTION INTRAVENOUS at 20:16

## 2024-11-11 ASSESSMENT — ACTIVITIES OF DAILY LIVING (ADL)
ADLS_ACUITY_SCORE: 0
ADLS_ACUITY_SCORE: 0
DEPENDENT_IADLS:: INDEPENDENT
ADLS_ACUITY_SCORE: 0

## 2024-11-11 NOTE — PROGRESS NOTES
"   11/11/24 1400   Appointment Info   Signing Clinician's Name / Credentials (PT) Kiki Ma PT   Living Environment   People in Home spouse   Current Living Arrangements independent living facility   Home Accessibility no concerns   Transportation Anticipated car, drives self;family or friend will provide   Living Environment Comments Pt stated spouse is able to complete ADL's I, he makes dinner, she make breakfast, he orders food   Self-Care   Usual Activity Tolerance good   Current Activity Tolerance good   Regular Exercise Yes   Activity/Exercise Type swimming   Exercise Amount/Frequency 3-5 times/wk   Equipment Currently Used at Home cane, straight   Fall history within last six months yes   Number of times patient has fallen within last six months 3   Activity/Exercise/Self-Care Comment Pt is I with allADL\"s and furniture walks at home I.   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   Existing Precautions/Restrictions fall   Weight-Bearing Status - LUE full weight-bearing   Weight-Bearing Status - RUE full weight-bearing   Weight-Bearing Status - LLE full weight-bearing   Weight-Bearing Status - RLE weight-bearing as tolerated   Cognition   Affect/Mental Status (Cognition) agitated   Orientation Status (Cognition) oriented x 3   Follows Commands (Cognition) WFL   Safety Deficit (Cognition) insight into deficits/self-awareness;safety precautions awareness;impulsivity;awareness of need for " assistance;at risk behavior observed   Cognitive Status Comments Pt initially uncooperativie to wear a gait belt but with further discussion and education agreed to wear it for amb in handy.   Pain Assessment   Patient Currently in Pain Yes, see Vital Sign flowsheet  (R hand and knee)   Range of Motion (ROM)   Range of Motion ROM is WFL   Strength (Manual Muscle Testing)   Strength Comments beneralized decrease in strength for B LE, but able to amb and rise STS without A, weakness per pt. decreased stability noted wtht standing and gait   Bed Mobility   Comment, (Bed Mobility) I bed mob   Transfers   Comment, (Transfers) Sit to stand with SBA, A for remembering rodriguez x 2   Gait/Stairs (Locomotion)   [place] Level (Gait) contact guard   Assistive Device (Gait) other (see comments)  (uses furniture or rail in handy)   Distance in Feet (Gait) 50'   Pattern (Gait) swing-through   Deviations/Abnormal Patterns (Gait) gait speed decreased;padmini decreased   Balance   Balance Comments decreased standing dynamic balance requires U UE support to maintain.   Clinical Impression   Criteria for Skilled Therapeutic Intervention Yes, treatment indicated   PT Diagnosis (PT) difficulty walking   Influenced by the following impairments Decreased act fercho, strength, balance. Cog deficits impairing safety   Functional limitations due to impairments Impaired functional mob I, safety and fercho   Clinical Presentation (PT Evaluation Complexity) stable   Clinical Presentation Rationale clinical judgement   Clinical Decision Making (Complexity) low complexity   Planned Therapy Interventions (PT) balance training;gait training;strengthening   Risk & Benefits of therapy have been explained evaluation/treatment results reviewed;care plan/treatment goals reviewed;risks/benefits reviewed;current/potential barriers reviewed;participants voiced agreement with care plan;participants included;patient   PT Total Evaluation Time   PT Eval, Low  Complexity Minutes (91190) 10   Physical Therapy Goals   PT Frequency Daily   PT Predicted Duration/Target Date for Goal Attainment 11/17/24   PT Goals Transfers;Gait   PT: Transfers Independent   PT: Gait Greater than 200 feet;Straight cane;Modified independent   Interventions   Interventions Quick Adds Gait Training;Therapeutic Activity   Therapeutic Activity   Therapeutic Activities: dynamic activities to improve functional performance Minutes (82983) 15   Symptoms Noted During/After Treatment None   Treatment Detail/Skilled Intervention PT pt educated in role of PT and POC. discussion about use of AD and safety measures to maintain I.  Pt refusing GB at first, then agreeable with cont encouargement, education from PT and RN.  PT refused to use AD, states he does not want to become dep on therm and that they are dangerous.Safety concern with rodriguez, pt forgets when he rises and attempts to move forward.  Pt impulsive.  Able to STS I but not always steady and forgets rodriguez.  Returned to bedside to sit on EOB, did not mind GB left on him.  A with food order. bed alarm on and care needs in reach   Gait Training   Gait Training Minutes (80920) 15   Symptoms Noted During/After Treatment (Gait Training) fatigue   Treatment Detail/Skilled Intervention PT gait training with no AD, pt reaches for something to support him on the L side due to sore hand on R, amb in handy use of rail. light use of R hand as other rail blocked, decreased stability during gait at times but no overt LOB.   Distance in Feet 850'   [place] Level (Gait Training) stand-by assist   Physical Assistance Level (Gait Training) supervision;verbal cues   PT Discharge Planning   PT Plan Balance activities, trial with SEC or FWW if pt will agree.  standing strengthening   PT Discharge Recommendation (DC Rec) home with home care physical therapy;Leaving home requires significant assistance;Leaving home requires significant taxing effort   PT Rationale  for DC Rec PT pt in near baseline but has cont balance insurfficiecies and would be benefitted from home eval for safety and use of AD to decrease fall risk.   PT Brief overview of current status Goals of therapy will be to address safe mobility and make recs for d/c to next level of care. Pt and RN will continue to follow all falls risk precautions as documented by RN staff while hospitalized. A x 1   Physical Therapy Time and Intention   Timed Code Treatment Minutes 30   Total Session Time (sum of timed and untimed services) 40

## 2024-11-11 NOTE — PLAN OF CARE
"Goal Outcome Evaluation:      Plan of Care Reviewed With: family    Overall Patient Progress: improvingOverall Patient Progress: improving    Summary: Urinary tract infection, metabolic encephalopathy; generalized weakness.   DATE & TIME: 11/10/24 8844-1488    Cognitive Concerns/ Orientation : A&Ox4, forgetful at times.   BEHAVIOR & AGGRESSION TOOL COLOR: Green  CIWA SCORE: NA   ABNL VS/O2: BP soft 96/44, 96/47, 105/46, other VSS, O2 94% on RA, denies SOB  MOBILITY: UW1, refusing both walker and gait belt, pt reported he doesn't use any equipment at baseline and he doesn't want to become \"dependent\" on it. Walked the hallways x2, unsteady gait, hangs on to the side rails, high falls risk.   PAIN MANAGMENT: c/o Rt wrist and Rt knee pain, mild swelling noted to right wrist, pt reported the pain has been ongoing since April after a fall at home, unable to tolerate any weight.    DIET: Regular diet, poor appetite, declined breakfast  BOWEL/BLADDER: rodriguez in place, patent, BS +, passing flatus, pt doesn't recall last BM.   ABNL LAB/BG: Na 132, WBC 11.3, Hgb 12.4, BC drawn 11/9 growing GPC, Staphylococcus aureus. CRP 37.26.   DRAIN/DEVICES: PIV SL   TELEMETRY RHYTHM: NA   SKIN: multiple scattered bruising. Rt wrist swelling, no skin discoloration noted.   TESTS/PROCEDURES: XR Rt wrist, Rt knee  D/C DAY/GOALS/PLACE: TBD   OTHER IMPORTANT INFO: on IV Rocephin q24h. BC drawn 11/9 growing GPC Staphylococcus aureus, started on IV Vancomycin q24h. Urology following. PT/OT following.              "

## 2024-11-11 NOTE — CONSULTS
Care Management Initial Consult    General Information  Assessment completed with: Hang Crocker  Type of CM/SW Visit: Initial Assessment    [place] SW: Jesse [phone]    Primary Care Provider verified and updated as needed: Yes   Readmission within the last 30 days: no previous admission in last 30 days      Reason for Consult: discharge planning  Advance Care Planning: Advance Care Planning Reviewed: no concerns identified          Communication Assessment  Patient's communication style: spoken language (English or Bilingual)    Hearing Difficulty or Deaf: no   Wear Glasses or Blind: yes    Cognitive  Cognitive/Neuro/Behavioral: WDL  Level of Consciousness: alert  Arousal Level: arouses to touch/gentle shaking, arouses to voice, opens eyes spontaneously  Orientation: oriented x 4  Mood/Behavior: calm, cooperative  Best Language: 0 - No aphasia  Speech: clear, spontaneous, logical    Living Environment:   People in home: alone, spouse  Kelin  Current living Arrangements: apartment      Able to return to prior arrangements: yes       Family/Social Support:  Care provided by: self  Provides care for: spouse  Marital Status:   Support system: Children, Wife  Kelin       Description of Support System: Supportive, Involved         Current Resources:   Patient receiving home care services: No        Community Resources:    Equipment currently used at home: cane, straight  Supplies currently used at home: None    Employment/Financial:  Employment Status: retired        Financial Concerns:     Referral to Financial Worker: No       Does the patient's insurance plan have a 3 day qualifying hospital stay waiver?  No    Lifestyle & Psychosocial Needs:  Social Drivers of Health     Food Insecurity: Low Risk  (11/10/2024)    Food Insecurity     Within the past 12 months, did you worry that your food would run out before you got money to buy more?: No     Within the past 12 months, did the food you bought  just not last and you didn t have money to get more?: No   Depression: Not at risk (6/28/2024)    PHQ-2     PHQ-2 Score: 0   Housing Stability: Low Risk  (11/10/2024)    Housing Stability     Do you have housing? : Yes     Are you worried about losing your housing?: No   Tobacco Use: Low Risk  (10/28/2024)    Received from [facility]    Patient History     Smoking Tobacco Use: Never     Smokeless Tobacco Use: Never     Passive Exposure: Not on file   Financial Resource Strain: Low Risk  (11/10/2024)    Financial Resource Strain     Within the past 12 months, have you or your family members you live with been unable to get utilities (heat, electricity) when it was really needed?: No   Alcohol Use: Not on file   Transportation Needs: Low Risk  (11/10/2024)    Transportation Needs     Within the past 12 months, has lack of transportation kept you from medical appointments, getting your medicines, non-medical meetings or appointments, work, or from getting things that you need?: No   Physical Activity: Not on file   Interpersonal Safety: Low Risk  (11/10/2024)    Interpersonal Safety     Do you feel physically and emotionally safe where you currently live?: Yes     Within the past 12 months, have you been hit, slapped, kicked or otherwise physically hurt by someone?: No     Within the past 12 months, have you been humiliated or emotionally abused in other ways by your partner or ex-partner?: No   Stress: Not on file   Social Connections: Socially Isolated (6/17/2024)    Received from [facility]    Social Connections     Do you often feel lonely or isolated from those around you?: 4   Health Literacy: Not on file       Functional Status:  Prior to admission patient needed assistance:   Dependent ADLs:: Independent  Dependent IADLs:: Independent       Mental Health Status:  Mental Health Status: No Current Concerns       Chemical Dependency  "Status:  Chemical Dependency Status: No Current Concerns             Values/Beliefs:  Spiritual, Cultural Beliefs, Cheondoism Practices, Values that affect care: no               Discussed  Partnership in Safe Discharge Planning  document with patient/family: Yes: Hang    Additional Information:  Writer met with patient, introduced self and role in discharge planning. Hang lives with his wife Kelin, in an independent living apartment at [facility], in [place]. They both still drive. He adamantly refuses to use assistive devices, he says canes/walkers \"are terrible, they;re dangerous\". He cares for his wife who has some dementia, they have no services in the home. He has has several falls recently that he attributes to not paying attention to his surroundings, and complains of (R)knee and wrist pain, he expresses interest in home care services. He says his wife will come pick him up at time of discharge, and he would like prescriptions filled here to take with him when he leaves.    Writer spoke with [facility] , Jesse, [phone], who is very familiar with Hang. She verified that the couple do live independently, and expressed concern that they both still drive. They use [facility] for home care in the facility. She asked directly for home care for him, writer told her I had spoken with patient and he has also asked for home care.    Next Steps: PT/OT to see, home care referral sent    Lisa Murray RN Care Coordinator  [facility]    Addendum:  PCP appointment scheduled for November 14th, patient had additional appointment on 11/13 for complaint of hand/wrist pain, this will be addressed with the hospitalization follow up.  "

## 2024-11-11 NOTE — PLAN OF CARE
"Goal Outcome Evaluation:         Summary: Urinary tract infection, metabolic encephalopathy; generalized weakness  DATE & TIME: 11/10/24-11/11/24 1786-9354  Cognitive Concerns/ Orientation : A&Ox4, forgetful at times.   BEHAVIOR & AGGRESSION TOOL COLOR: Green  CIWA SCORE: NA   ABNL VS/O2: VSS, O2 94% on RA, denies SOB  MOBILITY: UW1, refusing both walker and gait belt, pt reported he doesn't use any equipment at baseline and he doesn't want to become \"dependent\" on it. Walked the hallways x1, unsteady gait, hangs on to the side rails, high falls risk.   PAIN MANAGMENT: c/o Rt wrist and Rt knee pain, mild swelling noted to right wrist, pt reported the pain has been ongoing since April after a fall at home, unable to tolerate any weight. Voltaren Gel applied, and prn tylenol x1    DIET: Regular diet, poor appetite.   BOWEL/BLADDER: rodriguez in place, patent, BS +, passing flatus, pt doesn't recall last BM.   ABNL LAB/BG: Na 132, WBC 11.3, Hgb 12.4, BC drawn 11/9 growing GPC, Staphylococcus aureus. CRP 37.26.   DRAIN/DEVICES: PIV SL   TELEMETRY RHYTHM: NA   SKIN: multiple scattered bruising. Rt wrist swelling, no skin discoloration noted.   TESTS/PROCEDURES: XR Rt wrist, Rt knee completed 11/10  D/C DAY/GOALS/PLACE: TBD   OTHER IMPORTANT INFO: on IV Rocephin q24h. BC drawn 11/9 growing GPC Staphylococcus aureus, started on IV Vancomycin q24h. Urology following. PT/OT following.   Melatonin x1 at bedtime for restlessness  MRSA swab came back negative                "

## 2024-11-11 NOTE — PROGRESS NOTES
Antimicrobial Stewardship Team Note    Antimicrobial Stewardship Program - A joint venture between [facility] and [facility] Consult[facility] to optimize antibiotic management.     Patient: Canelo Cruz  MRN: [id number]  Allergies: Ciprofloxacin    Brief Summary: Hang Cruz is a 89 year old male admitted on 11/9/24 with generalized weakness and confusion, found to have complicated UTI and bacteremia. 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    Patient has chronic Rodriguez catheter that has been in since 6/2024 due to urinary retention with last exchange 9/2024. UA with WBC 52, WBC clumps, moderate LE, negative nitrite. Patient was started on ceftriaxone/IV vancomycin on 11/10.  WBC 11.3 on admission, down to 7.2 on 11/11. Patient present with febrile episode and has been afebrile since.    Blood cultures from 11/10 growing methicillin susceptible Staphylococcus aureus as identified by Verigene. Urine culture from 11/10 growing gram negative bacilli and gram positive cocci. MRSA nares from 11/10 are negative.         Active Anti-infective Medications   (From admission, onward)                 Start     Stop    11/11/24 1200  ceFAZolin  2 g,   Intravenous,   200 mL/hr,   EVERY 8 HOURS        Bacteremia       --                  Assessment: Staphylococcus aureus bacteremia likely 2/2 UTI  Patient presented with generalized weakness and febrile episode. Blood culture growing Staphylococcus aureus and urine culture likely growing Staphylococcus aureus. He is currently on day 2 of antibiotic therapy with ceftriaxone and vancomycin which can be narrowed to cefazolin to target MSSA bacteremia. Infectious disease will need to be consulted for further work up of Staphylococcus aureus bacteremia.    Recommendations:  Consult Infectious Disease for further recommendations.    Discussed with ID Staff MD Mariza Pineda, PharmD, BCIDP  Remy Arana,  PharmD    Vital Signs/Clinical Features:  Vitals         11/09 0700  11/10 0659 11/10 0700 11/11 0659 11/11 0700 11/11 1155   Most Recent      Temp ( F) 99.8 -  103    97.6 -  99.8      97.5     97.5 (36.4) 11/11 0740    Pulse 94 -  112    69 -  75      58     58 11/11 0740    Resp 7 -  29      18      18     18 11/11 0740    BP 85/63 -  157/85    81/35 -  106/47      125/103     125/103 11/11 0740    SpO2 (%) 87 -  96    93 -  95      96     96 11/11 0740            Labs  Estimated Creatinine Clearance: 48.5 mL/min (based on SCr of 1.08 mg/dL).  Recent Labs   Lab Test 06/28/24  1414 07/17/24  0545 08/30/24  0930 09/23/24 1941 11/09/24 2254 11/11/24  0638   CR 1.10 1.01 1.00 1.15 1.09 1.08       Recent Labs   Lab Test 09/09/17  1048 11/26/18  1441 05/11/20  1547 06/10/24  1550 06/28/24  1414 07/17/24  0545 08/30/24  0930 09/23/24 1941 11/09/24 2254 11/11/24  0638   WBC 8.1   < > 9.5   < > 11.0 9.1 8.1 14.1* 11.3* 7.2   ANEU 5.2  --  6.8  --   --   --   --   --   --   --    ALYM 1.9  --  1.6  --   --   --   --   --   --   --    CYNDI 0.8  --  0.9  --   --   --   --   --   --   --    AEOS 0.2  --  0.2  --   --   --   --   --   --   --    HGB 16.6   < > 15.7   < > 12.8* 12.0* 12.5* 12.6* 12.4* 10.9*   HCT 47.8   < > 46.7   < > 38.5* 35.5* 38.2* 36.5* 36.2* 32.5*   MCV 91   < > 90   < > 93 91 95 90 89 90      < > 218   < > 348 244 242 367 244 150    < > = values in this interval not displayed.       Recent Labs   Lab Test 11/26/18  1441 03/21/19  0833 02/14/20  1033 06/10/24  1550 06/28/24  1414 11/09/24  2254   BILITOTAL 0.4 0.5 0.4 1.0 0.8 0.6   ALKPHOS 70 73 80 70 100 91   ALBUMIN 3.7 3.8 3.6 4.2 3.9 3.8   AST 27 23 18 26 25 31   ALT 25 22 20 12 14 13       Recent Labs   Lab Test 06/10/24  1655 06/13/24  1410 06/13/24  1414 11/09/24  2254 11/10/24  0016   PCAL  --   --   --  0.11  --    LACT 1.4 0.4 0.4  --  0.8   CRPI  --   --   --  37.26*  --              Culture Results:  7-Day Micro Results        Procedure Component Value Units Date/Time    Blood Culture Arm, Right [[id number]] Collected: 11/11/24 0638    Order Status: Sent Lab Status: In process Updated: 11/11/24 0644    Specimen: Blood from Arm, Right     MRSA MSSA PCR, Nasal Swab [[id number]] Collected: 11/10/24 1704    Order Status: Completed Lab Status: Final result Updated: 11/10/24 2140    Specimen: Swab from Nose      MRSA Target DNA Negative     SA Target DNA Positive    Narrative:      The [employer]  Xpert SA Nasal Complete assay performed in the [employer]  Dx System is a qualitative in vitro diagnostic test designed for rapid detection of Staphylococcus aureus (SA) and methicillin-resistant Staphylococcus aureus (MRSA) from nasal swabs in patients at risk for nasal colonization. The test utilizes automated real-time polymerase chain reaction (PCR) to detect MRSA/SA DNA. The Xpert SA Nasal Complete assay is intended to aid in the prevention and control of MRSA/SA infections in healthcare settings. The assay is not intended to diagnose, guide or monitor treatment for MRSA/SA infections, or provide results of susceptibility to methicillin. A negative result does not preclude MRSA/SA nasal colonization.     Urine Culture [[id number]]  (Abnormal) Collected: 11/10/24 0023    Order Status: Completed Lab Status: Preliminary result Updated: 11/11/24 0953    Specimen: Urine, Catheter      Culture >100,000 CFU/mL Lactose fermenting gram negative bacilli      50,000-100,000 CFU/mL Gram positive cocci      50,000-100,000 CFU/mL Gram positive cocci      50,000-100,000 CFU/mL Lactose fermenting gram negative bacilli    Narrative:      Multiple morphotypes present with no predominant organism.  Growth consistent with probable contamination during collection.  Suggest repeat specimen if clinically indicated.     Blood Culture Peripheral Blood [[id number]]  (Abnormal)  (Susceptibility) Collected: 11/09/24 2254    Order Status: Completed Lab Status:  Preliminary result Updated: 11/11/24 1136    Specimen: Peripheral Blood      Culture Positive on the 1st day of incubation      Staphylococcus aureus     Comment: 2 of 2 bottles       Susceptibility       Staphylococcus aureus (1)       Antibiotic Interpretation Sensitivity   Method Status    Oxacillin Susceptible 0.5 ug/mL BRENDA Final     Oxacillin susceptible isolates are susceptible to cephalosporins (example: cefazolin and cephalexin) and beta lactam combination agents. Oxacillin resistant isolates are resistant to these agents.       Gentamicin Susceptible <=0.5 ug/mL BRENDA Final    Ciprofloxacin  [*]  Susceptible <=0.5 ug/mL BRENDA Final    Levofloxacin  [*]  Susceptible 0.25 ug/mL BRENDA Final    Moxifloxacin  [*]  Susceptible <=0.25 ug/mL BRENDA Final    Inducible macrolide resistance test  [*]  Positive Positive ug/mL BRENDA Final    Erythromycin Intermediate 1 ug/mL BRENDA Final    Clindamycin Resistant   BRENDA Final     This isolate is presumed to be clindamycin resistant based on detection of inducible clindamycin resistance. Erythromycin and clindamycin are resistant; therefore, they are not recommended for use.       Linezolid  [*]  Susceptible 2 ug/mL BRENDA Final    Vancomycin Susceptible <=0.5 ug/mL BRENDA Final    Daptomycin Susceptible 0.5 ug/mL BRENDA Final    Tetracycline Susceptible <=1 ug/mL BRENDA Final    Doxycycline Susceptible <=0.5 ug/mL BRENDA Final    Tigecycline  [*]  Susceptible <=0.12 ug/mL BRENDA Final    Nitrofurantoin  [*]  Susceptible <=16 ug/mL BRENDA Final    Rifampin  [*]  Susceptible <=0.5 ug/mL BRENDA Final    Trimethoprim/Sulfamethoxazole Susceptible <=0.5/9.5 ug/mL BRENDA Final               [*]  Suppressed Antibiotic                   Verigene GP Panel [[id number]]  (Abnormal) Collected: 11/09/24 7743    Order Status: Completed Lab Status: Final result Updated: 11/10/24 1224    Specimen: Peripheral Blood      Staphylococcus aureus Detected     Comment: Positive for Staphylococcus aureus and negative for the mecA gene  (not resistant to methicillin) by Spottedigene multiplex nucleic acid test. Final identification and antimicrobial susceptibility testing will be verified by standard methods.        Staphylococcus epidermidis Not Detected     Staphylococcus lugdunensis Not Detected     Enterococcus faecalis Not Detected     Enterococcus faecium Not Detected     Streptococcus species Not Detected     Streptococcus agalactiae Not Detected     Streptococcus anginosus group Not Detected     Streptococcus pneumoniae Not Detected     Streptococcus pyogenes Not Detected     Listeria species Not Detected    Narrative:      Specimen tested with Verigene multiplex, gram-positive blood culture nucleic acid test for the following targets: Staphylococcus aureus, Staphylococcus epidermidis, Staphylococcus lugdunensis, other Staphylococcus species, Enterococcus faecalis, Enterococcus faecium, Streptococcus species, Streptococcus agalactiae, Streptococcus anginosus group, Streptococcus pneumoniae, Streptococcus pyogenes, Listeria species, mecA (methicillin resistance), and Sobia/vanB (vancomycin resistance).            Recent Labs   Lab Test 01/17/18  1544 11/26/18  1505 03/21/19  0832 06/10/24  2106 09/23/24  1924 11/10/24  0023   URINEPH 6.0 7.0 6.0 6.0 8.0* 7.0   NITRITE Negative Negative Negative Negative Negative Negative   LEUKEST Large* Trace* Negative Negative Large* Moderate*   WBCU >100* 0 - 5  --  2 >182* 52*                         Imaging: XR Wrist Right G/E 3 Views    Result Date: 11/10/2024  EXAM: XR WRIST RIGHT G/E 3 VIEWS LOCATION: [facility] DATE: 11/10/2024 INDICATION: Reports fall in April, right wrist pain and swelling. COMPARISON: None.     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 Mild ulnar positive variance with moderate degenerative arthrosis of the ulnolunate joint. No acute fracture. Corticated ossicle dorsal of the  wrist on the lateral view which may be an old ununited triquetral avulsion fracture fragment.     XR Knee Right 1/2 Views    Result Date: 11/10/2024  EXAM: XR KNEE RIGHT 1/2 VIEWS LOCATION: [facility] DATE: 11/10/2024 INDICATION: Reports fall in April, right KNEE pain and swelling. COMPARISON: None.     IMPRESSION: Tricompartmental degenerative arthrosis with marginal osteophytes and moderate narrowing of the medial compartment. No acute fracture. Trace joint effusion.     XR Chest Port 1 View    Result Date: 11/10/2024  EXAM: XR CHEST PORT 1 VIEW LOCATION: [facility] DATE: 11/10/2024 INDICATION: COUGH COMPARISON: 06/10/2024     IMPRESSION: Stable cardiomediastinal silhouette. Mild basilar atelectasis. No vascular congestion or significant effusion. Right hilar prominence similar. Atherosclerotic aorta. Degenerative change osseous structures.

## 2024-11-11 NOTE — CONSULTS
[facility]    Infectious Disease Consultation     Date of Admission:  11/9/2024  Date of Consult (When I saw the patient): 11/11/24    Assessment & Plan   Canelo Cruz is a 89 year old male who was admitted on 11/9/2024.     Impression:  89 yo who fell at home leading to activation of EMS as he could not get up  He was found to be febrile with rigors  Was also noticed that patient has a Christianson catheter in place for 2 months  He was found to have generalized weakness  Polymicrobial urine culture not a surprise given he has the Christianson catheter in him for so long  Blood cultures with Staph aureus further identified as MSSA  On vanco and ceftriaxone   Past medical history significant for CHF, CAD s/p TAVR in July 2024        Recommendations   Switch to Ancef alone to treat the MSSA bacteremia   Polymicrobial urine cultures likely due to presentation of colonization given the duration of the Christianson catheter  Needs TTE most likely ELIZABETH as well with the presence of TAVR  Needs follow-up blood cultures till clears  Avoid any PICC or midline  Anticipate need for IV antibiotics as discharge given MSSA bacteremia        Rodney Majano MD    Reason for Consult   Reason for consult: I was asked to evaluate this patient for bacteremia .    Primary Care Physician   Maribell Stewart    Chief Complaint   Confusion     History is obtained from the patient and medical records    History of Present Illness   Canelo Cruz is a 89 year old male who presents with confusion more than baseline, encephalopathy, fell at home, could not get up     Past Medical History   I have reviewed this patient's medical history and updated it with pertinent information if needed.   Past Medical History:   Diagnosis Date    Gout     Heart murmur     Insomnia     Mixed hyperlipidemia     Postherpetic neuralgia        Past Surgical History   I have reviewed this patient's surgical history and updated it with pertinent  information if needed.  Past Surgical History:   Procedure Laterality Date    ARTHROSCOPY KNEE Right     ~[id number]    CV CORONARY ANGIOGRAM N/A 6/13/2024    Procedure: Coronary Angiogram;  Surgeon: Jarett Odell MD;  Location:  HEART CARDIAC CATH LAB    CV RIGHT HEART CATH MEASUREMENTS RECORDED N/A 6/13/2024    Procedure: Right Heart Catheterization;  Surgeon: Jarett Odell MD;  Location:  HEART CARDIAC CATH LAB    CV TRANSCATHETER AORTIC VALVE REPLACEMENT-FEMORAL APPROACH N/A 7/16/2024    Procedure: Transcatheter Aortic Valve Replacement-Femoral Approach;  Surgeon: Prince Burnett MD;  Location:  HEART CARDIAC CATH LAB    DAVINCI XI HERNIORRHAPHY INGUINAL Bilateral 7/18/2022    Procedure: Robotic repair of right inguinal hernia with placement of mesh,  robotic repair of left inguinal hernia with placement of mesh;  Surgeon: Ina Phillips MD;  Location:  OR    ORTHOPEDIC SURGERY      right achilles rupture repair with 14 month MRSA infection       Prior to Admission Medications   Prior to Admission Medications   Prescriptions Last Dose Informant Patient Reported? Taking?   acetaminophen (TYLENOL) 325 MG tablet   Yes Yes   Sig: Take 325-650 mg by mouth every 6 hours as needed for mild pain.   albuterol (PROAIR HFA/PROVENTIL HFA/VENTOLIN HFA) 108 (90 Base) MCG/ACT inhaler  Self Yes No   Sig: Inhale 2 puffs into the lungs every 6 hours as needed for shortness of breath, wheezing or cough   Patient not taking: Reported on 8/30/2024   aspirin 81 MG EC tablet  Self Yes Yes   Sig: Take 81 mg by mouth. Taking 1-2 weekly   atorvastatin (LIPITOR) 40 MG tablet  Self No No   Sig: Take 1 tablet (40 mg) by mouth daily   diclofenac (VOLTAREN) 1 % topical gel  Self Yes Yes   Sig: Apply 4 g topically 4 times daily   furosemide (LASIX) 40 MG tablet   No Yes   Sig: Take 1 tablet (40 mg) by mouth daily   melatonin 3 MG tablet  Self Yes Yes   Sig: Take 3 mg by mouth nightly as needed.   metoprolol  succinate ER (TOPROL XL) 25 MG 24 hr tablet   No Yes   Sig: Take 1 tablet (25 mg) by mouth daily.   potassium chloride ER (K-TAB) 20 MEQ CR tablet   No Yes   Sig: Take 1 tablet (20 mEq) by mouth daily   tamsulosin (FLOMAX) 0.4 MG capsule   No Yes   Sig: Take 1 capsule (0.4 mg) by mouth daily as needed (urinary retention).      Facility-Administered Medications: None     Allergies   Allergies   Allergen Reactions    Ciprofloxacin      history of achilles tendon pain       Immunization History   Immunization History   Administered Date(s) Administered    COVID-19 12+ (Pfizer) 10/09/2023, 10/23/2024    COVID-19 Bivalent 12+ (Pfizer) 09/15/2022    COVID-19 Bivalent 18+ (Moderna) 06/08/2023    COVID-19 Monovalent 18+ (Moderna) 01/16/2021, 02/13/2021, 11/11/2021, 05/05/2022    Influenza (High Dose) Trivalent,PF (Fluzone) 09/16/2015, 08/31/2016, 09/11/2017, 09/05/2018, 09/13/2019    Influenza Vaccine 65+ (Fluzone HD) 09/04/2020    TD,PF 7+ (Tenivac) 12/13/2016    Td (Adult), Adsorbed 12/13/2016    Zoster recombinant adjuvanted (SHINGRIX) 04/13/2020, 06/29/2020    Zoster vaccine, live 09/01/2014       Social History   I have reviewed this patient's social history and updated it with pertinent information if needed. Canelo Cruz  reports that he has never smoked. He has never used smokeless tobacco. He reports current alcohol use. He reports that he does not use drugs.    Family History   I have reviewed this patient's family history and updated it with pertinent information if needed.   Family History   Problem Relation Age of Onset    Unknown/Adopted Mother     Unknown/Adopted Father     Diabetes No family hx of     Coronary Artery Disease No family hx of     Hypertension No family hx of     Hyperlipidemia No family hx of     Cerebrovascular Disease No family hx of     Breast Cancer No family hx of     Colon Cancer No family hx of     Prostate Cancer No family hx of     Other Cancer No family hx of     Depression No  "family hx of     Anxiety Disorder No family hx of     Mental Illness No family hx of     Substance Abuse No family hx of     Anesthesia Reaction No family hx of     Asthma No family hx of     Osteoporosis No family hx of     Genetic Disorder No family hx of     Thyroid Disease No family hx of     Obesity No family hx of        Review of Systems   The 10 point Review of Systems is negative    Physical Exam   Temp: 97.5  F (36.4  C) Temp src: Oral BP: (!) 125/103 Pulse: 58   Resp: 18 SpO2: 96 % O2 Device: None (Room air)    Vital Signs with Ranges  Temp:  [97.5  F (36.4  C)-99.8  F (37.7  C)] 97.5  F (36.4  C)  Pulse:  [58-75] 58  Resp:  [18] 18  BP: (105-125)/() 125/103  SpO2:  [93 %-96 %] 96 %  177 lbs 0 oz  Body mass index is 23.22 kg/m .    GENERAL APPEARANCE:  awake  EYES: Eyes grossly normal to inspection  NECK: no adenopathy  RESP: lungs clear   CV: regular rates and rhythm  LYMPHATICS: normal ant/post cervical and supraclavicular nodes  ABDOMEN: soft, nontender  MS: extremities normal  SKIN: no suspicious lesions or rashes        Data   All laboratory and imaging data in the past 24 hours reviewed  No results for input(s): \"CULT\" in the last 168 hours.  Recent Labs   Lab Test 01/18/18  1121 01/17/18  1604 12/19/17  0923 12/08/17  0831 11/28/17  1029   CULT 50,000 to 100,000 colonies/mL  urogenital murtaza   No growth after 1 day >100,000 colonies/mL  Aerococcus urinae  Identification obtained by MALDI-TOF mass spectrometry research use only database. Test   characteristics determined and verified by the Infectious Diseases Diagnostic Laboratory   ([facility]) [place], [place].  * No growth >100,000 colonies/mL  Aerococcus urinae  *          All cultures:  Recent Labs   Lab 11/10/24  0023 11/09/24  2254   CULTURE >100,000 CFU/mL Lactose fermenting gram negative bacilli*  50,000-100,000 CFU/mL Gram positive cocci*  50,000-100,000 CFU/mL Gram positive cocci*  50,000-100,000 CFU/mL Lactose fermenting gram negative " bacilli* Positive on the 1st day of incubation*  Staphylococcus aureus*      Blood culture:  Results for orders placed or performed during the hospital encounter of 11/09/24   Blood Culture Peripheral Blood    Collection Time: 11/09/24 10:54 PM    Specimen: Peripheral Blood   Result Value Ref Range    Culture Positive on the 1st day of incubation (A)     Culture Staphylococcus aureus (AA)        Susceptibility    Staphylococcus aureus - BRENDA     Oxacillin* 0.5 Susceptible ug/mL      * Oxacillin susceptible isolates are susceptible to cephalosporins (example: cefazolin and cephalexin) and beta lactam combination agents. Oxacillin resistant isolates are resistant to these agents.     Gentamicin <=0.5 Susceptible ug/mL     Erythromycin 1 Intermediate ug/mL     Clindamycin*  Resistant       * This isolate is presumed to be clindamycin resistant based on detection of inducible clindamycin resistance. Erythromycin and clindamycin are resistant; therefore, they are not recommended for use.     Vancomycin <=0.5 Susceptible ug/mL     Daptomycin 0.5 Susceptible ug/mL     Tetracycline <=1 Susceptible ug/mL     Doxycycline <=0.5 Susceptible ug/mL     Trimethoprim/Sulfamethoxazole <=0.5/9.5 Susceptible ug/mL      Urine culture:  Results for orders placed or performed during the hospital encounter of 11/09/24   Urine Culture    Collection Time: 11/10/24 12:23 AM    Specimen: Urine, Catheter   Result Value Ref Range    Culture (A)      >100,000 CFU/mL Lactose fermenting gram negative bacilli    Culture 50,000-100,000 CFU/mL Gram positive cocci (A)     Culture 50,000-100,000 CFU/mL Gram positive cocci (A)     Culture (A)      50,000-100,000 CFU/mL Lactose fermenting gram negative bacilli   Results for orders placed or performed during the hospital encounter of 09/23/24   Urine Culture    Collection Time: 09/23/24  7:24 PM    Specimen: Urine, Catheter   Result Value Ref Range    Culture (A)      >100,000 CFU/mL Non lactose  fermenting gram negative bacilli    Culture (A)      <10,000 CFU/mL Lactose fermenting gram negative bacilli    Culture 50,000-100,000 CFU/mL Gram positive cocci (A)     Culture (A)      10,000-50,000 CFU/mL Gram positive bacilli, resembling diphtheroids   Results for orders placed or performed in visit on 01/17/18   Urine Culture Aerobic Bacterial    Collection Time: 01/17/18  4:04 PM    Specimen: Midstream Urine   Result Value Ref Range    Specimen Description Midstream Urine     Culture Micro No growth after 1 day    Results for orders placed or performed in visit on 01/17/18   Urine Culture Aerobic Bacterial    Collection Time: 01/18/18 11:21 AM    Specimen: Midstream Urine   Result Value Ref Range    Specimen Description Midstream Urine     Culture Micro 50,000 to 100,000 colonies/mL  urogenital murtaza      Results for orders placed or performed in visit on 12/19/17   Urine Culture Aerobic Bacterial    Collection Time: 12/19/17  9:23 AM    Specimen: Midstream Urine   Result Value Ref Range    Specimen Description Midstream Urine     Culture Micro (A)      >100,000 colonies/mL  Aerococcus urinae  Identification obtained by MALDI-TOF mass spectrometry research use only database. Test   characteristics determined and verified by the Infectious Diseases Diagnostic Laboratory   ([facility]) [place], [place].         Susceptibility    Aerococcus urinae - BRENDA     Penicillin <=0.03 Susceptible ug/mL     Vancomycin 0.5 Susceptible ug/mL     Cefotaxime <=0.25 Susceptible ug/mL     Ceftriaxone <=0.25 Susceptible ug/mL     Tetracycline <=0.5 Susceptible ug/mL     Levofloxacin <=0.25 Susceptible ug/mL     Trimethoprim/Sulfamethoxazole <=0.25/4.75 Susceptible ug/mL   Results for orders placed or performed in visit on 12/08/17   Urine Culture Aerobic Bacterial    Collection Time: 12/08/17  8:31 AM    Specimen: Urine   Result Value Ref Range    Specimen Description Urine     Special Requests Urine     Culture Micro No growth     Results for orders placed or performed in visit on 11/28/17   Urine Culture Aerobic Bacterial    Collection Time: 11/28/17 10:29 AM    Specimen: Midstream Urine   Result Value Ref Range    Specimen Description Midstream Urine     Culture Micro >100,000 colonies/mL  Aerococcus urinae   (A)        Susceptibility    Aerococcus urinae - BRENDA     Penicillin <=0.03 Susceptible ug/mL     Vancomycin 0.5 Susceptible ug/mL     Tetracycline <=0.5 Susceptible ug/mL     Cefotaxime <=0.25 Susceptible ug/mL     Ceftriaxone <=0.25 Susceptible ug/mL     Levofloxacin <=0.25 Susceptible ug/mL     Trimethoprim/Sulfamethoxazole <=0.25/4.75 Susceptible ug/mL

## 2024-11-11 NOTE — PROGRESS NOTES
UROLOGY    See Dr Anderson's full consultation note from yesterday, 11/10.   Orders placed for rodriguez exchange by nursing today, 11/11.   AVS updated -- patient should follow up in 3-4 weeks for chronic rodriguez exchange.      Urology will sign off for now. Please call with any questions or concerns.     Valentina Bonilla) LYNETTE Bhandari  [facility]  Office: [phone]  Pager: [phone]

## 2024-11-11 NOTE — PROGRESS NOTES
[facility]  Hospitalist Progress Note   11/11/2024          Assessment and Plan:       Canelo Cruz is a 89 year old male with history of urinary retention with chronic rodriguez, hypertension, coronary artery disease, dyslipidemia, chronic systolic CHF, 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, suspected cognitive impairment admitted on 11/9/2024 with generalized weakness and fever of 101.9.      Gram-positive bacteremia.  Complicated urinary tract infection.  Sepsis secondary to above  Chronic rodriguez catheter  Patient brought into the ED via EMS with generalized weakness, fever of 101.9, confusion.  Per report abnormal smell, sediment in Rodriguez catheter bag, poor care of rodriguez catheter. Last known change of rodriguez catheter was in September 2024 at the time of a hospital admission.   --In ED temperature 103, tachycardic.  Sodium 132, creatinine 1.09.  Liver enzymes within normal limits.  WBC 11.3, lactic acid 0.8.  Influenza A, B, RSV, COVID-19 PCR negative.  Procalcitonin 0.11.  CRP 37.26.  UA with moderate leukocyte esterase, WBC clumps present.  Chest x-ray no infiltrate.  ---Initiated on IV ceftriaxone. (11/9)  Preliminary blood cultures on 11/10 positive for Staph aureus.  Added IV vancomycin on 11/10.  -Continue IV vancomycin, IV ceftriaxone.  Follow final blood cultures, urine cultures.  Infectious disease consult for antibiotic recommendations requested.  Urology followed, Rodriguez catheter exchange on 11/11.  Follow-up with outpatient urology in 1 month for catheter changes.  Appreciate input.  Continue PTA tamsulosin.  As needed Tylenol as needed.  Trend WBC count, fever curve.-  --Initiated on IV ceftriaxone, to continue.  Follow-up blood cultures and urine cultures.    Hyponatremia.  Sodium of 132 on admission, received IV fluids.  PTA Lasix held.  Sodium on 11/11 dropped to 129.  Resume PTA oral Lasix.  2000 mL fluid restriction.  Telemetry monitoring.  Monitor sodium levels in AM.     Chronic systolic congestive  "heart failure [LVEF of 40 to 45%]  Coronary artery disease  Severe aortic valve stenosis s/p TAVR -07/17/2024.  Hypertension.  Hyperlipidemia.  Follows with [facility] cardiology.  Echocardiogram 7/2024 with LVEF of 20 to 25%.  Repeat echo 8/2024 with LVEF of 40%.  Continue PTA aspirin and Toprol.  Continue PTA Lipitor.  Resume PTA Lasix at low-dose of 20 mg oral daily [11/11].  Strict intake output monitoring, daily weights.  2000 mL fluid restriction.  Liberalize salt in diet given hyponatremia.    Physical deconditioning from medical illness, senile frailty.  Acute encephalopathy likely multifactorial, infectious etiology, underlying cognitive impairment, ?  Behavioral disturbance  Cognitive impairment.  Paranoid thought content.  --Per report patient and wife live alone, patient and wife still driving. EMS notes that the patient wanted an additional drink of alcohol before getting in ambulance    ---Patient awake, oriented but forgetful and repetitive, paranoid, thought content  -- Per admitting provider evasive about medical history and specifically requests no contact with his sister nor spouse about details of medical history or concerns about cognitive function.   ---Review of previous admissions note -\" required multiple explanations and reiteration's of the same information.  quite irascible on admission \"    -- On 11/10 with patient with his wife and brother-in-law by bedside.  During encounter patient frustrated, agitated.  Wife by bedside mostly calm ( report has dementia), brother-in-law reports - patient at around his baseline mental status.    -- 11/11-met with patient, his wife and another brother-in-law by the bedside.  Patient appears frustrated, wanting to leave the hospital.  Discussed concerns regarding no driving, concern for cognitive impairment.  Patient agitated reports will drive and I am fine.  Wife mostly calm during encounter.  Patient's brother-in-law reports patient makes his own " decisions.  --OT followed 11/10, Short Orientation Memory Concentration Test cognition screen to evaluate orientation, registration, and attention. The patient scored 13/28 indicating moderate cognitive impairment.  -- Psychiatry consult requested for decision-making capacity.  Fall precautions.  Delirium precautions.  Await PT evaluation, will likely need TCU.  Care management for assistance with transition requested     Acute on chronic knee pain-degenerative joint disease.  --Patient complained of worsening right knee pain.   Reports unable to bear weight on right knee.  Noted swelling, no erythema.  Tenderness present.  Xray right knee- Tricompartmental degenerative arthrosis with marginal osteophytes and moderate narrowing of the medial compartment. No acute fracture. Trace joint effusion.   As needed Tylenol.  As needed Robaxin.  As needed topical Voltaren gel.  Follow-up with orthopedic surgery as outpatient.  Age-appropriate health maintenance including bone health maintenance as outpatient.    Right wrist pain-multifocal degenerative joint disease  History of fall.  Complaining of right wrist pain.  Reports had a fall recently in October while coming out of dentist office, has not had any workup from his primary provider whom he had seen.   On exam right wrist swelling noted, range of motion restricted.  Tenderness present.  No warmth or erythema.  X-ray right wrist multifocal degenerative arthrosis of the right wrist, greatest to an advanced degree involving the STT joints. Mild ulnar positive variance with moderate degenerative arthrosis of the ulnolunate joint. No acute fracture. Corticated ossicle   dorsal of the wrist on the lateral view which may be an old ununited triquetral avulsion fracture fragment.   As needed Tylenol.  Fall precautions    Alcohol use.  --Per ED note reviewed, They further report that he attempted to grab a drink of alcohol before leaving, and they noticed the container of  "alcohol had \"floaties\" in it, though they are unsure where they came from.   -- Patient denies any alcohol use.  Wife by bedside calm - during encounter (report of dementia).  Alcohol level less than 0.01.  Monitor closely for withdrawal symptoms.    Acute anemia likely dilutional.  Presented with hemoglobin of 12.4.  Previously baseline hemoglobin in the 12 range.  This morning hemoglobin dropped to 10.9.  Received gentle fluids for sepsis, Lasix held.  No active bleeding.  Monitor hemoglobin levels in AM.    Orders Placed This Encounter      Combination Diet Regular Diet Adult      DVT Prophylaxis: SCDs, ambulate.  Code Status: Full Code  Disposition: Expected discharge in greater than 2 days pending clinical improvement    Medically Ready for Discharge: Anticipated in 2-4 Days     Discussed with patient, his wife, brother-in-law by bedside, bedside RN, ID team.   >51 minutes spent by me on the date of service doing chart review, history, exam, documentation & further activities per the note.      Claritza Gabriel MD        Interval History:        Patient lying in bed.  Denies any chest pain or palpitations.  Denies any headache or dizziness.  No nausea vomiting.  Denies any tingling or numbness.  During encounter patient appearing frustrated, wife and brother-in-law by bedside.  Reports would like to leave the hospital.  Expressing frustration with missed diagnosis.  Reports had no instructions given regarding Christianson catheter on previous discharge.  Reporting right knee, right wrist swelling.  Reports will continue to drive, has no issues with memory.         Physical Exam:        Physical Exam   Temp:  [97.5  F (36.4  C)-99.8  F (37.7  C)] 97.5  F (36.4  C)  Pulse:  [58-75] 58  Resp:  [18] 18  BP: (105-125)/() 125/103  SpO2:  [93 %-96 %] 96 %    Intake/Output Summary (Last 24 hours) at 11/10/2024 1610  Last data filed at 11/10/2024 1435  Gross per 24 hour   Intake 240 ml   Output 650 ml   Net -410 ml "       Admission Weight: 80.3 kg (177 lb)  Current Weight: 80.3 kg (177 lb)    PHYSICAL EXAM  GENERAL: Patient is in no distress.  Awake able to answer most questions.  Appearing frustrated during encounter  HEENT: Oropharynx pink.  HEART: Regular rate and rhythm. S1S2. No murmurs  LUNGS: Clear to auscultation bilaterally. No expiratory wheeze.  Respirations unlabored  ABDOMEN: Soft, no abdominal tenderness, bowel sounds heard   NEURO: Moving all extremities  EXTREMITIES: No pedal edema.    Right wrist area swelling noted.No erythema.  No open wound.  No warmth.  Tenderness present.  Right knee area swelling noted.  No erythema.  No open wound.  No warmth.  Tenderness present.  SKIN: Warm, dry.   PSYCHIATRY slightly agitated during encounter       Medications:        Current Facility-Administered Medications   Medication Dose Route Frequency Provider Last Rate Last Admin    aspirin EC tablet 81 mg  81 mg Oral Daily Claritza Gabriel MD   81 mg at 11/11/24 0756    atorvastatin (LIPITOR) tablet 40 mg  40 mg Oral Daily Claritza Gabriel MD   40 mg at 11/11/24 0756    cefTRIAXone (ROCEPHIN) 1 g vial to attach to  mL bag for ADULTS or NS 50 mL bag for PEDS  1 g Intravenous Q24H Justyna Bowser MD   1 g at 11/11/24 0430    diclofenac (VOLTAREN) 1 % topical gel 4 g  4 g Topical 4x Daily Justyna Bowser MD   4 g at 11/11/24 0754    metoprolol succinate ER (TOPROL XL) 24 hr tablet 25 mg  25 mg Oral Daily Claritza Gabriel MD   25 mg at 11/11/24 0754    sodium chloride (PF) 0.9% PF flush 3 mL  3 mL Intracatheter Q8H Justyna Bowser MD   3 mL at 11/11/24 0430    sodium chloride (PF) 0.9% PF flush 3 mL  3 mL Intracatheter Q8H Raven Roper MD   3 mL at 11/10/24 1425    vancomycin (VANCOCIN) 1,250 mg in 0.9% NaCl 262.5 mL intermittent infusion  1,250 mg Intravenous Q24H Claritza Gabriel MD   1,250 mg at 11/10/24 1425     Current Facility-Administered Medications   Medication Dose  Route Frequency Provider Last Rate Last Admin    acetaminophen (TYLENOL) tablet 650 mg  650 mg Oral Q4H PRN Justyna Bowser MD   650 mg at 11/10/24 2055    Or    acetaminophen (TYLENOL) Suppository 650 mg  650 mg Rectal Q4H PRN Justyna Bowser MD        albuterol (PROVENTIL HFA/VENTOLIN HFA) inhaler  2 puff Inhalation Q6H PRN Claritza Gabriel MD        benzocaine-menthol (CHLORASEPTIC) 6-10 MG lozenge 1 lozenge  1 lozenge Buccal Q1H PRN Justyna Bowser MD        calcium carbonate (TUMS) chewable tablet 1,000 mg  1,000 mg Oral 4x Daily PRN Justyna Bowser MD        lidocaine (LMX4) cream   Topical Q1H PRN Justyna Bowser MD        lidocaine 1 % 0.1-1 mL  0.1-1 mL Other Q1H PRN Justyna Bowser MD        melatonin tablet 1 mg  1 mg Oral At Bedtime PRN Justyna Bowser MD   1 mg at 11/10/24 2243    methocarbamol (ROBAXIN) half-tab 250 mg  250 mg Oral TID PRN Claritza Gabriel MD        ondansetron (ZOFRAN ODT) ODT tab 4 mg  4 mg Oral Q6H PRN Justyna Bowser MD        Or    ondansetron (ZOFRAN) injection 4 mg  4 mg Intravenous Q6H PRN Justyna Bowser MD        senna-docusate (SENOKOT-S/PERICOLACE) 8.6-50 MG per tablet 1 tablet  1 tablet Oral BID Justyna Walker MD        Or    senna-docusate (SENOKOT-S/PERICOLACE) 8.6-50 MG per tablet 2 tablet  2 tablet Oral BID PRJustyna Bailey MD        sodium chloride (PF) 0.9% PF flush 3 mL  3 mL Intracatheter q1 min prn Justyna Bowser MD   3 mL at 11/10/24 2052    sodium chloride (PF) 0.9% PF flush 3 mL  3 mL Intracatheter q1 min prn Raven oRper MD        tamsulosin (FLOMAX) capsule 0.4 mg  0.4 mg Oral Daily PRN Justyna Bowser MD                Data:      All new lab and imaging data was reviewed.

## 2024-11-11 NOTE — DISCHARGE INSTRUCTIONS
You are scheduled for Hospitalization follow up at:  [facility] [place] [address] [place] [address], [place] [address]    [phone]  Dr Maribell Stewart, November 14th at 11am  They have cancelled the appointment on November 13th for your hand/wrist, they will address this at your appointment on the 14th.

## 2024-11-11 NOTE — PROGRESS NOTES
Infection Prevention Progress Note  11/11/2024      Patient Name: Caenlo Cruz [id number]  Admit Date: 11/9/2024    Infection Status as of 11/11/2024 7:59 AM: No active infections  Isolation Status as of 11/11/2024 7:59 AM: No active isolations     MDRO Discontinuation  Infection Prevention has reviewed this patient's chart per the MDRO D/C Policy and have taken the following action:    Patient meets all the criteria for discontinuation and Infection Prevention will resolve the MRSA infection status.    If you have any questions, please contact Infection Prevention.    Anais Mancini, Infection Prevention

## 2024-11-12 ENCOUNTER — APPOINTMENT (OUTPATIENT)
Dept: CARDIOLOGY | Facility: CLINIC | Age: 89
End: 2024-11-12
Attending: HOSPITALIST
Payer: COMMERCIAL

## 2024-11-12 LAB
ANION GAP SERPL CALCULATED.3IONS-SCNC: 11 MMOL/L (ref 7–15)
BACTERIA BLD CULT: ABNORMAL
BACTERIA BLD CULT: ABNORMAL
BUN SERPL-MCNC: 17 MG/DL (ref 8–23)
CALCIUM SERPL-MCNC: 8.7 MG/DL (ref 8.8–10.4)
CHLORIDE SERPL-SCNC: 102 MMOL/L (ref 98–107)
CREAT SERPL-MCNC: 1.05 MG/DL (ref 0.67–1.17)
EGFRCR SERPLBLD CKD-EPI 2021: 66 ML/MIN/1.73M2
GLUCOSE SERPL-MCNC: 98 MG/DL (ref 70–99)
HCO3 SERPL-SCNC: 22 MMOL/L (ref 22–29)
HGB BLD-MCNC: 12.4 G/DL (ref 13.3–17.7)
LVEF ECHO: NORMAL
POTASSIUM SERPL-SCNC: 3.8 MMOL/L (ref 3.4–5.3)
SODIUM SERPL-SCNC: 135 MMOL/L (ref 135–145)

## 2024-11-12 PROCEDURE — 250N000013 HC RX MED GY IP 250 OP 250 PS 637: Performed by: HOSPITALIST

## 2024-11-12 PROCEDURE — 255N000002 HC RX 255 OP 636: Performed by: HOSPITALIST

## 2024-11-12 PROCEDURE — 36415 COLL VENOUS BLD VENIPUNCTURE: CPT | Performed by: HOSPITALIST

## 2024-11-12 PROCEDURE — 120N000001 HC R&B MED SURG/OB

## 2024-11-12 PROCEDURE — 999N000208 ECHOCARDIOGRAM COMPLETE

## 2024-11-12 PROCEDURE — 93306 TTE W/DOPPLER COMPLETE: CPT | Mod: 26 | Performed by: INTERNAL MEDICINE

## 2024-11-12 PROCEDURE — 80048 BASIC METABOLIC PNL TOTAL CA: CPT | Performed by: HOSPITALIST

## 2024-11-12 PROCEDURE — 250N000013 HC RX MED GY IP 250 OP 250 PS 637: Performed by: INTERNAL MEDICINE

## 2024-11-12 PROCEDURE — 82565 ASSAY OF CREATININE: CPT | Performed by: HOSPITALIST

## 2024-11-12 PROCEDURE — 99233 SBSQ HOSP IP/OBS HIGH 50: CPT | Performed by: HOSPITALIST

## 2024-11-12 PROCEDURE — 250N000011 HC RX IP 250 OP 636: Mod: JZ | Performed by: INTERNAL MEDICINE

## 2024-11-12 PROCEDURE — 85018 HEMOGLOBIN: CPT | Performed by: HOSPITALIST

## 2024-11-12 RX ADMIN — POTASSIUM CHLORIDE 20 MEQ: 1500 TABLET, EXTENDED RELEASE ORAL at 08:29

## 2024-11-12 RX ADMIN — METHOCARBAMOL 250 MG: 500 TABLET ORAL at 08:29

## 2024-11-12 RX ADMIN — FUROSEMIDE 20 MG: 20 TABLET ORAL at 08:29

## 2024-11-12 RX ADMIN — DICLOFENAC 4 G: 10 GEL TOPICAL at 08:29

## 2024-11-12 RX ADMIN — DICLOFENAC 4 G: 10 GEL TOPICAL at 12:26

## 2024-11-12 RX ADMIN — ATORVASTATIN CALCIUM 40 MG: 40 TABLET, FILM COATED ORAL at 08:29

## 2024-11-12 RX ADMIN — ASPIRIN 81 MG: 81 TABLET, COATED ORAL at 08:29

## 2024-11-12 RX ADMIN — DICLOFENAC 4 G: 10 GEL TOPICAL at 20:05

## 2024-11-12 RX ADMIN — METOPROLOL SUCCINATE 25 MG: 25 TABLET, EXTENDED RELEASE ORAL at 08:29

## 2024-11-12 RX ADMIN — CEFAZOLIN SODIUM 2 G: 2 INJECTION, SOLUTION INTRAVENOUS at 12:26

## 2024-11-12 RX ADMIN — CEFAZOLIN SODIUM 2 G: 2 INJECTION, SOLUTION INTRAVENOUS at 20:05

## 2024-11-12 RX ADMIN — METHOCARBAMOL 250 MG: 500 TABLET ORAL at 20:05

## 2024-11-12 RX ADMIN — DICLOFENAC 4 G: 10 GEL TOPICAL at 16:29

## 2024-11-12 RX ADMIN — CEFAZOLIN SODIUM 2 G: 2 INJECTION, SOLUTION INTRAVENOUS at 04:28

## 2024-11-12 RX ADMIN — Medication 1 MG: at 20:05

## 2024-11-12 RX ADMIN — PERFLUTREN 10 ML: 6.52 INJECTION, SUSPENSION INTRAVENOUS at 15:22

## 2024-11-12 RX ADMIN — QUETIAPINE 12.5 MG: 25 TABLET, FILM COATED ORAL at 20:05

## 2024-11-12 NOTE — PLAN OF CARE
Summary: UTI, met encephalopathy, hyponatremia  DATE & TIME: 11/12/2024 3633-6324  Cognitive Concerns/ Orientation : A&Ox3, disoriented to situation, forgetful. Frustrated and anxious at times.  BEHAVIOR & AGGRESSION TOOL COLOR: Green  CIWA SCORE: NA   ABNL VS/O2: VSS on RA.   MOBILITY: SBA, pt refusing walker and gait belt.  PAIN MANAGMENT: Denies. States R wrist can be tender; scheduled voltaren gel for knees  DIET: Regular diet. 2000mL FR  BOWEL/BLADDER: Chronic rodriguez in place, No bm.  ABNL LAB/BG: None new  DRAIN/DEVICES: L PIV saline locked  TELEMETRY RHYTHM: Pt refused   SKIN: Dry skin. Scattered scabs and bruises.  TESTS/PROCEDURES: None new  D/C DAY/GOALS/PLACE: TBD. OT rec TCU, pt wants to go home w/ HC.   OTHER IMPORTANT INFO: ID following. Psych consult placed for decision making capacity, OT following. Continues on Lasix. Strict intake and output.

## 2024-11-12 NOTE — PROGRESS NOTES
Infectious disease brief note  Chart checked  Pending echocardiogram  Pending follow-up blood cultures  Continue on Ancef  See details of recommendations from yesterday's note    Rodney Majano MD  Infectious Disease

## 2024-11-12 NOTE — PROGRESS NOTES
Summary: UTI, met encephalopathy, hyponatremia  DATE & TIME: 11/11/24 9490-4074  Cognitive Concerns/ Orientation : A&Ox4, forgetful, overestimates  BEHAVIOR & AGGRESSION TOOL COLOR: Green  CIWA SCORE: NA   ABNL VS/O2: VSS on RA ex skyler  MOBILITY: A1 GB/W. Ambulating to restroom, in halls  PAIN MANAGMENT: C/o of R wrist pain - gave scheduled voltaren gel  DIET: Regular diet, poor appetite. 2000mL FR  BOWEL/BLADDER: Christianson in place - changed this shift  ABNL LAB/BG: Hgb 10.9, Na 129  DRAIN/DEVICES: L PIV SL   TELEMETRY RHYTHM: Pt refused  SKIN: Dry skin. Scattered scabs, bruising. Blanchable redness to coccyx - mepilex in place.  TESTS/PROCEDURES: Bx positive - needs follow up  D/C DAY/GOALS/PLACE: TBD. OT rec TCU, pt wants to go home w/ HC. IV abx on discharge  OTHER IMPORTANT INFO: ID consulted for MSSA bacteremia. Psych consult placed for cog impairment, OT following. PTA lasix started today

## 2024-11-12 NOTE — PLAN OF CARE
Goal Outcome Evaluation:                      Summary: UTI, met encephalopathy, hyponatremia  DATE & TIME: 11/11/24, [id number]  Cognitive Concerns/ Orientation : A&Ox4, forgetful, overestimates  BEHAVIOR & AGGRESSION TOOL COLOR: Green  CIWA SCORE: NA   ABNL VS/O2: VSS on RA.   MOBILITY: A1 GB/W. Ambulating to restroom  PAIN MANAGMENT: Denies. scheduled voltaren gel applied on Knees  DIET: Regular diet. 2000mL FR  BOWEL/BLADDER: Chronic rodriguez in place, changed today by am nurse. No bm.  ABNL LAB/BG: Hgb 10.9, Na 129  DRAIN/DEVICES: PIV SL   TELEMETRY RHYTHM: Pt refused ( per am nurse note).  SKIN: Dry skin. Scattered scabs, bruising.   TESTS/PROCEDURES: Blood culture result pending.  D/C DAY/GOALS/PLACE: TBD. OT rec TCU, pt wants to go home w/ HC. IV abx on discharge. Sw following.  OTHER IMPORTANT INFO: ID consulted for MSSA bacteremia. Psych consult placed for cog impairment, OT following. PTA lasix started today. Strict intake and output.

## 2024-11-12 NOTE — PLAN OF CARE
Goal Outcome Evaluation:       Summary: UTI, met encephalopathy, hyponatremia  DATE & TIME: 11/12/2024 [id number]  Cognitive Concerns/ Orientation : A&Ox3, disoriented to situation, very forgetful. Intermittently more confused at times. Frustrated and anxious at times wanting to go home.  BEHAVIOR & AGGRESSION TOOL COLOR: Green  CIWA SCORE: NA   ABNL VS/O2: VSS on RA.   MOBILITY: SBA, ambulating in room  PAIN MANAGEMENT: States R wrist can be tender; scheduled voltaren gel for knees/wrist, muscle relaxant given x1  DIET: Regular diet. 2000mL FR- appetite fair   BOWEL/BLADDER: Chronic rodriguez in place, No bm.  ABNL LAB/BG: Hemoglobin 12.4   DRAIN/DEVICES: L PIV saline locked  TELEMETRY RHYTHM: SR with PAC's   SKIN: Dry skin. Scattered scabs and bruises, blanchable redness on coccyx- Mepilex in place.  TESTS/PROCEDURES: Echo done   D/C DAY/GOALS/PLACE: pending possibly tomorrow   OTHER IMPORTANT INFO: ID following. Psych consult placed for decision making capacity, OT following. Continues on Lasix. Strict intake and output.

## 2024-11-12 NOTE — CONSULTS
Initial Psychiatric Consult   Consult date: November 12, 2024         Reason for Consult, requesting source:      Requesting source: Claritza Gabriel    Labs and imaging reviewed. Patient seen and evaluated by Alphonse Tanner PA-C          HPI:   Canelo Cruz is a 89 year old male with history of urinary retention with chronic rodriguez, hypertension, coronary artery disease, dyslipidemia, chronic systolic CHF, 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, suspected cognitive impairment who was admitted on 11/9/2024 with complicated UTI, encephalopathy.     Psychiatry consulted due to concern for cognitive decline, and capacity.     On approach patient is engaged and amiable. States he is in the hospital due to a fall. Was annoyed by noises here last night, but understands that hospitals are seldom quiet. States that he was not given instructions for the rodriguez, and that is why he never changed it. Offers no other complaints today, but does state his desire to go home and leave the hospital.     Hospital team recommends remaining at hospital to complete medical workup. Patient continues to desire to go home, but is able to recognize the need to treat his infection. He is unable to demonstrate insight into the potential consequences of discharging AMA. He is unable to identify risks of driving. Believes his is more cognitively capable than he was 10 years ago.         Past Psychiatric History:           Substance Use and History:           Past Medical History:   PAST MEDICAL HISTORY:   Past Medical History:   Diagnosis Date    Gout     Heart murmur     Insomnia     Mixed hyperlipidemia     Postherpetic neuralgia        PAST SURGICAL HISTORY:   Past Surgical History:   Procedure Laterality Date    ARTHROSCOPY KNEE Right     ~[id number]    CV CORONARY ANGIOGRAM N/A 6/13/2024    Procedure: Coronary Angiogram;  Surgeon: Jarett Odell MD;  Location:  [facility]ATH [facility]    CV RIGHT HEART CATH MEASUREMENTS RECORDED N/A 6/13/2024     Procedure: Right Heart Catheterization;  Surgeon: Jarett Odell MD;  Location:  HE[facility]ARDI[facility] CATH LAB    CV TRANSCATHETER AORTIC VALVE REPLACEMENT-FEMORAL APPROACH N/A 7/16/2024    Procedure: Transcatheter Aortic Valve Replacement-Femoral Approach;  Surgeon: Prince Burnett MD;  Location:  HEART CARDI[facility] CATH LAB    DAVINCI XI HERNIORRHAPHY INGUINAL Bilateral 7/18/2022    Procedure: Robotic repair of right inguinal hernia with placement of mesh,  robotic repair of left inguinal hernia with placement of mesh;  Surgeon: Ina Phillips MD;  Location:  OR    ORTHOPEDIC SURGERY      right achilles rupture repair with 14 month MRSA infection             Family History:   FAMILY HISTORY:   Family History   Problem Relation Age of Onset    Unknown/Adopted Mother     Unknown/Adopted Father     Diabetes No family hx of     Coronary Artery Disease No family hx of     Hypertension No family hx of     Hyperlipidemia No family hx of     Cerebrovascular Disease No family hx of     Breast Cancer No family hx of     Colon Cancer No family hx of     Prostate Cancer No family hx of     Other Cancer No family hx of     Depression No family hx of     Anxiety Disorder No family hx of     Mental Illness No family hx of     Substance Abuse No family hx of     Anesthesia Reaction No family hx of     Asthma No family hx of     Osteoporosis No family hx of     Genetic Disorder No family hx of     Thyroid Disease No family hx of     Obesity No family hx of        Family Psychiatric History:         Social History:   SOCIAL HISTORY:   Social History     Tobacco Use    Smoking status: Never    Smokeless tobacco: Never   Substance Use Topics    Alcohol use: Yes     Comment: 10 per week                Physical ROS:   The 10 point Review of Systems is negative other than noted in the HPI or here.           Medications:     Current Facility-Administered Medications   Medication Dose Route Frequency Provider Last Rate Last Admin     aspirin EC tablet 81 mg  81 mg Oral Daily Claritza Gabriel MD   81 mg at 11/12/24 0829    atorvastatin (LIPITOR) tablet 40 mg  40 mg Oral Daily Claritza Gabriel MD   40 mg at 11/12/24 0829    ceFAZolin (ANCEF) 2 g in 100 mL D5W intermittent infusion  2 g Intravenous Q8H Rodney Majano  mL/hr at 11/12/24 0428 2 g at 11/12/24 0428    diclofenac (VOLTAREN) 1 % topical gel 4 g  4 g Topical 4x Daily Justyna Bowser MD   4 g at 11/12/24 0829    furosemide (LASIX) tablet 20 mg  20 mg Oral Daily Claritza Gabriel MD   20 mg at 11/12/24 0829    metoprolol succinate ER (TOPROL XL) 24 hr tablet 25 mg  25 mg Oral Daily Claritza Gabriel MD   25 mg at 11/12/24 0829    potassium chloride mary ER (KLOR-CON M20) CR tablet 20 mEq  20 mEq Oral Daily Claritza Gabriel MD   20 mEq at 11/12/24 0829    sodium chloride (PF) 0.9% PF flush 3 mL  3 mL Intracatheter Q8H Justyna Bowser MD   3 mL at 11/12/24 0434              Allergies:     Allergies   Allergen Reactions    Ciprofloxacin      history of achilles tendon pain          Labs:     Recent Results (from the past 48 hours)   MRSA MSSA PCR, Nasal Swab    Collection Time: 11/10/24  5:04 PM    Specimen: Nose; Swab   Result Value Ref Range    MRSA Target DNA Negative Negative    SA Target DNA Positive    CBC with platelets    Collection Time: 11/11/24  6:38 AM   Result Value Ref Range    WBC Count 7.2 4.0 - 11.0 10e3/uL    RBC Count 3.60 (L) 4.40 - 5.90 10e6/uL    Hemoglobin 10.9 (L) 13.3 - 17.7 g/dL    Hematocrit 32.5 (L) 40.0 - 53.0 %    MCV 90 78 - 100 fL    MCH 30.3 26.5 - 33.0 pg    MCHC 33.5 31.5 - 36.5 g/dL    RDW 15.3 (H) 10.0 - 15.0 %    Platelet Count 150 150 - 450 10e3/uL   Basic metabolic panel    Collection Time: 11/11/24  6:38 AM   Result Value Ref Range    Sodium 129 (L) 135 - 145 mmol/L    Potassium 3.9 3.4 - 5.3 mmol/L    Chloride 100 98 - 107 mmol/L    Carbon Dioxide (CO2) 23 22 - 29 mmol/L    Anion Gap 6 (L) 7 - 15 mmol/L    Urea Nitrogen  "18.3 8.0 - 23.0 mg/dL    Creatinine 1.08 0.67 - 1.17 mg/dL    GFR Estimate 64 >60 mL/min/1.73m2    Calcium 8.2 (L) 8.8 - 10.4 mg/dL    Glucose 98 70 - 99 mg/dL   Blood Culture Arm, Right    Collection Time: 11/11/24  6:38 AM    Specimen: Arm, Right; Blood   Result Value Ref Range    Culture No growth after 12 hours    Hemoglobin    Collection Time: 11/12/24  7:39 AM   Result Value Ref Range    Hemoglobin 12.4 (L) 13.3 - 17.7 g/dL   Basic metabolic panel    Collection Time: 11/12/24  7:39 AM   Result Value Ref Range    Sodium 135 135 - 145 mmol/L    Potassium 3.8 3.4 - 5.3 mmol/L    Chloride 102 98 - 107 mmol/L    Carbon Dioxide (CO2) 22 22 - 29 mmol/L    Anion Gap 11 7 - 15 mmol/L    Urea Nitrogen 17.0 8.0 - 23.0 mg/dL    Creatinine 1.05 0.67 - 1.17 mg/dL    GFR Estimate 66 >60 mL/min/1.73m2    Calcium 8.7 (L) 8.8 - 10.4 mg/dL    Glucose 98 70 - 99 mg/dL          Physical and Psychiatric Examination:     /56 (BP Location: Left arm)   Pulse 70   Temp 98.8  F (37.1  C) (Oral)   Resp 18   Ht 1.859 m (6' 1.2\")   Wt 80.2 kg (176 lb 12.9 oz)   SpO2 96%   BMI 23.20 kg/m    Weight is 176 lbs 12.94 oz  Body mass index is 23.2 kg/m .    Physical Exam:  I have reviewed the physical exam as documented by by the medical team and agree with findings and assessment and have no additional findings to add at this time.    Mental Status Exam:    Appearance: awake, alert  Attitude:  cooperative  Eye Contact:  fair  Mood:  better  Affect:  appropriate and in normal range  Speech:  clear, coherent  Language: Fluent in english   Psychomotor Behavior:  no evidence of tardive dyskinesia, dystonia, or tics  Thought Process:  logical  Associations:  no loose associations  Thought Content:  no evidence of suicidal ideation or homicidal ideation  Insight:  limited  Judgement:  limited  Oriented to:  time, person, and place  Attention Span and Concentration:  fair  Recent and Remote Memory:  limited  Fund of Knowledge: Appropriate "   Gait and Station:                DSM-5 Diagnosis:   Major neurocognitive disorder.           Assessment:   Competency is a legal term that refers to the ability to act on one's own behalf or participate in legal proceedings or transactions. It is a global assessment and a legal determination made by a  in court. Writer is able to review capacity as a functional term that refers to the ability to make a particular decision or perform a specific task. It is not static and can vary depending on the situation and the person's condition.   The four key components to address in a capacity evaluation include: 1) communicating a choice, 2) understanding, 3) appreciation, and 4) rationalization/reasoning.     Aid to Capacity  Able to understand medical problem? No   2. Able to understand proposed treatment? No    3. Able to understand alternative to proposed treatment (if any)? yes    4. Able to understand option of refusing proposed treatment? No    5. Able to appreciate reasonably foreseeable consequences of accepting proposed treatment? No   6. Able to appreciate reasonably foreseeable consequences of refusing proposed treatment?  No   7. The person s decision is affected by depression? No   8. The person s decision is affected by delusion/psychosis? No   Summary of capacity screener: Patient is not currently able to make his own decisions. Recommend involving trusted decision maker to aid patient.     After reviewing evidence based screeners, such as a SLUMS, CPT, etc... for the patient and reviewing collateral information, it appears patient does not display complex medical decision making capacity at this time regarding continued hospitalization.  Writer recommends that care team review [facility]'s policy on identification of alternate decision maker for this particular decision or specific task.        Patient is able to comprehend that he needs treatment, but promptly forgets what that entails. He is  unable to make decisions for himself at this time. He identifies his brother in law as a trusted person to aid in decision making.                  Summary of Recommendations:   Involve family in medical decisions, primarily brother in law  Offered low dose remeron to aid in sleep and appetite, which patient declined.       Alphonse Tanner PA-C

## 2024-11-12 NOTE — PROGRESS NOTES
[facility]  Hospitalist Progress Note   11/12/2024          Assessment and Plan:       Canelo Cruz is a 89 year old male with history of urinary retention with chronic rodriguez, hypertension, coronary artery disease, dyslipidemia, chronic systolic CHF, 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, suspected cognitive impairment admitted on 11/9/2024 with generalized weakness and fever of 101.9.      MSSA bacteremia.  Catheter related urine UTI  Sepsis secondary to above  Chronic rodriguez catheter status post catheter exchange 11/11.  Patient brought into the ED via EMS with generalized weakness, fever of 101.9, confusion.  Per report abnormal smell, sediment in Rodriguez catheter bag, poor care of rodriguez catheter. Last known change of rodriguez catheter was in September 2024 at the time of a hospital admission, patient reports was not given any instructions for Rodriguez catheter changes.  --In ED temperature 103, tachycardic.    WBC 11.3, lactic acid 0.8. Procalcitonin 0.11.  CRP 37.26.  Sodium 132, creatinine 1.09.  Liver enzymes within normal limits.   Influenza A, B, RSV, COVID-19 PCR negative.    UA with moderate leukocyte esterase, WBC clumps present.  Urine cultures polymicrobial.  Chest x-ray no infiltrate.  ---Initiated on IV ceftriaxone. (11/9)  Preliminary blood cultures on 11/10 positive for Staph aureus.  Added IV vancomycin on 11/10.  --Blood cultures with Staphylococcus aureus, MSSA.  Repeat blood cultures from 11/11 no growth to date  --Switch to IV Ancef on 11/11.   Transthoracic echocardiogram pending  Follow final blood cultures, urine cultures.  Trend WBC count, fever curve.  Infectious disease following, appreciate input  Urology followed, Rodriguez catheter exchange on 11/11. Appreciate input.  Continue PTA tamsulosin.  As needed Tylenol as needed.  Follow up in 3-4 weeks for chronic rodriguez exchange.  [facility] (Office: [phone]).    Acute encephalopathy likely multifactorial, infectious etiology, underlying  "cognitive impairment, ?  Behavioral disturbance, Cognitive impairment.  Physical deconditioning from medical illness, senile frailty.  -- Patient reporting he and his wife lives alone, independent and still drives. EMS notes that the patient wanted an additional drink of alcohol before getting in ambulance    ---Patient awake, oriented but forgetful and repetitive.  -- Per admitting provider evasive about medical history and specifically requests no contact with his sister nor spouse about details of medical history or concerns about cognitive function.   ---Review of previous admissions note -\" required multiple explanations and reiteration's of the same information. Quite irascible on admission \"  --During hospital encounters patient at times frustrated and agitated.  Reports makes his own decisions, family reports is at his own baseline.  Patient able to comprehend that he needs treatment but probably forgets and reports he will leave the hospital tomorrow.  Okay with brother-in-law at bedside assisting with decision-making  --OT followed 11/10, Short Orientation Memory Concentration Test cognition screen to evaluate orientation, registration, and attention. The patient scored 13/28 indicating moderate cognitive impairment.  -- Psychiatry followed 11/12, Patient is not currently able to make his own decisions. Recommend involving trusted decision maker to aid patient.   Fall precautions.  Delirium precautions.  PT, OT evaluation ongoing.  Discussed with patient, family to hold off driving.  Care management for assistance with transition requested     Chronic systolic congestive heart failure [LVEF of 40 to 45%]  Coronary artery disease  Severe aortic valve stenosis s/p TAVR -07/17/2024.  Hypertension.  Hyperlipidemia.  Follows with [facility] cardiology.  Echocardiogram 7/2024 with LVEF of 20 to 25%.  Repeat echo 8/2024 with LVEF of 40%.  Continue PTA aspirin and Toprol.  Continue PTA Lipitor.  Continue PTA Lasix at " low-dose of 20 mg oral daily [11/11].  Echocardiogram given MSSA bacteremia  Strict intake output monitoring, daily weights.  2000 mL fluid restriction.  Liberalize salt in diet given hyponatremia.    Right wrist pain-degenerative joint disease  History of fall.  --Reports had a fall recently in October while coming out of dentist office, has not had any workup from his primary provider whom he had seen.  Complaint of wrist pain.  --On exam right wrist swelling noted, range of motion restricted.  Tenderness present, no warmth or erythema.    --X-ray right wrist multifocal degenerative arthrosis of the right wrist, greatest to an advanced degree involving the STT joints. Mild ulnar positive variance with moderate degenerative arthrosis of the ulnolunate joint. No acute fracture. 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   As needed Tylenol.  Fall precautions    Acute on chronic knee pain-degenerative joint disease.  Reports since fall is about unable to bear weight on right knee, pain.  Mild swelling, no erythema, tenderness present.  Xray right knee- Tricompartmental degenerative arthrosis with marginal osteophytes and moderate narrowing of the medial compartment. No acute fracture. Trace joint effusion.   As needed Tylenol.  As needed Robaxin.  As needed topical Voltaren gel.  Follow-up with orthopedic surgery as outpatient.  Age-appropriate health maintenance including bone health maintenance as outpatient.    Hyponatremia.  Sodium of 132 on admission.  Received IV fluids, Lasix was held with which sodium dropped to 129 on 11/11.  -- Lasix resumed, fluid restriction with which sodium improved to 135.  2000 mL fluid restriction.  Monitor sodium levels periodically.    Alcohol use.  --Per ED note reviewed, They further report that he attempted to grab a drink of alcohol before leaving, and they noticed the container of alcohol had  "\"floaties\" in it, though they are unsure where they came from.   -- Patient denies any alcohol use.  Wife by bedside calm - during encounter (report of dementia).  Alcohol level less than 0.01.  Monitor closely for withdrawal symptoms.    Acute anemia likely dilutional.  Presented with hemoglobin of 12.4.  Previously baseline hemoglobin in the 12 range.  This morning hemoglobin dropped to 10.9.  Received gentle fluids for sepsis, Lasix held.  No active bleeding.  Recheck hemoglobin 12.4 on 11/12.    Orders Placed This Encounter      Combination Diet Regular Diet Adult      DVT Prophylaxis: SCDs, ambulate.  Code Status: Full Code  Disposition: Expected discharge in greater than 2 days pending clinical improvement    Medically Ready for Discharge: Anticipated in 2-4 Days     Discussed with patient, his wife, brother-in-law by bedside, bedside RN, psychiatrist.  >51 minutes spent by me on the date of service doing chart review, history, exam, documentation & further activities per the note.      Claritza Gabriel MD        Interval History:        Patient lying in bed.  Much more calmer today.  Forgetful, repetitive  This morning expressing frustration with moving his room, frequent interruptions at nighttime and unable to sleep.    Complaining of right wrist pain and knee pain.  Reports would like to go home soon.  Okay with staying in the hospital for today  Denies any chest pain or palpitations.  Denies any headache or dizziness.  No nausea vomiting.  Denies any tingling or numbness.  Wife and brother-in-law by bedside.         Physical Exam:        Physical Exam   Temp:  [97.5  F (36.4  C)-98.8  F (37.1  C)] 98.8  F (37.1  C)  Pulse:  [54-70] 70  Resp:  [18] 18  BP: (119-146)/(56-95) 128/56  SpO2:  [96 %-98 %] 96 %    Intake/Output Summary (Last 24 hours) at 11/10/2024 1610  Last data filed at 11/10/2024 1435  Gross per 24 hour   Intake 240 ml   Output 650 ml   Net -410 ml       Admission Weight: 80.3 kg (177 " lb)  Current Weight: 80.3 kg (177 lb)    PHYSICAL EXAM  GENERAL: Patient is in no distress.  Awake able to answer most questions.    HEART: Regular rate and rhythm. S1S2. No murmurs  LUNGS: Respirations unlabored  ABDOMEN: Soft, no abdominal tenderness, bowel sounds heard   NEURO: Moving all extremities  EXTREMITIES: No pedal edema.    Right wrist area swelling noted.No erythema.  No open wound.  No warmth.  Tenderness present.  Right knee area swelling noted.  No erythema.  No open wound.  No warmth.  Tenderness present.  SKIN: Warm, dry.   PSYCHIATRY calm her this morning during encounter.       Medications:        Current Facility-Administered Medications   Medication Dose Route Frequency Provider Last Rate Last Admin    aspirin EC tablet 81 mg  81 mg Oral Daily Claritza Gabriel MD   81 mg at 11/12/24 0829    atorvastatin (LIPITOR) tablet 40 mg  40 mg Oral Daily Claritza Gabriel MD   40 mg at 11/12/24 0829    ceFAZolin (ANCEF) 2 g in 100 mL D5W intermittent infusion  2 g Intravenous Q8H Rodney Majano  mL/hr at 11/12/24 0428 2 g at 11/12/24 0428    diclofenac (VOLTAREN) 1 % topical gel 4 g  4 g Topical 4x Daily Justyna Bowser MD   4 g at 11/12/24 0829    furosemide (LASIX) tablet 20 mg  20 mg Oral Daily Claritza Gabriel MD   20 mg at 11/12/24 0829    metoprolol succinate ER (TOPROL XL) 24 hr tablet 25 mg  25 mg Oral Daily Claritza Gabriel MD   25 mg at 11/12/24 0829    potassium chloride mary ER (KLOR-CON M20) CR tablet 20 mEq  20 mEq Oral Daily Claritza Gabriel MD   20 mEq at 11/12/24 0829    sodium chloride (PF) 0.9% PF flush 3 mL  3 mL Intracatheter Q8H Justyna Bowser MD   3 mL at 11/12/24 0434     Current Facility-Administered Medications   Medication Dose Route Frequency Provider Last Rate Last Admin    acetaminophen (TYLENOL) tablet 650 mg  650 mg Oral Q4H PRN Justyna Bowser MD   650 mg at 11/10/24 2055    Or    acetaminophen (TYLENOL) Suppository 650 mg  650  mg Rectal Q4H PRN Justyna Bowser MD        albuterol (PROVENTIL HFA/VENTOLIN HFA) inhaler  2 puff Inhalation Q6H PRN Claritza Gabriel MD        benzocaine-menthol (CHLORASEPTIC) 6-10 MG lozenge 1 lozenge  1 lozenge Buccal Q1H PRN Justyna Bowser MD        calcium carbonate (TUMS) chewable tablet 1,000 mg  1,000 mg Oral 4x Daily PRN Justyna Bowser MD        lidocaine (LMX4) cream   Topical Q1H PRN Justyna Bowser MD        lidocaine 1 % 0.1-1 mL  0.1-1 mL Other Q1H PRN Justyna Bowser MD        melatonin tablet 1 mg  1 mg Oral At Bedtime PRN Justyna Bowser MD   1 mg at 11/10/24 2243    methocarbamol (ROBAXIN) half-tab 250 mg  250 mg Oral TID PRN Claritza Gabriel MD   250 mg at 11/12/24 0829    ondansetron (ZOFRAN ODT) ODT tab 4 mg  4 mg Oral Q6H PRN Justyna Bowser MD        Or    ondansetron (ZOFRAN) injection 4 mg  4 mg Intravenous Q6H PRN Justyna Bowser MD        QUEtiapine (SEROquel) half-tab 12.5 mg  12.5 mg Oral BID PRN Claritza Gabriel MD        senna-docusate (SENOKOT-S/PERICOLACE) 8.6-50 MG per tablet 1 tablet  1 tablet Oral BID PRN Justyna Bowser MD        Or    senna-docusate (SENOKOT-S/PERICOLACE) 8.6-50 MG per tablet 2 tablet  2 tablet Oral BID PRJustyna Bailey MD        sodium chloride (PF) 0.9% PF flush 3 mL  3 mL Intracatheter q1 min prn Justyna Bowser MD   3 mL at 11/10/24 2052    sodium chloride (PF) 0.9% PF flush 3 mL  3 mL Intracatheter q1 min prn Raven Roper MD        tamsulosin (FLOMAX) capsule 0.4 mg  0.4 mg Oral Daily Justyna Walker MD                Data:      All new lab and imaging data was reviewed.

## 2024-11-13 ENCOUNTER — APPOINTMENT (OUTPATIENT)
Dept: OCCUPATIONAL THERAPY | Facility: CLINIC | Age: 89
End: 2024-11-13
Payer: COMMERCIAL

## 2024-11-13 PROCEDURE — 250N000013 HC RX MED GY IP 250 OP 250 PS 637: Performed by: HOSPITALIST

## 2024-11-13 PROCEDURE — 99233 SBSQ HOSP IP/OBS HIGH 50: CPT | Performed by: HOSPITALIST

## 2024-11-13 PROCEDURE — 250N000011 HC RX IP 250 OP 636: Mod: JZ | Performed by: INTERNAL MEDICINE

## 2024-11-13 PROCEDURE — 99232 SBSQ HOSP IP/OBS MODERATE 35: CPT | Performed by: INTERNAL MEDICINE

## 2024-11-13 PROCEDURE — 97530 THERAPEUTIC ACTIVITIES: CPT | Mod: GO

## 2024-11-13 PROCEDURE — 120N000001 HC R&B MED SURG/OB

## 2024-11-13 RX ADMIN — DICLOFENAC 4 G: 10 GEL TOPICAL at 13:10

## 2024-11-13 RX ADMIN — METOPROLOL SUCCINATE 25 MG: 25 TABLET, EXTENDED RELEASE ORAL at 09:10

## 2024-11-13 RX ADMIN — POTASSIUM CHLORIDE 20 MEQ: 1500 TABLET, EXTENDED RELEASE ORAL at 09:10

## 2024-11-13 RX ADMIN — DICLOFENAC 4 G: 10 GEL TOPICAL at 09:11

## 2024-11-13 RX ADMIN — ATORVASTATIN CALCIUM 40 MG: 40 TABLET, FILM COATED ORAL at 09:10

## 2024-11-13 RX ADMIN — QUETIAPINE 12.5 MG: 25 TABLET, FILM COATED ORAL at 18:37

## 2024-11-13 RX ADMIN — CEFAZOLIN SODIUM 2 G: 2 INJECTION, SOLUTION INTRAVENOUS at 13:10

## 2024-11-13 RX ADMIN — DICLOFENAC 4 G: 10 GEL TOPICAL at 16:26

## 2024-11-13 RX ADMIN — DICLOFENAC 4 G: 10 GEL TOPICAL at 20:03

## 2024-11-13 RX ADMIN — CEFAZOLIN SODIUM 2 G: 2 INJECTION, SOLUTION INTRAVENOUS at 20:00

## 2024-11-13 RX ADMIN — FUROSEMIDE 20 MG: 20 TABLET ORAL at 09:10

## 2024-11-13 RX ADMIN — ASPIRIN 81 MG: 81 TABLET, COATED ORAL at 09:10

## 2024-11-13 RX ADMIN — CEFAZOLIN SODIUM 2 G: 2 INJECTION, SOLUTION INTRAVENOUS at 04:50

## 2024-11-13 NOTE — PLAN OF CARE
Summary: UTI, met encephalopathy, hyponatremia  DATE & TIME: 11/13/2024 Night  Cognitive Concerns/ Orientation : A&Ox3, disoriented to situation, very forgetful. Frustrated and anxious at times wanting to go home.  BEHAVIOR & AGGRESSION TOOL COLOR: Green  CIWA SCORE: NA   ABNL VS/O2: VSS on RA.   MOBILITY: SBA, ambulating in room  PAIN MANAGEMENT: States R wrist can be tender; scheduled voltaren gel for knees/wrist, muscle relaxant given x1  DIET: Regular diet. 2000mL FR- appetite fair   BOWEL/BLADDER: Chronic rodriguez in place, No bm.  ABNL LAB/BG: Hemoglobin 12.4   DRAIN/DEVICES: L PIV saline locked  TELEMETRY RHYTHM: Refusing tele  SKIN: Dry skin. Scattered scabs and bruises, blanchable redness on coccyx- Mepilex in place.  TESTS/PROCEDURES: Echo done   D/C DAY/GOALS/PLACE: TBD pt very adamant about going home today.   OTHER IMPORTANT INFO: ID following and PSYC following, determined to not be decisional.OT following. Continues on Lasix. Strict intake and output.

## 2024-11-13 NOTE — PLAN OF CARE
Goal Outcome Evaluation:       Summary: UTI, met encephalopathy, hyponatremia  DATE & TIME: 11/13/24 [id number]  Cognitive Concerns/ Orientation : A&Ox2-3, disoriented to situation & place at times, very forgetful. Frustrated and anxious at times wanting to go home.  BEHAVIOR & AGGRESSION TOOL COLOR: Green  CIWA SCORE: NA   ABNL VS/O2: VSS on RA.   MOBILITY: SBA, ambulating in room  PAIN MANAGEMENT: States R wrist can be tender; scheduled voltaren gel for knees/wrist  DIET: Regular diet. 2000mL FR- appetite fair   BOWEL/BLADDER: Chronic rodriguez in place, No bm.  ABNL LAB/BG: Hemoglobin No new labs    DRAIN/DEVICES: L PIV saline locked  TELEMETRY RHYTHM: Refusing tele  SKIN: Dry skin. Scattered scabs and bruises, blanchable redness on coccyx  TESTS/PROCEDURES: None   D/C DAY/GOALS/PLACE: TBD   OTHER IMPORTANT INFO: ID following and PSYC following, determined to not be decisional.OT following. Continues on Lasix. Strict intake and output.  Seroquel given x1 PRN for restlessness/anxiety.

## 2024-11-13 NOTE — PROGRESS NOTES
Care Management Follow Up    Length of Stay (days): 3    Expected Discharge Date: 11/15/2024     Concerns to be Addressed: discharge planning     Patient plan of care discussed at interdisciplinary rounds: Yes    Anticipated Discharge Disposition: TCU              Anticipated Discharge Services:    Anticipated Discharge DME: None    Patient/family educated on Medicare website which has current facility and service quality ratings:    Education Provided on the Discharge Plan: Yes  Patient/Family in Agreement with the Plan: yes    Referrals Placed by CM/SW:    Private pay costs discussed: Not applicable    Discussed  Partnership in Safe Discharge Planning  document with patient/family: No     Handoff Completed: No, handoff not indicated or clinically appropriate    Additional Information:  Per Psych note patient needs a surrogate decision maker for healthcare and discharge decisions. Patient had indicated he would like his brother in law Dre to step in and make decisions for him ([phone]).  called Dre to discuss need for TCU due to plan for IV ABX. Dre expressed concern with plan and patient agreeing one day and changing his mind the next. Dre indicated he has some medical questions as well regarding patients fluctuation in cognition. Dre plans to touch base with his son and wants to come to the hospital tomorrow to discuss how to move forward. Dre indicated he will talk with family and will call  in the morning to set up a care conference to discuss concerns.     Next Steps: Care Conference    JESUS MANUEL Pierre    [facility]

## 2024-11-13 NOTE — PROGRESS NOTES
Pt not sleeping overnight. Agitated with bed alarm, and he figured out how to turn it off. Frequently unplugging bed from outlet. Refusing to lay in bed or sit in chair, unsteady on feet - so requires frequent observation. Needs constant reminders regarding not getting up by himself.

## 2024-11-13 NOTE — PROGRESS NOTES
[facility]  Hospitalist Progress Note   11/13/2024          Assessment and Plan:       Canelo Cruz is a 89 year old male with history of urinary retention with chronic rodriguez, hypertension, coronary artery disease, dyslipidemia, chronic systolic CHF, 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, suspected cognitive impairment admitted on 11/9/2024 with generalized weakness and fever of 101.9.      MSSA bacteremia.  Catheter related urine UTI  Sepsis secondary to above  Chronic rodriguez catheter status post catheter exchange 11/11.  Patient brought into the ED via EMS with generalized weakness, fever of 101.9, confusion.  Per report abnormal smell, sediment in Rodriguez catheter bag, poor care of rodriguez catheter. Last known change of rodriguez catheter was in September 2024 at the time of a hospital admission, patient reports was not given any instructions for Rodriguez catheter changes.  --In ED temperature 103, tachycardic.    WBC 11.3, lactic acid 0.8. Procalcitonin 0.11.  CRP 37.26.  Sodium 132, creatinine 1.09.  Liver enzymes within normal limits.   Influenza A, B, RSV, COVID-19 PCR negative.    UA with moderate leukocyte esterase, WBC clumps present.  Urine cultures polymicrobial.  Echocardiogram 11/12 with S/p TAVR 26 mm Rodriguez Paul 3 Ultra valve on 7/16/24. Well seated, normally functioning valve.  Chest x-ray no infiltrate.    ---Initiated on IV ceftriaxone on admission. (11/9)  --Preliminary blood cultures on 11/10 positive for Staph aureus.  Added IV vancomycin on 11/10.  --Blood cultures with Staphylococcus aureus, MSSA.  Repeat blood cultures from 11/11 no growth to date  --Hence switched to IV Ancef on 11/11.   -Infectious disease following.  Continue IV Ancef.  Appreciate input.  -Urology followed, Rodriguez catheter exchanged on 11/11. Appreciate input.  Follow final blood cultures, urine cultures.  Trend WBC count, fever curve.  Continue PTA tamsulosin.  As needed Tylenol as needed.  Follow up in 3-4 weeks for chronic rodriguez  "exchange.  [facility] (Office: [phone]).    Acute encephalopathy likely multifactorial, infectious etiology, underlying cognitive impairment, ?  Behavioral disturbance, delirium from hospitalization.   Concern for baseline cognitive impairment.  Physical deconditioning from medical illness, senile frailty.  -- Patient reported he and his wife lives alone, independent and still drives. EMS notes that the patient wanted an additional drink of alcohol before getting in ambulance   -- Per admitting provider evasive about medical history and specifically requests no contact with his sister nor spouse about details of medical history or concerns about cognitive function.   ---Review of previous admissions note -\" required multiple explanations and reiteration's of the same information. Quite irascible on admission \"  --OT followed 11/10, Short Orientation Memory Concentration Test cognition screen to evaluate orientation, registration, and attention. The patient scored 13/28 indicating moderate cognitive impairment.  -- Psychiatry followed 11/12, Patient is not currently able to make his own decisions. Recommend involving trusted decision maker to aid patient.     --During hospital encounters patient at times frustrated and agitated.  Reports makes his own decisions, family reports is at his own baseline. Patient able to comprehend that he needs treatment but probably forgets and reports he will leave the hospital.  Okay with brother-in-law assisting with decision-making.  Continue Seroquel 12.5 mg twice daily as needed.  Consider sitter as needed.  Frequent reorientation.  Fall precautions, delirium precautions.  Rehab team evaluation ongoing.  Care management assisting with transition plans.    Patient must hold off driving on discharge until outpatient assessment.     Chronic systolic congestive heart failure   Coronary artery disease  Severe aortic valve stenosis s/p TAVR -07/17/2024.  Hypertension.  " Hyperlipidemia.  Follows with [facility] cardiology.  Denied any chest pain or palpitations  Echocardiogram 7/2024 with LVEF of 20 to 25%.  Repeat echo 8/2024 with LVEF of 40%.  -- Echocardiogram [this admission given MSSA bacteremia] from 11/12/2024 with LVEF of 35 to 40%.  Moderate global hypokinesia of the left ventricle.  Right ventricle normal in size and function.  Status post TAVR, well-seated normally functioning valve.  See report for details.  Continue PTA aspirin and Toprol.  Continue PTA Lipitor.  Continue PTA Lasix at low-dose of 20 mg oral daily [11/11].  Strict intake output monitoring, daily weights.  2000 mL fluid restriction.  Liberalize salt in diet given hyponatremia.  Recommend follow-up with cardiology after discharge.    Right wrist pain-degenerative joint disease  History of fall.  --Reports had a fall recently in October while coming out of dentist office, has not had any workup from his primary provider whom he had seen.  Complaint of wrist pain.  --On exam right wrist swelling noted, range of motion restricted.  Tenderness present, no warmth or erythema.    --X-ray right wrist multifocal degenerative arthrosis of the right wrist, greatest to an advanced degree involving the STT joints. Mild ulnar positive variance with moderate degenerative arthrosis of the ulnolunate joint. No acute fracture. 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   As needed Tylenol.  Fall precautions    Acute on chronic knee pain-degenerative joint disease.  Reports since fall is about unable to bear weight on right knee, pain.  Mild swelling, no erythema, tenderness present.  Xray right knee- Tricompartmental degenerative arthrosis with marginal osteophytes and moderate narrowing of the medial compartment. No acute fracture. Trace joint effusion.   As needed Tylenol.  As needed Robaxin.  As needed topical Voltaren gel.  Follow-up with  "orthopedic surgery as outpatient.  Age-appropriate health maintenance including bone health maintenance as outpatient.    Hyponatremia.  Sodium of 132 on admission.  Received IV fluids, Lasix was held with which sodium dropped to 129 on 11/11.  -- Lasix resumed, fluid restriction with which sodium improved to 135.  2000 mL fluid restriction.  Monitor sodium levels periodically.    Alcohol use.  --Per ED note reviewed, They further report that he attempted to grab a drink of alcohol before leaving, and they noticed the container of alcohol had \"floaties\" in it, though they are unsure where they came from.   -- Patient denies any alcohol use.  Wife by bedside calm - during encounter (report of dementia).  Alcohol level less than 0.01.  Monitor closely for withdrawal symptoms.    Acute anemia likely dilutional.  Presented with hemoglobin of 12.4.  Previously baseline hemoglobin in the 12 range.  This morning hemoglobin dropped to 10.9.  Received gentle fluids for sepsis, Lasix held.  No active bleeding.  Recheck hemoglobin 12.4 on 11/12.    Orders Placed This Encounter      Combination Diet Regular Diet Adult      DVT Prophylaxis: SCDs, ambulate.  Code Status: Full Code  Disposition: Expected discharge in 2 days pending final cultures, antibiotic plan in place and safe discharge plan in place    Medically Ready for Discharge: Anticipated in 2-4 Days     Discussed with patient, floor team, care team.  Patient's wife, brother-in-law updated 11/12.  >51 minutes spent by me on the date of service doing chart review, history, exam, documentation & further activities per the note.      Claritza Gabriel MD        Interval History:        Patient lying in bed.  This morning confused, repetitive.\"Reports would like to go home soon\".    Unable to obtain review of systems.  Per nursing report agitated overnight.  Afebrile.         Physical Exam:        Physical Exam   Temp:  [97.6  F (36.4  C)-97.9  F (36.6  C)] 97.9  F (36.6 "  C)  Pulse:  [57-70] 57  Resp:  [12-16] 16  BP: (112-144)/(58-67) 132/61  SpO2:  [96 %-98 %] 96 %    Intake/Output Summary (Last 24 hours) at 11/10/2024 1610  Last data filed at 11/10/2024 1435  Gross per 24 hour   Intake 240 ml   Output 650 ml   Net -410 ml       Admission Weight: 80.3 kg (177 lb)  Current Weight: 80.3 kg (177 lb)    PHYSICAL EXAM  GENERAL: Patient is in no distress.  Impulsive.  LUNGS: Respirations unlabored  NEURO: Moving all extremities  EXTREMITIES: No pedal edema.    SKIN: Warm, dry.   PSYCHIATRY confused.       Medications:        Current Facility-Administered Medications   Medication Dose Route Frequency Provider Last Rate Last Admin    aspirin EC tablet 81 mg  81 mg Oral Daily Claritza Gabriel MD   81 mg at 11/12/24 0829    atorvastatin (LIPITOR) tablet 40 mg  40 mg Oral Daily Claritza Gabriel MD   40 mg at 11/12/24 0829    ceFAZolin (ANCEF) 2 g in 100 mL D5W intermittent infusion  2 g Intravenous Q8H Rodney Majano  mL/hr at 11/13/24 0450 2 g at 11/13/24 0450    diclofenac (VOLTAREN) 1 % topical gel 4 g  4 g Topical 4x Daily Justyna Bowser MD   4 g at 11/12/24 2005    furosemide (LASIX) tablet 20 mg  20 mg Oral Daily Claritza Gabriel MD   20 mg at 11/12/24 0829    metoprolol succinate ER (TOPROL XL) 24 hr tablet 25 mg  25 mg Oral Daily Claritza Gabriel MD   25 mg at 11/12/24 0829    potassium chloride mary ER (KLOR-CON M20) CR tablet 20 mEq  20 mEq Oral Daily Claritza Gabriel MD   20 mEq at 11/12/24 0829    sodium chloride (PF) 0.9% PF flush 3 mL  3 mL Intracatheter Q8H Justyna Bowser MD   3 mL at 11/13/24 0450     Current Facility-Administered Medications   Medication Dose Route Frequency Provider Last Rate Last Admin    acetaminophen (TYLENOL) tablet 650 mg  650 mg Oral Q4H PRN Justyna Bowser MD   650 mg at 11/10/24 2055    Or    acetaminophen (TYLENOL) Suppository 650 mg  650 mg Rectal Q4H PRN Justyna Bowser MD        albuterol  (PROVENTIL HFA/VENTOLIN HFA) inhaler  2 puff Inhalation Q6H PRN Claritza Gabriel MD        benzocaine-menthol (CHLORASEPTIC) 6-10 MG lozenge 1 lozenge  1 lozenge Buccal Q1H PRN Justyna Bowser MD        calcium carbonate (TUMS) chewable tablet 1,000 mg  1,000 mg Oral 4x Daily PRN Justyna Bowser MD        lidocaine (LMX4) cream   Topical Q1H PRN Justyna Bowser MD        lidocaine 1 % 0.1-1 mL  0.1-1 mL Other Q1H PRN Justyna Bowser MD        melatonin tablet 1 mg  1 mg Oral At Bedtime PRN Justyna Bowser MD   1 mg at 11/12/24 2005    methocarbamol (ROBAXIN) half-tab 250 mg  250 mg Oral TID PRN Claritza Gabriel MD   250 mg at 11/12/24 2005    ondansetron (ZOFRAN ODT) ODT tab 4 mg  4 mg Oral Q6H PRN Justyna Bowser MD        Or    ondansetron (ZOFRAN) injection 4 mg  4 mg Intravenous Q6H PRN Justyna Bowser MD        QUEtiapine (SEROquel) half-tab 12.5 mg  12.5 mg Oral BID PRN Claritza Gabriel MD   12.5 mg at 11/12/24 2005    senna-docusate (SENOKOT-S/PERICOLACE) 8.6-50 MG per tablet 1 tablet  1 tablet Oral BID Justyna Walker MD        Or    senna-docusate (SENOKOT-S/PERICOLACE) 8.6-50 MG per tablet 2 tablet  2 tablet Oral BID PRJustyna Bailey MD        sodium chloride (PF) 0.9% PF flush 3 mL  3 mL Intracatheter q1 min prn Justyna Bowser MD   3 mL at 11/10/24 2052    sodium chloride (PF) 0.9% PF flush 3 mL  3 mL Intracatheter q1 min prn Raven Roper MD        tamsulosin (FLOMAX) capsule 0.4 mg  0.4 mg Oral Daily Justyna Walker MD                Data:      All new lab and imaging data was reviewed.

## 2024-11-13 NOTE — PROGRESS NOTES
[facility]    Infectious Disease Progress Note    Date of Service (when I saw the patient): 11/13/2024     Assessment & Plan   Canelo Cruz is a 89 year old male who was admitted on 11/9/2024.   Impression:  89 yo who fell at home leading to activation of EMS as he could not get up  He was found to be febrile with rigors  Was also noticed that patient has a Christianson catheter in place for 2 months  He was found to have generalized weakness  Polymicrobial urine culture not a surprise given he has the Christianson catheter in him for so long  Blood cultures with Staph aureus further identified as MSSA  On vanco and ceftriaxone   Past medical history significant for CHF, CAD s/p TAVR in July 2024           Recommendations   Switch to Ancef alone to treat the MSSA bacteremia   Polymicrobial urine cultures likely due to presentation of colonization given the duration of the Christianson catheter  TTE noted if multiple positive blood cultures then will request ELIZABETH for now the repeat cultures appear to be clear   Needs follow-up blood cultures till clears  Avoid any PICC or midline  Anticipate need for IV antibiotics as discharge given MSSA bacteremia              Rodney Majano MD    Interval History   Patient is confused   No new complaints   Does not remember why he is in the hospital   Frustrated about hospital stay     Physical Exam   Temp: 97.9  F (36.6  C) Temp src: Oral BP: 132/61 Pulse: 57   Resp: 16 SpO2: 96 % O2 Device: None (Room air)    Vitals:    11/10/24 1308 11/11/24 1425 11/12/24 0549   Weight: 80.3 kg (177 lb) 80.5 kg (177 lb 8 oz) 80.2 kg (176 lb 12.9 oz)     Vital Signs with Ranges  Temp:  [97.6  F (36.4  C)-97.9  F (36.6  C)] 97.9  F (36.6  C)  Pulse:  [57-70] 57  Resp:  [12-16] 16  BP: (112-144)/(58-67) 132/61  SpO2:  [96 %-98 %] 96 %    Constitutional: awake and forgetful, does not remember why he is in the hospital   Lungs: Clear to auscultation bilaterally, no crackles or  wheezing  Cardiovascular: Regular rate and rhythm, normal S1 and S2, and no murmur noted  Abdomen: Normal bowel sounds, soft, non-distended, non-tender  Skin: No rashes, no cyanosis, no edema  Other:    Medications   Current Facility-Administered Medications   Medication Dose Route Frequency Provider Last Rate Last Admin     Current Facility-Administered Medications   Medication Dose Route Frequency Provider Last Rate Last Admin    aspirin EC tablet 81 mg  81 mg Oral Daily Claritza Gabriel MD   81 mg at 11/13/24 0910    atorvastatin (LIPITOR) tablet 40 mg  40 mg Oral Daily Claritza Gabriel MD   40 mg at 11/13/24 0910    ceFAZolin (ANCEF) 2 g in 100 mL D5W intermittent infusion  2 g Intravenous Q8H Rodney Majano  mL/hr at 11/13/24 0450 2 g at 11/13/24 0450    diclofenac (VOLTAREN) 1 % topical gel 4 g  4 g Topical 4x Daily Justyna Bowser MD   4 g at 11/13/24 0911    furosemide (LASIX) tablet 20 mg  20 mg Oral Daily Claritza Gabriel MD   20 mg at 11/13/24 0910    metoprolol succinate ER (TOPROL XL) 24 hr tablet 25 mg  25 mg Oral Daily Claritza Gabriel MD   25 mg at 11/13/24 0910    potassium chloride mary ER (KLOR-CON M20) CR tablet 20 mEq  20 mEq Oral Daily Claritza Gabriel MD   20 mEq at 11/13/24 0910    sodium chloride (PF) 0.9% PF flush 3 mL  3 mL Intracatheter Q8H Justyna Bowser MD   3 mL at 11/13/24 0450       Data   All microbiology laboratory data reviewed.  Recent Labs   Lab Test 11/12/24  0739 11/11/24  0638 11/09/24  2254 09/23/24  1941   WBC  --  7.2 11.3* 14.1*   HGB 12.4* 10.9* 12.4* 12.6*   HCT  --  32.5* 36.2* 36.5*   MCV  --  90 89 90   PLT  --  150 244 367     Recent Labs   Lab Test 11/12/24  0739 11/11/24  0638 11/09/24  2254   CR 1.05 1.08 1.09     No lab results found.  Recent Labs   Lab Test 01/18/18  1121 01/17/18  1604 12/19/17  0923 12/08/17  0831 11/28/17  1029   CULT 50,000 to 100,000 colonies/mL  urogenital murtaza   No growth after 1 day >100,000  colonies/mL  Aerococcus urinae  Identification obtained by MALDI-TOF mass spectrometry research use only database. Test   characteristics determined and verified by the Infectious Diseases Diagnostic Laboratory   ([facility]) [place], [place].  * No growth >100,000 colonies/mL  Aerococcus urinae  *       All cultures:  Recent Labs   Lab 11/11/24  0638 11/10/24  0023 11/09/24  2254   CULTURE No growth after 1 day >100,000 CFU/mL Lactose fermenting gram negative bacilli*  50,000-100,000 CFU/mL Gram positive cocci*  50,000-100,000 CFU/mL Gram positive cocci*  50,000-100,000 CFU/mL Lactose fermenting gram negative bacilli* Positive on the 1st day of incubation*  Staphylococcus aureus*      Blood culture:  Results for orders placed or performed during the hospital encounter of 11/09/24   Blood Culture Arm, Right    Collection Time: 11/11/24  6:38 AM    Specimen: Arm, Right; Blood   Result Value Ref Range    Culture No growth after 1 day    Blood Culture Peripheral Blood    Collection Time: 11/09/24 10:54 PM    Specimen: Peripheral Blood   Result Value Ref Range    Culture Positive on the 1st day of incubation (A)     Culture Staphylococcus aureus (AA)        Susceptibility    Staphylococcus aureus - BRENDA     Oxacillin* 0.5 Susceptible ug/mL      * Oxacillin susceptible isolates are susceptible to cephalosporins (example: cefazolin and cephalexin) and beta lactam combination agents. Oxacillin resistant isolates are resistant to these agents.     Gentamicin <=0.5 Susceptible ug/mL     Erythromycin 1 Intermediate ug/mL     Clindamycin*  Resistant       * This isolate is presumed to be clindamycin resistant based on detection of inducible clindamycin resistance. Erythromycin and clindamycin are resistant; therefore, they are not recommended for use.     Vancomycin <=0.5 Susceptible ug/mL     Daptomycin 0.5 Susceptible ug/mL     Tetracycline <=1 Susceptible ug/mL     Doxycycline <=0.5 Susceptible ug/mL      Trimethoprim/Sulfamethoxazole <=0.5/9.5 Susceptible ug/mL      Urine culture:  Results for orders placed or performed during the hospital encounter of 11/09/24   Urine Culture    Collection Time: 11/10/24 12:23 AM    Specimen: Urine, Catheter   Result Value Ref Range    Culture (A)      >100,000 CFU/mL Lactose fermenting gram negative bacilli    Culture 50,000-100,000 CFU/mL Gram positive cocci (A)     Culture 50,000-100,000 CFU/mL Gram positive cocci (A)     Culture (A)      50,000-100,000 CFU/mL Lactose fermenting gram negative bacilli   Results for orders placed or performed during the hospital encounter of 09/23/24   Urine Culture    Collection Time: 09/23/24  7:24 PM    Specimen: Urine, Catheter   Result Value Ref Range    Culture (A)      >100,000 CFU/mL Non lactose fermenting gram negative bacilli    Culture (A)      <10,000 CFU/mL Lactose fermenting gram negative bacilli    Culture 50,000-100,000 CFU/mL Gram positive cocci (A)     Culture (A)      10,000-50,000 CFU/mL Gram positive bacilli, resembling diphtheroids   Results for orders placed or performed in visit on 01/17/18   Urine Culture Aerobic Bacterial    Collection Time: 01/17/18  4:04 PM    Specimen: Midstream Urine   Result Value Ref Range    Specimen Description Midstream Urine     Culture Micro No growth after 1 day    Results for orders placed or performed in visit on 01/17/18   Urine Culture Aerobic Bacterial    Collection Time: 01/18/18 11:21 AM    Specimen: Midstream Urine   Result Value Ref Range    Specimen Description Midstream Urine     Culture Micro 50,000 to 100,000 colonies/mL  urogenital murtaza      Results for orders placed or performed in visit on 12/19/17   Urine Culture Aerobic Bacterial    Collection Time: 12/19/17  9:23 AM    Specimen: Midstream Urine   Result Value Ref Range    Specimen Description Midstream Urine     Culture Micro (A)      >100,000 colonies/mL  Aerococcus urinae  Identification obtained by MALDI-TOF mass  spectrometry research use only database. Test   characteristics determined and verified by the Infectious Diseases Diagnostic Laboratory   ([facility]) [place], [place].         Susceptibility    Aerococcus urinae - BRENDA     Penicillin <=0.03 Susceptible ug/mL     Vancomycin 0.5 Susceptible ug/mL     Cefotaxime <=0.25 Susceptible ug/mL     Ceftriaxone <=0.25 Susceptible ug/mL     Tetracycline <=0.5 Susceptible ug/mL     Levofloxacin <=0.25 Susceptible ug/mL     Trimethoprim/Sulfamethoxazole <=0.25/4.75 Susceptible ug/mL   Results for orders placed or performed in visit on 12/08/17   Urine Culture Aerobic Bacterial    Collection Time: 12/08/17  8:31 AM    Specimen: Urine   Result Value Ref Range    Specimen Description Urine     Special Requests Urine     Culture Micro No growth    Results for orders placed or performed in visit on 11/28/17   Urine Culture Aerobic Bacterial    Collection Time: 11/28/17 10:29 AM    Specimen: Midstream Urine   Result Value Ref Range    Specimen Description Midstream Urine     Culture Micro >100,000 colonies/mL  Aerococcus urinae   (A)        Susceptibility    Aerococcus urinae - BRENDA     Penicillin <=0.03 Susceptible ug/mL     Vancomycin 0.5 Susceptible ug/mL     Tetracycline <=0.5 Susceptible ug/mL     Cefotaxime <=0.25 Susceptible ug/mL     Ceftriaxone <=0.25 Susceptible ug/mL     Levofloxacin <=0.25 Susceptible ug/mL     Trimethoprim/Sulfamethoxazole <=0.25/4.75 Susceptible ug/mL

## 2024-11-14 PROCEDURE — 250N000011 HC RX IP 250 OP 636: Mod: JZ | Performed by: INTERNAL MEDICINE

## 2024-11-14 PROCEDURE — 250N000013 HC RX MED GY IP 250 OP 250 PS 637: Performed by: HOSPITALIST

## 2024-11-14 PROCEDURE — 120N000001 HC R&B MED SURG/OB

## 2024-11-14 PROCEDURE — 250N000013 HC RX MED GY IP 250 OP 250 PS 637: Performed by: INTERNAL MEDICINE

## 2024-11-14 PROCEDURE — 99232 SBSQ HOSP IP/OBS MODERATE 35: CPT | Performed by: STUDENT IN AN ORGANIZED HEALTH CARE EDUCATION/TRAINING PROGRAM

## 2024-11-14 RX ORDER — CEFAZOLIN SODIUM 1 G/3ML
2 INJECTION, POWDER, FOR SOLUTION INTRAMUSCULAR; INTRAVENOUS EVERY 8 HOURS
DISCHARGE
Start: 2024-11-14 | End: 2024-11-17

## 2024-11-14 RX ADMIN — ATORVASTATIN CALCIUM 40 MG: 40 TABLET, FILM COATED ORAL at 10:30

## 2024-11-14 RX ADMIN — METOPROLOL SUCCINATE 25 MG: 25 TABLET, EXTENDED RELEASE ORAL at 10:32

## 2024-11-14 RX ADMIN — ACETAMINOPHEN 650 MG: 325 TABLET, FILM COATED ORAL at 13:15

## 2024-11-14 RX ADMIN — ACETAMINOPHEN 650 MG: 325 TABLET, FILM COATED ORAL at 17:25

## 2024-11-14 RX ADMIN — DICLOFENAC 4 G: 10 GEL TOPICAL at 10:30

## 2024-11-14 RX ADMIN — FUROSEMIDE 20 MG: 20 TABLET ORAL at 10:30

## 2024-11-14 RX ADMIN — CEFAZOLIN SODIUM 2 G: 2 INJECTION, SOLUTION INTRAVENOUS at 04:35

## 2024-11-14 RX ADMIN — POTASSIUM CHLORIDE 20 MEQ: 1500 TABLET, EXTENDED RELEASE ORAL at 10:29

## 2024-11-14 RX ADMIN — DICLOFENAC 4 G: 10 GEL TOPICAL at 20:42

## 2024-11-14 RX ADMIN — DICLOFENAC 4 G: 10 GEL TOPICAL at 13:15

## 2024-11-14 RX ADMIN — CEFAZOLIN SODIUM 2 G: 2 INJECTION, SOLUTION INTRAVENOUS at 13:16

## 2024-11-14 RX ADMIN — CEFAZOLIN SODIUM 2 G: 2 INJECTION, SOLUTION INTRAVENOUS at 20:37

## 2024-11-14 RX ADMIN — DICLOFENAC 4 G: 10 GEL TOPICAL at 15:47

## 2024-11-14 RX ADMIN — QUETIAPINE 12.5 MG: 25 TABLET, FILM COATED ORAL at 20:36

## 2024-11-14 RX ADMIN — ASPIRIN 81 MG: 81 TABLET, COATED ORAL at 10:29

## 2024-11-14 NOTE — PROGRESS NOTES
[facility]    Medicine Progress Note - Hospitalist Service    Date of Admission:  11/9/2024    Assessment & Plan     Canelo Cruz is a 89 year old male with history of urinary retention with chronic rodriguez, hypertension, coronary artery disease, dyslipidemia, chronic systolic CHF, 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, suspected cognitive impairment admitted on 11/9/2024 with generalized weakness found to have MSSA bacteremia.     MSSA bacteremia  CAUTI w/ chronic rodriguez catheter, less likely (last exchange 11/11)  Sepsis secondary to above, resolved  Patient brought into the ED via EMS with generalized weakness, fever of 101.9, confusion.  Per report abnormal smell, sediment in Rodriguez catheter bag, poor care of rodriguez catheter. Last known change of rodriguez catheter was in September 2024 at the time of a hospital admission, patient reports was not given any instructions for Rodriguez catheter changes. Met sepsis criteria on admission and found to have MSSA bacteremia. TTE 11/12 with stable TAVR appearance and function. Remains HDS on cefazolin, ID following.  -Continue cefazolin, will need 28d IV abx  -If bcx remain NGTD, plan for PICC placement 11/15  -Follow-up blood cultures  -Infectious disease following  -Urology consulted, plan for rodriguez exchange in ~3 weeks  -Continue PTA tamsulosin     Acute encephalopathy likely multifactorial 2/2 infectious etiology, underlying cognitive impairment, delirium from hospitalization  Concern for baseline cognitive impairment  Physical deconditioning from medical illness, senile frailty  Patient reported he and his wife lives alone, independent and still drives. EMS notes that the patient wanted an additional drink of alcohol before getting in ambulance. Requests no contact with his sister nor spouse about details of medical history or concerns about cognitive function. Concern for cognitive impairment noted during prior hospitalizations. Scored 13/28 on MoCA. Per psychiatry,  patient is not currently able to make his own decisions.   -Patient's brother-in-law Dre Phan is decision maker  -Mental status remains stable, continue to monitor  -Continue Seroquel 12.5 mg twice daily as needed.  -Continue 1:1 as needed  -Frequent reorientation  -Fall precautions, delirium precautions  -Awaiting TCU placement, SW following     Chronic systolic congestive heart failure, compensated  Coronary artery disease  Severe aortic valve stenosis s/p TAVR (07/17/2024)  Hypertension  Hyperlipidemia  Follows with [facility] cardiology.  Denied any chest pain or palpitations  Echocardiogram 7/2024 with LVEF of 20 to 25%.  Repeat echo 8/2024 with LVEF of 40%. Echocardiogram from 11/12/2024 with LVEF of 35 to 40%.  Moderate global hypokinesia of the left ventricle.  Right ventricle normal in size and function.  Status post TAVR, well-seated normally functioning valve.   -Continue PTA aspirin and Toprol.  -Continue PTA Lipitor.  -Continue PTA Lasix at low-dose of 20 mg oral daily  -Strict intake output monitoring, daily weights.  -2000 mL fluid restriction.  Liberalize salt in diet given hyponatremia.  -Recommend follow-up with cardiology after discharge.     Right wrist pain-degenerative joint disease  History of falls  Reports had a fall recently in October while coming out of dentist office, has not had any workup from his primary provider whom he had seen.  Complaint of wrist pain. On exam right wrist swelling noted, range of motion restricted.  Tenderness present, no warmth or erythema. X-ray right wrist multifocal degenerative arthrosis of the right wrist, greatest to an advanced degree involving the STT joints. Mild ulnar positive variance with moderate degenerative arthrosis of the ulnolunate joint. No acute fracture. 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   -As needed Tylenol, heat, ice, voltaren  -Fall precautions    "  Acute on chronic knee pain-degenerative joint disease  Reports since fall is about unable to bear weight on right knee, pain.  Mild swelling, no erythema, tenderness present. Xray right knee w/ OA and joint space narrowing, no fx, trace effusion.   -As needed Tylenol, Robaxin, voltaren  -Follow-up with orthopedic surgery as outpatient.     Hyponatremia, resolved  Sodium of 132 on admission. Improved with IVF.  -Intermittent BMP     Alcohol use, reported  Per ED note reviewed, patient attempted to grab a drink of alcohol before leaving, and they noticed the container of alcohol had \"floaties\" in it, though they are unsure where they came from.      Acute anemia likely dilutional        Diet: Combination Diet Regular Diet Adult  Fluid restriction 2000 ML FLUID    DVT Prophylaxis: Pneumatic Compression Devices  Christianson Catheter: PRESENT, indication: Other (Comment) (chronic), Other (Comment) (chronic retention)  Lines: None     Cardiac Monitoring: None  Code Status: Full Code      Clinically Significant Risk Factors                    # Chronic heart failure with reduced ejection fraction: last echo with EF <40%                    Social Drivers of Health    Social Connections: Socially Isolated (6/17/2024)    Received from [employer] [facility]    Social Connections     Do you often feel lonely or isolated from those around you?: 4         Disposition Plan     Medically Ready for Discharge: Anticipated Tomorrow     Michael Murphy MD  Hospitalist Service  [facility]  Securely message with [employer] (more info)  Text page via [email or web address] Paging/Directory   ______________________________________________________________________    Interval History   No concerns. Notes ongoing R wrist pain. Denies chest pain, SOB, n/v, abdominal pain. Updated on plan for course of IV antibiotics and TCU placement.    Physical Exam   Vital Signs: Temp: 97.6  F (36.4  C) Temp src: Oral BP: 136/60 " Pulse: 60   Resp: 18 SpO2: 96 % O2 Device: None (Room air)    Weight: 176 lbs 12.94 oz    General: Awake, alert, NAD, confused, sitting upright in bed  HEENT: Atraumatic, normocephalic, EOMI, no scleral icterus  CV: RRR, no murmurs, no EL, distal pulses intact, no JVD  Pulm: CTAB, breathing comfortably on RA, no wheezes, no crackles  Abd: Soft, non-tender, non-distended, no overlying skin changes, no palpable hepatosplenomegaly  Skin: No rashes, lesions, or wounds visualized  Neuro: CN II-XII grossly intact, no focal deficits, moving all extremities spontaneously, speech normal, confused, tangential thought content    Medical Decision Making       45 MINUTES SPENT BY ME on the date of service doing chart review, history, exam, documentation & further activities per the note.

## 2024-11-14 NOTE — PLAN OF CARE
Goal Outcome Evaluation:                    11/14/24 NOC  Cognitive Concerns/ Orientation : A&Ox2-3, disoriented to situation & place at times, very forgetful. Frustrated and anxious at times wanting to go home.  BEHAVIOR & AGGRESSION TOOL COLOR: Green  CIWA SCORE: NA   ABNL VS/O2: VSS on RA.   MOBILITY: SBA, ambulating in room  PAIN MANAGEMENT: States R wrist can be tender; scheduled voltaren gel for knees/wrist. Declined Tylenol   DIET: Regular diet. 2000mL FR  BOWEL/BLADDER: Chronic rodriguez in place, No bm.  ABNL LAB/BG: No new labs    DRAIN/DEVICES: L PIV saline locked  TELEMETRY RHYTHM: Refusing tele  SKIN: Dry skin. Scattered scabs and bruises, blanchable redness on coccyx  TESTS/PROCEDURES: None   D/C DAY/GOALS/PLACE: TBD   OTHER IMPORTANT INFO: ID following and PSYC following, determined to not be decisional. OT following. Continues on Lasix. Strict intake and output.

## 2024-11-14 NOTE — PLAN OF CARE
Goal Outcome Evaluation:         Summary: UTI, met encephalopathy, hyponatremia  DATE & TIME: 11/13/24-11/14/24 7452-8961  Cognitive Concerns/ Orientation : A&Ox2-3, disoriented to situation & place at times, very forgetful. Frustrated and anxious at times wanting to go home.  BEHAVIOR & AGGRESSION TOOL COLOR: Green  CIWA SCORE: NA   ABNL VS/O2: VSS on RA.   MOBILITY: SBA, ambulating in room  PAIN MANAGEMENT: States R wrist can be tender; scheduled voltaren gel for knees/wrist  DIET: Regular diet. 2000mL FR- appetite fair   BOWEL/BLADDER: Chronic rodriguez in place, No bm.  ABNL LAB/BG: No new labs    DRAIN/DEVICES: L PIV saline locked  TELEMETRY RHYTHM: Refusing tele  SKIN: Dry skin. Scattered scabs and bruises, blanchable redness on coccyx  TESTS/PROCEDURES: None   D/C DAY/GOALS/PLACE: TBD   OTHER IMPORTANT INFO: ID following and PSYC following, determined to not be decisional.OT following. Continues on Lasix. Strict intake and output.

## 2024-11-14 NOTE — PROGRESS NOTES
"Care Management Follow Up    Length of Stay (days): 4    Expected Discharge Date: 11/15/2024     Concerns to be Addressed: discharge planning     Patient plan of care discussed at interdisciplinary rounds: Yes    Anticipated Discharge Disposition: Home Care              Anticipated Discharge Services:    Anticipated Discharge DME: None    Patient/family educated on Medicare website which has current facility and service quality ratings:    Education Provided on the Discharge Plan: Yes  Patient/Family in Agreement with the Plan: yes    Referrals Placed by CM/SW:    Private pay costs discussed: Not applicable    Discussed  Partnership in Safe Discharge Planning  document with patient/family: No     Handoff Completed: No, handoff not indicated or clinically appropriate    Additional Information:  Writer received a voicemail that was left after work hours yesterday to Social work team by Dre Phan requesting a call back at [phone].    Writer spoke with   regarding the case. She states that she was in discussion with yesterday's  regarding the case and it had been determined by psychiatry that pt was able to determine who he would want to make his decisions and pt had informed psychiatry that he chose Dre Phan.  supervisor states that pt had been clear in stating he did not want his sister contacted per Doctor's notes. Writer is able to see that on 11/10/2024 Dr. Gabriel's note states the following \"Per admitting provider evasive about medical history and specifically requests no contact with his sister nor spouse about details of medical history or concerns about cognitive function.\"   supervisor states that social work team can move forward with discharge planning with Dre.    Writer able to see in psychiatry note of Provider Flores on 11/12/24 \"Patient is able to comprehend that he needs treatment, but promptly forgets what that entails. He is unable to make " "decisions for himself at this time. He identifies his brother in law as a trusted person to aid in decision making. \" In Psychiatry note recommendations provider Flores states that pt's family should be involved in pt's medical decision making.     Writer called Dre back. No answer. Writer left voicemail asking for a call back.     Writer received voicemail from Jesse NATH with [facility] where she states pt resides. She states that she was in discussion this morning with pt's brother in law Dre and pt's son in law Carrillo and both would like to have a meeting tomorrow with Jesse along at the hospital. Jesse is requesting a call back at [phone]. Writer placed phone call back to Jesse. No answer. Writer left voicemail asking Jesse if tomorrow at 10am would work for them. Writer requesting a call back.    Addendum: Writer received call from Jesse with [facility]in [facility]. She states they are really wanting to meet with the hospital  to have a discussion. She states that tomorrow at 10am would work well for herself, pt's brother in law, and Carrillo to meet tomorrow. Writer informed Jesse that writer will leave notice for unit  and supervisor so that someone can meet with them tomorrow. Jesse states understanding. Jesse states no questions or concerns at this time.     Writer left report for unit  and sent notice to  about 10am meeting tomorrow.     Next Steps: await medical progression; discuss discharge planning with pt's family primarily Dre.    Angelina Kim, LALYW  Social Work  [facility]        "

## 2024-11-14 NOTE — PROGRESS NOTES
[facility]    Infectious Disease Progress Note    Date of Service (when I saw the patient): 11/14/2024     Assessment & Plan   Canelo Cruz is a 89 year old male who was admitted on 11/9/2024.   Impression:  89 yo who fell at home leading to activation of EMS as he could not get up  He was found to be febrile with rigors  Was also noticed that patient has a Christianson catheter in place for 2 months  He was found to have generalized weakness  Polymicrobial urine culture not a surprise given he has the Christianson catheter in him for so long  Blood cultures with Staph aureus further identified as MSSA  On vanco and ceftriaxone   Past medical history significant for CHF, CAD s/p TAVR in July 2024          Recommendations:   Repeat blood culture are negative at 2 days now.   Continue on Ancef for the MSSA bacteremia.     Polymicrobial urine cultures likely due to presentation of colonization given the duration of the Christianson catheter    TTE noted since repeat  blood cultures clear no need for ELIZABETH       Day 4/ 28 of antibiotics today   If blood cultures stay negative ok to place PICC on 11/ 15   OPIV Antibiotics orders are in the chart.     Will follow peripherally call if ques         Rodney Majano MD    Interval History       Physical Exam   Temp: 98.4  F (36.9  C) Temp src: Oral BP: 134/67 Pulse: 70   Resp: 18 SpO2: 96 % O2 Device: None (Room air)    Vitals:    11/10/24 1308 11/11/24 1425 11/12/24 0549   Weight: 80.3 kg (177 lb) 80.5 kg (177 lb 8 oz) 80.2 kg (176 lb 12.9 oz)     Vital Signs with Ranges  Temp:  [97.3  F (36.3  C)-98.4  F (36.9  C)] 98.4  F (36.9  C)  Pulse:  [67-75] 70  Resp:  [16-18] 18  BP: (133-135)/(60-87) 134/67  SpO2:  [96 %-98 %] 96 %    Other:    Medications   Current Facility-Administered Medications   Medication Dose Route Frequency Provider Last Rate Last Admin     Current Facility-Administered Medications   Medication Dose Route Frequency Provider Last Rate Last Admin     aspirin EC tablet 81 mg  81 mg Oral Daily Claritza Gabriel MD   81 mg at 11/13/24 0910    atorvastatin (LIPITOR) tablet 40 mg  40 mg Oral Daily Claritza Gabriel MD   40 mg at 11/13/24 0910    ceFAZolin (ANCEF) 2 g in 100 mL D5W intermittent infusion  2 g Intravenous Q8H Rodney Majano  mL/hr at 11/14/24 0435 2 g at 11/14/24 0435    diclofenac (VOLTAREN) 1 % topical gel 4 g  4 g Topical 4x Daily Justyna Bowser MD   4 g at 11/13/24 2003    furosemide (LASIX) tablet 20 mg  20 mg Oral Daily Claritza Gabriel MD   20 mg at 11/13/24 0910    metoprolol succinate ER (TOPROL XL) 24 hr tablet 25 mg  25 mg Oral Daily Claritza Gabriel MD   25 mg at 11/13/24 0910    potassium chloride mary ER (KLOR-CON M20) CR tablet 20 mEq  20 mEq Oral Daily Claritza Gabriel MD   20 mEq at 11/13/24 0910    sodium chloride (PF) 0.9% PF flush 3 mL  3 mL Intracatheter Q8H Justyna Bowser MD   3 mL at 11/14/24 0435       Data   All microbiology laboratory data reviewed.  Recent Labs   Lab Test 11/12/24  0739 11/11/24  0638 11/09/24  2254 09/23/24  1941   WBC  --  7.2 11.3* 14.1*   HGB 12.4* 10.9* 12.4* 12.6*   HCT  --  32.5* 36.2* 36.5*   MCV  --  90 89 90   PLT  --  150 244 367     Recent Labs   Lab Test 11/12/24  0739 11/11/24  0638 11/09/24  2254   CR 1.05 1.08 1.09     No lab results found.  Recent Labs   Lab Test 01/18/18  1121 01/17/18  1604 12/19/17  0923 12/08/17  0831 11/28/17  1029   CULT 50,000 to 100,000 colonies/mL  urogenital murtaza   No growth after 1 day >100,000 colonies/mL  Aerococcus urinae  Identification obtained by MALDI-TOF mass spectrometry research use only database. Test   characteristics determined and verified by the Infectious Diseases Diagnostic Laboratory   ([facility]) [place], [place].  * No growth >100,000 colonies/mL  Aerococcus urinae  *       All cultures:  Recent Labs   Lab 11/11/24  0638 11/10/24  0023 11/09/24  2254   CULTURE No growth after 2 days >100,000 CFU/mL Lactose fermenting  gram negative bacilli*  50,000-100,000 CFU/mL Gram positive cocci*  50,000-100,000 CFU/mL Gram positive cocci*  50,000-100,000 CFU/mL Lactose fermenting gram negative bacilli* Positive on the 1st day of incubation*  Staphylococcus aureus*      Blood culture:  Results for orders placed or performed during the hospital encounter of 11/09/24   Blood Culture Arm, Right    Collection Time: 11/11/24  6:38 AM    Specimen: Arm, Right; Blood   Result Value Ref Range    Culture No growth after 2 days    Blood Culture Peripheral Blood    Collection Time: 11/09/24 10:54 PM    Specimen: Peripheral Blood   Result Value Ref Range    Culture Positive on the 1st day of incubation (A)     Culture Staphylococcus aureus (AA)        Susceptibility    Staphylococcus aureus - BRENDA     Oxacillin* 0.5 Susceptible ug/mL      * Oxacillin susceptible isolates are susceptible to cephalosporins (example: cefazolin and cephalexin) and beta lactam combination agents. Oxacillin resistant isolates are resistant to these agents.     Gentamicin <=0.5 Susceptible ug/mL     Erythromycin 1 Intermediate ug/mL     Clindamycin*  Resistant       * This isolate is presumed to be clindamycin resistant based on detection of inducible clindamycin resistance. Erythromycin and clindamycin are resistant; therefore, they are not recommended for use.     Vancomycin <=0.5 Susceptible ug/mL     Daptomycin 0.5 Susceptible ug/mL     Tetracycline <=1 Susceptible ug/mL     Doxycycline <=0.5 Susceptible ug/mL     Trimethoprim/Sulfamethoxazole <=0.5/9.5 Susceptible ug/mL      Urine culture:  Results for orders placed or performed during the hospital encounter of 11/09/24   Urine Culture    Collection Time: 11/10/24 12:23 AM    Specimen: Urine, Catheter   Result Value Ref Range    Culture (A)      >100,000 CFU/mL Lactose fermenting gram negative bacilli    Culture 50,000-100,000 CFU/mL Gram positive cocci (A)     Culture 50,000-100,000 CFU/mL Gram positive cocci (A)      Culture (A)      50,000-100,000 CFU/mL Lactose fermenting gram negative bacilli   Results for orders placed or performed during the hospital encounter of 09/23/24   Urine Culture    Collection Time: 09/23/24  7:24 PM    Specimen: Urine, Catheter   Result Value Ref Range    Culture (A)      >100,000 CFU/mL Non lactose fermenting gram negative bacilli    Culture (A)      <10,000 CFU/mL Lactose fermenting gram negative bacilli    Culture 50,000-100,000 CFU/mL Gram positive cocci (A)     Culture (A)      10,000-50,000 CFU/mL Gram positive bacilli, resembling diphtheroids   Results for orders placed or performed in visit on 01/17/18   Urine Culture Aerobic Bacterial    Collection Time: 01/17/18  4:04 PM    Specimen: Midstream Urine   Result Value Ref Range    Specimen Description Midstream Urine     Culture Micro No growth after 1 day    Results for orders placed or performed in visit on 01/17/18   Urine Culture Aerobic Bacterial    Collection Time: 01/18/18 11:21 AM    Specimen: Midstream Urine   Result Value Ref Range    Specimen Description Midstream Urine     Culture Micro 50,000 to 100,000 colonies/mL  urogenital murtaza      Results for orders placed or performed in visit on 12/19/17   Urine Culture Aerobic Bacterial    Collection Time: 12/19/17  9:23 AM    Specimen: Midstream Urine   Result Value Ref Range    Specimen Description Midstream Urine     Culture Micro (A)      >100,000 colonies/mL  Aerococcus urinae  Identification obtained by MALDI-TOF mass spectrometry research use only database. Test   characteristics determined and verified by the Infectious Diseases Diagnostic Laboratory   ([facility]) [place], [place].         Susceptibility    Aerococcus urinae - BRENDA     Penicillin <=0.03 Susceptible ug/mL     Vancomycin 0.5 Susceptible ug/mL     Cefotaxime <=0.25 Susceptible ug/mL     Ceftriaxone <=0.25 Susceptible ug/mL     Tetracycline <=0.5 Susceptible ug/mL     Levofloxacin <=0.25 Susceptible ug/mL      Trimethoprim/Sulfamethoxazole <=0.25/4.75 Susceptible ug/mL   Results for orders placed or performed in visit on 12/08/17   Urine Culture Aerobic Bacterial    Collection Time: 12/08/17  8:31 AM    Specimen: Urine   Result Value Ref Range    Specimen Description Urine     Special Requests Urine     Culture Micro No growth    Results for orders placed or performed in visit on 11/28/17   Urine Culture Aerobic Bacterial    Collection Time: 11/28/17 10:29 AM    Specimen: Midstream Urine   Result Value Ref Range    Specimen Description Midstream Urine     Culture Micro >100,000 colonies/mL  Aerococcus urinae   (A)        Susceptibility    Aerococcus urinae - BRENDA     Penicillin <=0.03 Susceptible ug/mL     Vancomycin 0.5 Susceptible ug/mL     Tetracycline <=0.5 Susceptible ug/mL     Cefotaxime <=0.25 Susceptible ug/mL     Ceftriaxone <=0.25 Susceptible ug/mL     Levofloxacin <=0.25 Susceptible ug/mL     Trimethoprim/Sulfamethoxazole <=0.25/4.75 Susceptible ug/mL

## 2024-11-15 LAB
ANION GAP SERPL CALCULATED.3IONS-SCNC: 8 MMOL/L (ref 7–15)
BUN SERPL-MCNC: 14.4 MG/DL (ref 8–23)
CALCIUM SERPL-MCNC: 8.6 MG/DL (ref 8.8–10.4)
CHLORIDE SERPL-SCNC: 102 MMOL/L (ref 98–107)
CREAT SERPL-MCNC: 0.93 MG/DL (ref 0.67–1.17)
EGFRCR SERPLBLD CKD-EPI 2021: 77 ML/MIN/1.73M2
ERYTHROCYTE [DISTWIDTH] IN BLOOD BY AUTOMATED COUNT: 15.1 % (ref 10–15)
FOLATE SERPL-MCNC: 5.3 NG/ML (ref 4.6–34.8)
GLUCOSE SERPL-MCNC: 97 MG/DL (ref 70–99)
HCO3 SERPL-SCNC: 24 MMOL/L (ref 22–29)
HCT VFR BLD AUTO: 34.2 % (ref 40–53)
HGB BLD-MCNC: 11.7 G/DL (ref 13.3–17.7)
MCH RBC QN AUTO: 30.5 PG (ref 26.5–33)
MCHC RBC AUTO-ENTMCNC: 34.2 G/DL (ref 31.5–36.5)
MCV RBC AUTO: 89 FL (ref 78–100)
PLATELET # BLD AUTO: 161 10E3/UL (ref 150–450)
POTASSIUM SERPL-SCNC: 3.6 MMOL/L (ref 3.4–5.3)
RBC # BLD AUTO: 3.83 10E6/UL (ref 4.4–5.9)
SODIUM SERPL-SCNC: 134 MMOL/L (ref 135–145)
VIT B12 SERPL-MCNC: 371 PG/ML (ref 232–1245)
WBC # BLD AUTO: 7.4 10E3/UL (ref 4–11)

## 2024-11-15 PROCEDURE — 99232 SBSQ HOSP IP/OBS MODERATE 35: CPT | Performed by: STUDENT IN AN ORGANIZED HEALTH CARE EDUCATION/TRAINING PROGRAM

## 2024-11-15 PROCEDURE — 82374 ASSAY BLOOD CARBON DIOXIDE: CPT | Performed by: STUDENT IN AN ORGANIZED HEALTH CARE EDUCATION/TRAINING PROGRAM

## 2024-11-15 PROCEDURE — 120N000001 HC R&B MED SURG/OB

## 2024-11-15 PROCEDURE — 82607 VITAMIN B-12: CPT | Performed by: STUDENT IN AN ORGANIZED HEALTH CARE EDUCATION/TRAINING PROGRAM

## 2024-11-15 PROCEDURE — 82746 ASSAY OF FOLIC ACID SERUM: CPT | Performed by: STUDENT IN AN ORGANIZED HEALTH CARE EDUCATION/TRAINING PROGRAM

## 2024-11-15 PROCEDURE — 250N000013 HC RX MED GY IP 250 OP 250 PS 637: Performed by: INTERNAL MEDICINE

## 2024-11-15 PROCEDURE — 80048 BASIC METABOLIC PNL TOTAL CA: CPT | Performed by: STUDENT IN AN ORGANIZED HEALTH CARE EDUCATION/TRAINING PROGRAM

## 2024-11-15 PROCEDURE — 250N000013 HC RX MED GY IP 250 OP 250 PS 637: Performed by: STUDENT IN AN ORGANIZED HEALTH CARE EDUCATION/TRAINING PROGRAM

## 2024-11-15 PROCEDURE — 36415 COLL VENOUS BLD VENIPUNCTURE: CPT | Performed by: STUDENT IN AN ORGANIZED HEALTH CARE EDUCATION/TRAINING PROGRAM

## 2024-11-15 PROCEDURE — 250N000013 HC RX MED GY IP 250 OP 250 PS 637: Performed by: HOSPITALIST

## 2024-11-15 PROCEDURE — 85014 HEMATOCRIT: CPT | Performed by: STUDENT IN AN ORGANIZED HEALTH CARE EDUCATION/TRAINING PROGRAM

## 2024-11-15 PROCEDURE — 250N000011 HC RX IP 250 OP 636: Mod: JZ | Performed by: INTERNAL MEDICINE

## 2024-11-15 RX ORDER — LIDOCAINE 40 MG/G
CREAM TOPICAL
Status: DISCONTINUED | OUTPATIENT
Start: 2024-11-15 | End: 2024-11-17 | Stop reason: HOSPADM

## 2024-11-15 RX ORDER — MULTIPLE VITAMINS W/ MINERALS TAB 9MG-400MCG
1 TAB ORAL DAILY
Status: DISCONTINUED | OUTPATIENT
Start: 2024-11-15 | End: 2024-11-17 | Stop reason: HOSPADM

## 2024-11-15 RX ADMIN — DICLOFENAC 4 G: 10 GEL TOPICAL at 10:14

## 2024-11-15 RX ADMIN — Medication 1 TABLET: at 14:30

## 2024-11-15 RX ADMIN — CEFAZOLIN SODIUM 2 G: 2 INJECTION, SOLUTION INTRAVENOUS at 11:58

## 2024-11-15 RX ADMIN — POTASSIUM CHLORIDE 20 MEQ: 1500 TABLET, EXTENDED RELEASE ORAL at 10:13

## 2024-11-15 RX ADMIN — CEFAZOLIN SODIUM 2 G: 2 INJECTION, SOLUTION INTRAVENOUS at 04:55

## 2024-11-15 RX ADMIN — METOPROLOL SUCCINATE 25 MG: 25 TABLET, EXTENDED RELEASE ORAL at 10:13

## 2024-11-15 RX ADMIN — CEFAZOLIN SODIUM 2 G: 2 INJECTION, SOLUTION INTRAVENOUS at 20:08

## 2024-11-15 RX ADMIN — DICLOFENAC 4 G: 10 GEL TOPICAL at 16:31

## 2024-11-15 RX ADMIN — ASPIRIN 81 MG: 81 TABLET, COATED ORAL at 10:13

## 2024-11-15 RX ADMIN — Medication 1 MG: at 00:14

## 2024-11-15 RX ADMIN — ACETAMINOPHEN 650 MG: 325 TABLET, FILM COATED ORAL at 21:27

## 2024-11-15 RX ADMIN — FUROSEMIDE 20 MG: 20 TABLET ORAL at 10:14

## 2024-11-15 RX ADMIN — DICLOFENAC 4 G: 10 GEL TOPICAL at 20:08

## 2024-11-15 RX ADMIN — ATORVASTATIN CALCIUM 40 MG: 40 TABLET, FILM COATED ORAL at 10:14

## 2024-11-15 NOTE — PROGRESS NOTES
Met with patient today regarding line placement.  Pt confused and refusing line placement stating he doesn't know why he needs any of this. Discharge plan unclear at this time.  Will hold on any line placement until final discharge plan in place.

## 2024-11-15 NOTE — PLAN OF CARE
Goal Outcome Evaluation:    Summary: UTI d/t chronic rodriguez, acute metabolic encephalopathy, hyponatremia    DATE & TIME: 11/14/24 8167-9509  Cognitive Concerns/ Orientation: A&O x3, disoriented to situation, very forgetful  BEHAVIOR & AGGRESSION TOOL COLOR: Green  CIWA SCORE: NA   ABNL VS/O2: VSS on RA  MOBILITY: SBA  PAIN MANAGEMENT: C/O Right wrist pain, managed with Scheduled Voltaren gel and PRN Tylenol x2  DIET: Regular, 2000mL FR  BOWEL/BLADDER: Chronic rodriguez in place. No BM this shift.  ABNL LAB/BG: No new labs    DRAIN/DEVICES: R PIV SL, intermittent abx.  TELEMETRY RHYTHM: NA  SKIN: Dry skin, scattered scabs and bruises, blanchable redness on coccyx  TESTS/PROCEDURES: None new  D/C DAY/GOALS/PLACE: pending  OTHER IMPORTANT INFO: ID & PSYCH following, determined to not be decisional. OT following. Continues on Lasix. Strict intake and output.

## 2024-11-15 NOTE — PROGRESS NOTES
Care Management Follow Up    Length of Stay (days): 5    Expected Discharge Date: 11/18/2024     Concerns to be Addressed: discharge planning     Patient plan of care discussed at interdisciplinary rounds: Yes    Anticipated Discharge Disposition: Transitional Care     Anticipated Discharge Services:    Anticipated Discharge DME: None    Patient/family educated on Medicare website which has current facility and service quality ratings: yes  Education Provided on the Discharge Plan: Yes  Patient/Family in Agreement with the Plan: yes    Referrals Placed by CM/SW: Post Acute Facilities  Private pay costs discussed: private room/amenity fees and transportation costs    Discussed  Partnership in Safe Discharge Planning  document with patient/family: No     Handoff Completed: No, handoff not indicated or clinically appropriate    Additional Information:  SW met with pt's tanya Carrillo, brother in law Erickson and SW from [facility], [phone]. Pt will need an IV antibiotic for 28 days. Discussed this would be best managed in a TCU as it appears there is no one teachable at home to manage this. Family and [facility] in agreement. SW provided a TCU list. Pt will need a prior-auth from [facility] for TCU placement. Referrals sent. Family believes he would prefer a private room and SW explained this is an additional private fee per day he will be billed for. Also discussed arranging transportation at time of discharge and SW also explained pt will be billed for the cost of this ride. He will need to be off a one on one attendant for at least 24 hours before most TCU's will accept and have a prior-auth in place. Noted no participation with PT and OT in several days. SW left PT a message to please work with pt for updated recommendations.     Next Steps: Secure TCU bed and [facility] prior-auth. SW following for discharge planning.     JEZ Garcia, LICSW  [phone] Desk phone  [phone] Cell/text (Preferred)  M  [facility]

## 2024-11-15 NOTE — PROGRESS NOTES
[facility]    Medicine Progress Note - Hospitalist Service    Date of Admission:  11/9/2024    Assessment & Plan   Canelo Cruz is a 89 year old male with history of urinary retention with chronic rodriguez, hypertension, coronary artery disease, dyslipidemia, chronic systolic CHF, 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, suspected cognitive impairment admitted on 11/9/2024 with generalized weakness found to have MSSA bacteremia.     MSSA bacteremia  CAUTI w/ chronic rodriguez catheter, less likely (last exchange 11/11)  Sepsis secondary to above, resolved  Patient brought into the ED via EMS with generalized weakness, fever of 101.9, confusion.  Per report abnormal smell, sediment in Rodriguez catheter bag, poor care of rodriguez catheter. Last known change of rodriguez catheter was in September 2024 at the time of a hospital admission, patient reports was not given any instructions for Rodriguez catheter changes. Met sepsis criteria on admission and found to have MSSA bacteremia. TTE 11/12 with stable TAVR appearance and function. Remains HDS on cefazolin, ID following.  -Continue cefazolin, will need 28d IV abx   +Patient declining midline placement 11/15   +Plan to place on day of discharge to avoid additional confusion  -Blood cultures remain NGTD  -Infectious disease following  -Urology consulted, plan for rodriguez exchange in ~3 weeks  -Continue PTA tamsulosin     Acute encephalopathy likely multifactorial 2/2 infectious etiology, underlying cognitive impairment, delirium from hospitalization  Concern for baseline cognitive impairment  Physical deconditioning from medical illness, senile frailty  Patient reported he and his wife lives alone, independent and still drives. EMS notes that the patient wanted an additional drink of alcohol before getting in ambulance. Requests no contact with his sister nor spouse about details of medical history or concerns about cognitive function. Concern for cognitive impairment noted during prior  hospitalizations. Scored 13/28 on MoCA. Per psychiatry, patient is not currently able to make his own decisions.   -Patient's brother-in-law Dre Phan is decision maker  -Mental status remains stable, continue to monitor  -Continue Seroquel 12.5 mg twice daily as needed.  -Continue 1:1 as needed  -Frequent reorientation  -Fall precautions, delirium precautions  -Awaiting TCU placement, SW following     Chronic systolic congestive heart failure, compensated  Coronary artery disease  Severe aortic valve stenosis s/p TAVR (07/17/2024)  Hypertension  Hyperlipidemia  Follows with [facility] cardiology.  Denied any chest pain or palpitations  Echocardiogram 7/2024 with LVEF of 20 to 25%.  Repeat echo 8/2024 with LVEF of 40%. Echocardiogram from 11/12/2024 with LVEF of 35 to 40%.  Moderate global hypokinesia of the left ventricle.  Right ventricle normal in size and function.  Status post TAVR, well-seated normally functioning valve.   -Continue PTA aspirin and Toprol.  -Continue PTA Lipitor.  -Continue PTA Lasix at low-dose of 20 mg oral daily  -Strict intake output monitoring, daily weights.  -2000 mL fluid restriction. Liberalize salt in diet given hyponatremia.  -Recommend follow-up with cardiology after discharge.     Right wrist pain-degenerative joint disease  History of falls  Reports had a fall recently in October while coming out of dentist office, has not had any workup from his primary provider whom he had seen.  Complaint of wrist pain. On exam right wrist swelling noted, range of motion restricted.  Tenderness present, no warmth or erythema. X-ray right wrist multifocal degenerative arthrosis of the right wrist, greatest to an advanced degree involving the STT joints. Mild ulnar positive variance with moderate degenerative arthrosis of the ulnolunate joint. No acute fracture. 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   -As  "needed Tylenol, heat, ice, voltaren  -Fall precautions     Acute on chronic knee pain-degenerative joint disease  Reports since fall is about unable to bear weight on right knee, pain.  Mild swelling, no erythema, tenderness present. Xray right knee w/ OA and joint space narrowing, no fx, trace effusion.   -As needed Tylenol, Robaxin, voltaren  -Follow-up with orthopedic surgery as outpatient.     Hyponatremia, resolved  Sodium of 132 on admission. Improved with IVF.  -Intermittent BMP     Alcohol use, reported  Per ED note reviewed, patient attempted to grab a drink of alcohol before leaving, and they noticed the container of alcohol had \"floaties\" in it, though they are unsure where they came from. B12, folate low-normal.  -Start multivitamin     Acute anemia, suspect dilutional        Diet: Combination Diet Regular Diet Adult  Fluid restriction 2000 ML FLUID    DVT Prophylaxis: Pneumatic Compression Devices and Ambulate every shift  Christianson Catheter: PRESENT, indication: Other (Comment) (chronic retention), Other (Comment) (chronic retention)  Lines: None     Cardiac Monitoring: None  Code Status: Full Code      Clinically Significant Risk Factors         # Hyponatremia: Lowest Na = 134 mmol/L in last 2 days, will monitor as appropriate            # Chronic heart failure with reduced ejection fraction: last echo with EF <40%                    Social Drivers of Health    Social Connections: Socially Isolated (6/17/2024)    Received from [facility]    Social Connections     Do you often feel lonely or isolated from those around you?: 4          Disposition Plan     Medically Ready for Discharge: Ready Now     Michael Murphy MD  Hospitalist Service  [facility]  Securely message with [employer] (more info)  Text page via [employer] Paging/Directory   ______________________________________________________________________    Interval History   No concerns. Frustrated " about being in the hospital. Confused about why he is here. Updated patient and family about discharge plan and need for ongoing IV antibiotics. Denies chest pain, SOB, n/v, abdominal pain.    Physical Exam   Vital Signs: Temp: 97.4  F (36.3  C) Temp src: Oral BP: 119/60 Pulse: 59   Resp: 18 SpO2: 94 % O2 Device: None (Room air)    Weight: 176 lbs 12.94 oz    General: Awake, alert, NAD, confused, sitting upright in bed  HEENT: Atraumatic, normocephalic, EOMI, no scleral icterus  CV: RRR, no murmurs, no EL, distal pulses intact, no JVD  Pulm: CTAB, breathing comfortably on RA, no wheezes, no crackles  Abd: Soft, non-tender, non-distended, no overlying skin changes, no palpable hepatosplenomegaly  Skin: No rashes, lesions, or wounds visualized  Neuro: CN II-XII grossly intact, no focal deficits, moving all extremities spontaneously, speech normal, confused, tangential thought content    Medical Decision Making       45 MINUTES SPENT BY ME on the date of service doing chart review, history, exam, documentation & further activities per the note.      Data     I have personally reviewed the following data over the past 24 hrs:    7.4  \   11.7 (L)   / 161     134 (L) 102 14.4 /  97   3.6 24 0.93 \

## 2024-11-15 NOTE — PLAN OF CARE
Summary: UTI d/t chronic rodriguez, acute metabolic encephalopathy, hyponatremia    DATE & TIME: 11/14/24, 1930 - 2345  Cognitive Concerns/ Orientation: A&O x 2, disoriented to time and situation, very forgetful  BEHAVIOR & AGGRESSION TOOL COLOR: Green  CIWA SCORE: NA   ABNL VS/O2: VSS on RA  MOBILITY: SBA, not OOB this shift  PAIN MANAGEMENT: Denied pain. Scheduled Voltaren gel applied to right knee  DIET: Regular, 2000mL FR. Strict I&Os  BOWEL/BLADDER: Chronic rodriguez in place. No BM this shift.  ABNL LAB/BG: AM labs pending   DRAIN/DEVICES:  PIV SL  TELEMETRY RHYTHM: NA  SKIN: Dry skin, scattered scabs and bruises, blanchable redness on coccyx  TESTS/PROCEDURES: NA  D/C DAY/GOALS/PLACE: Pending  OTHER IMPORTANT INFO: ID, Psych and OT following.

## 2024-11-16 VITALS
HEART RATE: 59 BPM | RESPIRATION RATE: 18 BRPM | DIASTOLIC BLOOD PRESSURE: 65 MMHG | WEIGHT: 176.81 LBS | SYSTOLIC BLOOD PRESSURE: 141 MMHG | HEIGHT: 73 IN | BODY MASS INDEX: 23.43 KG/M2 | TEMPERATURE: 97.5 F | OXYGEN SATURATION: 97 %

## 2024-11-16 LAB — BACTERIA BLD CULT: NO GROWTH

## 2024-11-16 PROCEDURE — 99232 SBSQ HOSP IP/OBS MODERATE 35: CPT | Performed by: HOSPITALIST

## 2024-11-16 PROCEDURE — 120N000001 HC R&B MED SURG/OB

## 2024-11-16 PROCEDURE — 250N000013 HC RX MED GY IP 250 OP 250 PS 637: Performed by: INTERNAL MEDICINE

## 2024-11-16 PROCEDURE — 250N000013 HC RX MED GY IP 250 OP 250 PS 637: Performed by: HOSPITALIST

## 2024-11-16 PROCEDURE — 250N000013 HC RX MED GY IP 250 OP 250 PS 637: Performed by: STUDENT IN AN ORGANIZED HEALTH CARE EDUCATION/TRAINING PROGRAM

## 2024-11-16 PROCEDURE — 250N000011 HC RX IP 250 OP 636: Mod: JZ | Performed by: INTERNAL MEDICINE

## 2024-11-16 RX ORDER — LIDOCAINE HYDROCHLORIDE 10 MG/ML
10 INJECTION, SOLUTION EPIDURAL; INFILTRATION; INTRACAUDAL; PERINEURAL ONCE
Status: DISCONTINUED | OUTPATIENT
Start: 2024-11-16 | End: 2024-11-16

## 2024-11-16 RX ORDER — LIDOCAINE HYDROCHLORIDE 10 MG/ML
10 INJECTION, SOLUTION EPIDURAL; INFILTRATION; INTRACAUDAL; PERINEURAL
Status: DISCONTINUED | OUTPATIENT
Start: 2024-11-16 | End: 2024-11-17 | Stop reason: HOSPADM

## 2024-11-16 RX ADMIN — POTASSIUM CHLORIDE 20 MEQ: 1500 TABLET, EXTENDED RELEASE ORAL at 08:38

## 2024-11-16 RX ADMIN — ASPIRIN 81 MG: 81 TABLET, COATED ORAL at 08:38

## 2024-11-16 RX ADMIN — METOPROLOL SUCCINATE 25 MG: 25 TABLET, EXTENDED RELEASE ORAL at 08:38

## 2024-11-16 RX ADMIN — CEFAZOLIN SODIUM 2 G: 2 INJECTION, SOLUTION INTRAVENOUS at 12:46

## 2024-11-16 RX ADMIN — ACETAMINOPHEN 650 MG: 325 TABLET, FILM COATED ORAL at 21:45

## 2024-11-16 RX ADMIN — METHOCARBAMOL 250 MG: 500 TABLET ORAL at 08:38

## 2024-11-16 RX ADMIN — Medication 1 MG: at 21:45

## 2024-11-16 RX ADMIN — Medication 1 TABLET: at 08:38

## 2024-11-16 RX ADMIN — FUROSEMIDE 20 MG: 20 TABLET ORAL at 08:38

## 2024-11-16 RX ADMIN — CEFAZOLIN SODIUM 2 G: 2 INJECTION, SOLUTION INTRAVENOUS at 21:10

## 2024-11-16 RX ADMIN — CEFAZOLIN SODIUM 2 G: 2 INJECTION, SOLUTION INTRAVENOUS at 05:33

## 2024-11-16 RX ADMIN — ATORVASTATIN CALCIUM 40 MG: 40 TABLET, FILM COATED ORAL at 08:38

## 2024-11-16 RX ADMIN — ACETAMINOPHEN 650 MG: 325 TABLET, FILM COATED ORAL at 01:30

## 2024-11-16 RX ADMIN — ACETAMINOPHEN 650 MG: 325 TABLET, FILM COATED ORAL at 06:05

## 2024-11-16 RX ADMIN — DICLOFENAC 4 G: 10 GEL TOPICAL at 08:46

## 2024-11-16 RX ADMIN — Medication 1 MG: at 01:30

## 2024-11-16 NOTE — PROGRESS NOTES
[facility]    Medicine Progress Note - Hospitalist Service    Date of Admission:  11/9/2024    Assessment & Plan   Canelo Cruz is a 89 year old male with history of urinary retention with chronic rodriguez, hypertension, coronary artery disease, dyslipidemia, chronic systolic CHF, 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, suspected cognitive impairment admitted on 11/9/2024 with generalized weakness found to have MSSA bacteremia.     MSSA bacteremia  CAUTI w/ chronic rodriguez catheter, less likely (last exchange 11/11)  Sepsis secondary to above, resolved  Patient brought into the ED via EMS with generalized weakness, fever of 101.9, confusion.  Per report abnormal smell, sediment in Rodriguez catheter bag, poor care of rodriguez catheter. Last known change of rodriguez catheter was in September 2024 at the time of a hospital admission, patient reports was not given any instructions for Rodriguez catheter changes. Met sepsis criteria on admission and found to have MSSA bacteremia. TTE 11/12 with stable TAVR appearance and function. Remains HDS on cefazolin, ID following.  -Continue cefazolin, will need 28 d IV abx  -Midline when patient agrees , Plan to place on day of discharge to avoid additional confusion  -Blood cultures remain NGTD  -Appreciate ID recommendation.  -Urology consulted, plan for rodriguez exchange in ~3 weeks  -Continue PTA tamsulosin     Acute encephalopathy likely multifactorial 2/2 infectious etiology, underlying cognitive impairment, delirium from hospitalization  Concern for baseline cognitive impairment  Physical deconditioning from medical illness, senile frailty  Patient reported he and his wife lives alone, independent and still drives. EMS notes that the patient wanted an additional drink of alcohol before getting in ambulance. Requests no contact with his sister nor spouse about details of medical history or concerns about cognitive function. Concern for cognitive impairment noted during prior  hospitalizations. Scored 13/28 on MoCA. Per psychiatry, patient is not currently able to make his own decisions.   -Patient's brother-in-law Dre Phan is decision maker  -Mental status remains stable, continue to monitor  -Continue Seroquel 12.5 mg twice daily as needed.  -1:1 sitter has been discontinued  -Frequent reorientation  -Fall precautions, delirium precautions  -Awaiting TCU placement, SW following     Chronic systolic congestive heart failure, compensated  Coronary artery disease  Severe aortic valve stenosis s/p TAVR (07/17/2024)  Hypertension  Hyperlipidemia  Follows with [facility] cardiology.  Denied any chest pain or palpitations  Echocardiogram 7/2024 with LVEF of 20 to 25%.  Repeat echo 8/2024 with LVEF of 40%. Echocardiogram from 11/12/2024 with LVEF of 35 to 40%.  Moderate global hypokinesia of the left ventricle.  Right ventricle normal in size and function.  Status post TAVR, well-seated normally functioning valve.   -Continue PTA aspirin and Toprol.  -Continue PTA Lipitor.  -Continue PTA Lasix at low-dose of 20 mg oral daily  -Strict intake output monitoring, daily weights.  -2000 mL fluid restriction. Liberalize salt in diet given hyponatremia.  -Recommend follow-up with cardiology after discharge.     Right wrist pain-degenerative joint disease  History of falls  Reports had a fall recently in October while coming out of dentist office, has not had any workup from his primary provider whom he had seen.  Complaint of wrist pain. On exam right wrist swelling noted, range of motion restricted.  Tenderness present, no warmth or erythema. X-ray right wrist multifocal degenerative arthrosis of the right wrist, greatest to an advanced degree involving the STT joints. Mild ulnar positive variance with moderate degenerative arthrosis of the ulnolunate joint. No acute fracture. 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  "  -Given MSSA bacteremia, ongoing pain and swelling and tenderness, orthopedic surgery consulted.  Joint aspiration was offered but patient declined.  -As needed Tylenol, heat, ice, voltaren  -Fall precautions     Acute on chronic knee pain-degenerative joint disease  Reports since fall is about unable to bear weight on right knee, pain.  Mild swelling, no erythema, tenderness present. Xray right knee w/ OA and joint space narrowing, no fx, trace effusion.   -As needed Tylenol, Robaxin, voltaren  -Follow-up with orthopedic surgery as outpatient.     Hyponatremia   Sodium of 132 on admission. Improved with IVF.  -Intermittent BMP     Alcohol use, reported  Per ED note reviewed, patient attempted to grab a drink of alcohol before leaving, and they noticed the container of alcohol had \"floaties\" in it, though they are unsure where they came from. B12, folate low-normal.  -Start multivitamin     Acute anemia, suspect dilutional  Hemoglobin 10-11.  Monitor        Diet: Combination Diet Regular Diet Adult  Fluid restriction 2000 ML FLUID    DVT Prophylaxis: Pneumatic Compression Devices  Christianson Catheter: PRESENT, indication: Other (Comment) (chronic), Other (Comment) (chronic retention)  Lines: None     Cardiac Monitoring: None  Code Status: Full Code      Clinically Significant Risk Factors         # Hyponatremia: Lowest Na = 134 mmol/L in last 2 days, will monitor as appropriate            # Chronic heart failure with reduced ejection fraction: last echo with EF <40%                      Social Drivers of Health    Social Connections: Socially Isolated (6/17/2024)    [facility]    Social Connections     Do you often feel lonely or isolated from those around you?: 4          Disposition Plan     Medically Ready for Discharge: Anticipated Tomorrow medically stable but sitter just discontinued and as long as remains sitter-free, pending midline/PICC line placement, discharge " tomorrow, pending TCU availability       Oren Fernandez MD  Hospitalist Service  [facility]  Securely message with [employer] (more info)  Text page via [employer] Paging/Directory   ______________________________________________________________________    Interval History     Chart reviewed, discussed with nursing staff and patient was seen this morning.  Reported right wrist pain and swelling.  Reports it is ongoing for almost 10 days since he fell.  Pain has worsened.  No other new symptoms reported.    Physical Exam   Vital Signs: Temp: 97.9  F (36.6  C) Temp src: Oral BP: 129/63 Pulse: 62   Resp: 18 SpO2: 95 % O2 Device: None (Room air)    Weight: 176 lbs 12.94 oz    General: Alert awake, oriented to self and place, appears comfortable.  HEENT: PERRLA EOMI. Mucosa moist.  Face has psoriatic rash looking skin  Lungs: Bilateral equal air entry. Clear to auscultation, normal work of breathing.   CVS: S1S2 regular, no tachycardia or murmur.   Abdomen: Soft, NT, ND. BS heard.  MSK: Right wrist is swollen, tender.  Pain with active and passive movement.  Neuro: AAOX3. CN 2-12 normal. Strength symmetrical.  Skin: No rash.       Medical Decision Making       40 MINUTES SPENT BY ME on the date of service doing chart review, history, exam, documentation & further activities per the note.      Data         Imaging results reviewed over the past 24 hrs:   No results found for this or any previous visit (from the past 24 hours).

## 2024-11-16 NOTE — PROGRESS NOTES
A&OX3-4, redirectable, can be irritable at times. VSS on RA. Denies pain except for occasional c/o pain in the R wrist, voltaren gel in use and effective. Up SBA with GB and walker. No BM this shift. Chronic rodriguez with good UO. Regular diet with improved appetite. PIV Sled. On IV ancef. Per ID, will need midline for discharge abx. Plan to discharge to TCU pending placement. Continue to monitor.

## 2024-11-16 NOTE — CONSULTS
[facility]    Orthopedic Consultation    Canelo Cruz MRN# [id number]   Age: 89 year old YOB: 1931     Date of Admission:  11/9/2024    Reason for consult: R wrist pain           Level of consult: Consult, follow and place orders           Assessment and Plan:   Assessment:   89M w/ PMH urine ret w/ chronic rodriguez, HTN, CAD, CHF, TAVR, cog impairment, admitted to IM on 11/9 w/ MSSA bacteremia and encephalopathy, on whom ortho is consulted for:  - R wrist pain    DDx acute-on-chronic wrist arthritis vs gout flare vs septic wrist        Plan:   Long discussion w/ patient.  I discussed with him my differential diagnosis above.  I discussed that I cannot differentiate between pain from wrist arthritis, gout, or infection without performing a wrist aspiration.  I explained this procedure, but the patient is declining at this time.  He does have an open offer for wrist aspiration if he changes his mind    Recommendations:  -Diet: regular  -Activity: no restrictions  -Pain control: per primary; recommend tylenol/NSAIDs if no medical contraindications  -Further diagnostics: R wrist aspiration if patient consents to this    Please contact ortho if patient changes his mind and wishes to proceed with wrist aspiration.           Chief Complaint:   R wrist pain         History of Present Illness:   Medical history obtained via chart review and discussion with the patient.          Past Medical History:     Past Medical History:   Diagnosis Date    Gout     Heart murmur     Insomnia     Mixed hyperlipidemia     Postherpetic neuralgia              Past Surgical History:     Past Surgical History:   Procedure Laterality Date    ARTHROSCOPY KNEE Right     ~[id number]    CV CORONARY ANGIOGRAM N/A 6/13/2024    Procedure: Coronary Angiogram;  Surgeon: Jarett Odell MD;  Location:  HEART CARDIAC CATH LAB    CV RIGHT HEART CATH MEASUREMENTS RECORDED N/A 6/13/2024    Procedure: Right  Heart Catheterization;  Surgeon: Jarett Odell MD;  Location:  HEART CARDIAC CATH LAB    CV TRANSCATHETER AORTIC VALVE REPLACEMENT-FEMORAL APPROACH N/A 7/16/2024    Procedure: Transcatheter Aortic Valve Replacement-Femoral Approach;  Surgeon: Prince Burnett MD;  Location:  HEART CARDIAC CATH LAB    DAVINCI XI HERNIORRHAPHY INGUINAL Bilateral 7/18/2022    Procedure: Robotic repair of right inguinal hernia with placement of mesh,  robotic repair of left inguinal hernia with placement of mesh;  Surgeon: Ina Phillips MD;  Location:  OR    ORTHOPEDIC SURGERY      right achilles rupture repair with 14 month MRSA infection             Social History:     Social History     Tobacco Use    Smoking status: Never    Smokeless tobacco: Never   Substance Use Topics    Alcohol use: Yes     Comment: 10 per week             Family History:     Family History   Problem Relation Age of Onset    Unknown/Adopted Mother     Unknown/Adopted Father     Diabetes No family hx of     Coronary Artery Disease No family hx of     Hypertension No family hx of     Hyperlipidemia No family hx of     Cerebrovascular Disease No family hx of     Breast Cancer No family hx of     Colon Cancer No family hx of     Prostate Cancer No family hx of     Other Cancer No family hx of     Depression No family hx of     Anxiety Disorder No family hx of     Mental Illness No family hx of     Substance Abuse No family hx of     Anesthesia Reaction No family hx of     Asthma No family hx of     Osteoporosis No family hx of     Genetic Disorder No family hx of     Thyroid Disease No family hx of     Obesity No family hx of              Immunizations:     VACCINE/DOSE   Diptheria   DPT   DTAP   HBIG   Hepatitis A   Hepatitis B   HIB   Influenza   Measles   Meningococcal   MMR   Mumps   Pneumococcal   Polio   Rubella   Small Pox   TDAP   Varicella   Zoster             Allergies:     Allergies   Allergen Reactions    Ciprofloxacin      history of  achilles tendon pain             Medications:     Current Facility-Administered Medications   Medication Dose Route Frequency Provider Last Rate Last Admin    acetaminophen (TYLENOL) tablet 650 mg  650 mg Oral Q4H PRN Justyna Bowser MD   650 mg at 11/16/24 0605    Or    acetaminophen (TYLENOL) Suppository 650 mg  650 mg Rectal Q4H PRN Justyna Bowser MD        albuterol (PROVENTIL HFA/VENTOLIN HFA) inhaler  2 puff Inhalation Q6H PRN Claritza Gabriel MD        aspirin EC tablet 81 mg  81 mg Oral Daily Claritza Gabriel MD   81 mg at 11/16/24 0838    atorvastatin (LIPITOR) tablet 40 mg  40 mg Oral Daily Claritza Gabriel MD   40 mg at 11/16/24 0838    benzocaine-menthol (CHLORASEPTIC) 6-10 MG lozenge 1 lozenge  1 lozenge Buccal Q1H PRN Justyna Bowser MD        calcium carbonate (TUMS) chewable tablet 1,000 mg  1,000 mg Oral 4x Daily PRN Justyna Bowser MD        ceFAZolin (ANCEF) 2 g in 100 mL D5W intermittent infusion  2 g Intravenous Q8H Rodney Majano  mL/hr at 11/16/24 1246 2 g at 11/16/24 1246    diclofenac (VOLTAREN) 1 % topical gel 4 g  4 g Topical 4x Daily Justyna Bowser MD   4 g at 11/16/24 0846    furosemide (LASIX) tablet 20 mg  20 mg Oral Daily Claritza Gabriel MD   20 mg at 11/16/24 0838    lidocaine (LMX4) cream   Topical Q1H PRN Michael Murphy MD        lidocaine (LMX4) cream   Topical Q1H PRN Rodney Majano MD        lidocaine (LMX4) cream   Topical Q1H PRN Justyna Bowser MD        lidocaine (PF) (XYLOCAINE) 1 % injection 10 mL  10 mL Subcutaneous Once Chau Palencia MD        lidocaine 1 % 0.1-1 mL  0.1-1 mL Other Q1H PRN Rodney Majano MD        lidocaine 1 % 0.1-1 mL  0.1-1 mL Other Q1H PRN Rodney Majano MD        lidocaine 1 % 0.1-1 mL  0.1-1 mL Other Q1H PRN Justyna Bowser MD        lidocaine 1 % 0.1-5 mL  0.1-5 mL Other Q1H PRN Michael Murphy MD        melatonin tablet 1 mg  1 mg Oral At Bedtime PRN Mague,  Justyna Prasad MD   1 mg at 11/16/24 0130    methocarbamol (ROBAXIN) half-tab 250 mg  250 mg Oral TID PRN Claritza Gabriel MD   250 mg at 11/16/24 0838    metoprolol succinate ER (TOPROL XL) 24 hr tablet 25 mg  25 mg Oral Daily Claritza Gabriel MD   25 mg at 11/16/24 0838    multivitamin w/minerals (THERA-VIT-M) tablet 1 tablet  1 tablet Oral Daily Michael Murphy MD   1 tablet at 11/16/24 0838    ondansetron (ZOFRAN ODT) ODT tab 4 mg  4 mg Oral Q6H PRN Justyna Bowser MD        Or    ondansetron (ZOFRAN) injection 4 mg  4 mg Intravenous Q6H PRN Justyna Bowser MD        potassium chloride mary ER (KLOR-CON M20) CR tablet 20 mEq  20 mEq Oral Daily Claritza Gabriel MD   20 mEq at 11/16/24 0838    QUEtiapine (SEROquel) half-tab 12.5 mg  12.5 mg Oral BID PRN Claritza Gabriel MD   12.5 mg at 11/14/24 2036    senna-docusate (SENOKOT-S/PERICOLACE) 8.6-50 MG per tablet 1 tablet  1 tablet Oral BID PRN Justyna Bowser MD        Or    senna-docusate (SENOKOT-S/PERICOLACE) 8.6-50 MG per tablet 2 tablet  2 tablet Oral BID PRN Justyna Bowser MD        sodium chloride (PF) 0.9% PF flush 10 mL  10 mL Intracatheter Q8H Rodney Majano MD        sodium chloride (PF) 0.9% PF flush 10 mL  10 mL Intracatheter Q8H Rodney Majano MD        sodium chloride (PF) 0.9% PF flush 10-20 mL  10-20 mL Intracatheter q1 min prn Rodney Majano MD        sodium chloride (PF) 0.9% PF flush 10-20 mL  10-20 mL Intracatheter q1 min prn Rodney Majano MD        sodium chloride (PF) 0.9% PF flush 10-40 mL  10-40 mL Intracatheter Once PRN Michael Murphy MD        sodium chloride (PF) 0.9% PF flush 3 mL  3 mL Intracatheter Q8H Justyna Bowser MD   3 mL at 11/16/24 1247    sodium chloride (PF) 0.9% PF flush 3 mL  3 mL Intracatheter q1 min prn Justyna Bowsre MD   3 mL at 11/10/24 2052    sodium chloride (PF) 0.9% PF flush 3 mL  3 mL Intracatheter q1 min prn Raven Roper MD         tamsulosin (FLOMAX) capsule 0.4 mg  0.4 mg Oral Daily Justyna Walker MD                Physical Exam:   All vitals have been reviewed  Patient Vitals for the past 24 hrs:   BP Temp Temp src Pulse Resp SpO2   11/16/24 0840 116/61 97.6  F (36.4  C) Oral -- 20 96 %   11/16/24 0651 -- -- -- -- 18 --   11/16/24 0605 -- -- -- -- 18 --   11/16/24 0216 -- -- -- -- 18 --   11/16/24 0130 -- -- -- -- 18 --   11/15/24 2230 129/63 97.9  F (36.6  C) Oral 62 18 95 %   11/15/24 2214 -- -- -- -- 18 --   11/15/24 2127 -- -- -- -- 18 --   11/15/24 1503 112/54 97.8  F (36.6  C) Oral 61 18 97 %       Intake/Output Summary (Last 24 hours) at 11/16/2024 1336  Last data filed at 11/16/2024 1255  Gross per 24 hour   Intake 300 ml   Output 1725 ml   Net -1425 ml     Musculoskeletal: R wrist TTP radiocarpal joint, appears swollen, no erythema, refuses AROM or PROM due to pain          Data:   All laboratory data reviewed  Results for orders placed or performed during the hospital encounter of 11/09/24   XR Chest Port 1 View     Status: None    Narrative    EXAM: XR CHEST PORT 1 VIEW  LOCATION: [facility] [place]  DATE: 11/10/2024    INDICATION: COUGH  COMPARISON: 06/10/2024      Impression    IMPRESSION: Stable cardiomediastinal silhouette. Mild basilar atelectasis. No vascular congestion or significant effusion. Right hilar prominence similar. Atherosclerotic aorta. Degenerative change osseous structures.   XR Wrist Right G/E 3 Views     Status: None    Narrative    EXAM: XR WRIST RIGHT G/E 3 VIEWS  LOCATION: [facility] [place]  DATE: 11/10/2024    INDICATION: Reports fall in April, right wrist pain and swelling.  COMPARISON: None.      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 Mild ulnar positive variance with moderate degenerative arthrosis of the ulnolunate joint. No acute fracture. Corticated  ossicle   dorsal of the wrist on the lateral view which may be an old ununited triquetral avulsion fracture fragment.    XR Knee Right 1/2 Views     Status: None    Narrative    EXAM: XR KNEE RIGHT 1/2 VIEWS  LOCATION: [facility]  DATE: 11/10/2024    INDICATION: Reports fall in April, right KNEE pain and swelling.  COMPARISON: None.      Impression    IMPRESSION: Tricompartmental degenerative arthrosis with marginal osteophytes and moderate narrowing of the medial compartment. No acute fracture. Trace joint effusion.    Comprehensive metabolic panel     Status: Abnormal   Result Value Ref Range    Sodium 132 (L) 135 - 145 mmol/L    Potassium 4.4 3.4 - 5.3 mmol/L    Carbon Dioxide (CO2) 23 22 - 29 mmol/L    Anion Gap 9 7 - 15 mmol/L    Urea Nitrogen 12.3 8.0 - 23.0 mg/dL    Creatinine 1.09 0.67 - 1.17 mg/dL    GFR Estimate 63 >60 mL/min/1.73m2    Calcium 8.7 (L) 8.8 - 10.4 mg/dL    Chloride 100 98 - 107 mmol/L    Glucose 101 (H) 70 - 99 mg/dL    Alkaline Phosphatase 91 40 - 150 U/L    AST 31 0 - 45 U/L    ALT 13 0 - 70 U/L    Protein Total 6.8 6.4 - 8.3 g/dL    Albumin 3.8 3.5 - 5.2 g/dL    Bilirubin Total 0.6 <=1.2 mg/dL   UA with Microscopic reflex to Culture     Status: Abnormal    Specimen: Urine, Catheter   Result Value Ref Range    Color Urine Light Yellow Colorless, Straw, Light Yellow, Yellow    Appearance Urine Clear Clear    Glucose Urine Negative Negative mg/dL    Bilirubin Urine Negative Negative    Ketones Urine 20 (A) Negative mg/dL    Specific Gravity Urine 1.015 1.003 - 1.035    Blood Urine Small (A) Negative    pH Urine 7.0 5.0 - 7.0    Protein Albumin Urine 10 (A) Negative mg/dL    Urobilinogen Urine Normal Normal, 2.0 mg/dL    Nitrite Urine Negative Negative    Leukocyte Esterase Urine Moderate (A) Negative    WBC Clumps Urine Present (A) None Seen /HPF    Mucus Urine Present (A) None Seen /LPF    RBC Urine 5 (H) <=2 /HPF    WBC Urine 52 (H) <=5 /HPF    Narrative    Urine  Culture ordered based on laboratory criteria   CBC with platelets and differential     Status: Abnormal   Result Value Ref Range    WBC Count 11.3 (H) 4.0 - 11.0 10e3/uL    RBC Count 4.07 (L) 4.40 - 5.90 10e6/uL    Hemoglobin 12.4 (L) 13.3 - 17.7 g/dL    Hematocrit 36.2 (L) 40.0 - 53.0 %    MCV 89 78 - 100 fL    MCH 30.5 26.5 - 33.0 pg    MCHC 34.3 31.5 - 36.5 g/dL    RDW 15.1 (H) 10.0 - 15.0 %    Platelet Count 244 150 - 450 10e3/uL    % Neutrophils 89 %    % Lymphocytes 6 %    % Monocytes 5 %    % Eosinophils 0 %    % Basophils 0 %    % Immature Granulocytes 0 %    NRBCs per 100 WBC 0 <1 /100    Absolute Neutrophils 10.0 (H) 1.6 - 8.3 10e3/uL    Absolute Lymphocytes 0.7 (L) 0.8 - 5.3 10e3/uL    Absolute Monocytes 0.5 0.0 - 1.3 10e3/uL    Absolute Eosinophils 0.0 0.0 - 0.7 10e3/uL    Absolute Basophils 0.0 0.0 - 0.2 10e3/uL    Absolute Immature Granulocytes 0.0 <=0.4 10e3/uL    Absolute NRBCs 0.0 10e3/uL   [place] Draw     Status: None    Narrative    The following orders were created for panel order [place] Draw.  Procedure                               Abnormality         Status                     ---------                               -----------         ------                     Extra Blue Top Tube[[id number]]                              Final result               Extra Red Top Tube[[id number]]                               Final result               Extra Blood Bank Purple ...[[id number]]                      Final result                 Please view results for these tests on the individual orders.   Extra Blue Top Tube     Status: None   Result Value Ref Range    Hold Specimen JIC    Extra Red Top Tube     Status: None   Result Value Ref Range    Hold Specimen JIC    Extra Blood Bank Purple Top Tube     Status: None   Result Value Ref Range    Hold Specimen JIC    Asymptomatic Influenza A/B, RSV, & SARS-CoV2 PCR (COVID-19) Nasopharyngeal     Status: Normal    Specimen: Nasopharyngeal; Swab   Result Value Ref  Range    Influenza A PCR Negative Negative    Influenza B PCR Negative Negative    RSV PCR Negative Negative    SARS CoV2 PCR Negative Negative    Narrative    Testing was performed using the Xpert Xpress CoV2/Flu/RSV Assay on the Cepheid GeneXpert Instrument. This test should be ordered for the detection of SARS-CoV2, influenza, and RSV viruses in individuals with signs and symptoms of respiratory tract infection. This test is for in vitro diagnostic use under the [place] FDA for laboratories certified under CLIA to perform high or moderate complexity testing. This test has been [place] FDA cleared. A negative result does not rule out the presence of PCR inhibitors in the specimen or target RNA in concentration below the limit of detection for the assay. If only one viral target is positive but coinfection with multiple targets is suspected, the sample should be re-tested with another FDA cleared, approved, or authorized test, if coninfection would change clinical management. This test was validated by the [facility] and [facility]. These laboratories are certified under the Clinical Laboratory Improvement Amendments of 1988 (CLIA-88) as qualified to perfom high complexity laboratory testing.   iStat Gases (lactate) venous, POCT     Status: Abnormal   Result Value Ref Range    Lactic Acid POCT 0.8 <=2.0 mmol/L    Bicarbonate Venous POCT 25 21 - 28 mmol/L    O2 Sat, Venous POCT 78 (H) 70 - 75 %    pCO2 Venous POCT 41 40 - 50 mm Hg    pH Venous POCT 7.39 7.32 - 7.43    pO2 Venous POCT 43 25 - 47 mm Hg    Base Excess/Deficit (+/-) POCT 0.0 -3.0 - 3.0 mmol/L   Alcohol ethyl     Status: Normal   Result Value Ref Range    Alcohol ethyl <0.01 <=0.01 g/dL   Procalcitonin     Status: Normal   Result Value Ref Range    Procalcitonin 0.11 <0.50 ng/mL   CRP inflammation     Status: Abnormal   Result Value Ref Range    CRP Inflammation 37.26 (H) <5.00 mg/L   CBC with platelets     Status: Abnormal   Result Value Ref Range    WBC  Count 7.2 4.0 - 11.0 10e3/uL    RBC Count 3.60 (L) 4.40 - 5.90 10e6/uL    Hemoglobin 10.9 (L) 13.3 - 17.7 g/dL    Hematocrit 32.5 (L) 40.0 - 53.0 %    MCV 90 78 - 100 fL    MCH 30.3 26.5 - 33.0 pg    MCHC 33.5 31.5 - 36.5 g/dL    RDW 15.3 (H) 10.0 - 15.0 %    Platelet Count 150 150 - 450 10e3/uL   Basic metabolic panel     Status: Abnormal   Result Value Ref Range    Sodium 129 (L) 135 - 145 mmol/L    Potassium 3.9 3.4 - 5.3 mmol/L    Chloride 100 98 - 107 mmol/L    Carbon Dioxide (CO2) 23 22 - 29 mmol/L    Anion Gap 6 (L) 7 - 15 mmol/L    Urea Nitrogen 18.3 8.0 - 23.0 mg/dL    Creatinine 1.08 0.67 - 1.17 mg/dL    GFR Estimate 64 >60 mL/min/1.73m2    Calcium 8.2 (L) 8.8 - 10.4 mg/dL    Glucose 98 70 - 99 mg/dL   Hemoglobin     Status: Abnormal   Result Value Ref Range    Hemoglobin 12.4 (L) 13.3 - 17.7 g/dL   Basic metabolic panel     Status: Abnormal   Result Value Ref Range    Sodium 135 135 - 145 mmol/L    Potassium 3.8 3.4 - 5.3 mmol/L    Chloride 102 98 - 107 mmol/L    Carbon Dioxide (CO2) 22 22 - 29 mmol/L    Anion Gap 11 7 - 15 mmol/L    Urea Nitrogen 17.0 8.0 - 23.0 mg/dL    Creatinine 1.05 0.67 - 1.17 mg/dL    GFR Estimate 66 >60 mL/min/1.73m2    Calcium 8.7 (L) 8.8 - 10.4 mg/dL    Glucose 98 70 - 99 mg/dL   Vitamin B12     Status: Normal   Result Value Ref Range    Vitamin B12 371 232 - 1,245 pg/mL   Folate     Status: Normal   Result Value Ref Range    Folic Acid 5.3 4.6 - 34.8 ng/mL   Basic metabolic panel     Status: Abnormal   Result Value Ref Range    Sodium 134 (L) 135 - 145 mmol/L    Potassium 3.6 3.4 - 5.3 mmol/L    Chloride 102 98 - 107 mmol/L    Carbon Dioxide (CO2) 24 22 - 29 mmol/L    Anion Gap 8 7 - 15 mmol/L    Urea Nitrogen 14.4 8.0 - 23.0 mg/dL    Creatinine 0.93 0.67 - 1.17 mg/dL    GFR Estimate 77 >60 mL/min/1.73m2    Calcium 8.6 (L) 8.8 - 10.4 mg/dL    Glucose 97 70 - 99 mg/dL   CBC with platelets     Status: Abnormal   Result Value Ref Range    WBC Count 7.4 4.0 - 11.0 10e3/uL    RBC  Count 3.83 (L) 4.40 - 5.90 10e6/uL    Hemoglobin 11.7 (L) 13.3 - 17.7 g/dL    Hematocrit 34.2 (L) 40.0 - 53.0 %    MCV 89 78 - 100 fL    MCH 30.5 26.5 - 33.0 pg    MCHC 34.2 31.5 - 36.5 g/dL    RDW 15.1 (H) 10.0 - 15.0 %    Platelet Count 161 150 - 450 10e3/uL   Echocardiogram Complete     Status: None   Result Value Ref Range    LVEF  35-40%     [facility]    [id number]  [id number]  [id number]  [id number]^BRENDA^ALFONZO     [facility]  Echocardiography Laboratory  [address] [place] [address]     Name: YUMIKO FERNANDEZ  MRN: [id number]  : 1931  Study Date: 2024 01:29 PM  Age: 89 yrs  Gender: Male  Patient Location: [facility]  Reason For Study: Endocarditis  Ordering Physician: ALFONZO GUTIERREZ  Performed By: Noni Akins     BSA: 2.0 m2  Height: 73 in  Weight: 176 lb  HR: 81  BP: 128/56 mmHg  ______________________________________________________________________________  Procedure  Complete Portable Echo Adult. Definity (NDC #26268-962) given intravenously.  ______________________________________________________________________________  Interpretation Summary     Left ventricular systolic function is moderately reduced. The visual ejection  fraction is 35-40%.  There is moderate global hypokinesia of the left ventricle.  The right ventricle is normal in size and function.  S/p TAVR 26 mm Rodriguez Paul 3 Ultra valve on 24. Well seated, normally  functioning valve with mean gradient 9 mmHg, No AI/PVL.  No major change from prior study on 2024.  ______________________________________________________________________________  Left Ventricle  The left ventricle is normal in size. Diastolic Doppler findings (E/E' ratio  and/or other parameters) suggest left ventricular filling pressures are  increased. Left ventricular systolic function is moderately reduced. The  visual ejection fraction is 35-40%. There is moderate global hypokinesia of  the left ventricle.     Right  Ventricle  The right ventricle is normal in size and function.     Atria  The left atrium is mildly dilated. Right atrial size is normal.     Mitral Valve  The mitral valve leaflets are mildly thickened. There is mild (1+) mitral  regurgitation.     Tricuspid Valve  Normal tricuspid valve.     Aortic Valve  The calculated aortic valve are is 1.5 cm^2. The mean AoV pressure gradient  is  9.0 mmHg. There is a bioprosthetic aortic valve. The prosthetic aortic valve  is well-seated.     Pulmonic Valve  The pulmonic valve is not well visualized.     Pericardium  There is no pericardial effusion.     Rhythm  Sinus rhythm was noted. The patient exhibited frequent PVCs.     ______________________________________________________________________________  MMode/2D Measurements & Calculations  IVSd: 0.89 cm  LVIDd: 5.6 cm  LVIDs: 3.7 cm  LVPWd: 1.0 cm  FS: 34.2 %     LV mass(C)d: 202.5 grams  LV mass(C)dI: 99.4 grams/m2  LA dimension: 4.0 cm  asc Aorta Diam: 3.8 cm  LVOT diam: 2.2 cm  LVOT area: 3.7 cm2  Asc Ao diam index BSA (cm/m2): 1.9  Asc Ao diam index Ht(cm/m): 2.1  EF Biplane: 51.4 %  LA Volume (BP): 47.5 ml  LA Volume Index (BP): 23.3 ml/m2  RV Base: 3.7 cm     RWT: 0.36  TAPSE: 1.7 cm     Doppler Measurements & Calculations  MV E max zeb: 90.8 cm/sec  MV A max zeb: 75.0 cm/sec  MV E/A: 1.2  MV max P.1 mmHg  MV mean P.9 mmHg  MV V2 VTI: 41.9 cm  MVA(VTI): 1.6 cm2  MV dec slope: 303.5 cm/sec2  MV dec time: 0.30 sec  Ao V2 max: 189.4 cm/sec  Ao max P.0 mmHg  Ao V2 mean: 141.6 cm/sec  Ao mean P.0 mmHg  Ao V2 VTI: 44.4 cm  TIFFANY(I,D): 1.5 cm2  TIFFANY(V,D): 1.4 cm2  LV V1 max P.1 mmHg  LV V1 max: 73.3 cm/sec  LV V1 VTI: 17.7 cm  SV(LVOT): 65.1 ml  SI(LVOT): 32.0 ml/m2  PA acc time: 0.14 sec  AV Zeb Ratio (DI): 0.39  TIFFANY Index (cm2/m2): 0.72  E/E' avg: 15.0  Lateral E/e': 11.0  Medial E/e': 19.0     RV S Zeb: 11.0 cm/sec     ______________________________________________________________________________  Report  approved by: Mary Rehman 11/12/2024 06:13 PM         Blood Culture Peripheral Blood     Status: Abnormal    Specimen: Peripheral Blood   Result Value Ref Range    Culture Positive on the 1st day of incubation (A)     Culture Staphylococcus aureus (AA)        Susceptibility    Staphylococcus aureus - BRENDA     Oxacillin* 0.5 Susceptible ug/mL      * Oxacillin susceptible isolates are susceptible to cephalosporins (example: cefazolin and cephalexin) and beta lactam combination agents. Oxacillin resistant isolates are resistant to these agents.     Gentamicin <=0.5 Susceptible ug/mL     Erythromycin 1 Intermediate ug/mL     Clindamycin*  Resistant       * This isolate is presumed to be clindamycin resistant based on detection of inducible clindamycin resistance. Erythromycin and clindamycin are resistant; therefore, they are not recommended for use.     Vancomycin <=0.5 Susceptible ug/mL     Daptomycin 0.5 Susceptible ug/mL     Tetracycline <=1 Susceptible ug/mL     Doxycycline <=0.5 Susceptible ug/mL     Trimethoprim/Sulfamethoxazole <=0.5/9.5 Susceptible ug/mL   Urine Culture     Status: Abnormal    Specimen: Urine, Catheter   Result Value Ref Range    Culture (A)      >100,000 CFU/mL Lactose fermenting gram negative bacilli    Culture 50,000-100,000 CFU/mL Gram positive cocci (A)     Culture 50,000-100,000 CFU/mL Gram positive cocci (A)     Culture (A)      50,000-100,000 CFU/mL Lactose fermenting gram negative bacilli    Narrative    Multiple morphotypes present with no predominant organism.  Growth consistent with probable contamination during collection.  Suggest repeat specimen if clinically indicated.    [facility]ig[facility] GP Panel     Status: Abnormal    Specimen: Peripheral Blood   Result Value Ref Range    Staphylococcus aureus Detected (A) Not Detected    Staphylococcus epidermidis Not Detected Not Detected    Staphylococcus lugdunensis Not Detected Not Detected    Enterococcus faecalis Not Detected Not  Detected    Enterococcus faecium Not Detected Not Detected    Streptococcus species Not Detected Not Detected    Streptococcus agalactiae Not Detected Not Detected    Streptococcus anginosus group Not Detected Not Detected    Streptococcus pneumoniae Not Detected Not Detected    Streptococcus pyogenes Not Detected Not Detected    Listeria species Not Detected Not Detected    Narrative    Specimen tested with CytoPherxigene multiplex, gram-positive blood culture nucleic acid test for the following targets: Staphylococcus aureus, Staphylococcus epidermidis, Staphylococcus lugdunensis, other Staphylococcus species, Enterococcus faecalis, Enterococcus faecium, Streptococcus species, Streptococcus agalactiae, Streptococcus anginosus group, Streptococcus pneumoniae, Streptococcus pyogenes, Listeria species, mecA (methicillin resistance), and Sobia/vanB (vancomycin resistance).   MRSA MSSA PCR, Nasal Swab     Status: None    Specimen: Nose; Swab   Result Value Ref Range    MRSA Target DNA Negative Negative    SA Target DNA Positive     Narrative    The [employer]  Xpert SA Nasal Complete assay performed in the [employer]  Dx System is a qualitative in vitro diagnostic test designed for rapid detection of Staphylococcus aureus (SA) and methicillin-resistant Staphylococcus aureus (MRSA) from nasal swabs in patients at risk for nasal colonization. The test utilizes automated real-time polymerase chain reaction (PCR) to detect MRSA/SA DNA. The Xpert SA Nasal Complete assay is intended to aid in the prevention and control of MRSA/SA infections in healthcare settings. The assay is not intended to diagnose, guide or monitor treatment for MRSA/SA infections, or provide results of susceptibility to methicillin. A negative result does not preclude MRSA/SA nasal colonization.    Blood Culture Arm, Right     Status: Normal    Specimen: Arm, Right; Blood   Result Value Ref Range    Culture No Growth    CBC with platelets differential     Status:  Abnormal    Narrative    The following orders were created for panel order CBC with platelets differential.  Procedure                               Abnormality         Status                     ---------                               -----------         ------                     CBC with platelets and d...[[id number]]  Abnormal            Final result                 Please view results for these tests on the individual orders.     Chau Palencia MD  [facility]

## 2024-11-16 NOTE — PROGRESS NOTES
Care Management Follow Up    Length of Stay (days): 6    Expected Discharge Date: 11/18/2024     Concerns to be Addressed: discharge planning     Patient plan of care discussed at interdisciplinary rounds: Yes    Anticipated Discharge Disposition: Transitional Care      Anticipated Discharge Services:    Anticipated Discharge DME: None    Patient/family educated on Medicare website which has current facility and service quality ratings: yes  Education Provided on the Discharge Plan: Yes  Patient/Family in Agreement with the Plan: yes    Referrals Placed by CM/SW: Post Acute Facilities  Private pay costs discussed: Not applicable    Discussed  Partnership in Safe Discharge Planning  document with patient/family: No     Handoff Completed: No, handoff not indicated or clinically appropriate    Additional Information:  Writer remigio VALE auth for SNF for IV antibiotics.     [id number]  Request ID  Status: Pending Clinical Review        Next Steps: SECURE tcu and auth     JESUS MANUEL Garcia

## 2024-11-16 NOTE — PLAN OF CARE
DATE & TIME: 11/15/24 [id number]    Cognitive Concerns/ Orientation : Pt A/Ox3, disoriented to situation. Pt frustrated with wrist pain this evening.   BEHAVIOR & AGGRESSION TOOL COLOR: Green   ABNL VS/O2: VSS on RA  MOBILITY: SBA with gait belt, fall risk  PAIN MANAGMENT: Complaints of wrist pain, managed with PRN Tylenol, scheduled Voltaren gel, hot packs and ice packs  DIET: Regular, 2000ml fluid restriction  BOWEL/BLADDER: Chronic rodriguez patent with adequate output  ABNL LAB/BG: Hgb 11.7  DRAIN/DEVICES: IV SL  SKIN: Scattered scabs and bruises. Blanchable redness to coccyx.  D/C DATE: Discharge pending TCU placement, Midline will be placed on day of discharge  OTHER IMPORTANT INFO:

## 2024-11-17 ENCOUNTER — DOCUMENTATION ONLY (OUTPATIENT)
Dept: GERIATRICS | Facility: CLINIC | Age: 89
End: 2024-11-17
Payer: COMMERCIAL

## 2024-11-17 ENCOUNTER — HOME INFUSION (OUTPATIENT)
Dept: HOME HEALTH SERVICES | Facility: HOME HEALTH | Age: 89
End: 2024-11-17
Payer: COMMERCIAL

## 2024-11-17 ENCOUNTER — ENROLLMENT (OUTPATIENT)
Dept: HOME HEALTH SERVICES | Facility: HOME HEALTH | Age: 89
End: 2024-11-17

## 2024-11-17 ENCOUNTER — HOME INFUSION BILLING (OUTPATIENT)
Dept: HOME HEALTH SERVICES | Facility: HOME HEALTH | Age: 89
End: 2024-11-17

## 2024-11-17 VITALS
RESPIRATION RATE: 20 BRPM | DIASTOLIC BLOOD PRESSURE: 68 MMHG | WEIGHT: 176.81 LBS | HEART RATE: 59 BPM | OXYGEN SATURATION: 93 % | BODY MASS INDEX: 23.43 KG/M2 | SYSTOLIC BLOOD PRESSURE: 129 MMHG | HEIGHT: 73 IN | TEMPERATURE: 98 F

## 2024-11-17 DIAGNOSIS — N39.0 SEPSIS SECONDARY TO UTI (H): Primary | ICD-10-CM

## 2024-11-17 DIAGNOSIS — A41.9 SEPSIS SECONDARY TO UTI (H): Primary | ICD-10-CM

## 2024-11-17 PROCEDURE — 36569 INSJ PICC 5 YR+ W/O IMAGING: CPT

## 2024-11-17 PROCEDURE — S9502 HIT ANTIBIOTIC Q8H DIEM: HCPCS

## 2024-11-17 PROCEDURE — 99239 HOSP IP/OBS DSCHRG MGMT >30: CPT | Performed by: HOSPITALIST

## 2024-11-17 PROCEDURE — A4221 SUPP NON-INSULIN INF CATH/WK: HCPCS

## 2024-11-17 PROCEDURE — 250N000011 HC RX IP 250 OP 636: Mod: JZ | Performed by: INTERNAL MEDICINE

## 2024-11-17 PROCEDURE — 250N000013 HC RX MED GY IP 250 OP 250 PS 637: Performed by: STUDENT IN AN ORGANIZED HEALTH CARE EDUCATION/TRAINING PROGRAM

## 2024-11-17 PROCEDURE — 99207 PR NO BILLABLE SERVICE THIS VISIT: CPT | Performed by: HOSPITALIST

## 2024-11-17 PROCEDURE — 250N000013 HC RX MED GY IP 250 OP 250 PS 637: Performed by: HOSPITALIST

## 2024-11-17 PROCEDURE — 250N000009 HC RX 250: Performed by: STUDENT IN AN ORGANIZED HEALTH CARE EDUCATION/TRAINING PROGRAM

## 2024-11-17 PROCEDURE — 272N000433 HC KIT CATH IV 18 OR 20G CM, POWERGLIDE W MAX BARRIER

## 2024-11-17 RX ORDER — ACETAMINOPHEN 325 MG/1
650 TABLET ORAL EVERY 6 HOURS PRN
DISCHARGE
Start: 2024-11-17

## 2024-11-17 RX ORDER — MULTIPLE VITAMINS W/ MINERALS TAB 9MG-400MCG
1 TAB ORAL DAILY
DISCHARGE
Start: 2024-11-18

## 2024-11-17 RX ORDER — FUROSEMIDE 40 MG/1
20 TABLET ORAL DAILY
DISCHARGE
Start: 2024-11-17

## 2024-11-17 RX ADMIN — CEFAZOLIN SODIUM 2 G: 2 INJECTION, SOLUTION INTRAVENOUS at 03:28

## 2024-11-17 RX ADMIN — ATORVASTATIN CALCIUM 40 MG: 40 TABLET, FILM COATED ORAL at 09:36

## 2024-11-17 RX ADMIN — Medication 1 TABLET: at 09:36

## 2024-11-17 RX ADMIN — METOPROLOL SUCCINATE 25 MG: 25 TABLET, EXTENDED RELEASE ORAL at 09:36

## 2024-11-17 RX ADMIN — CEFAZOLIN SODIUM 2 G: 2 INJECTION, SOLUTION INTRAVENOUS at 13:00

## 2024-11-17 RX ADMIN — POTASSIUM CHLORIDE 20 MEQ: 1500 TABLET, EXTENDED RELEASE ORAL at 09:36

## 2024-11-17 RX ADMIN — ASPIRIN 81 MG: 81 TABLET, COATED ORAL at 09:36

## 2024-11-17 RX ADMIN — FUROSEMIDE 20 MG: 20 TABLET ORAL at 09:36

## 2024-11-17 RX ADMIN — LIDOCAINE HYDROCHLORIDE 1 ML: 10 INJECTION, SOLUTION EPIDURAL; INFILTRATION; INTRACAUDAL; PERINEURAL at 14:09

## 2024-11-17 ASSESSMENT — ACTIVITIES OF DAILY LIVING (ADL)
ADLS_ACUITY_SCORE: 0

## 2024-11-17 NOTE — DISCHARGE SUMMARY
[facility]  Hospitalist Discharge Summary      Date of Admission:  11/9/2024  Date of Discharge:  11/17/2024  Discharging Provider: Oren Fernandez MD  Discharge Service: Hospitalist Service    Discharge Diagnoses     Please refer to the hospital course    Clinically Significant Risk Factors          Follow-ups Needed After Discharge   Follow-up Appointments       Follow Up ([facility])      Please call to schedule an appointment for rodriguez catheter exchange in 4 weeks (ideally first week of December). Urology has recommended against removing the catheter and recommends continuing this on a chronic basis.    [facility]  [address] [place] [address]  You may call [phone] with any questions or concerns.   Central Appointment #: [phone]        Follow Up and recommended labs and tests      Follow up with longterm physician.  The following labs/tests are recommended: CBC and CMP weekly while in abx.                 Unresulted Labs Ordered in the Past 30 Days of this Admission       No orders found from 10/10/2024 to 11/10/2024.        These results will be followed up by none    Discharge Disposition   Discharged to short-term care facility  Condition at discharge: Stable    Hospital Course   Canelo Cruz is a 89 year old male with history of urinary retention with chronic rodriguez, hypertension, coronary artery disease, dyslipidemia, chronic systolic CHF, 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, suspected cognitive impairment admitted on 11/9/2024 with generalized weakness found to have MSSA bacteremia.     MSSA bacteremia  CAUTI w/ chronic rodriguez catheter, less likely (last exchange 11/11)  Sepsis secondary to above, resolved  Patient brought into the ED via EMS with generalized weakness, fever of 101.9, confusion.  Per report abnormal smell, sediment in Rodriguez catheter bag, poor care of rodriguez catheter. Last known change of rodriugez catheter was in September 2024 at the time of  a hospital admission, patient reports was not given any instructions for Rodriguez catheter changes. Met sepsis criteria on admission and found to have MSSA bacteremia. TTE 11/12 with stable TAVR appearance and function. Remains HDS on cefazolin, ID following.    -Continue cefazolin, plan is 28 days of IV abx (end date 12/08/24), discharge antibiotic order in epic per ID.  Appreciate ID assistance.  -Midline placed on day of discharge, to remove after antibiotic course completed.  -Blood cultures remain NGTD  -Urology consulted, plan for rodriguez exchange in ~3 weeks  -Continue PTA tamsulosin     Acute encephalopathy likely multifactorial 2/2 infectious etiology, underlying cognitive impairment, delirium from hospitalization  Concern for baseline cognitive impairment  Physical deconditioning from medical illness, senile frailty  Patient reported he and his wife lives alone, independent and still drives. EMS notes that the patient wanted an additional drink of alcohol before getting in ambulance. Requests no contact with his sister nor spouse about details of medical history or concerns about cognitive function. Concern for cognitive impairment noted during prior hospitalizations. Scored 13/28 on MoCA. Per psychiatry, patient is not currently able to make his own decisions.   -Patient's brother-in-law Dre Phan is decision maker  -Patient is more calm. Off sitter, on Seroquel 12.5 mg twice daily as needed, has not needed and discontinued.   -Frequent reorientation  -Fall precautions, delirium precautions  -Discharged to TCU     Chronic systolic congestive heart failure, compensated  Coronary artery disease  Severe aortic valve stenosis s/p TAVR (07/17/2024)  Hypertension  Hyperlipidemia  Follows with [facility] cardiology.  Denied any chest pain or palpitations  Echocardiogram 7/2024 with LVEF of 20 to 25%.  Repeat echo 8/2024 with LVEF of 40%. Echocardiogram from 11/12/2024 with LVEF of 35 to 40%.  Moderate global hypokinesia  of the left ventricle.  Right ventricle normal in size and function.  Status post TAVR, well-seated normally functioning valve.   -Continue PTA aspirin and Toprol.  -Continue PTA Lipitor.  -Continue PTA Lasix at low-dose of 20 mg oral daily   -Patient is quite frustrated about the fluid restriction, will remove and monitor his oral intake   -Recommend follow-up with cardiology after discharge.     Right wrist pain-degenerative joint disease  History of falls  Reports had a fall recently in October while coming out of dentist office, has not had any workup from his primary provider whom he had seen.  Complaint of wrist pain. On exam right wrist swelling noted, range of motion restricted.  Tenderness present, no warmth or erythema. X-ray right wrist multifocal degenerative arthrosis of the right wrist, greatest to an advanced degree involving the STT joints. Mild ulnar positive variance with moderate degenerative arthrosis of the ulnolunate joint. No acute fracture. 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   -Given MSSA bacteremia, ongoing pain and swelling and tenderness, orthopedic surgery consulted.  Joint aspiration was offered but patient declined.  Pain has improved with use of crêpe bandage.  -As needed Tylenol, heat, ice, voltaren  -Fall precautions     Acute on chronic knee pain-degenerative joint disease  Reports since fall is about unable to bear weight on right knee, pain.  Mild swelling, no erythema, tenderness present. Xray right knee w/ OA and joint space narrowing, no fx, trace effusion.   -As needed Tylenol, Robaxin, voltaren  -Follow-up with orthopedic surgery as outpatient.     Hyponatremia   Sodium of 132 on admission. Improved with IVF.  Last recheck 134.  -Intermittent BMP     Alcohol use, reported  Per ED note reviewed, patient attempted to grab a drink of alcohol before leaving, and they noticed the container  "of alcohol had \"floaties\" in it, though they are unsure where they came from. B12, folate low-normal.  -Start multivitamin     Acute anemia, suspect dilutional  Hemoglobin 10-11.  Monitor    Consultations This Hospital Stay   PHYSICAL THERAPY ADULT IP CONSULT  OCCUPATIONAL THERAPY ADULT IP CONSULT  UROLOGY IP CONSULT  CARE MANAGEMENT / SOCIAL WORK IP CONSULT  PHARMACY TO DOSE VANCO  INFECTIOUS DISEASES IP CONSULT  CARE MANAGEMENT / SOCIAL WORK IP CONSULT  PSYCHIATRY IP CONSULT  VASCULAR ACCESS ADULT IP CONSULT  VASCULAR ACCESS ADULT IP CONSULT  VASCULAR ACCESS ADULT IP CONSULT  VASCULAR ACCESS ADULT IP CONSULT  ORTHOPEDIC SURGERY IP CONSULT  VASCULAR ACCESS ADULT IP CONSULT  PHYSICAL THERAPY ADULT IP CONSULT  OCCUPATIONAL THERAPY ADULT IP CONSULT    Code Status   Full Code    Time Spent on this Encounter   I, Oren Fernandez MD, personally saw the patient today and spent greater than 30 minutes discharging this patient.       Oren Fernandez MD  [place] [address] [facility]  [address] [place] [address]  Phone: [phone]  ______________________________________________________________________    Physical Exam   Vital Signs:                    Weight: 176 lbs 12.94 oz    Please refer to the progress note from same day       Primary Care Physician   Maribell Stewart    Discharge Orders      Follow Up ([facility])    Please call to schedule an appointment for rodriguez catheter exchange in 4 weeks (ideally first week of December). Urology has recommended against removing the catheter and recommends continuing this on a chronic basis.    [facility] [place] [facility]  [address] [place] [address]  You may call [phone] with any questions or concerns.   Central Appointment #: [phone]     General info for SNF    Length of Stay Estimate: Short Term Care: Estimated # of Days <30  Condition at Discharge: Improving  Level of care:skilled   Rehabilitation " Potential: Good  Admission H&P remains valid and up-to-date: Yes  Recent Chemotherapy: N/A  Use Nursing Home Standing Orders: Yes     Follow Up and recommended labs and tests    Follow up with long term physician.  The following labs/tests are recommended: CBC and CMP weekly while in abx.     Reason for your hospital stay    MSSA bacteremia     Activity - Up with assistive device     Activity - Up with nursing assistance     Additional Discharge Instructions    Discontinue mid line after IV antibiotic course completed     Full Code     Physical Therapy Adult Consult    Evaluate and treat as clinically indicated.    Reason: Bacteremia, generalized weakness     Occupational Therapy Adult Consult    Evaluate and treat as clinically indicated.    Reason: Bacteremia, generalized weakness, cognitive impairment     Diet    Follow this diet upon discharge: Current Diet:Orders Placed This Encounter      Combination Diet Regular Diet Adult       Significant Results and Procedures   Most Recent 3 CBC's:  Recent Labs   Lab Test 11/15/24  0646 11/12/24  0739 11/11/24  0638 11/09/24  2254   WBC 7.4  --  7.2 11.3*   HGB 11.7* 12.4* 10.9* 12.4*   MCV 89  --  90 89     --  150 244     Most Recent 3 BMP's:  Recent Labs   Lab Test 11/15/24  0646 11/12/24  0739 11/11/24  0638   * 135 129*   POTASSIUM 3.6 3.8 3.9   CHLORIDE 102 102 100   CO2 24 22 23   BUN 14.4 17.0 18.3   CR 0.93 1.05 1.08   ANIONGAP 8 11 6*   ROSA 8.6* 8.7* 8.2*   GLC 97 98 98     Most Recent 2 LFT's:  Recent Labs   Lab Test 11/09/24 2254 06/28/24  1414   AST 31 25   ALT 13 14   ALKPHOS 91 100   BILITOTAL 0.6 0.8     Most Recent 3 INR's:  Recent Labs   Lab Test 06/28/24  1414   INR 0.96   ,   Results for orders placed or performed during the hospital encounter of 11/09/24   XR Chest Port 1 View    Narrative    EXAM: XR CHEST PORT 1 VIEW  LOCATION: [facility]  DATE: 11/10/2024    INDICATION: COUGH  COMPARISON: 06/10/2024       Impression    IMPRESSION: Stable cardiomediastinal silhouette. Mild basilar atelectasis. No vascular congestion or significant effusion. Right hilar prominence similar. Atherosclerotic aorta. Degenerative change osseous structures.   XR Wrist Right G/E 3 Views    Narrative    EXAM: XR WRIST RIGHT G/E 3 VIEWS  LOCATION: [facility]  DATE: 11/10/2024    INDICATION: Reports fall in April, right wrist pain and swelling.  COMPARISON: None.      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 Mild ulnar positive variance with moderate degenerative arthrosis of the ulnolunate joint. No acute fracture. Corticated ossicle   dorsal of the wrist on the lateral view which may be an old ununited triquetral avulsion fracture fragment.    XR Knee Right 1/2 Views    Narrative    EXAM: XR KNEE RIGHT 1/2 VIEWS  LOCATION: [facility]  DATE: 11/10/2024    INDICATION: Reports fall in April, right KNEE pain and swelling.  COMPARISON: None.      Impression    IMPRESSION: Tricompartmental degenerative arthrosis with marginal osteophytes and moderate narrowing of the medial compartment. No acute fracture. Trace joint effusion.    Echocardiogram Complete     Value    LVEF  35-40%    Narrative    [id number]  [id number]  [id number]  [id number]^BRENDA^ALFONZO     [facility]  Echocardiography Laboratory  [address] [place] [address]     Name: YUMIKO FERNANDEZ  MRN: [id number]  : 1931  Study Date: 2024 01:29 PM  Age: 89 yrs  Gender: Male  Patient Location: [facility]  Reason For Study: Endocarditis  Ordering Physician: ALFONZO GUTIERREZ  Performed By: Noni Akins     BSA: 2.0 m2  Height: 73 in  Weight: 176 lb  HR: 81  BP: 128/56 mmHg  ______________________________________________________________________________  Procedure  Complete Portable Echo Adult. Definity (NDC  #12884-731) given intravenously.  ______________________________________________________________________________  Interpretation Summary     Left ventricular systolic function is moderately reduced. The visual ejection  fraction is 35-40%.  There is moderate global hypokinesia of the left ventricle.  The right ventricle is normal in size and function.  S/p TAVR 26 mm Rodriguez Paul 3 Ultra valve on 7/16/24. Well seated, normally  functioning valve with mean gradient 9 mmHg, No AI/PVL.  No major change from prior study on 8/30/2024.  ______________________________________________________________________________  Left Ventricle  The left ventricle is normal in size. Diastolic Doppler findings (E/E' ratio  and/or other parameters) suggest left ventricular filling pressures are  increased. Left ventricular systolic function is moderately reduced. The  visual ejection fraction is 35-40%. There is moderate global hypokinesia of  the left ventricle.     Right Ventricle  The right ventricle is normal in size and function.     Atria  The left atrium is mildly dilated. Right atrial size is normal.     Mitral Valve  The mitral valve leaflets are mildly thickened. There is mild (1+) mitral  regurgitation.     Tricuspid Valve  Normal tricuspid valve.     Aortic Valve  The calculated aortic valve are is 1.5 cm^2. The mean AoV pressure gradient  is  9.0 mmHg. There is a bioprosthetic aortic valve. The prosthetic aortic valve  is well-seated.     Pulmonic Valve  The pulmonic valve is not well visualized.     Pericardium  There is no pericardial effusion.     Rhythm  Sinus rhythm was noted. The patient exhibited frequent PVCs.     ______________________________________________________________________________  MMode/2D Measurements & Calculations  IVSd: 0.89 cm  LVIDd: 5.6 cm  LVIDs: 3.7 cm  LVPWd: 1.0 cm  FS: 34.2 %     LV mass(C)d: 202.5 grams  LV mass(C)dI: 99.4 grams/m2  LA dimension: 4.0 cm  asc Aorta Diam: 3.8 cm  LVOT diam:  2.2 cm  LVOT area: 3.7 cm2  Asc Ao diam index BSA (cm/m2): 1.9  Asc Ao diam index Ht(cm/m): 2.1  EF Biplane: 51.4 %  LA Volume (BP): 47.5 ml  LA Volume Index (BP): 23.3 ml/m2  RV Base: 3.7 cm     RWT: 0.36  TAPSE: 1.7 cm     Doppler Measurements & Calculations  MV E max zeb: 90.8 cm/sec  MV A max zeb: 75.0 cm/sec  MV E/A: 1.2  MV max P.1 mmHg  MV mean P.9 mmHg  MV V2 VTI: 41.9 cm  MVA(VTI): 1.6 cm2  MV dec slope: 303.5 cm/sec2  MV dec time: 0.30 sec  Ao V2 max: 189.4 cm/sec  Ao max P.0 mmHg  Ao V2 mean: 141.6 cm/sec  Ao mean P.0 mmHg  Ao V2 VTI: 44.4 cm  TIFFANY(I,D): 1.5 cm2  TIFFANY(V,D): 1.4 cm2  LV V1 max P.1 mmHg  LV V1 max: 73.3 cm/sec  LV V1 VTI: 17.7 cm  SV(LVOT): 65.1 ml  SI(LVOT): 32.0 ml/m2  PA acc time: 0.14 sec  AV Zeb Ratio (DI): 0.39  TIFFANY Index (cm2/m2): 0.72  E/E' avg: 15.0  Lateral E/e': 11.0  Medial E/e': 19.0     RV S Zeb: 11.0 cm/sec     ______________________________________________________________________________  Report approved by: Mary Rehman 2024 06:13 PM             Discharge Medications   Discharge Medication List as of 2024  2:43 PM        START taking these medications    Details   multivitamin w/minerals (THERA-VIT-M) tablet Take 1 tablet by mouth daily., Transitional      ceFAZolin (ANCEF) 1 GM vial Inject 2 g over 30 minutes into the vein every 8 hours for 24 days. CBC with differential, creatinine, SGOT weekly while on this medication to be faxed to Dr. Fuchs office., Transitional           CONTINUE these medications which have CHANGED    Details   acetaminophen (TYLENOL) 325 MG tablet Take 2 tablets (650 mg) by mouth every 6 hours as needed for mild pain., Transitional      furosemide (LASIX) 40 MG tablet Take 0.5 tablets (20 mg) by mouth daily., Transitional           CONTINUE these medications which have NOT CHANGED    Details   aspirin 81 MG EC tablet Take 81 mg by mouth. Taking 1-2 weekly, Historical      diclofenac (VOLTAREN) 1 % topical gel  Apply 4 g topically 4 times daily, Historical      melatonin 3 MG tablet Take 3 mg by mouth nightly as needed., Historical      metoprolol succinate ER (TOPROL XL) 25 MG 24 hr tablet Take 1 tablet (25 mg) by mouth daily., Disp-90 tablet, R-3, E-Prescribe      potassium chloride ER (K-TAB) 20 MEQ CR tablet Take 1 tablet (20 mEq) by mouth daily, Disp-90 tablet, R-0, E-Prescribe      tamsulosin (FLOMAX) 0.4 MG capsule Take 1 capsule (0.4 mg) by mouth daily as needed (urinary retention)., Disp-30 capsule, R-0, E-Prescribe      atorvastatin (LIPITOR) 40 MG tablet Take 1 tablet (40 mg) by mouth daily, Disp-90 tablet, R-3, E-Prescribe           STOP taking these medications       albuterol (PROAIR HFA/PROVENTIL HFA/VENTOLIN HFA) 108 (90 Base) MCG/ACT inhaler Comments:   Reason for Stopping:             Allergies   Allergies   Allergen Reactions    Ciprofloxacin      history of achilles tendon pain

## 2024-11-17 NOTE — PLAN OF CARE
"Goal Outcome Evaluation:                    SUMMARY: Urosepsis, bacteremia.    DATE & TIME: 11/16/24    Cognitive Concerns/ Orientation : A&O x3, disoriented to situation/need for TCU.   BEHAVIOR & AGGRESSION TOOL COLOR: Green   ABNL VS/O2: VSS on RA  MOBILITY: SBA with gait belt, fall risk. Amb in handy x1, sat in chair x1. Refuses to use walker.  PAIN MANAGMENT: C/O continued R wrist pain, managed with PRN Tylenol, scheduled Voltaren gel. PRN robaxin dose sl effective. R ACE wrap effective.   DIET: Regular, 2000ml fluid restriction.  BOWEL/BLADDER: Chronic rodriguez patent to leg bag, adeq output. No BM.   ABNL LAB: None drawn today.  DRAIN/DEVICES: R IV SL patent.  SKIN: Scattered scabs and bruises. Blanchable redness to coccyx. Refuses hygiene cares, \"I will think about taking a shower\".   D/C DATE: Discharge pending TCU placement, for IV abx and PT/OT, SW involved.  OTHER IMPORTANT INFO: Receiving Ancef q8hrs. Sleeping or on his phone between cares. Refused R wrist aspiration, if pt changes his mind, please contact Ortho. ACE wrap to R wrist per pt request effective.     "

## 2024-11-17 NOTE — PLAN OF CARE
Goal Outcome Evaluation:                      SUMMARY: Sepsis, Bacteremia    DATE & TIME: 11/17/24    Cognitive Concerns/ Orientation : A/Ox3, disoriented to situation, forgetful, frustrated, acceptance.   BEHAVIOR & AGGRESSION TOOL COLOR: Green   ABNL VS/O2: VSS on RA  MOBILITY: SBA with gait belt, fall risk, declined OOB activity when encouraged.   PAIN MANAGMENT: Complaints of minor wrist pain, declines intervention. Per patient much better after ACE wrap applied.  DIET: Regular, 2000 ml fluid restriction.  BOWEL/BLADDER: Chronic rodriguez patent with adequate output.  DRAIN/DEVICES: PIV removed. R midline placed.   SKIN: Scattered scabs and bruises. Blanchable redness to coccyx.  D/C DATE: [facility] today.   OTHER IMPORTANT INFO: Receiving Ancef q8hrs. PT/OT.     Discharge    Patient discharged to [facility] via w/c with family. Discharge packet including midline IV info sent with pt.     Listed belongings gathered and given to patient (including from security/pharmacy). Yes  Care Plan and Patient education resolved: Yes  Prescriptions if needed, hard copies sent with patient  NA  Medication Bin checked and emptied on discharge Yes  SW/care coordinator/charge RN aware of discharge: Yes

## 2024-11-17 NOTE — PROGRESS NOTES
Care Management Follow Up    Length of Stay (days): 7    Expected Discharge Date: 11/18/2024     Concerns to be Addressed: discharge planning     Patient plan of care discussed at interdisciplinary rounds: Yes    Anticipated Discharge Disposition: Transitional Care              Anticipated Discharge Services:    Anticipated Discharge DME: None    Patient/family educated on Medicare website which has current facility and service quality ratings: yes  Education Provided on the Discharge Plan: Yes  Patient/Family in Agreement with the Plan: yes    Referrals Placed by CM/SW: Post Acute Facilities  Private pay costs discussed: Not applicable    Discussed  Partnership in Safe Discharge Planning  document with patient/family: Yes:     Handoff Completed: No, handoff not indicated or clinically appropriate    Additional Information:  [employer] auth is still pending - patient can discharge to [facility] when auth is received. Possible for today    Next Steps: PAS, Ride     ADD - writer checking [employer] portal for updates several times, referral says pending clinical review, writer called [employer] to do a verbal at 1234 on hold for 10 min, still busy.     JESUS MANUEL Garcia

## 2024-11-17 NOTE — PLAN OF CARE
Physical Therapy Discharge Summary    Reason for therapy discharge:    Discharged to transitional care facility.    Progress towards therapy goal(s). See goals on Care Plan in Psychiatric electronic health record for goal details.  Goals partially met.  Barriers to achieving goals:   discharge from facility.    Therapy recommendation(s):    Continued therapy is recommended.  Rationale/Recommendations:  To improve IND w/ mobility as pt below baseline at this time.

## 2024-11-17 NOTE — PLAN OF CARE
DATE & TIME: 11/16/24 [id number]    Cognitive Concerns/ Orientation : Pt A/Ox3, disoriented to situation   BEHAVIOR & AGGRESSION TOOL COLOR: Green   ABNL VS/O2: VSS on RA  MOBILITY: SBA with gait belt, fall risk  PAIN MANAGMENT: Complaints of minor wrist pain, declines intervention. Per patient much better after ACE wrap applied.  DIET: Regular, 2000 ml fluid restriction  BOWEL/BLADDER: Chronic rodriguez patent with adequate output.  DRAIN/DEVICES: IV SL  SKIN: Scattered scabs and bruises. Blanchable redness to coccyx.  D/C DATE: Discharge to TCU pending placement. Pt will need IV abx.  OTHER IMPORTANT INFO: Receiving Ancef q8hrs. Pt given Tylenol and Melatonin at bedtime.

## 2024-11-17 NOTE — PROCEDURES
[facility]    Single Lumen Midline Placement    Date/Time: 11/17/2024 2:18 PM    Performed by: Nathalie Man RN  Authorized by: Rodney Majano MD  Indications: vascular access      UNIVERSAL PROTOCOL   Site Marked: Yes  Prior Images Obtained and Reviewed:  Yes  Required items: Required blood products, implants, devices and special equipment available    Patient identity confirmed:  Verbally with patient, arm band, hospital-assigned identification number and provided demographic data  NA - No sedation, light sedation, or local anesthesia  Confirmation Checklist:  Patient's identity using two indicators, relevant allergies, procedure was appropriate and matched the consent or emergent situation and correct equipment/implants were available  Time out: Immediately prior to the procedure a time out was called    Universal Protocol: the Joint Commission Universal Protocol was followed    Preparation: Patient was prepped and draped in usual sterile fashion       ANESTHESIA    Local Anesthetic:  Lidocaine 1% without epinephrine  Anesthetic Total (mL):  1      SEDATION    Patient Sedated: No        Skin prep agent: skin prep agent completely dried prior to procedure  Sterile barriers: maximum sterile barriers were used: cap, mask, sterile gown, sterile gloves, and large sterile sheet  Hand hygiene: hand hygiene performed prior to central venous catheter insertion  Type of line used: Midline  Catheter type: single lumen  Lumen type: non-valved  Catheter size: 4 Fr  Brand: Bard  Lot number: [id number]  Placement method: MST, venipuncture and ultrasound  Number of attempts: 1  Difficulty threading catheter: no  Successful placement: yes  Orientation: right    Location: basilic vein  Arm circumference: adults 10 cm  Extremity circumference: 28  Visible catheter length: 0  Internal length: 17 cm  Total catheter length: 17  Dressing and securement: chlorhexidine patch applied, transparent dressing,  sterile dressing applied, statlock, site cleansed, line secured and transparent securement dressing  Post procedure assessment: blood return through all ports  PROCEDURE   Patient Tolerance:  Patient tolerated the procedure well with no immediate complicationsDescribe Procedure: Nursing note  Successfully placed single lumen ML catheter on the right basilic vein on one attempt with good blood return noted. ML is ready for use.

## 2024-11-17 NOTE — PROGRESS NOTES
Care Management Follow Up    Length of Stay (days): 7    Expected Discharge Date: 11/18/2024     Concerns to be Addressed: discharge planning     Patient plan of care discussed at interdisciplinary rounds: Yes    Anticipated Discharge Disposition: Transitional Care     Anticipated Discharge Services:    Anticipated Discharge DME: None    Patient/family educated on Medicare website which has current facility and service quality ratings: yes  Education Provided on the Discharge Plan: Yes  Patient/Family in Agreement with the Plan: yes    Referrals Placed by CM/SW: Post Acute Facilities  Private pay costs discussed: Not applicable    Discussed  Partnership in Safe Discharge Planning  document with patient/family: Yes:     Handoff Completed: No, handoff not indicated or clinically appropriate    Additional Information:  Writer completed PAS in anticipation of discharge    PAS-RR    D: Per DHS regulation, SW completed and submitted PAS-RR to [place] [employer] Direct Connect via the Senior LinkAge Line.  PAS-RR confirmation # is : [id number]    I: SW spoke with Pt  and they are aware a PAS-RR has been submitted.  SW reviewed with Pt that they may be contacted for a follow up appointment within 10 days of hospital discharge if their SNF stay is < 30 days.  Contact information for [facility] Line was also provided.    A: Pt  verbalized understanding.    P: Further questions may be directed to [facility] LinkAge Line at #[phone], option #4 for PAS-RR staff.     Next Steps: Orders     JESUS MANUEL Garcia

## 2024-11-17 NOTE — PROGRESS NOTES
[facility]    Medicine Progress Note - Hospitalist Service    Date of Admission:  11/9/2024    Assessment & Plan   Canelo Cruz is a 89 year old male with history of urinary retention with chronic rodriguez, hypertension, coronary artery disease, dyslipidemia, chronic systolic CHF, 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, suspected cognitive impairment admitted on 11/9/2024 with generalized weakness found to have MSSA bacteremia.     MSSA bacteremia  CAUTI w/ chronic rodriguez catheter, less likely (last exchange 11/11)  Sepsis secondary to above, resolved  Patient brought into the ED via EMS with generalized weakness, fever of 101.9, confusion.  Per report abnormal smell, sediment in Rodriguez catheter bag, poor care of rodriguez catheter. Last known change of rodriguez catheter was in September 2024 at the time of a hospital admission, patient reports was not given any instructions for Rodriguez catheter changes. Met sepsis criteria on admission and found to have MSSA bacteremia. TTE 11/12 with stable TAVR appearance and function. Remains HDS on cefazolin, ID following.    -Continue cefazolin, plan is 28 days of IV abx (end date 12/08/24), discharge antibiotic order in epic per ID.  Appreciate ID assistance.  -Midline when patient agrees, Plan to place on day of discharge to avoid additional confusion  -Blood cultures remain NGTD  -Urology consulted, plan for rodriguez exchange in ~3 weeks  -Continue PTA tamsulosin     Acute encephalopathy likely multifactorial 2/2 infectious etiology, underlying cognitive impairment, delirium from hospitalization  Concern for baseline cognitive impairment  Physical deconditioning from medical illness, senile frailty  Patient reported he and his wife lives alone, independent and still drives. EMS notes that the patient wanted an additional drink of alcohol before getting in ambulance. Requests no contact with his sister nor spouse about details of medical history or concerns about cognitive function.  Concern for cognitive impairment noted during prior hospitalizations. Scored 13/28 on MoCA. Per psychiatry, patient is not currently able to make his own decisions.   -Patient's brother-in-law Dre Phan is decision maker  -Patient is more calm. Off sitter, continue Seroquel 12.5 mg twice daily as needed.   -Frequent reorientation  -Fall precautions, delirium precautions  -Awaiting TCU placement, SW following     Chronic systolic congestive heart failure, compensated  Coronary artery disease  Severe aortic valve stenosis s/p TAVR (07/17/2024)  Hypertension  Hyperlipidemia  Follows with [facility] cardiology.  Denied any chest pain or palpitations  Echocardiogram 7/2024 with LVEF of 20 to 25%.  Repeat echo 8/2024 with LVEF of 40%. Echocardiogram from 11/12/2024 with LVEF of 35 to 40%.  Moderate global hypokinesia of the left ventricle.  Right ventricle normal in size and function.  Status post TAVR, well-seated normally functioning valve.   -Continue PTA aspirin and Toprol.  -Continue PTA Lipitor.  -Continue PTA Lasix at low-dose of 20 mg oral daily   -Patient is quite frustrated about the fluid restriction, will remove and monitor his oral intake, discussed with nursing staff, if dyspnea or hypoxia, then will decrease to 1.8-2 L.  -Recommend follow-up with cardiology after discharge.     Right wrist pain-degenerative joint disease  History of falls  Reports had a fall recently in October while coming out of dentist office, has not had any workup from his primary provider whom he had seen.  Complaint of wrist pain. On exam right wrist swelling noted, range of motion restricted.  Tenderness present, no warmth or erythema. X-ray right wrist multifocal degenerative arthrosis of the right wrist, greatest to an advanced degree involving the STT joints. Mild ulnar positive variance with moderate degenerative arthrosis of the ulnolunate joint. No acute fracture. Corticated ossicle dorsal of the wrist on the lateral view which  "may be an old ununited triquetral avulsion fracture fragment.   -Given MSSA bacteremia, ongoing pain and swelling and tenderness, orthopedic surgery consulted.  Joint aspiration was offered but patient declined.  Pain has improved with use of crêpe bandage.  -As needed Tylenol, heat, ice, voltaren  -Fall precautions     Acute on chronic knee pain-degenerative joint disease  Reports since fall is about unable to bear weight on right knee, pain.  Mild swelling, no erythema, tenderness present. Xray right knee w/ OA and joint space narrowing, no fx, trace effusion.   -As needed Tylenol, Robaxin, voltaren  -Follow-up with orthopedic surgery as outpatient.     Hyponatremia   Sodium of 132 on admission. Improved with IVF.  Last recheck 134.  -Intermittent BMP     Alcohol use, reported  Per ED note reviewed, patient attempted to grab a drink of alcohol before leaving, and they noticed the container of alcohol had \"floaties\" in it, though they are unsure where they came from. B12, folate low-normal.  -Start multivitamin     Acute anemia, suspect dilutional  Hemoglobin 10-11.  Monitor        Diet: Combination Diet Regular Diet Adult  Fluid restriction 2000 ML FLUID    DVT Prophylaxis: Pneumatic Compression Devices  [place] Catheter: PRESENT, indication: Other (Comment) (chronic), Other (Comment) (chronic retention)  Lines: None     Cardiac Monitoring: None  Code Status: Full Code      Clinically Significant Risk Factors                      # Chronic heart failure with reduced ejection fraction: last echo with EF <40%                      Social Drivers of Health    Social Connections: Socially Isolated (6/17/2024)    Received from [facility]    Social Connections     Do you often feel lonely or isolated from those around you?: 4          Disposition Plan     Medically Ready for Discharge: Ready Now medically stable, pending midline/PICC line placement, and pending insurance authorization for " TCU discharge.  I have called his brother-in-law Dre and left a voice message to call back with any question.       Oren Fernandez MD  Hospitalist Service  [facility]  Securely message with [employer] (more info)  Text page via [facility] Paging/Directory   ______________________________________________________________________    Interval History     Chart reviewed, discussed with nursing staff and patient was seen this morning.   He feels frustrated about being in hospital.  Could not sleep last 2 nights because of noise (screening patient next-door).   Right wrist pain has much improved with crêpe bandage.  Pain controlled with Tylenol.   Patient asked if he can take only antibiotic pill for the infection.  Still reluctant about PICC line/midline.  Discussed at length that given bacteremia, he needs IV antibiotic.     Physical Exam   Vital Signs: Temp: 98  F (36.7  C) Temp src: Oral BP: 129/68 Pulse: 59   Resp: 20 SpO2: 93 % O2 Device: None (Room air)    Weight: 176 lbs 12.94 oz    General: Alert awake, oriented to self and place, appears comfortable.  HEENT: PERRLA EOMI. Mucosa moist.  Face has psoriatic rash looking skin  Lungs: Bilateral equal air entry. Clear to auscultation, normal work of breathing.   CVS: S1S2 regular, no tachycardia or murmur.   Abdomen: Soft, NT, ND. BS heard.  MSK: Right wrist now with crêpe bandage, able to move more, although he still with pain with active and passive movement.  Neuro: AAOX3. CN 2-12 normal. Strength symmetrical.  Skin: No rash.       Medical Decision Making       37 MINUTES SPENT BY ME on the date of service doing chart review, history, exam, documentation & further activities per the note.      Data         Imaging results reviewed over the past 24 hrs:   No results found for this or any previous visit (from the past 24 hours).

## 2024-11-17 NOTE — PROGRESS NOTES
Care Management Follow Up    Length of Stay (days): 7    Expected Discharge Date: 11/18/2024     Concerns to be Addressed: discharge planning     Patient plan of care discussed at interdisciplinary rounds: Yes    Anticipated Discharge Disposition: Transitional Care     Anticipated Discharge Services:    Anticipated Discharge DME: None    Patient/family educated on Medicare website which has current facility and service quality ratings: yes  Education Provided on the Discharge Plan: Yes  Patient/Family in Agreement with the Plan: yes    Referrals Placed by CM/SW: Post Acute Facilities  Private pay costs discussed: Not applicable    Discussed  Partnership in Safe Discharge Planning  document with patient/family: Yes:     Handoff Completed: No, handoff not indicated or clinically appropriate    Additional Information:  Writer called [employer] and was able to talk through clinicals to obtain auth [id number]    Spoke with patient and caregiver Dre, they can wheel patient over to [facility] through the [facility]    We need to determine if patient can have his PICC placed today    Next Steps: PAS, Orders     JESUS MANUEL Garcia

## 2024-11-18 ENCOUNTER — TRANSITIONAL CARE UNIT VISIT (OUTPATIENT)
Dept: GERIATRICS | Facility: CLINIC | Age: 89
End: 2024-11-18
Payer: COMMERCIAL

## 2024-11-18 ENCOUNTER — LAB REQUISITION (OUTPATIENT)
Dept: LAB | Facility: CLINIC | Age: 89
End: 2024-11-18
Payer: COMMERCIAL

## 2024-11-18 ENCOUNTER — PATIENT OUTREACH (OUTPATIENT)
Dept: CARE COORDINATION | Facility: CLINIC | Age: 89
End: 2024-11-18

## 2024-11-18 ENCOUNTER — HOME INFUSION BILLING (OUTPATIENT)
Dept: HOME HEALTH SERVICES | Facility: HOME HEALTH | Age: 89
End: 2024-11-18

## 2024-11-18 VITALS
HEART RATE: 71 BPM | HEIGHT: 73 IN | BODY MASS INDEX: 23.33 KG/M2 | SYSTOLIC BLOOD PRESSURE: 152 MMHG | RESPIRATION RATE: 18 BRPM | WEIGHT: 176 LBS | OXYGEN SATURATION: 95 % | DIASTOLIC BLOOD PRESSURE: 72 MMHG | TEMPERATURE: 98.6 F

## 2024-11-18 DIAGNOSIS — R78.81 BACTEREMIA: Primary | ICD-10-CM

## 2024-11-18 DIAGNOSIS — T83.511D URINARY TRACT INFECTION ASSOCIATED WITH INDWELLING URETHRAL CATHETER, SUBSEQUENT ENCOUNTER: ICD-10-CM

## 2024-11-18 DIAGNOSIS — M25.531 RIGHT WRIST PAIN: ICD-10-CM

## 2024-11-18 DIAGNOSIS — Z95.2 S/P TAVR (TRANSCATHETER AORTIC VALVE REPLACEMENT): ICD-10-CM

## 2024-11-18 DIAGNOSIS — I35.0 AORTIC STENOSIS, SEVERE: ICD-10-CM

## 2024-11-18 DIAGNOSIS — R53.81 PHYSICAL DECONDITIONING: ICD-10-CM

## 2024-11-18 DIAGNOSIS — E78.1 HYPERTRIGLYCERIDEMIA: ICD-10-CM

## 2024-11-18 DIAGNOSIS — R41.89 COGNITIVE DECLINE: ICD-10-CM

## 2024-11-18 DIAGNOSIS — M17.11 OSTEOARTHRITIS OF RIGHT KNEE, UNSPECIFIED OSTEOARTHRITIS TYPE: ICD-10-CM

## 2024-11-18 DIAGNOSIS — I10 ESSENTIAL (PRIMARY) HYPERTENSION: ICD-10-CM

## 2024-11-18 DIAGNOSIS — N39.0 URINARY TRACT INFECTION ASSOCIATED WITH INDWELLING URETHRAL CATHETER, SUBSEQUENT ENCOUNTER: ICD-10-CM

## 2024-11-18 DIAGNOSIS — R78.81 BACTEREMIA: ICD-10-CM

## 2024-11-18 DIAGNOSIS — Z78.9 IMPAIRED MOBILITY AND ACTIVITIES OF DAILY LIVING: ICD-10-CM

## 2024-11-18 DIAGNOSIS — Z74.09 IMPAIRED MOBILITY AND ACTIVITIES OF DAILY LIVING: ICD-10-CM

## 2024-11-18 DIAGNOSIS — E87.1 HYPONATREMIA: ICD-10-CM

## 2024-11-18 DIAGNOSIS — I50.22 CHRONIC SYSTOLIC HEART FAILURE (H): ICD-10-CM

## 2024-11-18 DIAGNOSIS — R33.9 URINARY RETENTION: ICD-10-CM

## 2024-11-18 DIAGNOSIS — I10 BENIGN ESSENTIAL HYPERTENSION: ICD-10-CM

## 2024-11-18 DIAGNOSIS — G93.40 ENCEPHALOPATHY, UNSPECIFIED TYPE: ICD-10-CM

## 2024-11-18 DIAGNOSIS — Z97.8 FOLEY CATHETER IN PLACE: ICD-10-CM

## 2024-11-18 PROCEDURE — 99310 SBSQ NF CARE HIGH MDM 45: CPT | Performed by: PHYSICIAN ASSISTANT

## 2024-11-18 PROCEDURE — S9502 HIT ANTIBIOTIC Q8H DIEM: HCPCS

## 2024-11-18 NOTE — PROGRESS NOTES
[facility]    Background: Transitional Care Management program identified per system criteria and reviewed by [facility] team for possible outreach.    Assessment: Upon chart review, CCRC Team member will not proceed with patient outreach related to this episode of Transitional Care Management program due to reason below:    Non-[facility] TCU: CCRC team member noted patient discharged to TCU/ARU/LTACH. Patient is not established with a [facility] currently supported by Primary Care-Care Coordination therefore handoff to Primary Care-Care Coordination is not appropriate at this time.    Plan: Transitional Care Management episode addressed appropriately per reason noted above.      OMAR Whitaker  [phone]  [facility]     *Connected Care Resource Team does NOT follow patient ongoing. Referrals are identified based on internal discharge reports and the outreach is to ensure patient has an understanding of their discharge instructions.

## 2024-11-18 NOTE — PROGRESS NOTES
[facility] GERIATRICS    PRIMARY CARE PROVIDER AND CLINIC:  Maribell Stewart MD, [address] / [place] [address]  Chief Complaint   Patient presents with    Hospital F/U      [facility] Medical Record Number:  [id number]  Place of Service where encounter took place:  [facility] ([facility]) [[id number]]      HPI:    89yo male admitted at [facility] from 11/9 - 11/17, 2024 after presenting from home for evaluation of confusion, fever. Found to have sepsis 2/2 CAUTI infection and MSSA bacteremia. Seen in consult with ID, recommended treatment with IV ancef x28d. Midline placed, pt discharged to TCU.    Pt is visited as initial visit. Brother in law, Dre, and wife are at bedside. He is very upset today. Demanding to leave TCU, stating he does not want to be at the facility. Wants to go home. Required multiple explanations regarding reasons to remain at TCU, primarily the need for IV antibiotics which are administered every 8 hours and not a viable option for home administration. Despite these facts, pt wants to leave. He was reminded of his artifical heart valve and the potential outcomes of an individual who refuses complete care of bacteremia. Outcomes bluntly stated to patient included endocarditis, stroke, death, return to hospital with a prolonged stay. Pt verbalized he understood ramifications but then frequently stated he wanted to leave and questioned medical plan as established in hospital. Informed him and his family that should he decide to leave, it would be AMA from the TCU for multiple reasons.     Dre, who was the determined POA from hospital, is in full support of remaining at TCU for treatment duration. Pt is currently willing to stay after discussing with family. There have been concerns from family members of patient and his wife living alone for awhile. Dre shares his sister (pt's wife) has severe dementia herself. The family is currently working towards placement in an detention  to follow TCU.    CODE STATUS/ADVANCE DIRECTIVES DISCUSSION:  Full Code  CPR/Full code   ALLERGIES:   Allergies   Allergen Reactions    Ciprofloxacin      history of achilles tendon pain      PAST MEDICAL HISTORY:   Past Medical History:   Diagnosis Date    Gout     Heart murmur     Insomnia     Mixed hyperlipidemia     Postherpetic neuralgia       PAST SURGICAL HISTORY:   has a past surgical history that includes orthopedic surgery; Arthroscopy knee (Right); Davinci Xi Herniorrhaphy Inguinal (Bilateral, 7/18/2022); Coronary Angiogram (N/A, 6/13/2024); Right Heart Catheterization (N/A, 6/13/2024); and Transcatheter Aortic Valve Replacement-Femoral Approach (N/A, 7/16/2024).  FAMILY HISTORY: family history includes Unknown/Adopted in his father and mother.  SOCIAL HISTORY:   reports that he has never smoked. He has never used smokeless tobacco. He reports current alcohol use. He reports that he does not use drugs.  Patient's living condition: lives with spouse who has severe dementia    Post Discharge Medication Reconciliation Status:   MED REC REQUIRED  Post Medication Reconciliation Status: discharge medications reconciled and changed, per note/orders       Current Outpatient Medications   Medication Sig Dispense Refill    acetaminophen (TYLENOL) 325 MG tablet Take 2 tablets (650 mg) by mouth every 6 hours as needed for mild pain.      aspirin 81 MG EC tablet Take 81 mg by mouth. Taking 1-2 weekly      atorvastatin (LIPITOR) 40 MG tablet Take 1 tablet (40 mg) by mouth daily 90 tablet 3    ceFAZolin (ANCEF) 2,000 mg in sodium chloride 0.9 % 100 mL via HOMEPUMP Infuse 2,000 mg over 30 minutes into the vein every 8 hours for 21 days. 262033 mL 0    diclofenac (VOLTAREN) 1 % topical gel Apply 4 g topically 4 times daily      furosemide (LASIX) 40 MG tablet Take 0.5 tablets (20 mg) by mouth daily.      melatonin 3 MG tablet Take 3 mg by mouth nightly as needed.      metoprolol succinate ER (TOPROL XL) 25 MG 24 hr tablet  "Take 1 tablet (25 mg) by mouth daily. 90 tablet 3    multivitamin w/minerals (THERA-VIT-M) tablet Take 1 tablet by mouth daily.      potassium chloride ER (K-TAB) 20 MEQ CR tablet Take 1 tablet (20 mEq) by mouth daily 90 tablet 0    sodium chloride, PF, 0.9% PF flush Inject 10 mLs into the vein as needed for line flush. Flush IV before and after medication administration as directed and/or at least every 24 hours. [id number] mL 0    tamsulosin (FLOMAX) 0.4 MG capsule Take 1 capsule (0.4 mg) by mouth daily as needed (urinary retention). 30 capsule 0     No current facility-administered medications for this visit.       ROS:  4 point ROS including Respiratory, CV, GI and , other than that noted in the HPI,  is negative    Vitals:  BP (!) 152/72   Pulse 71   Temp 98.6  F (37  C)   Resp 18   Ht 1.854 m (6' 1\")   Wt 79.8 kg (176 lb)   SpO2 95%   BMI 23.22 kg/m    Exam:  GEN: well-developed, well-nourished, appears comfortable  HEENT: NCAT, EOM intact bilaterally, nose & mouth patent, mucous membranes moist  CHEST: lungs CTA bilaterally, no increased work of breathing, no wheeze, crackles, rhonchi  HEART: RRR, S1 & S2, no murmur  ABD: soft, nontender, nondistended, no guarding or rigidity  MSK: AROM bilateral UE/LE  NEURO: awake, alert, oriented to self. CN II-XII grossly intact. Sensation grossly intact to light touch.   SKIN: warm & dry without rash, no pedal edema    Lab/Diagnostic data:  Recent labs in [facility] reviewed by me today.  and Most Recent 3 CBC's:  Recent Labs   Lab Test 11/15/24  0646 11/12/24  0739 11/11/24  0638 11/09/24  2254   WBC 7.4  --  7.2 11.3*   HGB 11.7* 12.4* 10.9* 12.4*   MCV 89  --  90 89     --  150 244     Most Recent 3 BMP's:  Recent Labs   Lab Test 11/15/24  0646 11/12/24  0739 11/11/24  0638   * 135 129*   POTASSIUM 3.6 3.8 3.9   CHLORIDE 102 102 100   CO2 24 22 23   BUN 14.4 17.0 18.3   CR 0.93 1.05 1.08   ANIONGAP 8 11 6*   ROSA 8.6* 8.7* 8.2*   GLC 97 98 98     Most " Recent 2 LFT's:  Recent Labs   Lab Test 11/09/24 2254 06/28/24  1414   AST 31 25   ALT 13 14   ALKPHOS 91 100   BILITOTAL 0.6 0.8     Most Recent 3 Troponin's:No lab results found.  7-Day Micro Results       No results found for the last 168 hours.          Most Recent TSH and T4:  Recent Labs   Lab Test 11/26/18  1441   TSH 2.74     Most Recent Hemoglobin A1c:  Recent Labs   Lab Test 11/26/18  1441   A1C 5.7*     Most Recent Urinalysis:  Recent Labs   Lab Test 11/10/24  0023 06/10/24  2106 03/21/19  0832   COLOR Light Yellow   < > Yellow   APPEARANCE Clear   < > Clear   URINEGLC Negative   < > Negative   URINEBILI Negative   < > Negative   URINEKETONE 20*   < > Negative   SG 1.015   < > 1.020   UBLD Small*   < > Negative   URINEPH 7.0   < > 6.0   PROTEIN 10*   < > Negative   UROBILINOGEN  --   --  0.2   NITRITE Negative   < > Negative   LEUKEST Moderate*   < > Negative   RBCU 5*   < >  --    WBCU 52*   < >  --     < > = values in this interval not displayed.     Most Recent ESR & CRP:  Recent Labs   Lab Test 11/09/24 2254   CRPI 37.26*       ASSESSMENT/PLAN:    MSSA Bacteremia 2/2 CAUTI  Sepsis 2/2 above  Presented with indwelling rodriguez present since 09/2024 and evidence of sepsis, UTI. Noted poor catheter care. TTE 11/12 with stable appearing TAVR. Seen by ID, treated with IV cefazolin.  -Continues on IV cefazolin x28d course (end 12/8)  -Follow-up with ID    Urinary retention with chronic indwelling rodriguez catheter  Catheter initially placed 06/2024 due to significant retention. Recommended for urology follow-up as outpatient and was discharged with home care services at the time, unclear if these recommendations were followed as pt reported lack of instructions on rodriguez cares. Has since been seen at hospital for TAVR and catheter obstruction, each time having rodriguez exchanged. Last exchange estimated September prior to presentation.  -Continue indwelling rodriguez catheter  -Rodriguez exchange in 3 weeks  -Follow up  with [facility] at discharge    Acute encephalopathy, multifactorial  Suspected acute delirium  Cognitive decline  Concerns for cognitive function noted during prior hospitalizations. MoCA 13/28, psychiatry evaluated and pt not currently able to make own decisions. Brother-in-law is surrogate. Intermittently required sitter. Notes indicate discharging on seroquel, not in TCU orders.  -Resume Seroquel 12.5mg BID PRN  -Supportive management  -Maintain sleep/wake cycle  -OT for cog eval  -SW for discharge planning    HFrEF, EF 35-40%  CAD  Severe aortic stenosis s/p TAVR 7/17/24  HTN / HLD  Follows with [facility]. TTE with stable LVEF 35-40%, mod global hypokinesia, nml RVSF.  Recommended for fluid restriction while inpatient, pt disliked therefore liberated.  -Continues on ASA, metoprolol, statin, lasix  -Daily weights  -Monitor volume state    Recurrent falls  R wrist pain  R knee pain  Noted to have pain, swelling of R wrist and knee persisting after fall in October. Seen by orthopedics, offered joint aspiration of wrist which pt declined. No acute fractures identified on imaging of either wrist or knee.  -Continue pain control with tylenol, voltaren, coban wrapping to wrist  -PT/OT evaluations    Hyponatremia  Na 132 on hospital admit, improved with IVF.  -BMP 11/21    Anemia  Hgb 10-11. No evidence of active bleeding  -Follow with CBC    Total time spent with patient visit at the skilled nursing facility was 45 min including patient visit and review of past records.     Electronically signed by:  Shoaib Hernandes PA-C

## 2024-11-19 PROCEDURE — S9502 HIT ANTIBIOTIC Q8H DIEM: HCPCS

## 2024-11-20 ENCOUNTER — LAB REQUISITION (OUTPATIENT)
Dept: LAB | Facility: CLINIC | Age: 89
End: 2024-11-20
Payer: COMMERCIAL

## 2024-11-20 DIAGNOSIS — I50.32 CHRONIC DIASTOLIC (CONGESTIVE) HEART FAILURE (H): ICD-10-CM

## 2024-11-20 LAB
BASOPHILS # BLD AUTO: 0.1 10E3/UL (ref 0–0.2)
BASOPHILS NFR BLD AUTO: 1 %
CREAT SERPL-MCNC: 0.95 MG/DL (ref 0.67–1.17)
EGFRCR SERPLBLD CKD-EPI 2021: 75 ML/MIN/1.73M2
EOSINOPHIL # BLD AUTO: 0.4 10E3/UL (ref 0–0.7)
EOSINOPHIL NFR BLD AUTO: 4 %
ERYTHROCYTE [DISTWIDTH] IN BLOOD BY AUTOMATED COUNT: 15 % (ref 10–15)
HCT VFR BLD AUTO: 35.9 % (ref 40–53)
HGB BLD-MCNC: 12.1 G/DL (ref 13.3–17.7)
IMM GRANULOCYTES # BLD: 0 10E3/UL
IMM GRANULOCYTES NFR BLD: 0 %
LYMPHOCYTES # BLD AUTO: 1.9 10E3/UL (ref 0.8–5.3)
LYMPHOCYTES NFR BLD AUTO: 21 %
MCH RBC QN AUTO: 30.3 PG (ref 26.5–33)
MCHC RBC AUTO-ENTMCNC: 33.7 G/DL (ref 31.5–36.5)
MCV RBC AUTO: 90 FL (ref 78–100)
MONOCYTES # BLD AUTO: 1 10E3/UL (ref 0–1.3)
MONOCYTES NFR BLD AUTO: 10 %
NEUTROPHILS # BLD AUTO: 6.1 10E3/UL (ref 1.6–8.3)
NEUTROPHILS NFR BLD AUTO: 64 %
NRBC # BLD AUTO: 0 10E3/UL
NRBC BLD AUTO-RTO: 0 /100
PLATELET # BLD AUTO: 274 10E3/UL (ref 150–450)
RBC # BLD AUTO: 4 10E6/UL (ref 4.4–5.9)
WBC # BLD AUTO: 9.5 10E3/UL (ref 4–11)

## 2024-11-20 PROCEDURE — 86481 TB AG RESPONSE T-CELL SUSP: CPT | Mod: ORL | Performed by: NURSE PRACTITIONER

## 2024-11-20 PROCEDURE — P9604 ONE-WAY ALLOW PRORATED TRIP: HCPCS | Mod: ORL | Performed by: NURSE PRACTITIONER

## 2024-11-20 PROCEDURE — S9502 HIT ANTIBIOTIC Q8H DIEM: HCPCS

## 2024-11-20 PROCEDURE — 82565 ASSAY OF CREATININE: CPT | Mod: ORL | Performed by: NURSE PRACTITIONER

## 2024-11-20 PROCEDURE — 85025 COMPLETE CBC W/AUTO DIFF WBC: CPT | Mod: ORL | Performed by: NURSE PRACTITIONER

## 2024-11-20 PROCEDURE — 36415 COLL VENOUS BLD VENIPUNCTURE: CPT | Mod: ORL | Performed by: NURSE PRACTITIONER

## 2024-11-21 ENCOUNTER — HOME INFUSION BILLING (OUTPATIENT)
Dept: HOME HEALTH SERVICES | Facility: HOME HEALTH | Age: 89
End: 2024-11-21

## 2024-11-21 ENCOUNTER — TRANSITIONAL CARE UNIT VISIT (OUTPATIENT)
Dept: GERIATRICS | Facility: CLINIC | Age: 89
End: 2024-11-21
Payer: COMMERCIAL

## 2024-11-21 VITALS
HEART RATE: 69 BPM | RESPIRATION RATE: 18 BRPM | HEIGHT: 73 IN | TEMPERATURE: 99.2 F | BODY MASS INDEX: 22.8 KG/M2 | SYSTOLIC BLOOD PRESSURE: 128 MMHG | WEIGHT: 172 LBS | OXYGEN SATURATION: 95 % | DIASTOLIC BLOOD PRESSURE: 62 MMHG

## 2024-11-21 LAB
ANION GAP SERPL CALCULATED.3IONS-SCNC: 10 MMOL/L (ref 7–15)
BUN SERPL-MCNC: 21.2 MG/DL (ref 8–23)
CALCIUM SERPL-MCNC: 9.3 MG/DL (ref 8.8–10.4)
CHLORIDE SERPL-SCNC: 102 MMOL/L (ref 98–107)
CREAT SERPL-MCNC: 1.02 MG/DL (ref 0.67–1.17)
EGFRCR SERPLBLD CKD-EPI 2021: 69 ML/MIN/1.73M2
GAMMA INTERFERON BACKGROUND BLD IA-ACNC: 0.05 IU/ML
GLUCOSE SERPL-MCNC: 83 MG/DL (ref 70–99)
HCO3 SERPL-SCNC: 26 MMOL/L (ref 22–29)
M TB IFN-G BLD-IMP: NEGATIVE
M TB IFN-G CD4+ BCKGRND COR BLD-ACNC: 2.08 IU/ML
MITOGEN IGNF BCKGRD COR BLD-ACNC: 0 IU/ML
MITOGEN IGNF BCKGRD COR BLD-ACNC: 0 IU/ML
POTASSIUM SERPL-SCNC: 4.3 MMOL/L (ref 3.4–5.3)
QUANTIFERON MITOGEN: 2.13 IU/ML
QUANTIFERON NIL TUBE: 0.05 IU/ML
QUANTIFERON TB1 TUBE: 0.05 IU/ML
QUANTIFERON TB2 TUBE: 0.05
SODIUM SERPL-SCNC: 138 MMOL/L (ref 135–145)

## 2024-11-21 PROCEDURE — 36415 COLL VENOUS BLD VENIPUNCTURE: CPT | Mod: ORL | Performed by: PHYSICIAN ASSISTANT

## 2024-11-21 PROCEDURE — 80048 BASIC METABOLIC PNL TOTAL CA: CPT | Mod: ORL | Performed by: PHYSICIAN ASSISTANT

## 2024-11-21 PROCEDURE — P9604 ONE-WAY ALLOW PRORATED TRIP: HCPCS | Mod: ORL | Performed by: PHYSICIAN ASSISTANT

## 2024-11-21 PROCEDURE — S9502 HIT ANTIBIOTIC Q8H DIEM: HCPCS

## 2024-11-21 NOTE — PROGRESS NOTES
"[facility] GERIATRICS    Chief Complaint   Patient presents with    RECHECK     HPI:  Canelo Cruz is a 89 year old  (7/30/1931), who is being seen today for an episodic care visit at: [facility] (TC) [[id number]].     Summary: ***    Today, pt is seen in follow-up. ***    On review of facility records, BPs ranging ***, HRs ***, BG values ***, weight ***.    Allergies, and PMH/PSH reviewed in [facility] today.  REVIEW OF SYSTEMS:  4 point ROS including Respiratory, CV, GI and , other than that noted in the HPI,  is negative    Objective:   /62   Pulse 69   Temp 99.2  F (37.3  C)   Resp 18   Ht 1.854 m (6' 1\")   Wt 78 kg (172 lb)   SpO2 95%   BMI 22.69 kg/m    ***    {fgslab:213449}    Assessment/Plan:    ***    MED REC REQUIRED{TIP  Click the link below to document or use med rec list, use list to pull in response :[id number]}  Post Medication Reconciliation Status: {MED REC LIST:712168}      Electronically signed by: Roger Velasco        "

## 2024-11-22 PROBLEM — J81.0 ACUTE PULMONARY EDEMA (H): Status: RESOLVED | Noted: 2024-06-10 | Resolved: 2024-11-22

## 2024-11-22 PROCEDURE — S9502 HIT ANTIBIOTIC Q8H DIEM: HCPCS

## 2024-11-23 PROCEDURE — S9502 HIT ANTIBIOTIC Q8H DIEM: HCPCS

## 2024-11-24 PROCEDURE — S9502 HIT ANTIBIOTIC Q8H DIEM: HCPCS

## 2024-11-25 ENCOUNTER — HOME INFUSION BILLING (OUTPATIENT)
Dept: HOME HEALTH SERVICES | Facility: HOME HEALTH | Age: 89
End: 2024-11-25

## 2024-11-25 ENCOUNTER — TRANSITIONAL CARE UNIT VISIT (OUTPATIENT)
Dept: GERIATRICS | Facility: CLINIC | Age: 89
End: 2024-11-25
Payer: COMMERCIAL

## 2024-11-25 VITALS
TEMPERATURE: 98 F | HEIGHT: 73 IN | OXYGEN SATURATION: 97 % | BODY MASS INDEX: 22.8 KG/M2 | SYSTOLIC BLOOD PRESSURE: 130 MMHG | RESPIRATION RATE: 18 BRPM | WEIGHT: 172 LBS | HEART RATE: 81 BPM | DIASTOLIC BLOOD PRESSURE: 71 MMHG

## 2024-11-25 DIAGNOSIS — R53.81 PHYSICAL DECONDITIONING: ICD-10-CM

## 2024-11-25 DIAGNOSIS — E78.1 HYPERTRIGLYCERIDEMIA: ICD-10-CM

## 2024-11-25 DIAGNOSIS — T83.511D URINARY TRACT INFECTION ASSOCIATED WITH INDWELLING URETHRAL CATHETER, SUBSEQUENT ENCOUNTER: ICD-10-CM

## 2024-11-25 DIAGNOSIS — M17.11 OSTEOARTHRITIS OF RIGHT KNEE, UNSPECIFIED OSTEOARTHRITIS TYPE: ICD-10-CM

## 2024-11-25 DIAGNOSIS — G93.40 ENCEPHALOPATHY, UNSPECIFIED TYPE: ICD-10-CM

## 2024-11-25 DIAGNOSIS — M25.531 RIGHT WRIST PAIN: ICD-10-CM

## 2024-11-25 DIAGNOSIS — R78.81 BACTEREMIA: Primary | ICD-10-CM

## 2024-11-25 DIAGNOSIS — Z74.09 IMPAIRED MOBILITY AND ACTIVITIES OF DAILY LIVING: ICD-10-CM

## 2024-11-25 DIAGNOSIS — E87.1 HYPONATREMIA: ICD-10-CM

## 2024-11-25 DIAGNOSIS — Z95.2 S/P TAVR (TRANSCATHETER AORTIC VALVE REPLACEMENT): ICD-10-CM

## 2024-11-25 DIAGNOSIS — I10 BENIGN ESSENTIAL HYPERTENSION: ICD-10-CM

## 2024-11-25 DIAGNOSIS — R41.89 COGNITIVE DECLINE: ICD-10-CM

## 2024-11-25 DIAGNOSIS — Z97.8 FOLEY CATHETER IN PLACE: ICD-10-CM

## 2024-11-25 DIAGNOSIS — R33.9 URINARY RETENTION: ICD-10-CM

## 2024-11-25 DIAGNOSIS — I50.22 CHRONIC SYSTOLIC HEART FAILURE (H): ICD-10-CM

## 2024-11-25 DIAGNOSIS — Z78.9 IMPAIRED MOBILITY AND ACTIVITIES OF DAILY LIVING: ICD-10-CM

## 2024-11-25 DIAGNOSIS — I35.0 AORTIC STENOSIS, SEVERE: ICD-10-CM

## 2024-11-25 DIAGNOSIS — N39.0 URINARY TRACT INFECTION ASSOCIATED WITH INDWELLING URETHRAL CATHETER, SUBSEQUENT ENCOUNTER: ICD-10-CM

## 2024-11-25 PROCEDURE — 99309 SBSQ NF CARE MODERATE MDM 30: CPT | Performed by: PHYSICIAN ASSISTANT

## 2024-11-25 PROCEDURE — S9502 HIT ANTIBIOTIC Q8H DIEM: HCPCS

## 2024-11-25 PROCEDURE — A4221 SUPP NON-INSULIN INF CATH/WK: HCPCS

## 2024-11-26 PROCEDURE — S9502 HIT ANTIBIOTIC Q8H DIEM: HCPCS

## 2024-11-27 PROCEDURE — S9502 HIT ANTIBIOTIC Q8H DIEM: HCPCS

## 2024-11-28 PROCEDURE — S9502 HIT ANTIBIOTIC Q8H DIEM: HCPCS

## 2024-11-29 ENCOUNTER — HOME INFUSION BILLING (OUTPATIENT)
Dept: HOME HEALTH SERVICES | Facility: HOME HEALTH | Age: 89
End: 2024-11-29

## 2024-11-29 PROCEDURE — S9502 HIT ANTIBIOTIC Q8H DIEM: HCPCS

## 2024-11-29 PROCEDURE — A4221 SUPP NON-INSULIN INF CATH/WK: HCPCS

## 2024-11-30 ENCOUNTER — TELEPHONE (OUTPATIENT)
Dept: GERIATRICS | Facility: CLINIC | Age: 89
End: 2024-11-30
Payer: COMMERCIAL

## 2024-11-30 DIAGNOSIS — M25.531 RIGHT WRIST PAIN: ICD-10-CM

## 2024-11-30 DIAGNOSIS — M25.531 RIGHT WRIST PAIN: Primary | ICD-10-CM

## 2024-11-30 PROCEDURE — S9502 HIT ANTIBIOTIC Q8H DIEM: HCPCS

## 2024-11-30 RX ORDER — TRAMADOL HYDROCHLORIDE 50 MG/1
25 TABLET ORAL EVERY 4 HOURS PRN
Qty: 12 TABLET | Refills: 1 | Status: SHIPPED | OUTPATIENT
Start: 2024-11-30 | End: 2024-11-30

## 2024-11-30 RX ORDER — IBUPROFEN 200 MG
600 TABLET ORAL EVERY 6 HOURS PRN
Status: SHIPPED
Start: 2024-11-30

## 2024-11-30 RX ORDER — TRAMADOL HYDROCHLORIDE 50 MG/1
25 TABLET ORAL EVERY 4 HOURS PRN
Qty: 12 TABLET | Refills: 1 | Status: SHIPPED | OUTPATIENT
Start: 2024-11-30 | End: 2024-12-03

## 2024-11-30 NOTE — TELEPHONE ENCOUNTER
Nursing called due to increase pain for Canelo in his right wrist.  Just on scheduled Tylenol.  By description from nursing suspect gout and see 4 years ago a uric acid level was done and slightly elevated in 2019.    Allergies: cipro  Only on ASA 81mg    Orders:  Uric acid level Monday - right wrist pain, suspect gout  Tramadol 25mg every 4 hour prn  - right wrist pain - breakthrough  Ibuprofen 600mg po every 6 hour PRN.  Suspect gout     AVERY Rosa CNP

## 2024-12-01 PROCEDURE — S9502 HIT ANTIBIOTIC Q8H DIEM: HCPCS

## 2024-12-02 ENCOUNTER — TRANSITIONAL CARE UNIT VISIT (OUTPATIENT)
Dept: GERIATRICS | Facility: CLINIC | Age: 89
End: 2024-12-02
Payer: COMMERCIAL

## 2024-12-02 VITALS
TEMPERATURE: 98.2 F | HEIGHT: 73 IN | HEART RATE: 61 BPM | RESPIRATION RATE: 20 BRPM | OXYGEN SATURATION: 95 % | BODY MASS INDEX: 23.33 KG/M2 | WEIGHT: 176 LBS | SYSTOLIC BLOOD PRESSURE: 120 MMHG | DIASTOLIC BLOOD PRESSURE: 52 MMHG

## 2024-12-02 DIAGNOSIS — T83.511D URINARY TRACT INFECTION ASSOCIATED WITH INDWELLING URETHRAL CATHETER, SUBSEQUENT ENCOUNTER: ICD-10-CM

## 2024-12-02 DIAGNOSIS — N39.0 URINARY TRACT INFECTION ASSOCIATED WITH INDWELLING URETHRAL CATHETER, SUBSEQUENT ENCOUNTER: ICD-10-CM

## 2024-12-02 DIAGNOSIS — Z95.2 S/P TAVR (TRANSCATHETER AORTIC VALVE REPLACEMENT): ICD-10-CM

## 2024-12-02 DIAGNOSIS — I50.22 CHRONIC SYSTOLIC HEART FAILURE (H): ICD-10-CM

## 2024-12-02 DIAGNOSIS — R41.89 COGNITIVE DECLINE: ICD-10-CM

## 2024-12-02 DIAGNOSIS — E87.1 HYPONATREMIA: ICD-10-CM

## 2024-12-02 DIAGNOSIS — R78.81 BACTEREMIA: ICD-10-CM

## 2024-12-02 DIAGNOSIS — Z78.9 IMPAIRED MOBILITY AND ACTIVITIES OF DAILY LIVING: ICD-10-CM

## 2024-12-02 DIAGNOSIS — I35.0 AORTIC STENOSIS, SEVERE: ICD-10-CM

## 2024-12-02 DIAGNOSIS — R33.9 URINARY RETENTION: ICD-10-CM

## 2024-12-02 DIAGNOSIS — Z97.8 FOLEY CATHETER IN PLACE: ICD-10-CM

## 2024-12-02 DIAGNOSIS — Z74.09 IMPAIRED MOBILITY AND ACTIVITIES OF DAILY LIVING: ICD-10-CM

## 2024-12-02 DIAGNOSIS — R53.81 PHYSICAL DECONDITIONING: ICD-10-CM

## 2024-12-02 DIAGNOSIS — M17.11 OSTEOARTHRITIS OF RIGHT KNEE, UNSPECIFIED OSTEOARTHRITIS TYPE: ICD-10-CM

## 2024-12-02 DIAGNOSIS — I10 BENIGN ESSENTIAL HYPERTENSION: ICD-10-CM

## 2024-12-02 DIAGNOSIS — M25.531 RIGHT WRIST PAIN: Primary | ICD-10-CM

## 2024-12-02 PROCEDURE — S9502 HIT ANTIBIOTIC Q8H DIEM: HCPCS

## 2024-12-02 PROCEDURE — 99309 SBSQ NF CARE MODERATE MDM 30: CPT | Performed by: PHYSICIAN ASSISTANT

## 2024-12-02 NOTE — PROGRESS NOTES
"[facility] GERIATRICS    Chief Complaint   Patient presents with    RECHECK     HPI:  Canelo Cruz is a 89 year old  (7/30/1931), who is being seen today for an episodic care visit at: [facility] (TCU) [[id number]].     Summary: 89yo male admitted at [facility] from 11/9 - 11/17, 2024 after presenting from home for evaluation of confusion, fever. Found to have sepsis 2/2 CAUTI infection and MSSA bacteremia. Seen in consult with ID, recommended treatment with IV ancef x28d. Midline placed, pt discharged to TCU.     Today, pt is seen in follow-up. Continues to desire discharging despite having completed 2/3 weeks of IV antibiotics, states he is not sure he can make it. Over the weekend with worsening right wrist pain. On-call ordered additional pain medications and a uric acid level for today to rule-out gout, value missed. Pt reports pain medication has not been helpful. Wrist is very painful with any attempt at manipulation. Tolerable at rest when he does not move it. Otherwise denies fever, chills, sob, cp. Tolerating oral intake, does not like the food options. No constipation. Christianson catheter with leg bag in place, draining without issue. Facility staff without concerns.    On review of facility records, BPs ranging 120-138, HRs 61-67, weight 176#.    Allergies, and PMH/PSH reviewed in [facility] today.  REVIEW OF SYSTEMS:  4 point ROS including Respiratory, CV, GI and , other than that noted in the HPI,  is negative    Objective:   /52   Pulse 61   Temp 98.2  F (36.8  C)   Resp 20   Ht 1.854 m (6' 1\")   Wt 79.8 kg (176 lb)   SpO2 95%   BMI 23.22 kg/m    GEN: well-developed, well-nourished, appears comfortable  HEENT: NCAT, EOM intact bilaterally, nose & mouth patent, mucous membranes moist  CHEST: lungs CTA bilaterally, no increased work of breathing, no wheeze, crackles, rhonchi  HEART: RRR, S1 & S2, no murmur  ABD: soft, nontender, nondistended, no guarding or rigidity  MSK: AROM " bilateral UE/LE; R wrist with slight edema, tender to palpation at distal wrist, painful with pronation/supination, no wounds, no erythema/warmth, intact ROM of digits of hand, limited ability to make fist 2/2 pain  NEURO: awake, alert, oriented to self. CN II-XII grossly intact. Sensation grossly intact to light touch.   SKIN: warm & dry without rash, no pedal edema    Recent labs in [facility] reviewed by me today.  and Most Recent 3 CBC's:  Recent Labs   Lab Test 11/20/24  0654 11/15/24  0646 11/12/24  0739 11/11/24  0638   WBC 9.5 7.4  --  7.2   HGB 12.1* 11.7* 12.4* 10.9*   MCV 90 89  --  90    161  --  150     Most Recent 3 BMP's:  Recent Labs   Lab Test 11/21/24  0600 11/20/24  0654 11/15/24  0646 11/12/24  0739     --  134* 135   POTASSIUM 4.3  --  3.6 3.8   CHLORIDE 102  --  102 102   CO2 26  --  24 22   BUN 21.2  --  14.4 17.0   CR 1.02 0.95 0.93 1.05   ANIONGAP 10  --  8 11   ROSA 9.3  --  8.6* 8.7*   GLC 83  --  97 98     Most Recent 2 LFT's:  Recent Labs   Lab Test 11/09/24  2254 06/28/24  1414   AST 31 25   ALT 13 14   ALKPHOS 91 100   BILITOTAL 0.6 0.8     Most Recent TSH and T4:  Recent Labs   Lab Test 11/26/18  1441   TSH 2.74     Most Recent ESR & CRP:  Recent Labs   Lab Test 11/09/24 2254   CRPI 37.26*       Assessment/Plan:    MSSA Bacteremia 2/2 CAUTI  Sepsis 2/2 above  Presented with indwelling rodriguez present since 09/2024 and evidence of sepsis, UTI. Noted poor catheter care. TTE 11/12 with stable appearing TAVR. Seen by ID, treated with IV cefazolin.  -Continues on IV cefazolin x28d course (end 12/8)  -Follow-up with ID     Urinary retention with chronic indwelling rodriguez catheter  Catheter initially placed 06/2024 due to significant retention. Recommended for urology follow-up as outpatient and was discharged with home care services at the time, unclear if these recommendations were followed as pt reported lack of instructions on rodriguez cares. Has since been seen at hospital for TAVR and  catheter obstruction, each time having rodriguez exchanged. Last exchange estimated September prior to presentation.  -Continue indwelling rodriguez catheter  -Rodriguez exchange in 2 weeks  -Follow up with [facility] at discharge     Acute encephalopathy, multifactorial  Suspected acute delirium  Cognitive decline  Concerns for cognitive function noted during prior hospitalizations. MoCA 13/28, psychiatry evaluated and pt not currently able to make own decisions. Brother-in-law is surrogate. Intermittently required sitter. Started on seroquel with improvement. Has been redirectable and without significant behavioral disturbances at TCU. SLUMS 25/30.  -Continues on seroquel 12.5mg BID + 12.5mg daily PRN agitation  -Supportive management  -Maintain sleep/wake cycle  - for discharge planning     HFrEF, EF 35-40%  CAD  Severe aortic stenosis s/p TAVR 7/17/24  HTN / HLD  Follows with [facility]. TTE with stable LVEF 35-40%, mod global hypokinesia, nml RVSF.  Recommended for fluid restriction while inpatient, pt disliked therefore liberated. Appears euvolemic.  -Continues on ASA, metoprolol, statin, lasix  -Daily weights  -Monitor volume state     Recurrent falls  R wrist pain  R knee pain  Noted to have pain, swelling of R wrist and knee persisting after fall in October. Seen by orthopedics, offered joint aspiration of wrist which pt declined. No acute fractures identified on imaging of either wrist or knee. 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 On my read, ?old distal ulnar styloid fracture. With recent worsening pain. Tramadol ineffective.  -R wrist XR  -Discontinue tramadol  -Start oxycodone low-dose 2.5mg q6h PRN  -Continue pain control with tylenol, voltaren, ACE wrapping to wrist  -PT/OT continuing     Hyponatremia  Na 132 on hospital admit, improved with IVF. Repeat value 138.  -Monitor periodically     Anemia  Hgb 10-11. No evidence of active bleeding. Improved on repeat.  -Monitor  periodically    MED REC REQUIRED  Post Medication Reconciliation Status: medication reconcilation previously completed during another office visit      Electronically signed by: Shoaib Hernandes PA-C

## 2024-12-03 PROCEDURE — S9502 HIT ANTIBIOTIC Q8H DIEM: HCPCS

## 2024-12-03 RX ORDER — OXYCODONE HYDROCHLORIDE 5 MG/1
2.5 TABLET ORAL EVERY 6 HOURS PRN
Qty: 12 TABLET | Refills: 0 | Status: SHIPPED | OUTPATIENT
Start: 2024-12-03

## 2024-12-06 PROBLEM — K80.20 GALLSTONES: Status: ACTIVE | Noted: 2023-02-24

## 2024-12-06 PROBLEM — I35.0 AORTIC STENOSIS, MODERATE: Status: ACTIVE | Noted: 2023-03-07

## 2024-12-06 PROBLEM — K57.50 DIVERTICULOSIS OF BOTH SMALL AND LARGE INTESTINE WITHOUT PERFORATION OR ABSCESS WITHOUT BLEEDING: Status: ACTIVE | Noted: 2023-02-24

## 2024-12-06 PROBLEM — C78.89: Status: ACTIVE | Noted: 2023-02-24

## 2024-12-06 PROBLEM — R01.1 HEART MURMUR, SYSTOLIC: Status: ACTIVE | Noted: 2022-07-15

## 2024-12-06 PROBLEM — N40.0 PROSTATISM: Status: ACTIVE | Noted: 2023-02-24

## 2024-12-06 PROBLEM — N32.0 BLADDER OUTLET OBSTRUCTION: Status: ACTIVE | Noted: 2023-02-24

## 2024-12-06 PROBLEM — C80.1: Status: ACTIVE | Noted: 2023-02-24

## 2024-12-10 ENCOUNTER — DISCHARGE SUMMARY NURSING HOME (OUTPATIENT)
Dept: GERIATRICS | Facility: CLINIC | Age: 89
End: 2024-12-10
Payer: COMMERCIAL

## 2024-12-10 VITALS
BODY MASS INDEX: 23.33 KG/M2 | RESPIRATION RATE: 18 BRPM | HEART RATE: 58 BPM | DIASTOLIC BLOOD PRESSURE: 66 MMHG | OXYGEN SATURATION: 97 % | SYSTOLIC BLOOD PRESSURE: 116 MMHG | HEIGHT: 73 IN | WEIGHT: 176 LBS | TEMPERATURE: 98.3 F

## 2024-12-10 DIAGNOSIS — Z97.8 FOLEY CATHETER IN PLACE: ICD-10-CM

## 2024-12-10 DIAGNOSIS — S63.501D SPRAIN OF RIGHT WRIST, SUBSEQUENT ENCOUNTER: ICD-10-CM

## 2024-12-10 DIAGNOSIS — R78.81 BACTEREMIA: Primary | ICD-10-CM

## 2024-12-10 DIAGNOSIS — T83.511D URINARY TRACT INFECTION ASSOCIATED WITH INDWELLING URETHRAL CATHETER, SUBSEQUENT ENCOUNTER: ICD-10-CM

## 2024-12-10 DIAGNOSIS — M25.531 RIGHT WRIST PAIN: ICD-10-CM

## 2024-12-10 DIAGNOSIS — M17.11 OSTEOARTHRITIS OF RIGHT KNEE, UNSPECIFIED OSTEOARTHRITIS TYPE: ICD-10-CM

## 2024-12-10 DIAGNOSIS — S69.91XD SCAPHOLUNATE LIGAMENT INJURY WITH NO INSTABILITY, RIGHT, SUBSEQUENT ENCOUNTER: ICD-10-CM

## 2024-12-10 DIAGNOSIS — Z74.09 IMPAIRED MOBILITY AND ACTIVITIES OF DAILY LIVING: ICD-10-CM

## 2024-12-10 DIAGNOSIS — R33.9 URINARY RETENTION: ICD-10-CM

## 2024-12-10 DIAGNOSIS — N39.0 URINARY TRACT INFECTION ASSOCIATED WITH INDWELLING URETHRAL CATHETER, SUBSEQUENT ENCOUNTER: ICD-10-CM

## 2024-12-10 DIAGNOSIS — R41.89 COGNITIVE DECLINE: ICD-10-CM

## 2024-12-10 DIAGNOSIS — E78.1 HYPERTRIGLYCERIDEMIA: ICD-10-CM

## 2024-12-10 DIAGNOSIS — I10 BENIGN ESSENTIAL HYPERTENSION: ICD-10-CM

## 2024-12-10 DIAGNOSIS — G93.40 ENCEPHALOPATHY, UNSPECIFIED TYPE: ICD-10-CM

## 2024-12-10 DIAGNOSIS — I50.22 CHRONIC SYSTOLIC HEART FAILURE (H): ICD-10-CM

## 2024-12-10 DIAGNOSIS — Z78.9 IMPAIRED MOBILITY AND ACTIVITIES OF DAILY LIVING: ICD-10-CM

## 2024-12-10 DIAGNOSIS — I35.0 AORTIC STENOSIS, SEVERE: ICD-10-CM

## 2024-12-10 DIAGNOSIS — R53.81 PHYSICAL DECONDITIONING: ICD-10-CM

## 2024-12-10 DIAGNOSIS — Z95.2 S/P TAVR (TRANSCATHETER AORTIC VALVE REPLACEMENT): ICD-10-CM

## 2024-12-10 DIAGNOSIS — E87.1 HYPONATREMIA: ICD-10-CM

## 2024-12-10 NOTE — PROGRESS NOTES
[facility] GERIATRICS DISCHARGE SUMMARY  PATIENT'S NAME: Canelo Cruz  YOB: 1931  MEDICAL RECORD NUMBER:  [id number]  Place of Service where encounter took place:  [facility] (TCU) [[id number]]    PRIMARY CARE PROVIDER AND CLINIC RESPONSIBLE AFTER TRANSFER:   Maribell Stewart MD, [address] / [place] [address]    Non-FMG Provider     Transferring providers: Shoaib Hernandes PA-C, Dr. Alvarado MD  Recent Hospitalization/ED:  [facility] Hospital stay 11/9/24 to 11/17/24.  Date of SNF Admission:  11/17/24  Date of SNF (anticipated) Discharge:  12/12/24  Discharged to: previous independent home  Cognitive Scores: SLUMS: 25/30  Physical Function: ambulating with FWW  DME: SNF  coordinating DME needs     CODE STATUS/ADVANCE DIRECTIVES DISCUSSION:  Full Code   ALLERGIES: Ciprofloxacin    NURSING FACILITY COURSE   Medication Changes/Rationale:   As noted    Summary of nursing facility stay:   89yo male admitted at [facility] from 11/9 - 11/17, 2024 after presenting from home for evaluation of confusion, fever. Found to have sepsis 2/2 CAUTI infection and MSSA bacteremia. Seen in consult with ID, recommended treatment with IV ancef x28d. Midline placed, pt discharged to TCU.     Pt is seen today for discharge planning. Resting in bed on interview, has been wanting to discharge ever since arrival. While at TCU continued with R wrist pain, minimally improved with ACE wrap. Seen by orthopedics and recommended bracing or rest, pt declined. Some concerns raised of pt living with his wife who has severe dementia and recommended consideration towards [facility], pt declined and will be returning home with wife. Denies sob, cp, abdominal discomfort on day of encounter. The following were managed during TCU stay:    MSSA Bacteremia 2/2 CAUTI  Sepsis 2/2 above  Presented with indwelling rodriguez present since 09/2024 and evidence of sepsis, UTI. Noted poor  catheter care. TTE 11/12 with stable appearing TAVR. Seen by ID, treated with IV cefazolin for 28d course.     Urinary retention with chronic indwelling rodriguez catheter  Catheter initially placed 06/2024 due to significant retention. Recommended for urology follow-up as outpatient and was discharged with home care services at the time, unclear if these recommendations were followed as pt reported lack of instructions on rodriguez cares. Has since been seen at hospital for TAVR and catheter obstruction, each time having rodriguez exchanged. Last exchange estimated September prior to presentation. Seen as outpatient by Urology, failed TOV. Instructed to follow-up with MD for cystoscopy, pt declined to attend appointment.  -Continue indwelling rodriguez catheter  -Follow up with [facility] at discharge     Acute encephalopathy, multifactorial  Suspected acute delirium  Cognitive decline  Concerns for cognitive function noted during prior hospitalizations. MoCA 13/28, psychiatry evaluated and pt not currently able to make own decisions. Brother-in-law is surrogate. Intermittently required sitter. Started on seroquel with improvement. Has been redirectable and without significant behavioral disturbances at TCU. SLUMS 25/30.  -Continues on seroquel 12.5mg BID + 12.5mg daily PRN agitation     HFrEF, EF 35-40%  CAD  Severe aortic stenosis s/p TAVR 7/17/24  HTN / HLD  Follows with [facility]. TTE with stable LVEF 35-40%, mod global hypokinesia, nml RVSF.  Recommended for fluid restriction while inpatient, pt disliked therefore liberated. Appears euvolemic.  -Continues on ASA, metoprolol, statin, lasix     Recurrent falls  R wrist pain  R knee pain  Noted to have pain, swelling of R wrist and knee persisting after fall in October. Seen by orthopedics, offered joint aspiration of wrist which pt declined. No acute fractures identified on imaging of either wrist or knee. Seen by orthopedics, suspect ligament/tendon irritation. Offered  bracing, pt declined. Has low-dose oxycodone available, using sparingly.  -Continue pain control with tylenol, voltaren, ACE wrapping to wrist  -Pt continues to drive despite recommendations against, will not be prescribing additional oxycodone at TCU discharge  -PT/OT continuing     Hyponatremia  Na 132 on hospital admit, improved with IVF. Repeat value 138.  -Monitor periodically     Anemia  Hgb 10-11. No evidence of active bleeding. Improved on repeat.  -Monitor periodically    Discharge Medications:  MED REC REQUIRED  Post Medication Reconciliation Status: medication reconcilation previously completed during another office visit     Current Outpatient Medications   Medication Sig Dispense Refill    acetaminophen (TYLENOL) 325 MG tablet Take 2 tablets (650 mg) by mouth every 6 hours as needed for mild pain.      aspirin 81 MG EC tablet Take 81 mg by mouth. Taking 1-2 weekly      atorvastatin (LIPITOR) 40 MG tablet Take 1 tablet (40 mg) by mouth daily 90 tablet 3    diclofenac (VOLTAREN) 1 % topical gel Apply 4 g topically 4 times daily      furosemide (LASIX) 40 MG tablet Take 0.5 tablets (20 mg) by mouth daily.      ibuprofen (ADVIL/MOTRIN) 200 MG tablet Take 3 tablets (600 mg) by mouth every 6 hours as needed for pain.      melatonin 3 MG tablet Take 3 mg by mouth nightly as needed.      metoprolol succinate ER (TOPROL XL) 25 MG 24 hr tablet Take 1 tablet (25 mg) by mouth daily. 90 tablet 3    multivitamin w/minerals (THERA-VIT-M) tablet Take 1 tablet by mouth daily.      oxyCODONE (ROXICODONE) 5 MG tablet Take 0.5 tablets (2.5 mg) by mouth every 6 hours as needed for pain. 12 tablet 0    potassium chloride ER (K-TAB) 20 MEQ CR tablet Take 1 tablet (20 mEq) by mouth daily 90 tablet 0    QUEtiapine (SEROQUEL) 25 MG tablet Take 0.5 tablets (12.5 mg) by mouth 2 times daily. May also take 0.5 tablets (12.5 mg) daily as needed (agitation, anxiety).      tamsulosin (FLOMAX) 0.4 MG capsule Take 1 capsule (0.4 mg) by  "mouth daily as needed (urinary retention). 30 capsule 0       Controlled medications:   not applicable/none     Past Medical History:   Past Medical History:   Diagnosis Date    Gout     Heart murmur     Insomnia     Mixed hyperlipidemia     Postherpetic neuralgia      Physical Exam:   Vitals: /66   Pulse 58   Temp 98.3  F (36.8  C)   Resp 18   Ht 1.854 m (6' 1\")   Wt 79.8 kg (176 lb)   SpO2 97%   BMI 23.22 kg/m    BMI: Body mass index is 23.22 kg/m .  GEN: well-developed, well-nourished, appears comfortable  HEENT: NCAT, EOM intact bilaterally, nose & mouth patent, mucous membranes moist  CHEST: lungs CTA bilaterally, no increased work of breathing, no wheeze, crackles, rhonchi  HEART: RRR, S1 & S2, no murmur  ABD: soft, nontender, nondistended, no guarding or rigidity  MSK: AROM bilateral UE/LE; R wrist with slight edema, tender to palpation at distal wrist, painful with pronation/supination, no wounds, no erythema/warmth, intact ROM of digits of hand, limited ability to make fist 2/2 pain  NEURO: awake, alert, oriented to self. CN II-XII grossly intact. Sensation grossly intact to light touch.   SKIN: warm & dry without rash, no pedal edema    SNF labs: Recent labs in [facility] reviewed by me today.  and Most Recent 3 CBC's:  Recent Labs   Lab Test 11/20/24  0654 11/15/24  0646 11/12/24  0739 11/11/24  0638   WBC 9.5 7.4  --  7.2   HGB 12.1* 11.7* 12.4* 10.9*   MCV 90 89  --  90    161  --  150     Most Recent 3 BMP's:  Recent Labs   Lab Test 11/21/24  0600 11/20/24  0654 11/15/24  0646 11/12/24  0739     --  134* 135   POTASSIUM 4.3  --  3.6 3.8   CHLORIDE 102  --  102 102   CO2 26  --  24 22   BUN 21.2  --  14.4 17.0   CR 1.02 0.95 0.93 1.05   ANIONGAP 10  --  8 11   ROSA 9.3  --  8.6* 8.7*   GLC 83  --  97 98     Most Recent 2 LFT's:  Recent Labs   Lab Test 11/09/24  2254 06/28/24  1414   AST 31 25   ALT 13 14   ALKPHOS 91 100   BILITOTAL 0.6 0.8       DISCHARGE PLAN:  Follow up labs: No " labs orders/due  Medical Follow Up:      Follow up with primary care provider in 1-2 weeks  Follow up with specialist urology in 2-3 weeks   [place] scheduled appointments:     Discharge Services: Home Care:  Occupational Therapy, Physical Therapy, Registered Nurse, and Home Health Aide  Discharge Instructions Verbalized to Patient at Discharge:   [place] catheter: size 16 French; last changed 12/11; change every 4 weeks and as needed.     TOTAL DISCHARGE TIME:   Greater than 30min  Electronically signed by:  Shoaib Hernandes PA-C     Documentation of Face to Face and Certification for Home Health Services    I certify that services are/were furnished while this patient was under the care of a physician and that a physician or an allowed non-physician practitioner (NPP), had a face-to-face encounter that meets the physician face-to-face encounter requirements. The encounter was in whole, or in part, related to the primary reason for home health. The patient is confined to his/her home and needs intermittent skilled nursing, physical therapy, speech-language pathology, or the continued need for occupational therapy. A plan of care has been established by a physician and is periodically reviewed by a physician.  Date of Face-to-Face Encounter: 12/10/2024.    I certify that, based on my findings, the following services are medically necessary home health services: Nursing, Occupational Therapy, and Physical Therapy.    My clinical findings support the need for the above skilled services because: Requires assistance of another person or specialized equipment to access medical services because patient: Range of motion limitations prevents ability to exit home safely...    Patient to re-establish plan of care with their PCP within 7-10 days after leaving the facility to reestablish care.  Medicare certified KATIE provider: Shoaib Hernandes PA-C  Date: December 10, 2024

## 2024-12-11 RX ORDER — QUETIAPINE FUMARATE 25 MG/1
TABLET, FILM COATED ORAL
Qty: 75 TABLET | Refills: 1 | Status: SHIPPED | OUTPATIENT
Start: 2024-12-11

## 2025-01-25 NOTE — PLAN OF CARE
Ajit is a 80-photo-cvp, ex-FT, male presented with 5-days of fever and cough, and 1-day of increased WOB, now admitted for RSV bronchiolitis requiring HFNC.  On exam, increased respiratory effort with pan retractions prior to increasing HFNC to 20L/21% (max setting). On exam with attending, patient continued to show retractions though improved from the morning. Patient's lung exam indicated congestion, will add scheduled hypertonic nebs and albuterol treatments. Will continue to monitor and wean respiratory support as tolerated.     #RSV Bronchiolitis  - HFNC 20L/21% wean as tolerated  - Nasal suction PRN  - chest pt prn   - hypertonic saline nebs + albuterol scheduled q8h  - Tylenol/Motrin PRN    #FENGI  - Regular diet (avoid milk)  End of shift report 16:: No change in patient status this shift. Denied any pain or discomfort. Resting comfortably. Had angiogram, right groin site WDL. Handover report given to next shift RN. Discharge pending safe disposition plan.

## 2025-01-26 ENCOUNTER — LAB REQUISITION (OUTPATIENT)
Dept: LAB | Facility: CLINIC | Age: OVER 89
End: 2025-01-26
Payer: COMMERCIAL

## 2025-01-26 DIAGNOSIS — Z00.01 ENCOUNTER FOR GENERAL ADULT MEDICAL EXAMINATION WITH ABNORMAL FINDINGS: ICD-10-CM

## 2025-02-23 DIAGNOSIS — G93.40 ENCEPHALOPATHY, UNSPECIFIED TYPE: ICD-10-CM

## 2025-02-23 DIAGNOSIS — R41.89 COGNITIVE DECLINE: ICD-10-CM

## 2025-02-24 RX ORDER — QUETIAPINE FUMARATE 25 MG/1
TABLET, FILM COATED ORAL
Qty: 75 TABLET | Refills: 1 | OUTPATIENT
Start: 2025-02-24

## 2025-02-25 ENCOUNTER — TRANSCRIBE ORDERS (OUTPATIENT)
Dept: OTHER | Age: OVER 89
End: 2025-02-25

## 2025-02-25 DIAGNOSIS — N32.0 BLADDER OUTLET OBSTRUCTION: Primary | ICD-10-CM

## 2025-02-25 DIAGNOSIS — Z97.8 CHRONIC INDWELLING FOLEY CATHETER: ICD-10-CM

## 2025-03-09 ENCOUNTER — HEALTH MAINTENANCE LETTER (OUTPATIENT)
Age: OVER 89
End: 2025-03-09

## 2025-03-24 ENCOUNTER — TELEPHONE (OUTPATIENT)
Dept: CARDIOLOGY | Facility: CLINIC | Age: OVER 89
End: 2025-03-24
Payer: COMMERCIAL

## 2025-03-24 NOTE — CONFIDENTIAL NOTE
Called to schedule 1 yr TAVR follow up appt    Patient scheduled the 6 mon TAVR follow up after previously declining.     He also stated that he would like to wait to schedule his 1 yr TAVR follow up appts, let him know that I will call him closer to due date     Nancy Gutierrez  Structural Heart Procedure   [facility]

## 2025-05-07 NOTE — PROGRESS NOTES
Labs had been reviewed prior to administration per Fabi BARNHART verbal order parameters to hold if potassium under 3 or creatinine above 1.9.. mg Lasix given over 6 minutes, followed by Lasix infusion 20 mg/hr over 4 hours completed, see MAR. Pt tolerated well, voided in restroom x3. . Pt discharged per wheel chair to car accompanied by .  To return next week for weekly IV labs and lasix.     Subjective     Canelo Cruz is a 88 year old male who presents to clinic today for the following health issues:    HPI     RT Ear Problem      Duration: 12/15/2019    Description (location/character/radiation): Right Ear,     Hearing Loss since 12-19 -slightly better post ear wash then     Balance Problems - since 11-19    Intensity:  severe    Accompanying signs and symptoms: Dull Ache and Ear Fullness    History (similar episodes/previous evaluation): None    Precipitating or alleviating factors: None    Therapies tried and outcome: Ear Cleaning/Some Relief in 12-19      and today     NONMORBID OBESITY    --BMI = 32  -states is active walkingetc   -comorbid hi trigs, glu intol,   -stable      Glucose Intolerance  Follow-up      How often are you checking your blood sugar? Not at all    What concerns do you have today about your diabetes? None     Do you have any of these symptoms? (Select all that apply)  No numbness or tingling in feet.  No redness, sores or blisters on feet.  No complaints of excessive thirst.  No reports of blurry vision.  No significant changes to weight.      BP Readings from Last 2 Encounters:   02/13/20 130/70   12/17/19 130/80     Hemoglobin A1C (%)   Date Value   11/26/2018 5.7 (H)     LDL Cholesterol Calculated (mg/dL)   Date Value   03/21/2019 139 (H)   11/26/2018 137 (H)         Hyperlipidemia:Triglyceridemia  Follow-Up      Are you regularly taking any medication or supplement to lower your cholesterol?   No    Are you having muscle aches or other side effects that you think could be caused by your cholesterol lowering medication?  No            Reviewed and updated as needed this visit by Provider         Review of Systems   ROS COMP: CONSTITUTIONAL: NEGATIVE for fever, chills, change in weight  INTEGUMENTARY/SKIN: NEGATIVE for worrisome rashes, moles or lesions  EYES: NEGATIVE for vision changes or irritation  ENT/MOUTH: POSITIVE for  and earache right & decreased hearing   RESP:  "NEGATIVE for significant cough or SOB  BREAST: NEGATIVE for masses, tenderness or discharge  CV: NEGATIVE for chest pain, palpitations or peripheral edema  GI: NEGATIVE for nausea, abdominal pain, heartburn, or change in bowel habits  : NEGATIVE for frequency, dysuria, or hematuria  MUSCULOSKELETAL: NEGATIVE for significant arthralgias or myalgia  NEURO: NEGATIVE for weakness, dizziness or paresthesias POS off balance   ENDOCRINE: NEGATIVE for temperature intolerance, skin/hair changes  HEME: NEGATIVE for bleeding problems  PSYCHIATRIC: NEGATIVE for changes in mood or affect      Objective    /70   Pulse 88   Temp 97.9  F (36.6  C) (Tympanic)   Resp 18   Ht 1.778 m (5' 10\")   Wt 101.2 kg (223 lb)   SpO2 92%   BMI 32.00 kg/m    Body mass index is 32 kg/m .  Physical Exam   GENERAL: healthy, alert, no distress, obese and elderly  EYES: Eyes grossly normal to inspection, PERRL and conjunctivae and sclerae normal  HENT: ear canals and TM's normal, nose and mouth without ulcers or lesions  NECK: no adenopathy, no asymmetry, masses, or scars and thyroid normal to palpation  RESP: lungs clear to auscultation - no rales, rhonchi or wheezes  CV: regular rate and rhythm, normal S1 S2, no S3 or S4, no murmur, click or rub, no peripheral edema and peripheral pulses strong  MS: no gross musculoskeletal defects noted, no edema  SKIN: no suspicious lesions or rashes  NEURO: Normal strength and tone, mentation intact and speech normal  PSYCH: mentation appears normal, affect normal/bright  LYMPH: no cervical, supraclavicular, axillary, or inguinal adenopathy    Diagnostic Test Results:  Labs reviewed in Epic        Assessment & Plan       ICD-10-CM    1. Impacted cerumen of right ear H61.21    2. Decreased hearing of right ear- since 12-19 H91.91    3. Balance problems-since 11-19 R26.89    4. Hypertriglyceridemia E78.1    5. Impaired fasting glucose R73.01    6. Class 1 obesity due to excess calories with serious " "comorbidity and body mass index (BMI) of 32.0 to 32.9 in adult E66.09     Z68.32         BMI:   Estimated body mass index is 32 kg/m  as calculated from the following:    Height as of this encounter: 1.778 m (5' 10\").    Weight as of this encounter: 101.2 kg (223 lb).   Weight management plan: Discussed healthy diet and exercise guidelines        Patient Instructions   1. Use Debrox or cerumenex Put one to 2  Drops of this in  Each ear one at a time and hold with that ear up  For 15-30min until you get relief  NO Q tips in the ear !! Nothing smaller than your elbow in your ear You may use the shower or a sink faucet to run warm water in the ear to clean it out    2.  Weight Loss Tips  1. Do not eat after 6 hrs before your expected bedtime  2. Have your heaviest meal for breakfast, a slightly lighter meal at lunch and a snack 6 hrs before bed  3. No sugar/calorie drinks except milk ie no fruit juice, pop, alcohol.  4. Drink milk 30min before meals to decrease your hunger. Also it is excellent as part of your last meal of the day snack  5. Drink lots of water  6. Increase fiber in diet: all bran cereal, salads, popcorn etc  7. Have only one small serving of fruit a day about 1/2 cup (as this is high in sugar)  8. EXERCISE is the bottom line. Without it, you will gain weight even on a low calorie diet. Best if done 2-3X a day as can    Being overweight contributes to high blood pressure and high cholesterol, both of which cause heart attacks, strokes and kidney failure, prediabetes and diabetes, arthritis, and liver disease     You must also decrease your caloric intake and especially decrease the carbs or carbohydrates as these are the most harmful regarding the above health risks      Patient Education     Earache, No Infection (Adult)  Earaches can happen without an infection. This occurs when air and fluid build up behind the eardrum causing a feeling of fullness and discomfort and reduced hearing. This is called " otitis media with effusion (OME) or serous otitis media. It means there is fluid in the middle ear. It is not the same as acute otitis media, which is typically from infection.  OME can happen when you have a cold if congestion blocks the passage that drains the middle ear. This passage is called the eustachian tube. OME may also occur with nasal allergies or after a bacterial middle ear infection.    The pain or discomfort may come and go. You may hear clicking or popping sounds when you chew or swallow. You may feel that your balance is off. Or you may hear ringing in the ear.  It often takes from several weeks up to 3 months for the fluid to clear on its own. Oral pain relievers and ear drops help if there is pain. Decongestants and antihistamines sometimes help. Antibiotics don't help since there is no infection. Your doctor may prescribe a nasal spray to help reduce swelling in the nose and eustachian tube. This can allow the ear to drain.  If your OME doesn't improve after 3 months, surgery may be used to drain the fluid and insert a small tube in the eardrum to allow continued drainage.  Because the middle ear fluid can become infected, it is important to watch for signs of an ear infection which may develop later. These signs include increased ear pain, fever, or drainage from the ear.  Home care  The following guidelines will help you care for yourself at home:    You may use over-the-counter medicine as directed to control pain, unless another medicine was prescribed. If you have chronic liver or kidney disease or ever had a stomach ulcer or GI bleeding, talk with your doctor before using these medicines. Aspirin should never be used in anyone under 18 years of age who is ill with a fever. It may cause severe liver damage.    You may use over-the-counter decongestants such as phenylephrine or pseudoephedrine. But they are not always helpful. Don't use nasal spray decongestants more than 3 days. Longer use  can make congestion worse. Prescription nasal sprays from your doctor don't typically have those restrictions.    Antihistamines may help if you are also having allergy symptoms.    You may use medicines such as guaifenesin to thin mucus and promote drainage.  Follow-up care  Follow up with your healthcare provider or as advised if you are not feeling better after 3 days.  When to seek medical advice  Call your healthcare provider right away if any of the following occur:    Your ear pain gets worse or does not start to improve     Fever of 100.4 F (38 C) or higher, or as directed by your healthcare provider    Fluid or blood draining from the ear    Headache or sinus pain    Stiff neck    Unusual drowsiness or confusion  Date Last Reviewed: 10/1/2016    5942-6701 [employer]. [address] [place] [address], [place] [address]. All rights reserved. This information is not intended as a substitute for professional medical care. Always follow your healthcare professional's instructions.         2. Call and see shot record   Ask them if you had the pneumonia shot  And was it 23 or 13 valent   If the 1st pneumonia shot was > a yr ago , then you need the 2nd one     3. Shingrex is a 2 shot series that prevents shingles 97% of the time, as opposed to the old shingles shot that only prevented it at 40-50%  It costs less for medicare at a pharmacy  You should get it starting at 50 yrs old get the 2nd shot 5-6 mo after the first one    4 consider getting a cold air vaporizer     5.  Run a cold air vaporizer as much as possible. If you cannot,  boil water and breath the warm vapors 2-3 times a day to try to open up the sinuses take 2400mgm of guaifenesin per 24 hours   You can do this by taking  Mucinex plain blue  1200 mg  One tablet twice a day (This may come as 600mg/tablet and you need to take 2 tabs twice a day) or you could buy the cheaper  generic 400mgm / tab and take 2 tablets 3 x a day or 1 and 1/2 tablets 4 x  a day . .Guaifenesin is  the major component of most cough syrups, because it makes the mucus less thick, and therefore it drains out better and you are less likely to cough from it dripping on the back of your throat.  Irrigate the  nose with plain water under the kitchen sink faucet or the shower.  Keesha pots, spray bottles, etc accumulate bacteria and are not recommended.   The tickle in the throat is also helped by gargling with vinegar and honey mixture, or pop or mouth wash as these coat the throat.  Please try to rinse teeth with water after using these .   Do not use sudafed or pseudephedrine as it dries the mucus up so it is harder to get it out, and it can raise your BP       ENT  Per insurance     I  Explained the treatment and the reason for it   That this is likely acombination of wax and serous otitis with or wout inner ear hearing loss   Return in about 6 months (around 8/13/2020) for Physical Exam.    Vanessa Foster MD  [facility]    Weight management plan: Discussed healthy diet and exercise guidelines      Vanessa Foster MD

## 2025-05-08 ENCOUNTER — TELEPHONE (OUTPATIENT)
Dept: CARDIOLOGY | Facility: CLINIC | Age: OVER 89
End: 2025-05-08
Payer: COMMERCIAL

## 2025-05-08 NOTE — TELEPHONE ENCOUNTER
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

## (undated) DEVICE — LINEN TOWEL PACK X5 5464

## (undated) DEVICE — INTRO TERUMO 7FRX25CM W/MARKER RSB703

## (undated) DEVICE — CATH ANGIO INFINITI PIGTAIL 145 6 SH 6FRX110CM  534-652S

## (undated) DEVICE — SYR 10ML FINGER CONTROL W/O NDL 309695

## (undated) DEVICE — Device

## (undated) DEVICE — TOTE ANGIO CORP PC15AT SAN32CC83O

## (undated) DEVICE — DAVINCI XI FCP BIPOLAR FENESTRATED 470205

## (undated) DEVICE — LUBRICANT INST ELECTROLUBE EL101

## (undated) DEVICE — DRAPE SHEET REV FOLD 3/4 9349

## (undated) DEVICE — CATH ANGIO INFINITI JL5 4FRX100CM 538422

## (undated) DEVICE — KIT HAND CONTROL ANGIOTOUCH ACIST 65CM AT-P65

## (undated) DEVICE — ENDO TROCAR FIRST ENTRY KII FIOS Z-THRD 05X100MM CTF03

## (undated) DEVICE — CATH ANGIO INFINITI 3DRC 4FRX100CM 538476

## (undated) DEVICE — WIRE GUIDE 0.035"X150CM EMERALD STR 502542

## (undated) DEVICE — DAVINCI XI DRAPE ARM 470015

## (undated) DEVICE — DAVINCI XI SEAL UNIVERSAL 5-8MM 470361

## (undated) DEVICE — GUIDEWIRE VASC SAFARI2 0.035X275CM H74939406XS1

## (undated) DEVICE — DAVINCI XI DRAPE COLUMN 470341

## (undated) DEVICE — GUIDEWIRE VASC 40CM .018IN NT PLAT TI

## (undated) DEVICE — DAVINCI XI NDL DRIVER MEGA SUTURE CUT 8MM 470309

## (undated) DEVICE — CATH DIAG 4FR JL 4.5 538417

## (undated) DEVICE — ESU GROUND PAD UNIVERSAL W/O CORD

## (undated) DEVICE — CLOSURE DEVICE 6FR VASC PROGLIDE MEDICATED SUTURE 12673-03

## (undated) DEVICE — DEFIB PRO-PADZ LVP LQD GEL ADULT 8900-2105-01

## (undated) DEVICE — WIRE GUIDE LUNDERQUIST 0.035"X260CM DBL CVD

## (undated) DEVICE — CATH ANGIO SUPERTORQUE AL1 6FRX100CM 532-645

## (undated) DEVICE — MANIFOLD KIT ANGIO AUTOMATED 014613

## (undated) DEVICE — INTRO SHEATH 4FRX25CM PINNACLE MARKER RSB403

## (undated) DEVICE — BLADE KNIFE SURG 11 371111

## (undated) DEVICE — VALVE DELIVERY SYSTEM 26MM SAPIEN3 ULTRA COMMANDER 9750CM26

## (undated) DEVICE — CABLE PCNG 12FT REMINGTON PACI

## (undated) DEVICE — SOL NACL 0.9% IRRIG 1000ML BOTTLE 07138-09

## (undated) DEVICE — SYSTEM LAPAROVUE VISIBILITY LAPVUE10

## (undated) DEVICE — SU MONOCRYL 4-0 PS-2 18" UND Y496G

## (undated) DEVICE — INTRO SHEATH 7FRX10CM PINNACLE RSS702

## (undated) DEVICE — WIRE GUIDE 0.035"X260CM SAFE-T-J EXCHANGE G00517

## (undated) DEVICE — INTRO SHEATH 4FRX10CM PINNACLE RSS402

## (undated) DEVICE — DAVINCI XI MONOPOLAR SCISSORS HOT SHEARS 8MM 470179

## (undated) DEVICE — GLOVE BIOGEL PI SZ 8.0 40880

## (undated) DEVICE — DAVINCI HOT SHEARS TIP COVER  400180

## (undated) DEVICE — PREP CHLORAPREP 26ML TINTED HI-LITE ORANGE 930815

## (undated) DEVICE — RAD CLOSURE ANGIOSEAL 8FR  610131

## (undated) DEVICE — LIGHT HANDLE X2

## (undated) DEVICE — PACK LAP CHOLE SLC15LCFSD

## (undated) DEVICE — INTRODUCER CATH VASC 5FRX10CM  MPIS-501-NT-U-SST

## (undated) DEVICE — BLADE CLIPPER 4406

## (undated) DEVICE — CATH EP PACEL 5FRX110CM 1MM RIGHT HEART CURVE 401763

## (undated) DEVICE — SU VICRYL 2-0 SH 27" J317H

## (undated) DEVICE — GLOVE PROTEXIS W/NEU-THERA 6.5  2D73TE65

## (undated) DEVICE — RAD INTRODUCER KIT MICRO 5FRX10CM .018 NITINOL G/W

## (undated) DEVICE — GLOVE BIOGEL PI MICRO INDICATOR UNDERGLOVE SZ 8.0 48980

## (undated) DEVICE — NDL PERC ENTRY THINWALL 18GA 7.0" G00166

## (undated) DEVICE — SU STRATAFIX PDS PLUS 3-0 SPIRAL SH 15CM SXPP1B420

## (undated) DEVICE — INTRO TERUMO 6FRX25CM W/MARKER RSB603

## (undated) DEVICE — INFLATION DEVICE ATRION QL2530

## (undated) RX ORDER — HYDROMORPHONE HCL IN WATER/PF 6 MG/30 ML
PATIENT CONTROLLED ANALGESIA SYRINGE INTRAVENOUS
Status: DISPENSED
Start: 2022-07-18

## (undated) RX ORDER — HEPARIN SODIUM 200 [USP'U]/100ML
INJECTION, SOLUTION INTRAVENOUS
Status: DISPENSED
Start: 2024-07-16

## (undated) RX ORDER — FENTANYL CITRATE 50 UG/ML
INJECTION, SOLUTION INTRAMUSCULAR; INTRAVENOUS
Status: DISPENSED
Start: 2022-07-18

## (undated) RX ORDER — FENTANYL CITRATE 50 UG/ML
INJECTION, SOLUTION INTRAMUSCULAR; INTRAVENOUS
Status: DISPENSED
Start: 2024-07-16

## (undated) RX ORDER — HEPARIN SODIUM 1000 [USP'U]/ML
INJECTION, SOLUTION INTRAVENOUS; SUBCUTANEOUS
Status: DISPENSED
Start: 2024-07-16

## (undated) RX ORDER — BUPIVACAINE HYDROCHLORIDE AND EPINEPHRINE 5; 5 MG/ML; UG/ML
INJECTION, SOLUTION EPIDURAL; INTRACAUDAL; PERINEURAL
Status: DISPENSED
Start: 2022-07-18

## (undated) RX ORDER — LIDOCAINE HYDROCHLORIDE 10 MG/ML
INJECTION, SOLUTION EPIDURAL; INFILTRATION; INTRACAUDAL; PERINEURAL
Status: DISPENSED
Start: 2024-06-13

## (undated) RX ORDER — CEFAZOLIN SODIUM 2 G/100ML
INJECTION, SOLUTION INTRAVENOUS
Status: DISPENSED
Start: 2024-07-16

## (undated) RX ORDER — CEFAZOLIN SODIUM/WATER 2 G/20 ML
SYRINGE (ML) INTRAVENOUS
Status: DISPENSED
Start: 2022-07-18

## (undated) RX ORDER — FENTANYL CITRATE-0.9 % NACL/PF 10 MCG/ML
PLASTIC BAG, INJECTION (ML) INTRAVENOUS
Status: DISPENSED
Start: 2024-07-16

## (undated) RX ORDER — FENTANYL CITRATE 50 UG/ML
INJECTION, SOLUTION INTRAMUSCULAR; INTRAVENOUS
Status: DISPENSED
Start: 2024-06-13

## (undated) RX ORDER — ASPIRIN 81 MG/1
TABLET ORAL
Status: DISPENSED
Start: 2024-07-16

## (undated) RX ORDER — PROPOFOL 10 MG/ML
INJECTION, EMULSION INTRAVENOUS
Status: DISPENSED
Start: 2022-07-18

## (undated) RX ORDER — HEPARIN SODIUM 200 [USP'U]/100ML
INJECTION, SOLUTION INTRAVENOUS
Status: DISPENSED
Start: 2024-06-13

## (undated) RX ORDER — LIDOCAINE HYDROCHLORIDE 10 MG/ML
INJECTION, SOLUTION EPIDURAL; INFILTRATION; INTRACAUDAL; PERINEURAL
Status: DISPENSED
Start: 2024-07-16

## (undated) RX ORDER — HEPARIN SODIUM 1000 [USP'U]/ML
INJECTION, SOLUTION INTRAVENOUS; SUBCUTANEOUS
Status: DISPENSED
Start: 2024-06-13